# Patient Record
Sex: FEMALE | Race: BLACK OR AFRICAN AMERICAN | Employment: OTHER | URBAN - METROPOLITAN AREA
[De-identification: names, ages, dates, MRNs, and addresses within clinical notes are randomized per-mention and may not be internally consistent; named-entity substitution may affect disease eponyms.]

---

## 2017-04-25 ENCOUNTER — HOSPITAL ENCOUNTER (EMERGENCY)
Age: 57
Discharge: HOME | End: 2017-04-25
Attending: EMERGENCY MEDICINE
Payer: MEDICARE

## 2017-04-25 VITALS
RESPIRATION RATE: 18 BRPM | TEMPERATURE: 97.8 F | DIASTOLIC BLOOD PRESSURE: 96 MMHG | HEIGHT: 64 IN | OXYGEN SATURATION: 96 % | HEART RATE: 63 BPM | BODY MASS INDEX: 39.09 KG/M2 | SYSTOLIC BLOOD PRESSURE: 162 MMHG | WEIGHT: 229 LBS

## 2017-04-25 DIAGNOSIS — I10 ESSENTIAL HYPERTENSION: Primary | ICD-10-CM

## 2017-04-25 LAB
ANION GAP BLD CALC-SCNC: 19 MMOL/L (ref 5–15)
BUN BLD-MCNC: 13 MG/DL (ref 9–20)
CA-I BLD-MCNC: 1.13 MMOL/L (ref 1.12–1.32)
CHLORIDE BLD-SCNC: 104 MMOL/L (ref 98–107)
CO2 BLD-SCNC: 24 MMOL/L (ref 21–32)
CREAT BLD-MCNC: 0.6 MG/DL (ref 0.6–1.3)
GLUCOSE BLD-MCNC: 85 MG/DL (ref 65–100)
HCT VFR BLD CALC: 44 % (ref 35–47)
HGB BLD-MCNC: 15 GM/DL (ref 11.5–16)
POTASSIUM BLD-SCNC: 3.7 MMOL/L (ref 3.5–5.1)
SERVICE CMNT-IMP: ABNORMAL
SODIUM BLD-SCNC: 142 MMOL/L (ref 136–145)

## 2017-04-25 PROCEDURE — 74011250637 HC RX REV CODE- 250/637: Performed by: PHYSICIAN ASSISTANT

## 2017-04-25 PROCEDURE — 93005 ELECTROCARDIOGRAM TRACING: CPT

## 2017-04-25 PROCEDURE — 80047 BASIC METABLC PNL IONIZED CA: CPT

## 2017-04-25 PROCEDURE — 99284 EMERGENCY DEPT VISIT MOD MDM: CPT

## 2017-04-25 RX ORDER — LISINOPRIL 20 MG/1
20 TABLET ORAL DAILY
Qty: 20 TAB | Refills: 0 | Status: SHIPPED | OUTPATIENT
Start: 2017-04-25 | End: 2017-05-01 | Stop reason: SDUPTHER

## 2017-04-25 RX ORDER — METOPROLOL TARTRATE 25 MG/1
TABLET, FILM COATED ORAL
Qty: 10 TAB | Refills: 0 | Status: SHIPPED | OUTPATIENT
Start: 2017-04-25 | End: 2017-05-01 | Stop reason: SDUPTHER

## 2017-04-25 RX ORDER — AMLODIPINE BESYLATE 2.5 MG/1
2.5 TABLET ORAL DAILY
Qty: 20 TAB | Refills: 0 | Status: SHIPPED | OUTPATIENT
Start: 2017-04-25 | End: 2017-05-01 | Stop reason: SDUPTHER

## 2017-04-25 RX ORDER — METOPROLOL TARTRATE 25 MG/1
TABLET, FILM COATED ORAL
Qty: 10 TAB | Refills: 11 | Status: SHIPPED | OUTPATIENT
Start: 2017-04-25 | End: 2017-04-25

## 2017-04-25 RX ORDER — LISINOPRIL 20 MG/1
20 TABLET ORAL DAILY
Qty: 20 TAB | Refills: 11 | Status: SHIPPED | OUTPATIENT
Start: 2017-04-25 | End: 2017-04-25

## 2017-04-25 RX ORDER — LISINOPRIL 20 MG/1
20 TABLET ORAL
Status: COMPLETED | OUTPATIENT
Start: 2017-04-25 | End: 2017-04-25

## 2017-04-25 RX ORDER — AMLODIPINE BESYLATE 2.5 MG/1
2.5 TABLET ORAL DAILY
Qty: 20 TAB | Refills: 0 | Status: SHIPPED | OUTPATIENT
Start: 2017-04-25 | End: 2017-04-25

## 2017-04-25 RX ORDER — CLOPIDOGREL BISULFATE 75 MG/1
75 TABLET ORAL DAILY
Qty: 20 TAB | Refills: 0 | Status: SHIPPED | OUTPATIENT
Start: 2017-04-25 | End: 2017-05-01 | Stop reason: SDUPTHER

## 2017-04-25 RX ORDER — CLOPIDOGREL BISULFATE 75 MG/1
75 TABLET ORAL DAILY
Qty: 20 TAB | Refills: 11 | Status: SHIPPED | OUTPATIENT
Start: 2017-04-25 | End: 2017-04-25

## 2017-04-25 RX ORDER — AMLODIPINE BESYLATE 5 MG/1
2.5 TABLET ORAL
Status: COMPLETED | OUTPATIENT
Start: 2017-04-25 | End: 2017-04-25

## 2017-04-25 RX ORDER — METOPROLOL TARTRATE 25 MG/1
12.5 TABLET, FILM COATED ORAL
Status: COMPLETED | OUTPATIENT
Start: 2017-04-25 | End: 2017-04-25

## 2017-04-25 RX ADMIN — LISINOPRIL 20 MG: 20 TABLET ORAL at 11:48

## 2017-04-25 RX ADMIN — METOPROLOL TARTRATE 12.5 MG: 25 TABLET, FILM COATED ORAL at 11:48

## 2017-04-25 RX ADMIN — AMLODIPINE BESYLATE 2.5 MG: 5 TABLET ORAL at 11:48
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
## 2017-04-25 NOTE — DISCHARGE INSTRUCTIONS

## 2017-04-25 NOTE — ED NOTES
Patient verbalizes understanding of discharge instructions. Ambulatory and in no acute distress at discharge.    Visit Vitals    BP (!) 162/96 (BP 1 Location: Right arm, BP Patient Position: At rest)    Pulse 63    Temp 97.8 °F (36.6 °C)    Resp 18    Ht 5' 4\" (1.626 m)    Wt 103.9 kg (229 lb)    SpO2 96%    BMI 39.31 kg/m2

## 2017-04-27 ENCOUNTER — PATIENT OUTREACH (OUTPATIENT)
Dept: INTERNAL MEDICINE CLINIC | Age: 57
End: 2017-04-27

## 2017-04-28 NOTE — PROGRESS NOTES
Attempted to reach patient via telephone, left message to call Panel Manager. Patient listed on [facility] dated 4/25/2017 for Hypertension. Need to complete Initial Intake and Post Assessment. PAtient has f/u appointment for 5/1/2017. Will see patient at that time.

## 2017-04-30 LAB
ATRIAL RATE: 78 BPM
CALCULATED P AXIS, ECG09: 31 DEGREES
CALCULATED R AXIS, ECG10: 6 DEGREES
CALCULATED T AXIS, ECG11: 44 DEGREES
DIAGNOSIS, 93000: NORMAL
P-R INTERVAL, ECG05: 164 MS
Q-T INTERVAL, ECG07: 384 MS
QRS DURATION, ECG06: 84 MS
QTC CALCULATION (BEZET), ECG08: 437 MS
VENTRICULAR RATE, ECG03: 78 BPM

## 2017-05-01 ENCOUNTER — OFFICE VISIT (OUTPATIENT)
Dept: INTERNAL MEDICINE CLINIC | Age: 57
End: 2017-05-01

## 2017-05-01 ENCOUNTER — HOSPITAL ENCOUNTER (OUTPATIENT)
Dept: LAB | Age: 57
Discharge: HOME | End: 2017-05-01

## 2017-05-01 VITALS
HEART RATE: 70 BPM | RESPIRATION RATE: 20 BRPM | DIASTOLIC BLOOD PRESSURE: 111 MMHG | WEIGHT: 226.4 LBS | OXYGEN SATURATION: 100 % | TEMPERATURE: 97.8 F | HEIGHT: 64 IN | BODY MASS INDEX: 38.65 KG/M2 | SYSTOLIC BLOOD PRESSURE: 148 MMHG

## 2017-05-01 DIAGNOSIS — Z86.73 HISTORY OF CVA (CEREBROVASCULAR ACCIDENT): ICD-10-CM

## 2017-05-01 DIAGNOSIS — I10 ESSENTIAL HYPERTENSION: Primary | ICD-10-CM

## 2017-05-01 DIAGNOSIS — Z12.39 BREAST CANCER SCREENING: ICD-10-CM

## 2017-05-01 DIAGNOSIS — E66.01 MORBID OBESITY DUE TO EXCESS CALORIES (HCC): Chronic | ICD-10-CM

## 2017-05-01 DIAGNOSIS — I70.90 ASVD (ARTERIOSCLEROTIC VASCULAR DISEASE): ICD-10-CM

## 2017-05-01 LAB
GLUCOSE POC: 126 MG/DL
HBA1C MFR BLD HPLC: 5.7 %

## 2017-05-01 PROCEDURE — 99001 SPECIMEN HANDLING PT-LAB: CPT | Performed by: FAMILY MEDICINE

## 2017-05-01 RX ORDER — ATORVASTATIN CALCIUM 10 MG/1
10 TABLET, FILM COATED ORAL
Qty: 30 TAB | Refills: 11 | Status: SHIPPED | OUTPATIENT
Start: 2017-05-01 | End: 2018-11-26 | Stop reason: SDUPTHER

## 2017-05-01 RX ORDER — LISINOPRIL 20 MG/1
20 TABLET ORAL DAILY
Qty: 30 TAB | Refills: 11 | Status: SHIPPED | OUTPATIENT
Start: 2017-05-01 | End: 2018-11-26 | Stop reason: SDUPTHER

## 2017-05-01 RX ORDER — AMLODIPINE BESYLATE 2.5 MG/1
2.5 TABLET ORAL DAILY
Qty: 30 TAB | Refills: 11 | Status: SHIPPED | OUTPATIENT
Start: 2017-05-01 | End: 2018-11-26 | Stop reason: SDUPTHER

## 2017-05-01 RX ORDER — METOPROLOL TARTRATE 25 MG/1
TABLET, FILM COATED ORAL
Qty: 30 TAB | Refills: 11 | Status: SHIPPED | OUTPATIENT
Start: 2017-05-01 | End: 2018-11-26 | Stop reason: SDUPTHER

## 2017-05-01 RX ORDER — FLUOXETINE HYDROCHLORIDE 20 MG/1
CAPSULE ORAL
Qty: 30 CAP | Refills: 11 | Status: SHIPPED | OUTPATIENT
Start: 2017-05-01 | End: 2018-11-26

## 2017-05-01 RX ORDER — NORTRIPTYLINE HYDROCHLORIDE 10 MG/1
10 CAPSULE ORAL
Qty: 30 CAP | Refills: 11 | Status: SHIPPED | OUTPATIENT
Start: 2017-05-01 | End: 2018-11-26

## 2017-05-01 RX ORDER — GUAIFENESIN 100 MG/5ML
81 LIQUID (ML) ORAL DAILY
Qty: 30 TAB | Refills: 11 | Status: SHIPPED | OUTPATIENT
Start: 2017-05-01 | End: 2019-07-29 | Stop reason: SDUPTHER

## 2017-05-01 RX ORDER — CLOPIDOGREL BISULFATE 75 MG/1
75 TABLET ORAL DAILY
Qty: 30 TAB | Refills: 11 | Status: SHIPPED | OUTPATIENT
Start: 2017-05-01 | End: 2018-11-26 | Stop reason: SDUPTHER

## 2017-05-01 NOTE — MR AVS SNAPSHOT
Visit Information Date & Time Provider Department Dept. Phone Encounter #  
 5/1/2017  7:45 AM Zoë Corea, [address] [id number] Follow-up Instructions Return for HTN. Upcoming Health Maintenance Date Due Hepatitis C Screening 1960 COLONOSCOPY 9/4/1976 DTaP/Tdap/Td series (1 - Tdap) 9/4/1979 PAP AKA CERVICAL CYTOLOGY 9/4/1979 BREAST CANCER SCRN MAMMOGRAM 9/4/2008 INFLUENZA AGE 9 TO ADULT 8/1/2017 Allergies as of 5/1/2017  Review Complete On: 5/1/2017 By: Zoë Corea MD  
  
 Severity Noted Reaction Type Reactions Contrast Dye [Iodine]  03/02/2016    Hives Current Immunizations  Reviewed on 10/28/2014 Name Date Influenza Vaccine Intradermal PF 10/28/2014 Influenza Vaccine Split 10/27/2012  2:33 PM  
 Pneumococcal Vaccine (Unspecified Type) 10/27/2012  2:33 PM  
  
 Not reviewed this visit You Were Diagnosed With   
  
 Codes Comments Essential hypertension    -  Primary ICD-10-CM: I10 
ICD-9-CM: 401.9 History of CVA (cerebrovascular accident)     ICD-10-CM: Z86.73 
ICD-9-CM: V12.54   
 ASVD (arteriosclerotic vascular disease)     ICD-10-CM: I70.90 ICD-9-CM: 440.9 Breast cancer screening     ICD-10-CM: Z12.39 
ICD-9-CM: V76.10 Morbid obesity due to excess calories (HCC)     ICD-10-CM: E66.01 
ICD-9-CM: 278.01 Vitals BP Pulse Temp Resp Height(growth percentile) Weight(growth percentile) (!) 148/111 (BP 1 Location: Right arm, BP Patient Position: Sitting) 70 97.8 °F (36.6 °C) (Oral) 20 5' 4\" (1.626 m) 226 lb 6.4 oz (102.7 kg) SpO2 BMI OB Status Smoking Status 100% 38.86 kg/m2 Menopause Never Smoker Vitals History BMI and BSA Data Body Mass Index Body Surface Area  
 38.86 kg/m 2 2.15 m 2 Preferred Pharmacy Pharmacy Name Phone [facility] [address] [address] [phone] Your Updated Medication List  
  
   
This list is accurate as of: 5/1/17  9:21 AM.  Always use your most recent med list. amLODIPine 2.5 mg tablet Commonly known as:  Sue Cailin Take 1 Tab by mouth daily. aspirin 81 mg chewable tablet Take 1 Tab by mouth daily. atorvastatin 10 mg tablet Commonly known as:  LIPITOR Take 1 Tab by mouth nightly. clopidogrel 75 mg Tab Commonly known as:  PLAVIX Take 1 Tab by mouth daily. Indications: stroke prevention FLUoxetine 20 mg capsule Commonly known as:  PROzac TAKE ONE CAPSULE BY MOUTH ONCE DAILY  
  
 lisinopril 20 mg tablet Commonly known as:  Dulcie Teaganey Take 1 Tab by mouth daily. Indications: hypertension  
  
 metoprolol tartrate 25 mg tablet Commonly known as:  LOPRESSOR  
TAKE ONE-HALF TABLET BY MOUTH TWICE DAILY  
  
 nortriptyline 10 mg capsule Commonly known as:  PAMELOR Take 1 Cap by mouth nightly. Prescriptions Sent to Pharmacy Refills  
 aspirin 81 mg chewable tablet 11 Sig: Take 1 Tab by mouth daily. Class: Normal  
 Pharmacy: [address], [address] [place] [address] Ph #: [phone] Route: Oral  
 nortriptyline (PAMELOR) 10 mg capsule 11 Sig: Take 1 Cap by mouth nightly. Class: Normal  
 Pharmacy: [address], [address] [place] [address] Ph #: [phone] Route: Oral  
 atorvastatin (LIPITOR) 10 mg tablet 11 Sig: Take 1 Tab by mouth nightly. Class: Normal  
 Pharmacy: [address], [address] [place] [address] Ph #: [phone] Route: Oral  
 FLUoxetine (PROZAC) 20 mg capsule 11 Sig: TAKE ONE CAPSULE BY MOUTH ONCE DAILY Class: Normal  
 Pharmacy: [address], [address] [facility] Ph #: [phone]  
 metoprolol tartrate (LOPRESSOR) 25 mg tablet 11 Sig: TAKE ONE-HALF TABLET BY MOUTH TWICE DAILY  Class: Normal  
 Pharmacy: [address], [address] Ph #: [phone]  
 amLODIPine (NORVASC) 2.5 mg tablet 11 Sig: Take 1 Tab by mouth daily. Class: Normal  
 Pharmacy: [address] , [address] Ph #: [phone] Route: Oral  
 clopidogrel (PLAVIX) 75 mg tab 11 Sig: Take 1 Tab by mouth daily. Indications: stroke prevention Class: Normal  
 Pharmacy: [address], [address] Ph #: [phone] Route: Oral  
 lisinopril (PRINIVIL, ZESTRIL) 20 mg tablet 11 Sig: Take 1 Tab by mouth daily. Indications: hypertension Class: Normal  
 Pharmacy: [address], [address] Ph #: [phone] Route: Oral  
  
We Performed the Following AMB POC GLUCOSE BLOOD, BY GLUCOSE MONITORING DEVICE [37171 CPT(R)] AMB POC HEMOGLOBIN A1C [81203 CPT(R)] LIPID PANEL [22789 CPT(R)] METABOLIC PANEL, COMPREHENSIVE [69683 CPT(R)] Follow-up Instructions Return for HTN. To-Do List   
 05/01/2017 Imaging:  [facility] MAMMO BI SCREENING INCL CAD Introducing [facility]! [facility] introduces [employer] patient portal. Now you can access parts of your medical record, email your doctor's office, and request medication refills online. 1. In your internet browser, go to [email or web address] 2. Click on the First Time User? Click Here link in the Sign In box. You will see the New Member Sign Up page. 3. Enter your [employer] Access Code exactly as it appears below. You will not need to use this code after youve completed the sign-up process. If you do not sign up before the expiration date, you must request a new code. · [employer] Access Code: [email or web address] Expires: 7/30/2017  7:56 AM 
 
4.  Enter the last four digits of your Social Security Number (xxxx) and Date of Birth (mm/dd/yyyy) as indicated and click Submit. You will be taken to the next sign-up page. 5. Create a [employer] ID. This will be your [employer] login ID and cannot be changed, so think of one that is secure and easy to remember. 6. Create a [employer] password. You can change your password at any time. 7. Enter your Password Reset Question and Answer. This can be used at a later time if you forget your password. 8. Enter your e-mail address. You will receive e-mail notification when new information is available in [address] 9. Click Sign Up. You can now view and download portions of your medical record. 10. Click the Download Summary menu link to download a portable copy of your medical information. If you have questions, please visit the Frequently Asked Questions section of the [employer] website. Remember, [employer] is NOT to be used for urgent needs. For medical emergencies, dial 911. Now available from your iPhone and Android! Please provide this summary of care documentation to your next provider. Your primary care clinician is listed as Rupa Hernandez. If you have any questions after today's visit, please call [phone].

## 2017-05-01 NOTE — PROGRESS NOTES
1. Have you been to the ER, urgent care clinic since your last visit? Hospitalized since your last visit? Yes When: 4/25/17 [facility] ED /medication refill. 2. Have you seen or consulted any other health care providers outside of the [facility] since your last visit? Include any pap smears or colon screening.  No.

## 2017-05-01 NOTE — PROGRESS NOTES
Suzzanne Hammans is a 60 y.o. female and presents with Medication Refill  . Pt is on on her meds but didn't take meds this am. No CP, SOB, or edema. Review of Systems  Constitutional: negative for fevers, chills, anorexia and weight loss  Eyes:   negative for visual disturbance and irritation  ENT:   negative for tinnitus,sore throat,nasal congestion,ear pains. hoarseness  Respiratory:  negative for cough, hemoptysis, dyspnea,wheezing  CV:   negative for chest pain, palpitations, lower extremity edema  GI:   negative for nausea, vomiting, diarrhea, abdominal pain,melena  Endo:               negative for polyuria,polydipsia,polyphagia,heat intolerance  Genitourinary: negative for frequency, dysuria and hematuria  Integument:  negative for rash and pruritus  Hematologic:  negative for easy bruising and gum/nose bleeding  Musculoskel: negative for myalgias, arthralgias, back pain, muscle weakness, joint pain  Neurological:  negative for headaches, dizziness, vertigo, memory problems and gait   Behavl/Psych: negative for feelings of anxiety, depression, mood changes    Past Medical History:   Diagnosis Date    Anxiety     Bipolar 1 disorder (HCC)     Depression     GERD (gastroesophageal reflux disease)     Hypertension     Psychiatric disorder     bipolar    Stroke [facility])      Past Surgical History:   Procedure Laterality Date    HX ADENOIDECTOMY      sweat glands    HX TONSILLECTOMY      HX TUBAL LIGATION       Social History     Social History    Marital status:      Spouse name: N/A    Number of children: N/A    Years of education: N/A     Social History Main Topics    Smoking status: Never Smoker    Smokeless tobacco: None    Alcohol use No      Comment: \"once a month\"    Drug use: No      Comment: none x 1 month     Sexual activity: Not Currently     Other Topics Concern    None     Social History Narrative    ** Merged History Encounter **          Current Outpatient Prescriptions   Medication Sig Dispense Refill    aspirin 81 mg chewable tablet Take 1 Tab by mouth daily. 30 Tab 11    nortriptyline (PAMELOR) 10 mg capsule Take 1 Cap by mouth nightly. 30 Cap 11    atorvastatin (LIPITOR) 10 mg tablet Take 1 Tab by mouth nightly. 30 Tab 11    FLUoxetine (PROZAC) 20 mg capsule TAKE ONE CAPSULE BY MOUTH ONCE DAILY 30 Cap 11    metoprolol tartrate (LOPRESSOR) 25 mg tablet TAKE ONE-HALF TABLET BY MOUTH TWICE DAILY 30 Tab 11    amLODIPine (NORVASC) 2.5 mg tablet Take 1 Tab by mouth daily. 30 Tab 11    clopidogrel (PLAVIX) 75 mg tab Take 1 Tab by mouth daily. Indications: stroke prevention 30 Tab 11    lisinopril (PRINIVIL, ZESTRIL) 20 mg tablet Take 1 Tab by mouth daily. Indications: hypertension 30 Tab 11     Allergies   Allergen Reactions    Contrast Dye [Iodine] Hives       Objective:  Visit Vitals    BP (!) 148/111 (BP 1 Location: Right arm, BP Patient Position: Sitting)    Pulse 70    Temp 97.8 °F (36.6 °C) (Oral)    Resp 20    Ht 5' 4\" (1.626 m)    Wt 226 lb 6.4 oz (102.7 kg)    SpO2 100%    BMI 38.86 kg/m2     Physical Exam:   General appearance - alert, well appearing, and in no distress  Mental status - alert, oriented to person, place, and time  Chest - clear to auscultation, no wheezes, rales or rhonchi, symmetric air entry   Heart - normal rate, regular rhythm, normal S1, S2, no murmurs, rubs, clicks or gallops   Abdomen - soft, nontender, nondistended, no masses or organomegaly  Lymph- no adenopathy palpable  Ext-peripheral pulses normal, no pedal edema, no clubbing or cyanosis  Skin-Warm and dry. no hyperpigmentation, vitiligo, or suspicious lesions  Neuro -alert, oriented, normal speech, no focal findings or movement disorder noted      Assessment/Plan:    ICD-10-CM ICD-9-CM    1. Essential hypertension I10 401.9    2. History of CVA (cerebrovascular accident) Z86.73 V12.54    3. ASVD (arteriosclerotic vascular disease) I70.90 440.9    4.  Breast cancer screening Z12.39 V76.10 [facility] MAMMO BI SCREENING INCL CAD   5. Morbid obesity due to excess calories ([facility]) E00.68 694.36 METABOLIC PANEL, COMPREHENSIVE      LIPID PANEL      AMB POC HEMOGLOBIN A1C      AMB POC GLUCOSE BLOOD, BY GLUCOSE MONITORING DEVICE     Orders Placed This Encounter    [facility] MAMMO BI SCREENING INCL CAD     Standing Status:   Future     Standing Expiration Date:   6/1/2018     Order Specific Question:   Reason for Exam     Answer:   screening    METABOLIC PANEL, COMPREHENSIVE    LIPID PANEL    AMB POC HEMOGLOBIN A1C    AMB POC GLUCOSE BLOOD, BY GLUCOSE MONITORING DEVICE    aspirin 81 mg chewable tablet     Sig: Take 1 Tab by mouth daily. Dispense:  30 Tab     Refill:  11    nortriptyline (PAMELOR) 10 mg capsule     Sig: Take 1 Cap by mouth nightly. Dispense:  30 Cap     Refill:  11    atorvastatin (LIPITOR) 10 mg tablet     Sig: Take 1 Tab by mouth nightly. Dispense:  30 Tab     Refill:  11    FLUoxetine (PROZAC) 20 mg capsule     Sig: TAKE ONE CAPSULE BY MOUTH ONCE DAILY     Dispense:  30 Cap     Refill:  11    metoprolol tartrate (LOPRESSOR) 25 mg tablet     Sig: TAKE ONE-HALF TABLET BY MOUTH TWICE DAILY     Dispense:  30 Tab     Refill:  11    amLODIPine (NORVASC) 2.5 mg tablet     Sig: Take 1 Tab by mouth daily. Dispense:  30 Tab     Refill:  11    clopidogrel (PLAVIX) 75 mg tab     Sig: Take 1 Tab by mouth daily. Indications: stroke prevention     Dispense:  30 Tab     Refill:  11    lisinopril (PRINIVIL, ZESTRIL) 20 mg tablet     Sig: Take 1 Tab by mouth daily. Indications: hypertension     Dispense:  30 Tab     Refill:  11     HTN- ? Control. Check BP at home 1-3 times/week and bring readings to next appt. Hyperlipidemia- CMP  & lipid  H/o CVA- restarted plavix and ASA  Breast CA screening- mammogram ordered        Follow-up Disposition:  Return for HTN.

## 2017-05-02 LAB
ALBUMIN SERPL-MCNC: 4.5 G/DL (ref 3.5–5.5)
ALBUMIN/GLOB SERPL: 1.7 {RATIO} (ref 1.2–2.2)
ALP SERPL-CCNC: 117 IU/L (ref 39–117)
ALT SERPL-CCNC: 11 IU/L (ref 0–32)
AST SERPL-CCNC: 15 IU/L (ref 0–40)
BILIRUB SERPL-MCNC: 0.4 MG/DL (ref 0–1.2)
BUN SERPL-MCNC: 10 MG/DL (ref 6–24)
BUN/CREAT SERPL: 14 (ref 9–23)
CALCIUM SERPL-MCNC: 9.5 MG/DL (ref 8.7–10.2)
CHLORIDE SERPL-SCNC: 99 MMOL/L (ref 96–106)
CHOLEST SERPL-MCNC: 227 MG/DL (ref 100–199)
CO2 SERPL-SCNC: 25 MMOL/L (ref 18–29)
CREAT SERPL-MCNC: 0.72 MG/DL (ref 0.57–1)
GLOBULIN SER CALC-MCNC: 2.6 G/DL (ref 1.5–4.5)
GLUCOSE SERPL-MCNC: 98 MG/DL (ref 65–99)
HDLC SERPL-MCNC: 62 MG/DL
INTERPRETATION, 910389: NORMAL
LDLC SERPL CALC-MCNC: 137 MG/DL (ref 0–99)
POTASSIUM SERPL-SCNC: 4.2 MMOL/L (ref 3.5–5.2)
PROT SERPL-MCNC: 7.1 G/DL (ref 6–8.5)
SODIUM SERPL-SCNC: 140 MMOL/L (ref 134–144)
TRIGL SERPL-MCNC: 142 MG/DL (ref 0–149)
VLDLC SERPL CALC-MCNC: 28 MG/DL (ref 5–40)

## 2017-06-01 ENCOUNTER — OFFICE VISIT (OUTPATIENT)
Dept: INTERNAL MEDICINE CLINIC | Age: 57
End: 2017-06-01

## 2017-06-01 VITALS
SYSTOLIC BLOOD PRESSURE: 136 MMHG | TEMPERATURE: 97.4 F | HEIGHT: 64 IN | DIASTOLIC BLOOD PRESSURE: 91 MMHG | BODY MASS INDEX: 38.41 KG/M2 | HEART RATE: 71 BPM | RESPIRATION RATE: 18 BRPM | OXYGEN SATURATION: 98 % | WEIGHT: 225 LBS

## 2017-06-01 DIAGNOSIS — I10 ESSENTIAL HYPERTENSION: Primary | ICD-10-CM

## 2017-06-01 NOTE — PROGRESS NOTES
Ivone Cortes is a 60 y.o. female and presents with Hypertension and Follow-up  . Compliant w/ meds. No CP, SOB, or edema. Hasn't been checking BP at home but she is on all of her meds at this time. Review of Systems  Constitutional: negative for fevers, chills, anorexia and weight loss  Eyes:   negative for visual disturbance and irritation  ENT:   negative for tinnitus,sore throat,nasal congestion,ear pains. hoarseness  Respiratory:  negative for cough, hemoptysis, dyspnea,wheezing  CV:   negative for chest pain, palpitations, lower extremity edema  GI:   negative for nausea, vomiting, diarrhea, abdominal pain,melena  Endo:               negative for polyuria,polydipsia,polyphagia,heat intolerance  Genitourinary: negative for frequency, dysuria and hematuria  Integument:  negative for rash and pruritus  Hematologic:  negative for easy bruising and gum/nose bleeding  Musculoskel: negative for myalgias, arthralgias, back pain, muscle weakness, joint pain  Neurological:  negative for headaches, dizziness, vertigo, memory problems and gait   Behavl/Psych: negative for feelings of anxiety, depression, mood changes    Past Medical History:   Diagnosis Date    Anxiety     Bipolar 1 disorder (HCC)     Depression     GERD (gastroesophageal reflux disease)     Hypertension     Psychiatric disorder     bipolar    Stroke [facility])      Past Surgical History:   Procedure Laterality Date    HX ADENOIDECTOMY      sweat glands    HX TONSILLECTOMY      HX TUBAL LIGATION       Social History     Social History    Marital status:      Spouse name: N/A    Number of children: N/A    Years of education: N/A     Social History Main Topics    Smoking status: Never Smoker    Smokeless tobacco: None    Alcohol use No      Comment: \"once a month\"    Drug use: No      Comment: none x 1 month     Sexual activity: Not Currently     Other Topics Concern    None     Social History Narrative    ** Merged History Encounter **          Current Outpatient Prescriptions   Medication Sig Dispense Refill    aspirin 81 mg chewable tablet Take 1 Tab by mouth daily. 30 Tab 11    nortriptyline (PAMELOR) 10 mg capsule Take 1 Cap by mouth nightly. 30 Cap 11    atorvastatin (LIPITOR) 10 mg tablet Take 1 Tab by mouth nightly. 30 Tab 11    FLUoxetine (PROZAC) 20 mg capsule TAKE ONE CAPSULE BY MOUTH ONCE DAILY 30 Cap 11    metoprolol tartrate (LOPRESSOR) 25 mg tablet TAKE ONE-HALF TABLET BY MOUTH TWICE DAILY 30 Tab 11    amLODIPine (NORVASC) 2.5 mg tablet Take 1 Tab by mouth daily. 30 Tab 11    clopidogrel (PLAVIX) 75 mg tab Take 1 Tab by mouth daily. Indications: stroke prevention 30 Tab 11    lisinopril (PRINIVIL, ZESTRIL) 20 mg tablet Take 1 Tab by mouth daily. Indications: hypertension 30 Tab 11     Allergies   Allergen Reactions    Contrast Dye [Iodine] Hives       Objective:  Visit Vitals    BP (!) 136/91 (BP 1 Location: Left arm, BP Patient Position: Sitting)    Pulse 71    Temp 97.4 °F (36.3 °C) (Oral)    Resp 18    Ht 5' 4\" (1.626 m)    Wt 225 lb (102.1 kg)    SpO2 98%    BMI 38.62 kg/m2     Physical Exam:   General appearance - alert, well appearing, and in no distress  Mental status - alert, oriented to person, place, and time  Chest - clear to auscultation, no wheezes, rales or rhonchi, symmetric air entry   Heart - normal rate, regular rhythm, normal S1, S2, no murmurs, rubs, clicks or gallops   Abdomen - soft, nontender, nondistended, no masses or organomegaly  Lymph- no adenopathy palpable  Ext-peripheral pulses normal, no pedal edema, no clubbing or cyanosis  Skin-Warm and dry. no hyperpigmentation, vitiligo, or suspicious lesions  Neuro -alert, oriented, normal speech, no focal findings or movement disorder noted    Assessment/Plan:    ICD-10-CM ICD-9-CM    1. Essential hypertension I10 401.9      No orders of the defined types were placed in this encounter.     HTN- Well controlled  F/u in 6m    Follow-up Disposition:  Return in about 6 months (around 12/1/2017) for HTN.

## 2017-06-01 NOTE — MR AVS SNAPSHOT
Visit Information Date & Time Provider Department Dept. Phone Encounter #  
 6/1/2017  9:[address]  [address], [address]  [id number] Follow-up Instructions Return in about 6 months (around 12/1/2017) for HTN. Upcoming Health Maintenance Date Due Hepatitis C Screening 1960 COLONOSCOPY 9/4/1976 DTaP/Tdap/Td series (1 - Tdap) 9/4/1979 PAP AKA CERVICAL CYTOLOGY 9/4/1979 BREAST CANCER SCRN MAMMOGRAM 9/4/2008 INFLUENZA AGE 9 TO ADULT 8/1/2017 Allergies as of 6/1/2017  Review Complete On: 6/1/2017 By: Damien Ambrose LPN Severity Noted Reaction Type Reactions Contrast Dye [Iodine]  03/02/2016    Hives Current Immunizations  Reviewed on 10/28/2014 Name Date Influenza Vaccine Intradermal PF 10/28/2014 Influenza Vaccine Split 10/27/2012  2:33 PM  
 Pneumococcal Vaccine (Unspecified Type) 10/27/2012  2:33 PM  
  
 Not reviewed this visit Vitals BP Pulse Temp Resp Height(growth percentile) Weight(growth percentile) (!) 136/91 (BP 1 Location: Left arm, BP Patient Position: Sitting) 71 97.4 °F (36.3 °C) (Oral) 18 5' 4\" (1.626 m) 225 lb (102.1 kg) SpO2 BMI OB Status Smoking Status 98% 38.62 kg/m2 Menopause Never Smoker Vitals History BMI and BSA Data Body Mass Index Body Surface Area  
 38.62 kg/m 2 2.15 m 2 Preferred Pharmacy Pharmacy Name Phone [facility] [address] , [address] [place] [address] [phone] Your Updated Medication List  
  
   
This list is accurate as of: 6/1/17  9:18 AM.  Always use your most recent med list. amLODIPine 2.5 mg tablet Commonly known as:  Juan Luis Jarvis Take 1 Tab by mouth daily. aspirin 81 mg chewable tablet Take 1 Tab by mouth daily. atorvastatin 10 mg tablet Commonly known as:  LIPITOR Take 1 Tab by mouth nightly. clopidogrel 75 mg Tab Commonly known as:  PLAVIX Take 1 Tab by mouth daily. Indications: stroke prevention FLUoxetine 20 mg capsule Commonly known as:  PROzac TAKE ONE CAPSULE BY MOUTH ONCE DAILY  
  
 lisinopril 20 mg tablet Commonly known as:  [place] Take 1 Tab by mouth daily. Indications: hypertension  
  
 metoprolol tartrate 25 mg tablet Commonly known as:  LOPRESSOR  
TAKE ONE-HALF TABLET BY MOUTH TWICE DAILY  
  
 nortriptyline 10 mg capsule Commonly known as:  PAMELOR Take 1 Cap by mouth nightly. Follow-up Instructions Return in about 6 months (around 12/1/2017) for HTN. Introducing [facility]! Mis Salas introduces [email or web address] patient portal. Now you can access parts of your medical record, email your doctor's office, and request medication refills online. 1. In your internet browser, go to [email or web address] 2. Click on the First Time User? Click Here link in the Sign In box. You will see the New Member Sign Up page. 3. Enter your [email or web address] Access Code exactly as it appears below. You will not need to use this code after youve completed the sign-up process. If you do not sign up before the expiration date, you must request a new code. · [email or web address] Access Code: [email or web address] Expires: 7/30/2017  7:56 AM 
 
4. Enter the last four digits of your Social Security Number (xxxx) and Date of Birth (mm/dd/yyyy) as indicated and click Submit. You will be taken to the next sign-up page. 5. Create a [employer] ID. This will be your [email or web address] login ID and cannot be changed, so think of one that is secure and easy to remember. 6. Create a [email or web address] password. You can change your password at any time. 7. Enter your Password Reset Question and Answer. This can be used at a later time if you forget your password. 8. Enter your e-mail address. You will receive e-mail notification when new information is available in [address] 9. Click Sign Up. You can now view and download portions of your medical record. 10. Click the Download Summary menu link to download a portable copy of your medical information. If you have questions, please visit the Frequently Asked Questions section of the [employer] website. Remember, [employer] is NOT to be used for urgent needs. For medical emergencies, dial 911. Now available from your iPhone and Android! Please provide this summary of care documentation to your next provider. Your primary care clinician is listed as Erwin Ledesma. If you have any questions after today's visit, please call [phone].

## 2017-06-01 NOTE — PROGRESS NOTES
1. Have you been to the ER, urgent care clinic since your last visit? Hospitalized since your last visit? No.    2. Have you seen or consulted any other health care providers outside of the [facility] since your last visit? Include any pap smears or colon screening.  No.

## 2018-11-26 ENCOUNTER — HOSPITAL ENCOUNTER (OUTPATIENT)
Dept: LAB | Age: 58
Discharge: HOME | End: 2018-11-26
Payer: MEDICARE

## 2018-11-26 ENCOUNTER — OFFICE VISIT (OUTPATIENT)
Dept: INTERNAL MEDICINE CLINIC | Age: 58
End: 2018-11-26

## 2018-11-26 VITALS
SYSTOLIC BLOOD PRESSURE: 156 MMHG | BODY MASS INDEX: 36.04 KG/M2 | TEMPERATURE: 97.4 F | HEART RATE: 78 BPM | HEIGHT: 64 IN | OXYGEN SATURATION: 100 % | WEIGHT: 211.1 LBS | RESPIRATION RATE: 18 BRPM | DIASTOLIC BLOOD PRESSURE: 104 MMHG

## 2018-11-26 DIAGNOSIS — G89.29 CHRONIC PAIN OF RIGHT KNEE: ICD-10-CM

## 2018-11-26 DIAGNOSIS — Z01.419 WELL WOMAN EXAM WITH ROUTINE GYNECOLOGICAL EXAM: ICD-10-CM

## 2018-11-26 DIAGNOSIS — L73.2 HIDRADENITIS: ICD-10-CM

## 2018-11-26 DIAGNOSIS — I10 ESSENTIAL HYPERTENSION: ICD-10-CM

## 2018-11-26 DIAGNOSIS — E55.9 VITAMIN D DEFICIENCY: ICD-10-CM

## 2018-11-26 DIAGNOSIS — G47.30 SLEEP APNEA, UNSPECIFIED TYPE: ICD-10-CM

## 2018-11-26 DIAGNOSIS — M19.90 ARTHRITIS: ICD-10-CM

## 2018-11-26 DIAGNOSIS — Z11.59 SCREENING FOR VIRAL DISEASE: ICD-10-CM

## 2018-11-26 DIAGNOSIS — M25.561 CHRONIC PAIN OF RIGHT KNEE: ICD-10-CM

## 2018-11-26 DIAGNOSIS — Z12.11 ENCOUNTER FOR SCREENING COLONOSCOPY: ICD-10-CM

## 2018-11-26 DIAGNOSIS — Z01.419 WELL WOMAN EXAM WITH ROUTINE GYNECOLOGICAL EXAM: Primary | ICD-10-CM

## 2018-11-26 DIAGNOSIS — M25.521 RIGHT ELBOW PAIN: ICD-10-CM

## 2018-11-26 DIAGNOSIS — Z12.31 SCREENING MAMMOGRAM, ENCOUNTER FOR: ICD-10-CM

## 2018-11-26 DIAGNOSIS — E78.5 HYPERLIPIDEMIA, UNSPECIFIED HYPERLIPIDEMIA TYPE: ICD-10-CM

## 2018-11-26 DIAGNOSIS — Z86.73 HISTORY OF CVA (CEREBROVASCULAR ACCIDENT): ICD-10-CM

## 2018-11-26 DIAGNOSIS — K62.5 RECTAL BLEEDING: ICD-10-CM

## 2018-11-26 DIAGNOSIS — N76.0 ACUTE VAGINITIS: ICD-10-CM

## 2018-11-26 PROCEDURE — 87624 HPV HI-RISK TYP POOLED RSLT: CPT

## 2018-11-26 PROCEDURE — 88175 CYTOPATH C/V AUTO FLUID REDO: CPT

## 2018-11-26 RX ORDER — CHLORHEXIDINE GLUCONATE 4 G/100ML
1 SOLUTION TOPICAL
Qty: 236 ML | Refills: 0 | Status: SHIPPED | OUTPATIENT
Start: 2018-11-26 | End: 2019-07-29 | Stop reason: ALTCHOICE

## 2018-11-26 RX ORDER — METOPROLOL TARTRATE 25 MG/1
TABLET, FILM COATED ORAL
Qty: 30 TAB | Refills: 3 | Status: SHIPPED | OUTPATIENT
Start: 2018-11-26 | End: 2019-05-23 | Stop reason: SDUPTHER

## 2018-11-26 RX ORDER — LISINOPRIL 20 MG/1
20 TABLET ORAL DAILY
Qty: 30 TAB | Refills: 3 | Status: SHIPPED | OUTPATIENT
Start: 2018-11-26 | End: 2019-04-18 | Stop reason: SDUPTHER

## 2018-11-26 RX ORDER — ATORVASTATIN CALCIUM 10 MG/1
10 TABLET, FILM COATED ORAL
Qty: 30 TAB | Refills: 3 | Status: SHIPPED | OUTPATIENT
Start: 2018-11-26 | End: 2019-04-18 | Stop reason: SDUPTHER

## 2018-11-26 RX ORDER — CLOPIDOGREL BISULFATE 75 MG/1
75 TABLET ORAL DAILY
Qty: 30 TAB | Refills: 3 | Status: SHIPPED | OUTPATIENT
Start: 2018-11-26 | End: 2019-04-18 | Stop reason: SDUPTHER

## 2018-11-26 RX ORDER — DICLOFENAC SODIUM 10 MG/G
2 GEL TOPICAL
Qty: 2 G | Refills: 1 | Status: SHIPPED | OUTPATIENT
Start: 2018-11-26 | End: 2019-01-16

## 2018-11-26 RX ORDER — AMLODIPINE BESYLATE 2.5 MG/1
2.5 TABLET ORAL DAILY
Qty: 30 TAB | Refills: 3 | Status: SHIPPED | OUTPATIENT
Start: 2018-11-26 | End: 2019-04-18 | Stop reason: SDUPTHER

## 2018-11-26 NOTE — PROGRESS NOTES
Subjective:   59 y.o. female for Well Woman Check. No LMP recorded. Patient is not currently having periods (Reason: Menopause). She is new to this provider, has not been in this office x 1 year. Not currently followed by any specialists. She has not had any meds in 2-3 months    Hypertension ROS:  not taking medications regularly as instructed, no medication side effects noted, no TIAs, no chest pain on exertion, no dyspnea on exertion, no swelling of ankles. No previous problems w/ bp meds    Depression: again, no meds x 2-3 months, denies withdrawal s/e - denies depressive symptoms at this time    Sleep apnea: never got cpap, has poor sleep, +snores    Social History: single partner, contraception - none. Pertinent past medical hstory: hypertension. Blood in stool x 1 week. Denies constipation. No rectal pain. Never had colonoscopy    Hidradenitis: hx of surgery to bilateral axilla but abscesses have returned. Has not returned to ED for repeat I&D has been treating w/ hot water/compresses which has thus far resolved them. Knee pain: right side, hx of arthritis per patient. No swelling/warmth. Interested in steroid injection. No falls    Right elbow pain: right handed. No swelling, warmth. No inciting injury. No neuropathy.  She does a lot of cleaning around the house but no other repetitive motions that would cause obvious injury    Wt Readings from Last 3 Encounters:   11/26/18 211 lb 1.6 oz (95.8 kg)   06/01/17 225 lb (102.1 kg)   05/01/17 226 lb 6.4 oz (102.7 kg)         Patient Active Problem List   Diagnosis Code    Chest pain, unspecified R07.9    HTN (hypertension) I10    Esophageal reflux K21.9    History of CVA (cerebrovascular accident) Z80.78    Morbid obesity ([facility] E66.01    Cannabis abuse F12.10    ASVD (arteriosclerotic vascular disease) I70.90     Patient Active Problem List    Diagnosis Date Noted    Morbid obesity ([facility] 05/10/2014     Class: Chronic    Cannabis abuse 05/10/2014     Class: Chronic    ASVD (arteriosclerotic vascular disease) 05/10/2014     Class: Chronic    History of CVA (cerebrovascular accident) 01/09/2014     Class: Present on Admission    Chest pain, unspecified 10/24/2012    HTN (hypertension) 10/24/2012    Esophageal reflux 10/24/2012     Current Outpatient Medications   Medication Sig Dispense Refill    chlorhexidine (HIBICLENS) 4 % liquid Apply 118 mL to affected area daily as needed. 236 mL 0    clopidogrel (PLAVIX) 75 mg tab Take 1 Tab by mouth daily. 30 Tab 3    atorvastatin (LIPITOR) 10 mg tablet Take 1 Tab by mouth nightly. 30 Tab 3    amLODIPine (NORVASC) 2.5 mg tablet Take 1 Tab by mouth daily. 30 Tab 3    lisinopril (PRINIVIL, ZESTRIL) 20 mg tablet Take 1 Tab by mouth daily. 30 Tab 3    metoprolol tartrate (LOPRESSOR) 25 mg tablet TAKE ONE-HALF TABLET BY MOUTH TWICE DAILY 30 Tab 3    diclofenac (VOLTAREN) 1 % gel Apply 2 g to affected area four (4) times daily as needed. 2 g 1    aspirin 81 mg chewable tablet Take 1 Tab by mouth daily. 30 Tab 11     Allergies   Allergen Reactions    Contrast Dye [Iodine] Hives     Past Medical History:   Diagnosis Date    Anxiety     Bipolar 1 disorder (HCC)     Depression     GERD (gastroesophageal reflux disease)     Hypertension     Psychiatric disorder     bipolar    Stroke [facility])      Past Surgical History:   Procedure Laterality Date    HX ADENOIDECTOMY      sweat glands    HX GYN      HX TONSILLECTOMY       Family History   Problem Relation Age of Onset    Cancer Mother     Heart Disease Father      Social History     Tobacco Use    Smoking status: Never Smoker    Smokeless tobacco: Never Used   Substance Use Topics    Alcohol use: No     Comment: \"once a month\"        Review of Systems:   Constitutional:    Negative for fever and chills, negative diaphoresis. HEENT:              Negative for neck pain and stiffness.   Eyes:                  Negative for visual disturbance, itching, redness or discharge. Respiratory:        Negative for cough and shortness of breath. Cardiovascular:  Negative for chest pain and palpitations. Gastrointestinal: Negative for nausea, vomiting, abdominal pain, diarrhea and             constipation. +blrb in stool  Genitourinary:     Negative for dysuria and frequency. +vaginal itching. No discharge  Musculoskeletal: right knee and right elbow pain  Skin:                    +axillary abscess  Neurological:       Negative for dizzyness, seizure, loss of consciousness, weakness and numbness. Objective:     Visit Vitals  BP (!) 156/104 (BP 1 Location: Left arm, BP Patient Position: Sitting)   Pulse 78   Temp 97.4 °F (36.3 °C) (Oral)   Resp 18   Ht 5' 4\" (1.626 m)   Wt 211 lb 1.6 oz (95.8 kg)   SpO2 100%   BMI 36.24 kg/m²     Gen: Oriented to person, place and time and well-developed, well-nourished and in no distress. HEENT:    Head: normocephalic and atraumatic. Eyes:  EOM are normal. Pupils equal and round. Neck:  Normal range of motion. Neck supple. Cardiovascular: normal rate, regular rhythm and normal heart sounds. Pulmonary/Chest:  Effort normal and breath sounds normal.  No respiratory distress. No wheezes, no rales. Abdominal: soft, normal  bowel sounds. Musculoskeletal:  No edema, no tenderness. No calf tenderness or edema. Right elbow: no swelling, full ROM, no warmth. Neurological:  Alert, oriented to person, place and time. Skin: skin is warm and dry. Bilateral axilla: well healed scars. Right axilla does have evidence of small abscess, decreasing in size per patient, non-tender/non-draining        PELVIC EXAM: VULVA: normal appearing vulva with no masses, tenderness or lesions, CERVIX: normal appearing cervix without discharge or lesions, RECTAL: normal rectal, no masses    Assessment/Plan:   well woman  mammogram  pap smear      1.  Well woman exam with routine gynecological exam    - METABOLIC PANEL, COMPREHENSIVE  - CBC W/O DIFF  - LIPID PANEL  - HEPATITIS C AB  - HIV 1/2 AG/AB, 4TH GENERATION,W RFLX CONFIRM  - RPR  - VITAMIN D, 25 HYDROXY; Future  - [facility] MAMMO BI SCREENING INCL CAD; Future  - PAP IG, HPV AND RFX HPV 07/76,08([id number])    2. Essential hypertension    - amLODIPine (NORVASC) 2.5 mg tablet; Take 1 Tab by mouth daily. Dispense: 30 Tab; Refill: 3  - lisinopril (PRINIVIL, ZESTRIL) 20 mg tablet; Take 1 Tab by mouth daily. Dispense: 30 Tab; Refill: 3  - metoprolol tartrate (LOPRESSOR) 25 mg tablet; TAKE ONE-HALF TABLET BY MOUTH TWICE DAILY  Dispense: 30 Tab; Refill: 3    3. Encounter for screening colonoscopy      4. Rectal bleeding    - REFERRAL TO GASTROENTEROLOGY    5. Hyperlipidemia, unspecified hyperlipidemia type    - LIPID PANEL  - atorvastatin (LIPITOR) 10 mg tablet; Take 1 Tab by mouth nightly. Dispense: 30 Tab; Refill: 3    6. Right elbow pain  Reviewed exercises, f/u INI  - diclofenac (VOLTAREN) 1 % gel; Apply 2 g to affected area four (4) times daily as needed. Dispense: 2 g; Refill: 1    7. Arthritis    - diclofenac (VOLTAREN) 1 % gel; Apply 2 g to affected area four (4) times daily as needed. Dispense: 2 g; Refill: 1    8. Screening mammogram, encounter for    - [facility] MAMMO BI SCREENING INCL CAD; Future    9. Screening for viral disease    - HEPATITIS C AB  - HIV 1/2 AG/AB, 4TH GENERATION,W RFLX CONFIRM  - RPR    10. Acute vaginitis    - NUSWAB VAGINITIS PLUS    11. Vitamin D deficiency    - VITAMIN D, 25 HYDROXY; Future    12. Hidradenitis  Reviewed conditions in which to present to ED for I&D  - REFERRAL TO DERMATOLOGY  - chlorhexidine (HIBICLENS) 4 % liquid; Apply 118 mL to affected area daily as needed. Dispense: 236 mL; Refill: 0    13. Sleep apnea, unspecified type  Never got cpap, discussed risks of untreated  - REFERRAL TO SLEEP STUDIES    14. Chronic pain of right knee  interested in injections, recc tylenol, topicals, discussed weight loss  - REFERRAL TO ORTHOPEDICS    15. History of CVA (cerebrovascular accident)  asa  - clopidogrel (PLAVIX) 75 mg tab; Take 1 Tab by mouth daily. Dispense: 30 Tab; Refill: 3          Follow-up Disposition:  Return in about 4 weeks (around 12/24/2018) for bp/awv. Mike Betancourt

## 2018-11-26 NOTE — PATIENT INSTRUCTIONS
DASH Diet: Care Instructions  Your Care Instructions    The DASH diet is an eating plan that can help lower your blood pressure. DASH stands for Dietary Approaches to Stop Hypertension. Hypertension is high blood pressure. The DASH diet focuses on eating foods that are high in calcium, potassium, and magnesium. These nutrients can lower blood pressure. The foods that are highest in these nutrients are fruits, vegetables, low-fat dairy products, nuts, seeds, and legumes. But taking calcium, potassium, and magnesium supplements instead of eating foods that are high in those nutrients does not have the same effect. The DASH diet also includes whole grains, fish, and poultry. The DASH diet is one of several lifestyle changes your doctor may recommend to lower your high blood pressure. Your doctor may also want you to decrease the amount of sodium in your diet. Lowering sodium while following the DASH diet can lower blood pressure even further than just the DASH diet alone. Follow-up care is a key part of your treatment and safety. Be sure to make and go to all appointments, and call your doctor if you are having problems. It's also a good idea to know your test results and keep a list of the medicines you take. How can you care for yourself at home? Following the DASH diet  · Eat 4 to 5 servings of fruit each day. A serving is 1 medium-sized piece of fruit, ½ cup chopped or canned fruit, 1/4 cup dried fruit, or 4 ounces (½ cup) of fruit juice. Choose fruit more often than fruit juice. · Eat 4 to 5 servings of vegetables each day. A serving is 1 cup of lettuce or raw leafy vegetables, ½ cup of chopped or cooked vegetables, or 4 ounces (½ cup) of vegetable juice. Choose vegetables more often than vegetable juice. · Get 2 to 3 servings of low-fat and fat-free dairy each day. A serving is 8 ounces of milk, 1 cup of yogurt, or 1 ½ ounces of cheese. · Eat 6 to 8 servings of grains each day.  A serving is 1 slice of bread, 1 ounce of dry cereal, or ½ cup of cooked rice, pasta, or cooked cereal. Try to choose whole-grain products as much as possible. · Limit lean meat, poultry, and fish to 2 servings each day. A serving is 3 ounces, about the size of a deck of cards. · Eat 4 to 5 servings of nuts, seeds, and legumes (cooked dried beans, lentils, and split peas) each week. A serving is 1/3 cup of nuts, 2 tablespoons of seeds, or ½ cup of cooked beans or peas. · Limit fats and oils to 2 to 3 servings each day. A serving is 1 teaspoon of vegetable oil or 2 tablespoons of salad dressing. · Limit sweets and added sugars to 5 servings or less a week. A serving is 1 tablespoon jelly or jam, ½ cup sorbet, or 1 cup of lemonade. · Eat less than 2,300 milligrams (mg) of sodium a day. If you limit your sodium to 1,500 mg a day, you can lower your blood pressure even more. Tips for success  · Start small. Do not try to make dramatic changes to your diet all at once. You might feel that you are missing out on your favorite foods and then be more likely to not follow the plan. Make small changes, and stick with them. Once those changes become habit, add a few more changes. · Try some of the following:  ? Make it a goal to eat a fruit or vegetable at every meal and at snacks. This will make it easy to get the recommended amount of fruits and vegetables each day. ? Try yogurt topped with fruit and nuts for a snack or healthy dessert. ? Add lettuce, tomato, cucumber, and onion to sandwiches. ? Combine a ready-made pizza crust with low-fat mozzarella cheese and lots of vegetable toppings. Try using tomatoes, squash, spinach, broccoli, carrots, cauliflower, and onions. ? Have a variety of cut-up vegetables with a low-fat dip as an appetizer instead of chips and dip. ? Sprinkle sunflower seeds or chopped almonds over salads. Or try adding chopped walnuts or almonds to cooked vegetables.   ? Try some vegetarian meals using beans and peas. Add garbanzo or kidney beans to salads. Make burritos and tacos with mashed payne beans or black beans. Where can you learn more? Go to http://bertha-elizabeth.info/. Enter E932 in the search box to learn more about \"DASH Diet: Care Instructions. \"  Current as of: December 6, 2017  Content Version: 11.8  © 0783-7198 [employer]. Care instructions adapted under license by [employer] (which disclaims liability or warranty for this information). If you have questions about a medical condition or this instruction, always ask your healthcare professional. [address] any warranty or liability for your use of this information. Elbow Bursitis: Exercises  Your Care Instructions  Here are some examples of typical rehabilitation exercises for your condition. Start each exercise slowly. Ease off the exercise if you start to have pain. Your doctor or physical therapist will tell you when you can start these exercises and which ones will work best for you. How to do the exercises  Elbow flexion stretch    1. Lift the arm that bothers you, and bend the elbow. Your palm should face toward you. 2. With your other hand, gently push on the back of your affected forearm. Press your hand toward your shoulder until you feel a stretch in the back of your upper arm. 3. Hold for at least 15 to 30 seconds. 4. Repeat 2 to 4 times. Elbow extension stretch    1. Extend your affected arm in front of you with your palm facing away from you. 2. Bend back your wrist, pointing your hand up toward the ceiling. 3. With your other hand, gently bend your wrist farther until you feel a mild to moderate stretch in your forearm. 4. Hold for at least 15 to 30 seconds. 5. Repeat 2 to 4 times. 6. Repeat steps 1 through 5. But this time extend your affected arm in front of you with your palm facing up.  Then bend back your wrist, pointing your hand toward the floor.    Pronation and supination stretch    1. Keep your affected elbow at your side, bent at about 90 degrees. Grasp a pen, pencil, or stick, and wrap your hand around it. If you don't have something to hold on to, make a fist instead. 2. Slowly turn your forearm as far as you can back and forth in each direction. Your hand should face up and then down. 3. Hold each position for about 6 seconds. 4. Relax for up to 10 seconds between repetitions. 5. Repeat 8 to 12 times. Hand flips    1. While seated, place your affected forearm on your thigh. Your palm should face down. 2. Flip your hand over so the back of your hand rests on your thigh and your palm is up. Alternate between palm up and palm down while keeping your forearm on your thigh. 3. Repeat 8 to 12 times. Follow-up care is a key part of your treatment and safety. Be sure to make and go to all appointments, and call your doctor if you are having problems. It's also a good idea to know your test results and keep a list of the medicines you take. Where can you learn more? Go to http://bertha-elizabeth.info/. Enter X941 in the search box to learn more about \"Elbow Bursitis: Exercises. \"  Current as of: November 29, 2017  Content Version: 11.8  © 4607-0667 [employer]. Care instructions adapted under license by [employer] (which disclaims liability or warranty for this information). If you have questions about a medical condition or this instruction, always ask your healthcare professional. [place] [address] any warranty or liability for your use of this information.

## 2018-11-26 NOTE — PROGRESS NOTES
Chief Complaint   Patient presents with    Complete Physical    Skin Problem     1. Have you been to the ER, urgent care clinic since your last visit? Hospitalized since your last visit? No    2. Have you seen or consulted any other health care providers outside of the [address] [place] [address] since your last visit? Include any pap smears or colon screening.  No         Pt denies pain at this time        PHQ over the last two weeks 11/26/2018   Little interest or pleasure in doing things Not at all   Feeling down, depressed, irritable, or hopeless Not at all   Total Score PHQ 2 0   Trouble falling or staying asleep, or sleeping too much -   Feeling tired or having little energy -   Poor appetite, weight loss, or overeating -   Feeling bad about yourself - or that you are a failure or have let yourself or your family down -   Trouble concentrating on things such as school, work, reading, or watching TV -   Moving or speaking so slowly that other people could have noticed; or the opposite being so fidgety that others notice -   Thoughts of being better off dead, or hurting yourself in some way -   PHQ 9 Score -   How difficult have these problems made it for you to do your work, take care of your home and get along with others -

## 2018-11-27 ENCOUNTER — TELEPHONE (OUTPATIENT)
Dept: INTERNAL MEDICINE CLINIC | Age: 58
End: 2018-11-27

## 2018-11-27 DIAGNOSIS — R76.8 POSITIVE HEPATITIS C ANTIBODY TEST: Primary | ICD-10-CM

## 2018-11-27 PROBLEM — E55.9 VITAMIN D DEFICIENCY: Status: ACTIVE | Noted: 2018-11-27

## 2018-11-27 LAB
25(OH)D3+25(OH)D2 SERPL-MCNC: 12.6 NG/ML (ref 30–100)
ALBUMIN SERPL-MCNC: 4.4 G/DL (ref 3.6–4.8)
ALBUMIN/GLOB SERPL: 1.8 {RATIO} (ref 1.2–2.2)
ALP SERPL-CCNC: 115 IU/L (ref 39–117)
ALT SERPL-CCNC: 13 IU/L (ref 0–32)
AST SERPL-CCNC: 19 IU/L (ref 0–40)
BILIRUB SERPL-MCNC: 0.3 MG/DL (ref 0–1.2)
BUN SERPL-MCNC: 11 MG/DL (ref 8–27)
BUN/CREAT SERPL: 19 (ref 12–28)
CALCIUM SERPL-MCNC: 9.3 MG/DL (ref 8.7–10.3)
CHLORIDE SERPL-SCNC: 105 MMOL/L (ref 96–106)
CHOLEST SERPL-MCNC: 234 MG/DL (ref 100–199)
CO2 SERPL-SCNC: 25 MMOL/L (ref 20–29)
CREAT SERPL-MCNC: 0.57 MG/DL (ref 0.57–1)
ERYTHROCYTE [DISTWIDTH] IN BLOOD BY AUTOMATED COUNT: 14.2 % (ref 12.3–15.4)
GLOBULIN SER CALC-MCNC: 2.5 G/DL (ref 1.5–4.5)
GLUCOSE SERPL-MCNC: 71 MG/DL (ref 65–99)
HCT VFR BLD AUTO: 41.4 % (ref 34–46.6)
HCV AB S/CO SERPL IA: >11 S/CO RATIO (ref 0–0.9)
HDLC SERPL-MCNC: 71 MG/DL
HGB BLD-MCNC: 13.9 G/DL (ref 11.1–15.9)
HIV 1+2 AB+HIV1 P24 AG SERPL QL IA: NON REACTIVE
INTERPRETATION, 910389: NORMAL
LDLC SERPL CALC-MCNC: 138 MG/DL (ref 0–99)
MCH RBC QN AUTO: 31 PG (ref 26.6–33)
MCHC RBC AUTO-ENTMCNC: 33.6 G/DL (ref 31.5–35.7)
MCV RBC AUTO: 92 FL (ref 79–97)
PLATELET # BLD AUTO: 215 X10E3/UL (ref 150–379)
POTASSIUM SERPL-SCNC: 4.3 MMOL/L (ref 3.5–5.2)
PROT SERPL-MCNC: 6.9 G/DL (ref 6–8.5)
RBC # BLD AUTO: 4.49 X10E6/UL (ref 3.77–5.28)
RPR SER QL: NON REACTIVE
SODIUM SERPL-SCNC: 142 MMOL/L (ref 134–144)
TRIGL SERPL-MCNC: 127 MG/DL (ref 0–149)
VLDLC SERPL CALC-MCNC: 25 MG/DL (ref 5–40)
WBC # BLD AUTO: 7.6 X10E3/UL (ref 3.4–10.8)

## 2018-11-27 RX ORDER — ERGOCALCIFEROL 1.25 MG/1
50000 CAPSULE ORAL
Qty: 8 CAP | Refills: 0 | Status: SHIPPED | OUTPATIENT
Start: 2018-11-27 | End: 2018-12-31 | Stop reason: ALTCHOICE

## 2018-11-28 LAB
A VAGINAE DNA VAG QL NAA+PROBE: ABNORMAL SCORE
BVAB2 DNA VAG QL NAA+PROBE: ABNORMAL SCORE
C ALBICANS DNA VAG QL NAA+PROBE: NEGATIVE
C GLABRATA DNA VAG QL NAA+PROBE: NEGATIVE
C TRACH RRNA SPEC QL NAA+PROBE: NEGATIVE
MEGA1 DNA VAG QL NAA+PROBE: ABNORMAL SCORE
N GONORRHOEA RRNA SPEC QL NAA+PROBE: NEGATIVE
T VAGINALIS RRNA SPEC QL NAA+PROBE: NEGATIVE

## 2018-11-29 RX ORDER — METRONIDAZOLE 7.5 MG/G
1 GEL VAGINAL
Qty: 187.5 MG | Refills: 0 | Status: SHIPPED | OUTPATIENT
Start: 2018-11-29 | End: 2018-11-30

## 2018-11-30 RX ORDER — METRONIDAZOLE 500 MG/1
500 TABLET ORAL 2 TIMES DAILY
Qty: 14 TAB | Refills: 0 | Status: SHIPPED | OUTPATIENT
Start: 2018-11-30 | End: 2018-12-07

## 2018-11-30 NOTE — PROGRESS NOTES
11-30-18 called patient @ [phone] spoke to patient notified patient of Lab results per provider request and that medications was sent to her [address] [place], [place] [address]

## 2018-12-01 ENCOUNTER — TELEPHONE (OUTPATIENT)
Dept: SLEEP MEDICINE | Age: 58
End: 2018-12-01

## 2018-12-05 ENCOUNTER — HOSPITAL ENCOUNTER (OUTPATIENT)
Dept: MAMMOGRAPHY | Age: 58
Discharge: HOME | End: 2018-12-05
Attending: NURSE PRACTITIONER
Payer: MEDICARE

## 2018-12-05 DIAGNOSIS — Z12.31 SCREENING MAMMOGRAM, ENCOUNTER FOR: ICD-10-CM

## 2018-12-05 DIAGNOSIS — Z01.419 WELL WOMAN EXAM WITH ROUTINE GYNECOLOGICAL EXAM: ICD-10-CM

## 2018-12-05 PROCEDURE — 77067 SCR MAMMO BI INCL CAD: CPT

## 2018-12-06 ENCOUNTER — TELEPHONE (OUTPATIENT)
Dept: SLEEP MEDICINE | Age: 58
End: 2018-12-06

## 2018-12-31 RX ORDER — MULTIVITAMIN
2 TABLET ORAL DAILY
Qty: 60 TAB | Refills: 11 | Status: SHIPPED | OUTPATIENT
Start: 2018-12-31 | End: 2020-03-11 | Stop reason: SDUPTHER

## 2019-01-16 ENCOUNTER — OFFICE VISIT (OUTPATIENT)
Dept: INTERNAL MEDICINE CLINIC | Age: 59
End: 2019-01-16

## 2019-01-16 VITALS
BODY MASS INDEX: 34.85 KG/M2 | HEIGHT: 64 IN | SYSTOLIC BLOOD PRESSURE: 124 MMHG | OXYGEN SATURATION: 97 % | RESPIRATION RATE: 18 BRPM | WEIGHT: 204.1 LBS | DIASTOLIC BLOOD PRESSURE: 89 MMHG | TEMPERATURE: 96.2 F | HEART RATE: 84 BPM

## 2019-01-16 DIAGNOSIS — R63.4 WEIGHT LOSS: ICD-10-CM

## 2019-01-16 DIAGNOSIS — Z00.00 MEDICARE ANNUAL WELLNESS VISIT, SUBSEQUENT: ICD-10-CM

## 2019-01-16 DIAGNOSIS — Z23 ENCOUNTER FOR IMMUNIZATION: Primary | ICD-10-CM

## 2019-01-16 DIAGNOSIS — R07.89 CHEST WALL PAIN: ICD-10-CM

## 2019-01-16 NOTE — PROGRESS NOTES
Pt here for Chief Complaint Patient presents with [address] [place] [address] Annual Wellness Visit  Chest Pain 1. Have you been to the ER, urgent care clinic since your last visit? Hospitalized since your last visit? No 
 
2. Have you seen or consulted any other health care providers outside of the [address] [place] [address] since your last visit? Include any pap smears or colon screening. No  
 
 
 
 
Pt denies pain at this time PHQ over the last two weeks 1/16/2019 Little interest or pleasure in doing things Several days Feeling down, depressed, irritable, or hopeless Several days Total Score PHQ 2 2 Trouble falling or staying asleep, or sleeping too much - Feeling tired or having little energy - Poor appetite, weight loss, or overeating - Feeling bad about yourself - or that you are a failure or have let yourself or your family down - Trouble concentrating on things such as school, work, reading, or watching TV - Moving or speaking so slowly that other people could have noticed; or the opposite being so fidgety that others notice - Thoughts of being better off dead, or hurting yourself in some way -  
PHQ 9 Score - How difficult have these problems made it for you to do your work, take care of your home and get along with others -

## 2019-01-16 NOTE — PROGRESS NOTES
Subjective: (As above and below) Chief Complaint Patient presents with Mallory Ban Annual Wellness Visit  Chest Pain Trae Lara. Dennis Yee is a 59y.o. year old female who presents for AWV & following concerns: 
 
Chest wall  pain: pain is located just right of midline. Present x 2 days unchanged. No known inciting injury but maybe \"lifitng something\". Pain is worse with twisting her upper body. No associated shortness of breath (she does have baseline MARSH when exerts herself). She is quite sedentary. Leg aching: symptoms are very non-specific. She sites aching to calves, feet, occasional tingling feeling. No calf swelling, pain is NOT worse w/ walking. It is worse when she is sitting for periods of time of lying in certain positions. It has been present x 3-4 days. She has knee arthritis - gets cortisone injections. Denies low back pain Colonoscopy: scheduled for 2/13 Weight loss: she is concerned that she has lost weight, states that she has not been doing anything different w/ diet or activity Wt Readings from Last 3 Encounters:  
01/16/19 204 lb 1.6 oz (92.6 kg)  
11/26/18 211 lb 1.6 oz (95.8 kg) 06/01/17 225 lb (102.1 kg) Reviewed PmHx, RxHx, FmHx, SocHx, AllgHx and updated in chart. Family History Problem Relation Age of Onset  Cancer Mother  Heart Disease Father Past Medical History:  
Diagnosis Date  Anxiety  Bipolar 1 disorder ([facility] [address]  Depression  GERD (gastroesophageal reflux disease)  Hypertension  Psychiatric disorder   
 bipolar  [facility] Social History Socioeconomic History  Marital status:  Spouse name: Not on file  Number of children: Not on file  Years of education: Not on file  Highest education level: Not on file Tobacco Use  Smoking status: Never Smoker  Smokeless tobacco: Never Used Substance and Sexual Activity  Alcohol use: No  
  Comment: \"once a month\"  Drug use:  No  
 Comment: none x 1 month  Sexual activity: Not Currently Social History Narrative ** Merged History Encounter **  
    
  
 
 
Current Outpatient Medications Medication Sig  varicella-zoster recombinant, PF, (SHINGRIX, PF,) 50 mcg/0.5 mL susr injection 0.5 mL by IntraMUSCular route once for 1 dose.  calcium-cholecalciferol, D3, (CALCIUM WITH VITAMIN D) tablet Take 2 Tabs by mouth daily.  clopidogrel (PLAVIX) 75 mg tab Take 1 Tab by mouth daily.  atorvastatin (LIPITOR) 10 mg tablet Take 1 Tab by mouth nightly.  amLODIPine (NORVASC) 2.5 mg tablet Take 1 Tab by mouth daily.  lisinopril (PRINIVIL, ZESTRIL) 20 mg tablet Take 1 Tab by mouth daily.  metoprolol tartrate (LOPRESSOR) 25 mg tablet TAKE ONE-HALF TABLET BY MOUTH TWICE DAILY  aspirin 81 mg chewable tablet Take 1 Tab by mouth daily.  chlorhexidine (HIBICLENS) 4 % liquid Apply 118 mL to affected area daily as needed. No current facility-administered medications for this visit. Review of Systems:  
Constitutional:    Negative for fever and chills, negative diaphoresis. HEENT:              Negative for neck pain and stiffness. Eyes:                  Negative for visual disturbance, itching, redness or discharge. Respiratory:        Negative for cough and shortness of breath. +MARSH Cardiovascular:  Negative for palpitations. +chest wall pain Gastrointestinal: Negative for nausea, vomiting, abdominal pain, diarrhea or constipation. Genitourinary:     Negative for dysuria and frequency. Musculoskeletal: Negative for falls, tenderness and swelling. Skin:                    Negative for rash, masses or lesions. Neurological:       Negative for dizzyness, seizure, loss of consciousness, weakness and numbness. Objective:  
 
Vitals:  
 01/16/19 0849 BP: 124/89 Pulse: 84 Resp: 18 Temp: 96.2 °F (35.7 °C) TempSrc: Tympanic SpO2: 97% Weight: 204 lb 1.6 oz (92.6 kg) Height: 5' 4\" (1.626 m) Results for orders placed or performed in visit on 11/26/18 METABOLIC PANEL, COMPREHENSIVE Result Value Ref Range Glucose 71 65 - 99 mg/dL BUN 11 8 - 27 mg/dL Creatinine 0.57 0.57 - 1.00 mg/dL GFR est non- >59 mL/min/1.73 GFR est  >59 mL/min/1.73  
 BUN/Creatinine ratio 19 12 - 28 Sodium 142 134 - 144 mmol/L Potassium 4.3 3.5 - 5.2 mmol/L Chloride 105 96 - 106 mmol/L  
 CO2 25 20 - 29 mmol/L Calcium 9.3 8.7 - 10.3 mg/dL Protein, total 6.9 6.0 - 8.5 g/dL Albumin 4.4 3.6 - 4.8 g/dL GLOBULIN, TOTAL 2.5 1.5 - 4.5 g/dL A-G Ratio 1.8 1.2 - 2.2 Bilirubin, total 0.3 0.0 - 1.2 mg/dL Alk. phosphatase 115 39 - 117 IU/L  
 AST (SGOT) 19 0 - 40 IU/L  
 ALT (SGPT) 13 0 - 32 IU/L  
CBC W/O DIFF Result Value Ref Range WBC 7.6 3.4 - 10.8 x10E3/uL  
 RBC 4.49 3.77 - 5.28 x10E6/uL HGB 13.9 11.1 - 15.9 g/dL HCT 41.4 34.0 - 46.6 % MCV 92 79 - 97 fL  
 MCH 31.0 26.6 - 33.0 pg  
 MCHC 33.6 31.5 - 35.7 g/dL  
 RDW 14.2 12.3 - 15.4 % PLATELET 398 227 - 245 x10E3/uL LIPID PANEL Result Value Ref Range Cholesterol, total 234 (H) 100 - 199 mg/dL Triglyceride 127 0 - 149 mg/dL HDL Cholesterol 71 >39 mg/dL VLDL, calculated 25 5 - 40 mg/dL LDL, calculated 138 (H) 0 - 99 mg/dL HEPATITIS C AB Result Value Ref Range Hep C Virus Ab >11.0 (H) 0.0 - 0.9 s/co ratio HIV 1/2 AG/AB, 4TH GENERATION,W RFLX CONFIRM Result Value Ref Range HIV SCREEN 4TH GENERATION WRFX Non Reactive Non Reactive RPR Result Value Ref Range RPR Non Reactive Non Reactive NUSWAB VAGINITIS PLUS Result Value Ref Range Atopobium vaginae High - 2 (A) Score BVAB 2 High - 2 (A) Score Megasphaera 1 High - 2 (A) Score C. albicans, MICHELLE Negative Negative C. glabrata, MICHELLE Negative Negative T. vaginalis, IMCHELLE Negative Negative C. trachomatis, MICHELLE Negative Negative N. gonorrhoeae, MICHELLE Negative Negative VITAMIN D, 25 HYDROXY Result Value Ref Range VITAMIN D, 25-HYDROXY 12.6 (L) 30.0 - 100.0 ng/mL CVD REPORT Result Value Ref Range INTERPRETATION Note Britany Peaks Gen: Oriented to person, place and time and well-developed, well-nourished and in no distress. HEENT:   
Head: normocephalic and atraumatic. Eyes:  EOM are normal. Pupils equal and round. Neck:  Normal range of motion. Neck supple. Cardiovascular: normal rate, regular rhythm and normal heart sounds. Chest wall pain is reproducible with her rotating her upper body side to side but not w/ palpation Pulmonary/Chest:  Effort normal and breath sounds normal.  No respiratory distress. No wheezes, no rales. Abdominal: soft, normal  bowel sounds. Musculoskeletal:  No edema, no tenderness. No calf tenderness or edema. Pedal pulses +2, no pain to feet/ankles/legs on exam 
Neurological:  Alert, oriented to person, place and time. Skin: skin is warm and dry. Assessment/ Plan:  
Follow-up Disposition: 
Return in about 6 months (around 7/16/2019). Reprinted prior labs for hep c check Chest pain: watch and wait, seems to be msk. Advised ED if new/worsneing symptoms Leg pain: ?reviewed warning signs for DVT, watch and wait for symptom improvement as pain only 3-4 days 1. Medicare annual wellness visit, subsequent 2. Encounter for immunization 
 
- varicella-zoster recombinant, PF, (SHINGRIX, PF,) 50 mcg/0.5 mL susr injection; 0.5 mL by IntraMUSCular route once for 1 dose. Dispense: 0.5 mL; Refill: 1 3. Chest wall pain - AMB POC EKG ROUTINE W/ 12 LEADS, INTER & REP 4. Weight loss 
 
- TSH 3RD GENERATION I have discussed the diagnosis with the patient and the intended plan as seen in the above orders. The patient has received an after-visit summary and questions were answered concerning future plans. Pt conveyed understanding of plan. Medication Side Effects and Warnings were discussed with patient: yes Patient Labs were reviewed: yes Patient Past Records were reviewed:  yes Smiley Russ NP This is the Subsequent Medicare Annual Wellness Exam, performed 12 months or more after the Initial AWV or the last Subsequent AWV I have reviewed the patient's medical history in detail and updated the computerized patient record. History Past Medical History:  
Diagnosis Date  Anxiety  Bipolar 1 disorder ([facility] [address]  Depression  GERD (gastroesophageal reflux disease)  Hypertension  Psychiatric disorder   
 bipolar  Stroke ([facility] Past Surgical History:  
Procedure Laterality Date  HX ADENOIDECTOMY    
 sweat glands  HX GYN    
 HX TONSILLECTOMY Current Outpatient Medications Medication Sig Dispense Refill  varicella-zoster recombinant, PF, (SHINGRIX, PF,) 50 mcg/0.5 mL susr injection 0.5 mL by IntraMUSCular route once for 1 dose. 0.5 mL 1  calcium-cholecalciferol, D3, (CALCIUM WITH VITAMIN D) tablet Take 2 Tabs by mouth daily. 60 Tab 11  
 clopidogrel (PLAVIX) 75 mg tab Take 1 Tab by mouth daily. 30 Tab 3  
 atorvastatin (LIPITOR) 10 mg tablet Take 1 Tab by mouth nightly. 30 Tab 3  
 amLODIPine (NORVASC) 2.5 mg tablet Take 1 Tab by mouth daily. 30 Tab 3  
 lisinopril (PRINIVIL, ZESTRIL) 20 mg tablet Take 1 Tab by mouth daily. 30 Tab 3  
 metoprolol tartrate (LOPRESSOR) 25 mg tablet TAKE ONE-HALF TABLET BY MOUTH TWICE DAILY 30 Tab 3  
 aspirin 81 mg chewable tablet Take 1 Tab by mouth daily. 30 Tab 11  
 chlorhexidine (HIBICLENS) 4 % liquid Apply 118 mL to affected area daily as needed. 236 mL 0 Allergies Allergen Reactions  Contrast Dye [Iodine] Hives Family History Problem Relation Age of Onset  Cancer Mother  Heart Disease Father Social History Tobacco Use  Smoking status: Never Smoker  Smokeless tobacco: Never Used Substance Use Topics  Alcohol use: No  
  Comment: \"once a month\" Patient Active Problem List  
Diagnosis Code  Chest pain, unspecified R07.9  
 HTN (hypertension) I10  
 Esophageal reflux K21.9  History of CVA (cerebrovascular accident) Z80.78  
 Morbid obesity (HCC) E66.01  
 Cannabis abuse F12.10  ASVD (arteriosclerotic vascular disease) I70.90  Vitamin D deficiency E55.9  Positive hepatitis C antibody test R76.8 Depression Risk Factor Screening: PHQ over the last two weeks 1/16/2019 Little interest or pleasure in doing things Several days Feeling down, depressed, irritable, or hopeless Several days Total Score PHQ 2 2 Trouble falling or staying asleep, or sleeping too much - Feeling tired or having little energy - Poor appetite, weight loss, or overeating - Feeling bad about yourself - or that you are a failure or have let yourself or your family down - Trouble concentrating on things such as school, work, reading, or watching TV - Moving or speaking so slowly that other people could have noticed; or the opposite being so fidgety that others notice - Thoughts of being better off dead, or hurting yourself in some way -  
PHQ 9 Score - How difficult have these problems made it for you to do your work, take care of your home and get along with others - Alcohol Risk Factor Screening: You do not drink alcohol or very rarely. Functional Ability and Level of Safety:  
Hearing Loss Hearing is good. Activities of Daily Living The home contains: no safety equipment. Patient does total self care Fall Risk Fall Risk Assessment, last 12 mths 1/16/2019 Able to walk? Yes Fall in past 12 months? No  
 
 
Abuse Screen Patient is not abused Cognitive Screening Evaluation of Cognitive Function: 
Has your family/caregiver stated any concerns about your memory: no 
Normal 
 
Patient Care Team  
Patient Care Team: 
Radha Shah., NP as PCP - General (Nurse Practitioner) Leonel Soria RN as Nurse Navigator Sol Coto LPN as Ambulatory Care Navigator Assessment/Plan Education and counseling provided: 
Are appropriate based on today's review and evaluation Diagnoses and all orders for this visit: 1. Encounter for immunization 
-     varicella-zoster recombinant, PF, (SHINGRIX, PF,) 50 mcg/0.5 mL susr injection; 0.5 mL by IntraMUSCular route once for 1 dose. 2. Chest wall pain -     AMB POC EKG ROUTINE W/ 12 LEADS, INTER & REP 3. Medicare annual wellness visit, subsequent 4. Weight loss 
-     TSH 3RD GENERATION Health Maintenance Due Topic Date Due  
 DTaP/Tdap/Td series (1 - Tdap) 09/04/1979  Shingrix Vaccine Age 50> (1 of 2) 09/04/2008  FOBT Q 1 YEAR AGE 50-75  09/04/2008  MEDICARE YEARLY EXAM  10/18/2018

## 2019-01-16 NOTE — PATIENT INSTRUCTIONS
Medicare Wellness Visit, Female The best way to live healthy is to have a lifestyle where you eat a well-balanced diet, exercise regularly, limit alcohol use, and quit all forms of tobacco/nicotine, if applicable. Regular preventive services are another way to keep healthy. Preventive services (vaccines, screening tests, monitoring & exams) can help personalize your care plan, which helps you manage your own care. Screening tests can find health problems at the earliest stages, when they are easiest to treat. Medhat Patel follows the current, evidence-based guidelines published by the [facility] ([facility]) when recommending preventive services for our patients. Because we follow these guidelines, sometimes recommendations change over time as research supports it. (For example, mammograms used to be recommended annually. Even though Medicare will still pay for an annual mammogram, the newer guidelines recommend a mammogram every two years for women of average risk.) Of course, you and your doctor may decide to screen more often for some diseases, based on your risk and your health status. Preventive services for you include: - Medicare offers their members a free annual wellness visit, which is time for you and your primary care provider to discuss and plan for your preventive service needs. Take advantage of this benefit every year! 
-All adults over the age of 72 should receive the recommended pneumonia vaccines. Current USPSTF guidelines recommend a series of two vaccines for the best pneumonia protection.  
-All adults should have a flu vaccine yearly and a tetanus vaccine every 10 years. All adults age 61 and older should receive a shingles vaccine once in their lifetime.   
-A bone mass density test is recommended when a woman turns 65 to screen for osteoporosis. This test is only recommended one time, as a screening. Some providers will use this same test as a disease monitoring tool if you already have osteoporosis. -All adults age 38-68 who are overweight should have a diabetes screening test once every three years.  
-Other screening tests and preventive services for persons with diabetes include: an eye exam to screen for diabetic retinopathy, a kidney function test, a foot exam, and stricter control over your cholesterol.  
-Cardiovascular screening for adults with routine risk involves an electrocardiogram (ECG) at intervals determined by your doctor.  
-Colorectal cancer screenings should be done for adults age 54-65 with no increased risk factors for colorectal cancer. There are a number of acceptable methods of screening for this type of cancer. Each test has its own benefits and drawbacks. Discuss with your doctor what is most appropriate for you during your annual wellness visit. The different tests include: colonoscopy (considered the best screening method), a fecal occult blood test, a fecal DNA test, and sigmoidoscopy. -Breast cancer screenings are recommended every other year for women of normal risk, age 54-69. 
-Cervical cancer screenings for women over age 72 are only recommended with certain risk factors.  
-All adults born between [facility] should be screened once for Hepatitis C. Here is a list of your current Health Maintenance items (your personalized list of preventive services) with a due date: 
Health Maintenance Due Topic Date Due  
 DTaP/Tdap/Td  (1 - Tdap) 09/04/1979  Shingles Vaccine (1 of 2) 09/04/2008  Stool testing for trace blood  09/04/2008 [place] Annual Well Visit  10/18/2018 Musculoskeletal Chest Pain: Care Instructions Your Care Instructions Chest pain is not always a sign that something is wrong with your heart or that you have another serious problem.  The doctor thinks your chest pain is caused by strained muscles or ligaments, inflamed chest cartilage, or another problem in your chest, rather than by your heart. You may need more tests to find the cause of your chest pain. Follow-up care is a key part of your treatment and safety. Be sure to make and go to all appointments, and call your doctor if you are having problems. It's also a good idea to know your test results and keep a list of the medicines you take. How can you care for yourself at home? · Take pain medicines exactly as directed. ? If the doctor gave you a prescription medicine for pain, take it as prescribed. ? If you are not taking a prescription pain medicine, ask your doctor if you can take an over-the-counter medicine. · Rest and protect the sore area. · Stop, change, or take a break from any activity that may be causing your pain or soreness. · Put ice or a cold pack on the sore area for 10 to 20 minutes at a time. Try to do this every 1 to 2 hours for the next 3 days (when you are awake) or until the swelling goes down. Put a thin cloth between the ice and your skin. · After 2 or 3 days, apply a heating pad set on low or a warm cloth to the area that hurts. Some doctors suggest that you go back and forth between hot and cold. · Do not wrap or tape your ribs for support. This may cause you to take smaller breaths, which could increase your risk of lung problems. · Mentholated creams such as Bengay or Icy Hot may soothe sore muscles. Follow the instructions on the package. · Follow your doctor's instructions for exercising. · Gentle stretching and massage may help you get better faster. Stretch slowly to the point just before pain begins, and hold the stretch for at least 15 to 30 seconds. Do this 3 or 4 times a day. Stretch just after you have applied heat. · As your pain gets better, slowly return to your normal activities. Any increased pain may be a sign that you need to rest a while longer. When should you call for help? Call 911 anytime you think you may need emergency care. For example, call if: 
  · You have chest pain or pressure. This may occur with: ? Sweating. ? Shortness of breath. ? Nausea or vomiting. ? Pain that spreads from the chest to the neck, jaw, or one or both shoulders or arms. ? Dizziness or lightheadedness. ? A fast or uneven pulse. After calling 911, chew 1 adult-strength aspirin. Wait for an ambulance. Do not try to drive yourself.  
  · You have sudden chest pain and shortness of breath, or you cough up blood.  
 Call your doctor now or seek immediate medical care if: 
  · You have any trouble breathing.  
  · Your chest pain gets worse.  
  · Your chest pain occurs consistently with exercise and is relieved by rest.  
 Watch closely for changes in your health, and be sure to contact your doctor if: 
  · Your chest pain does not get better after 1 week. Where can you learn more? Go to http://berthaInvuityelizabeth.info/. Enter V293 in the search box to learn more about \"Musculoskeletal Chest Pain: Care Instructions. \" Current as of: November 20, 2017 Content Version: 11.8 © 1694-7776 [employer]. Care instructions adapted under license by [employer] (which disclaims liability or warranty for this information). If you have questions about a medical condition or this instruction, always ask your healthcare professional. [address] any warranty or liability for your use of this information. Recombinant Zoster (Shingles) Vaccine, RZV: What you need to know Why get vaccinated? Shingles (also called herpes zoster, or just zoster) is a painful skin rash, often with blisters. Shingles is caused by the varicella zoster virus, the same virus that causes chickenpox. After you have chickenpox, the virus stays in your body and can cause shingles later in life. You can't catch shingles from another person.  However, a person who has never had chickenpox (or chickenpox vaccine) could get chickenpox from someone with shingles. A shingles rash usually appears on one side of the face or body and heals within 2 to 4 weeks. Its main symptom is pain, which can be severe. Other symptoms can include fever, headache, chills, and upset stomach. Very rarely, a shingles infection can lead to pneumonia, hearing problems, blindness, brain inflammation (encephalitis), or death. For about 1 person in 5, severe pain can continue even long after the rash has cleared up. This long-lasting pain is called post-herpetic neuralgia (PHN). Shingles is far more common in people 48years of age and older than in younger people, and the risk increases with age. It is also more common in people whose immune system is weakened because of a disease such as cancer, or by drugs such as steroids or chemotherapy. At least 1 million people a year in the [facility] get shingles. Shingles vaccine (recombinant) Recombinant shingles vaccine was approved by FDA in 2017 for the prevention of shingles. In clinical trials, it was more than 90% effective in preventing shingles. It can also reduce the likelihood of PHN. Two doses, 2 to 6 months apart, are recommended for adults 48 and older. This vaccine is also recommended for people who have already gotten the live shingles vaccine (Zostavax). There is no live virus in this vaccine. Some people should not get this vaccine Tell your vaccine provider if you: 
· Have any severe, life-threatening allergies. A person who has ever had a life-threatening allergic reaction after a dose of recombinant shingles vaccine, or has a severe allergy to any component of this vaccine, may be advised not to be vaccinated. Ask your health care provider if you want information about vaccine components. · Are pregnant or breastfeeding. There is not much information about use of recombinant shingles vaccine in pregnant or nursing women.  Your healthcare provider might recommend delaying vaccination. · Are not feeling well. If you have a mild illness, such as a cold, you can probably get the vaccine today. If you are moderately or severely ill, you should probably wait until you recover. Your doctor can advise you. Risks of a vaccine reaction With any medicine, including vaccines, there is a chance of reactions. After recombinant shingles vaccination, a person might experience: 
· Pain, redness, soreness, or swelling at the site of the injection · Headache, muscle aches, fever, shivering, fatigue In clinical trials, most people got a sore arm with mild or moderate pain after vaccination, and some also had redness and swelling where they got the shot. Some people felt tired, had muscle pain, a headache, shivering, fever, stomach pain, or nausea. About 1 out of 6 people who got recombinant zoster vaccine experienced side effects that prevented them from doing regular activities. Symptoms went away on their own in about 2 to 3 days. Side effects were more common in younger people. You should still get the second dose of recombinant zoster vaccine even if you had one of these reactions after the first dose. Other things that could happen after this vaccine: · People sometimes faint after medical procedures, including vaccination. Sitting or lying down for about 15 minutes can help prevent fainting and injuries caused by a fall. Tell your provider if you feel dizzy or have vision changes or ringing in the ears. · Some people get shoulder pain that can be more severe and longer-lasting than routine soreness that can follow injections. This happens very rarely. · Any medication can cause a severe allergic reaction. Such reactions to a vaccine are estimated at about 1 in a million doses, and would happen within a few minutes to a few hours after the vaccination.  
As with any medicine, there is a very remote chance of a vaccine causing a serious injury or death. The safety of vaccines is always being monitored. For more information, visit: www.cdc.gov/vaccinesafety/ What if there is a serious problem? What should I look for? · Look for anything that concerns you, such as signs of a severe allergic reaction, very high fever, or unusual behavior. Signs of a severe allergic reaction can include hives, swelling of the face and throat, difficulty breathing, a fast heartbeat, dizziness, and weakness. These would usually start a few minutes to a few hours after the vaccination. What should I do? · If you think it is a severe allergic reaction or other emergency that can't wait, call 9-1-1 or get to the nearest hospital. Otherwise, call your health care provider. · Afterward, the reaction should be reported to the Vaccine Adverse Event Reporting System (VAERS). Your doctor should file this report, or you can do it yourself through the [employer] website at www.vaers. [facility].gov, or by calling 5-762.767.4158. [employer] does not give medical advice. Rosita Zaman How can I learn more? · Ask your health care provider. He or she can give you the vaccine package insert or suggest other sources of information. · Call your local or state health department. · Contact the Centers for Disease Control and Prevention (CDC): 
? Call 7-451.945.8605 (1-800-CDC-INFO) or 
? Visit CDC's website at www.cdc.gov/vaccines Vaccine Information Statement (Interim) Recombinant Zoster Vaccine 2/12/2018 Department of Health and [employer] Centers for Disease Control and Prevention Many Vaccine Information Statements are available in Thai and other languages. See www.immunize.org/vis. Hojas de Información Sobre Vacunas están disponibles en Español y en muchos otros idiomas. Visite Sugey.si Care instructions adapted under license by [employer] (which disclaims liability or warranty for this information).  If you have questions about a medical condition or this instruction, always ask your healthcare professional. [place] [address] any warranty or liability for your use of this information.

## 2019-01-18 LAB
25(OH)D3+25(OH)D2 SERPL-MCNC: 26.7 NG/ML (ref 30–100)
HCV GENOTYPE: NORMAL
HCV RNA SERPL NAA+PROBE-ACNC: NORMAL IU/ML
HCV RNA SERPL NAA+PROBE-LOG IU: NORMAL LOG10 IU/ML
TEST INFORMATION: NORMAL
TSH SERPL DL<=0.005 MIU/L-ACNC: 0.81 UIU/ML (ref 0.45–4.5)

## 2019-01-29 ENCOUNTER — TELEPHONE (OUTPATIENT)
Dept: INTERNAL MEDICINE CLINIC | Age: 59
End: 2019-01-29

## 2019-02-18 ENCOUNTER — APPOINTMENT (OUTPATIENT)
Dept: GENERAL RADIOLOGY | Age: 59
End: 2019-02-18
Attending: PHYSICIAN ASSISTANT
Payer: MEDICARE

## 2019-02-18 ENCOUNTER — HOSPITAL ENCOUNTER (EMERGENCY)
Age: 59
Discharge: HOME | End: 2019-02-18
Attending: EMERGENCY MEDICINE
Payer: MEDICARE

## 2019-02-18 VITALS
DIASTOLIC BLOOD PRESSURE: 96 MMHG | HEIGHT: 64 IN | OXYGEN SATURATION: 96 % | WEIGHT: 204.5 LBS | TEMPERATURE: 97.5 F | SYSTOLIC BLOOD PRESSURE: 156 MMHG | HEART RATE: 78 BPM | BODY MASS INDEX: 34.91 KG/M2 | RESPIRATION RATE: 18 BRPM

## 2019-02-18 DIAGNOSIS — M25.561 CHRONIC PAIN OF RIGHT KNEE: Primary | ICD-10-CM

## 2019-02-18 DIAGNOSIS — G89.29 CHRONIC PAIN OF RIGHT KNEE: Primary | ICD-10-CM

## 2019-02-18 PROCEDURE — 99282 EMERGENCY DEPT VISIT SF MDM: CPT

## 2019-02-18 PROCEDURE — 73562 X-RAY EXAM OF KNEE 3: CPT

## 2019-02-18 RX ORDER — MELOXICAM 7.5 MG/1
7.5 TABLET ORAL DAILY
Qty: 7 TAB | Refills: 0 | Status: SHIPPED | OUTPATIENT
Start: 2019-02-18 | End: 2019-04-18 | Stop reason: ALTCHOICE

## 2019-02-18 NOTE — DISCHARGE INSTRUCTIONS

## 2019-02-18 NOTE — ED TRIAGE NOTES
Patient (s)  given copy of dc instructions and 0 paper script(s) and 1 electronic scripts. Patient (s)  verbalized understanding of instructions and script (s). Patient given a current medication reconciliation form and verbalized understanding of their medications. Patient (s) verbalized understanding of the importance of discussing medications with  his or her physician or clinic they will be following up with. Patient alert and oriented and in no acute distress. Patient offered wheelchair from treatment area to hospital entrance, patient declined wheelchair and used her cane. Pt was discharged by the provider

## 2019-02-18 NOTE — ED PROVIDER NOTES
EMERGENCY DEPARTMENT HISTORY AND PHYSICAL EXAM 
 
Date: 2/18/2019 Patient Name: Sp Bass History of Presenting Illness Chief Complaint Patient presents with  Knee Pain History Provided By: Patient HPI: Sp Bass is a 59 y.o. female with a PMH of bipolar, depressoni, gerd, htn, stroke who presents with cc of right knee pain and swelling for 2 days. Pt states she has a history of arthritis that and has had similar flare ups in the past.  
She denies any trauma, falls, increase in warmth, weakness to her leg or any numbness. Pt has not self medicated All other ROS negative at this time Pt is in no acute distress and is speaking in full sentences' PCP: Reggie Milner., NP Current Outpatient Medications Medication Sig Dispense Refill  meloxicam (MOBIC) 7.5 mg tablet Take 1 Tab by mouth daily. 7 Tab 0  
 calcium-cholecalciferol, D3, (CALCIUM WITH VITAMIN D) tablet Take 2 Tabs by mouth daily. 60 Tab 11  
 chlorhexidine (HIBICLENS) 4 % liquid Apply 118 mL to affected area daily as needed. 236 mL 0  
 clopidogrel (PLAVIX) 75 mg tab Take 1 Tab by mouth daily. 30 Tab 3  
 atorvastatin (LIPITOR) 10 mg tablet Take 1 Tab by mouth nightly. 30 Tab 3  
 amLODIPine (NORVASC) 2.5 mg tablet Take 1 Tab by mouth daily. 30 Tab 3  
 lisinopril (PRINIVIL, ZESTRIL) 20 mg tablet Take 1 Tab by mouth daily. 30 Tab 3  
 metoprolol tartrate (LOPRESSOR) 25 mg tablet TAKE ONE-HALF TABLET BY MOUTH TWICE DAILY 30 Tab 3  
 aspirin 81 mg chewable tablet Take 1 Tab by mouth daily. 30 Tab 11 Past History Past Medical History: 
Past Medical History:  
Diagnosis Date  Anxiety  Bipolar 1 disorder ([facility] [address]  Depression  GERD (gastroesophageal reflux disease)  Hypertension  Psychiatric disorder   
 bipolar  Stroke ([facility] [address] Past Surgical History: 
Past Surgical History:  
Procedure Laterality Date  HX ADENOIDECTOMY    
 sweat glands  HX GYN    
  HX TONSILLECTOMY Family History: 
Family History Problem Relation Age of Onset  Cancer Mother  Heart Disease Father Social History: 
Social History Tobacco Use  Smoking status: Never Smoker  Smokeless tobacco: Never Used Substance Use Topics  Alcohol use: No  
  Comment: \"once a month\"  Drug use: No  
  Comment: none x 1 month Allergies: Allergies Allergen Reactions  Contrast Dye [Iodine] Hives Review of Systems Review of Systems Constitutional: Negative. Negative for chills and fever. HENT: Negative. Eyes: Negative. Respiratory: Negative. Negative for shortness of breath. Cardiovascular: Negative. Negative for chest pain. Gastrointestinal: Negative. Negative for abdominal pain, diarrhea, nausea and vomiting. Endocrine: Negative. Genitourinary: Negative. Musculoskeletal: Positive for arthralgias. Skin: Negative. Allergic/Immunologic: Negative. Neurological: Negative. Negative for headaches. Hematological: Negative. Psychiatric/Behavioral: Negative. All other systems reviewed and are negative. Physical Exam  
 
Vitals:  
 02/18/19 1317 BP: (!) 156/96 Pulse: 78 Resp: 18 Temp: 97.5 °F (36.4 °C) SpO2: 96% Weight: 92.8 kg (204 lb 8 oz) Height: 5' 4\" (1.626 m) Physical Exam  
Constitutional: She is oriented to person, place, and time. She appears well-developed and well-nourished. No distress. HENT:  
Head: Normocephalic and atraumatic. Right Ear: External ear normal.  
Left Ear: External ear normal.  
Nose: Nose normal.  
Mouth/Throat: Oropharynx is clear and moist.  
Eyes: Conjunctivae and EOM are normal. Pupils are equal, round, and reactive to light. Neck: Normal range of motion. Neck supple. No tracheal deviation present. Cardiovascular: Normal rate, regular rhythm, normal heart sounds and intact distal pulses. Pulmonary/Chest: Effort normal and breath sounds normal. No respiratory distress. She has no wheezes. Abdominal: Soft. Bowel sounds are normal. She exhibits no distension. There is no tenderness. There is no rebound, no CVA tenderness, no tenderness at McBurney's point and negative Cid's sign. Musculoskeletal: Normal range of motion. She exhibits no edema or deformity. Right hip: Normal.  
     Left hip: Normal.  
     Right knee: She exhibits normal range of motion, no swelling, no effusion, no ecchymosis, no deformity, no laceration, no erythema, normal alignment, no LCL laxity, normal patellar mobility and no MCL laxity. Tenderness (with ROM) found. No patellar tendon tenderness noted. Left knee: Normal.  
     Right ankle: Normal.  
     Left ankle: Normal.  
     Right lower leg: Normal. She exhibits no tenderness, no bony tenderness, no swelling, no edema, no deformity and no laceration. Left lower leg: Normal. She exhibits no tenderness, no bony tenderness, no swelling, no edema, no deformity and no laceration. Lymphadenopathy:  
  She has no cervical adenopathy. Neurological: She is alert and oriented to person, place, and time. She has normal reflexes. She displays normal reflexes. No cranial nerve deficit. She exhibits normal muscle tone. Coordination normal.  
Skin: Skin is warm and dry. She is not diaphoretic. No pallor. Psychiatric: She has a normal mood and affect. Her behavior is normal. Judgment and thought content normal.  
Nursing note and vitals reviewed. Diagnostic Study Results Labs - No results found for this or any previous visit (from the past 12 hour(s)). Radiologic Studies -  
XR KNEE RT 3 V Final Result IMPRESSION: Evidence of moderate degenerative change involving the right knee as  
described above. CT Results  (Last 48 hours) None CXR Results  (Last 48 hours) None Medical Decision Making I am the first provider for this patient. I reviewed the vital signs, available nursing notes, past medical history, past surgical history, family history and social history. Vital Signs-Reviewed the patient's vital signs. Records Reviewed: Nursing Notes, Old Medical Records, Previous Radiology Studies and Previous Laboratory Studies Disposition: 
Discharge DISCHARGE NOTE:  
Care plan outlined and precautions discussed. Patient has no new complaints, changes, or physical findings. Results of visit were reviewed with the patient. All medications were reviewed with the patient; will d/c home. All of pt's questions and concerns were addressed. Patient was instructed and agrees to follow up with pcp, as well as to return to the ED upon further deterioration. Patient is ready to go home. Follow-up Information Follow up With Specialties Details Why Contact Info Ruchi Yan., NP Nurse Practitioner Schedule an appointment as soon as possible for a visit in 3 days If symptoms worsen [facility] [address] [facility] [address] [id number] 
[phone] [facility][1]  Schedule an appointment as soon as possible for a visit in 3 days If symptoms worsen [address] [facility] [address] [place] [id number] [phone] Discharge Medication List as of 2/18/2019  2:14 PM  
  
START taking these medications Details  
meloxicam (MOBIC) 7.5 mg tablet Take 1 Tab by mouth daily. , Normal, Disp-7 Tab, R-0  
  
  
CONTINUE these medications which have NOT CHANGED Details  
calcium-cholecalciferol, D3, (CALCIUM WITH VITAMIN D) tablet Take 2 Tabs by mouth daily. , Normal, Disp-60 Tab, R-11  
  
chlorhexidine (HIBICLENS) 4 % liquid Apply 118 mL to affected area daily as needed., Normal, Disp-236 mL, R-0  
  
clopidogrel (PLAVIX) 75 mg tab Take 1 Tab by mouth daily. , Normal, Disp-30 Tab, R-3  
  
atorvastatin (LIPITOR) 10 mg tablet Take 1 Tab by mouth nightly., Normal, Disp-30 Tab, R-3  
  
amLODIPine (NORVASC) 2.5 mg tablet Take 1 Tab by mouth daily. , Normal, Disp-30 Tab, R-3  
  
lisinopril (PRINIVIL, ZESTRIL) 20 mg tablet Take 1 Tab by mouth daily. , Normal, Disp-30 Tab, R-3  
  
metoprolol tartrate (LOPRESSOR) 25 mg tablet TAKE ONE-HALF TABLET BY MOUTH TWICE DAILY, Normal, Disp-30 Tab, R-3  
  
aspirin 81 mg chewable tablet Take 1 Tab by mouth daily. , Normal, Disp-30 Tab, R-11 Provider Notes (Medical Decision Making): DDX arthritic pain, contusion, fracture, Worsening si/sxs discussed extensively Follow up with PCP or RTC if symptoms/signs worsen Side effects of medication discussed Education materials provided at discharge Pt verbalizes agreement with plan 
 
Procedures: 
Procedures Diagnosis Clinical Impression: 1. Chronic pain of right knee

## 2019-02-18 NOTE — ED NOTES
Emergency Department Nursing Plan of Care The Nursing Plan of Care is developed from the Nursing assessment and Emergency Department Attending provider initial evaluation. The plan of care may be reviewed in the ED Provider note. The Plan of Care was developed with the following considerations:  
Patient / Family readiness to learn indicated by:verbalized understanding Persons(s) to be included in education: patient Barriers to Learning/Limitations:No 
 
Signed Vipin Olmstead RN   
2/18/2019   2:21 PM

## 2019-02-18 NOTE — ED NOTES
Provider discharged the patient without a recheck of the bp. I told her and she still discharged the patient

## 2019-04-18 DIAGNOSIS — I10 ESSENTIAL HYPERTENSION: ICD-10-CM

## 2019-04-18 DIAGNOSIS — Z86.73 HISTORY OF CVA (CEREBROVASCULAR ACCIDENT): ICD-10-CM

## 2019-04-18 DIAGNOSIS — E78.5 HYPERLIPIDEMIA, UNSPECIFIED HYPERLIPIDEMIA TYPE: ICD-10-CM

## 2019-04-18 RX ORDER — AMLODIPINE BESYLATE 2.5 MG/1
TABLET ORAL
Qty: 90 TAB | Refills: 0 | Status: SHIPPED | OUTPATIENT
Start: 2019-04-18 | End: 2019-07-29 | Stop reason: SDUPTHER

## 2019-04-18 RX ORDER — CLOPIDOGREL BISULFATE 75 MG/1
TABLET ORAL
Qty: 90 TAB | Refills: 0 | Status: SHIPPED | OUTPATIENT
Start: 2019-04-18 | End: 2019-07-29 | Stop reason: SDUPTHER

## 2019-04-18 RX ORDER — ATORVASTATIN CALCIUM 10 MG/1
10 TABLET, FILM COATED ORAL
Qty: 90 TAB | Refills: 0 | Status: SHIPPED | OUTPATIENT
Start: 2019-04-18 | End: 2019-07-29 | Stop reason: SDUPTHER

## 2019-04-18 RX ORDER — LISINOPRIL 20 MG/1
TABLET ORAL
Qty: 90 TAB | Refills: 0 | Status: SHIPPED | OUTPATIENT
Start: 2019-04-18 | End: 2019-07-29 | Stop reason: SDUPTHER

## 2019-07-29 ENCOUNTER — OFFICE VISIT (OUTPATIENT)
Dept: INTERNAL MEDICINE CLINIC | Age: 59
End: 2019-07-29

## 2019-07-29 VITALS
BODY MASS INDEX: 36.55 KG/M2 | OXYGEN SATURATION: 98 % | SYSTOLIC BLOOD PRESSURE: 118 MMHG | DIASTOLIC BLOOD PRESSURE: 82 MMHG | RESPIRATION RATE: 18 BRPM | HEIGHT: 64 IN | HEART RATE: 76 BPM | TEMPERATURE: 98.1 F | WEIGHT: 214.1 LBS

## 2019-07-29 DIAGNOSIS — Z12.11 SCREENING FOR COLON CANCER: ICD-10-CM

## 2019-07-29 DIAGNOSIS — R05.8 DRY COUGH: ICD-10-CM

## 2019-07-29 DIAGNOSIS — I10 ESSENTIAL HYPERTENSION: Primary | ICD-10-CM

## 2019-07-29 DIAGNOSIS — E66.01 MORBID OBESITY DUE TO EXCESS CALORIES (HCC): ICD-10-CM

## 2019-07-29 DIAGNOSIS — E78.5 HYPERLIPIDEMIA, UNSPECIFIED HYPERLIPIDEMIA TYPE: ICD-10-CM

## 2019-07-29 DIAGNOSIS — E78.2 MIXED HYPERLIPIDEMIA: ICD-10-CM

## 2019-07-29 DIAGNOSIS — Z86.73 HISTORY OF CVA (CEREBROVASCULAR ACCIDENT): ICD-10-CM

## 2019-07-29 RX ORDER — LISINOPRIL 20 MG/1
TABLET ORAL
Qty: 90 TAB | Refills: 0 | Status: SHIPPED | OUTPATIENT
Start: 2019-07-29 | End: 2019-10-10 | Stop reason: SINTOL

## 2019-07-29 RX ORDER — GUAIFENESIN 100 MG/5ML
81 LIQUID (ML) ORAL DAILY
Qty: 90 TAB | Refills: 1 | Status: SHIPPED | OUTPATIENT
Start: 2019-07-29 | End: 2020-07-20 | Stop reason: SDUPTHER

## 2019-07-29 RX ORDER — ATORVASTATIN CALCIUM 10 MG/1
10 TABLET, FILM COATED ORAL
Qty: 90 TAB | Refills: 0 | Status: SHIPPED | OUTPATIENT
Start: 2019-07-29 | End: 2020-06-22

## 2019-07-29 RX ORDER — CETIRIZINE HCL 10 MG
10 TABLET ORAL
Qty: 90 TAB | Refills: 0 | Status: SHIPPED | OUTPATIENT
Start: 2019-07-29 | End: 2020-07-20 | Stop reason: SDUPTHER

## 2019-07-29 RX ORDER — CLOPIDOGREL BISULFATE 75 MG/1
TABLET ORAL
Qty: 90 TAB | Refills: 0 | Status: SHIPPED | OUTPATIENT
Start: 2019-07-29 | End: 2019-12-22

## 2019-07-29 RX ORDER — METOPROLOL TARTRATE 25 MG/1
12.5 TABLET, FILM COATED ORAL 2 TIMES DAILY
Qty: 180 TAB | Refills: 0 | Status: SHIPPED | OUTPATIENT
Start: 2019-07-29 | End: 2020-07-20 | Stop reason: SDUPTHER

## 2019-07-29 RX ORDER — AMLODIPINE BESYLATE 2.5 MG/1
TABLET ORAL
Qty: 90 TAB | Refills: 0 | Status: SHIPPED | OUTPATIENT
Start: 2019-07-29 | End: 2019-12-22

## 2019-07-29 NOTE — PROGRESS NOTES
Pt here for   Chief Complaint   Patient presents with    Follow-up    Hypertension    Cough     PT c/o dry cough     1. Have you been to the ER, urgent care clinic since your last visit? Hospitalized since your last visit? No    2. Have you seen or consulted any other health care providers outside of the [address] [place] [address] since your last visit? Include any pap smears or colon screening.  No       Pt denies pain at this time    3 most recent PHQ Screens 7/29/2019   Little interest or pleasure in doing things Not at all   Feeling down, depressed, irritable, or hopeless Not at all   Total Score PHQ 2 0   Trouble falling or staying asleep, or sleeping too much -   Feeling tired or having little energy -   Poor appetite, weight loss, or overeating -   Feeling bad about yourself - or that you are a failure or have let yourself or your family down -   Trouble concentrating on things such as school, work, reading, or watching TV -   Moving or speaking so slowly that other people could have noticed; or the opposite being so fidgety that others notice -   Thoughts of being better off dead, or hurting yourself in some way -   PHQ 9 Score -   How difficult have these problems made it for you to do your work, take care of your home and get along with others -

## 2019-07-29 NOTE — PATIENT INSTRUCTIONS
1 sometimes lisinopril can cause a dry cough - try holding this med for 1-2 weeks. .. to see if cough improves. Because this is a blood pressure med we don't want your pressure to start getting high - I would take 2 of your amlodipine to equal 5mg daily. .   Goal blood pressure is less than 140/90. .. It would be helpful for you to check it. ..

## 2019-07-29 NOTE — PROGRESS NOTES
Subjective: (As above and below)     Chief Complaint   Patient presents with    Follow-up    Hypertension    Cough     PT c/o dry cough     Adeel Lot. Mela Gomez is a 59y.o. year old female who presents for     Hypertension ROS:  taking medications as instructed, no medication side effects noted, no TIAs, no chest pain on exertion, no dyspnea on exertion, no swelling of ankles    Colonoscopy: canceled, unsure why. She has not r/s yet. Hyperlipidemia: taking statin w/o a/e    Dry cough: x 2 months. No fevers, travel, sick contacts. She has a hx of seasonal allergies but denies other allergy s/s. Not taking anything for allergies. Denies wheezing. Denies GERD. No chest pain          Reviewed PmHx, RxHx, FmHx, SocHx, AllgHx and updated in chart. Family History   Problem Relation Age of Onset    Cancer Mother     Heart Disease Father        Past Medical History:   Diagnosis Date    Anxiety     Bipolar 1 disorder ([facility] [address]     Depression     GERD (gastroesophageal reflux disease)     Hypertension     Psychiatric disorder     bipolar    Stroke [facility])       Social History     Socioeconomic History    Marital status:      Spouse name: Not on file    Number of children: Not on file    Years of education: Not on file    Highest education level: Not on file   Tobacco Use    Smoking status: Never Smoker    Smokeless tobacco: Never Used   Substance and Sexual Activity    Alcohol use: No     Comment: \"once a month\"    Drug use: No     Frequency: 2.0 times per week     Types: Marijuana     Comment: none x 1 month     Sexual activity: Not Currently   Social History Narrative    ** Merged History Encounter **               Current Outpatient Medications   Medication Sig    metoprolol tartrate (LOPRESSOR) 25 mg tablet TAKE 1/2 (ONE-HALF) TABLET BY MOUTH TWICE DAILY    atorvastatin (LIPITOR) 10 mg tablet Take 1 Tab by mouth nightly.  For cholesterol    amLODIPine (NORVASC) 2.5 mg tablet TAKE 1 TABLET BY MOUTH ONCE DAILY    clopidogrel (PLAVIX) 75 mg tab TAKE 1 TABLET BY MOUTH ONCE DAILY    lisinopril (PRINIVIL, ZESTRIL) 20 mg tablet TAKE 1 TABLET BY MOUTH ONCE DAILY    aspirin 81 mg chewable tablet Take 1 Tab by mouth daily.  calcium-cholecalciferol, D3, (CALCIUM WITH VITAMIN D) tablet Take 2 Tabs by mouth daily. No current facility-administered medications for this visit. Review of Systems:   Constitutional:    Negative for fever and chills, negative diaphoresis. HEENT:              Negative for neck pain and stiffness. Eyes:                  Negative for visual disturbance, itching, redness or discharge. Respiratory:        Negative for  shortness of breath. +cough  Cardiovascular:  Negative for chest pain and palpitations. Gastrointestinal: Negative for nausea, vomiting, abdominal pain, diarrhea or constipation. Genitourinary:     Negative for dysuria and frequency. Musculoskeletal: Negative for falls, tenderness and swelling. Skin:                    Negative for rash, masses or lesions. Neurological:       Negative for dizzyness, seizure, loss of consciousness, weakness and numbness.      Objective:     Vitals:    07/29/19 1126   BP: 118/82   Pulse: 76   Resp: 18   Temp: 98.1 °F (36.7 °C)   TempSrc: Oral   SpO2: 98%   Weight: 214 lb 1.6 oz (97.1 kg)   Height: 5' 4\" (1.626 m)       Results for orders placed or performed in visit on 11/26/18   VITAMIN D, 25 HYDROXY   Result Value Ref Range    VITAMIN D, 25-HYDROXY 26.7 (L) 30.0 - 100.0 ng/mL   HEPATITIS C QT BY PCR WITH REFLEX GENOTYPE   Result Value Ref Range    Hepatitis C Quantitation HCV Not Detected IU/mL    HCV log10 CANCELED log10 IU/mL    TEST INFORMATION Comment     HCV Genotype CANCELED    TSH 3RD GENERATION   Result Value Ref Range    TSH 0.810 0.450 - 4.500 uIU/mL         Physical Examination: General appearance - alert, well appearing, and in no distress and overweight  Ears - bilateral TM's and external ear canals normal  Nose - normal and patent, no erythema, discharge or polyps  Mouth - mucous membranes moist, pharynx normal without lesions  Chest - clear to auscultation, no wheezes, rales or rhonchi, symmetric air entry  Heart - normal rate, regular rhythm, normal S1, S2, no murmurs, rubs, clicks or gallops  Extremities - no pedal edema noted      Assessment/ Plan:     1. Essential hypertension    - METABOLIC PANEL, BASIC  - amLODIPine (NORVASC) 2.5 mg tablet; TAKE 1 TABLET BY MOUTH ONCE DAILY  Dispense: 90 Tab; Refill: 0  - metoprolol tartrate (LOPRESSOR) 25 mg tablet; Take 0.5 Tabs by mouth two (2) times a day. Dispense: 180 Tab; Refill: 0  - lisinopril (PRINIVIL, ZESTRIL) 20 mg tablet; TAKE 1 TABLET BY MOUTH ONCE DAILY  Dispense: 90 Tab; Refill: 0    2. Mixed hyperlipidemia    - LIPID PANEL    3. Screening for colon cancer    - REFERRAL TO GASTROENTEROLOGY    4. Hyperlipidemia, unspecified hyperlipidemia type    - atorvastatin (LIPITOR) 10 mg tablet; Take 1 Tab by mouth nightly. For cholesterol  Dispense: 90 Tab; Refill: 0    5. History of CVA (cerebrovascular accident)    - clopidogrel (PLAVIX) 75 mg tab; TAKE 1 TABLET BY MOUTH ONCE DAILY  Dispense: 90 Tab; Refill: 0    6. Dry cough  ? recc try allergy tab 1st. If not better advised trial off lisinopril x 1-2 weeks - if trial off lisinopril recc home BP checks and increase amlodipine to 5mg daily if >goal    7. Morbid obesity due to excess calories ([address]  I have reviewed/discussed the above normal BMI with the patient. I have recommended the following interventions: dietary management education, guidance, and counseling . .            I have discussed the diagnosis with the patient and the intended plan as seen in the above orders. The patient has received an after-visit summary and questions were answered concerning future plans. Pt conveyed understanding of plan.       Medication Side Effects and Warnings were discussed with patient: yes  Patient Labs were reviewed: yes  Patient Past Records were reviewed:  yes    Radha Augutse.  Trae Sloan NP

## 2019-08-09 ENCOUNTER — TELEPHONE (OUTPATIENT)
Dept: INTERNAL MEDICINE CLINIC | Age: 59
End: 2019-08-09

## 2019-10-09 ENCOUNTER — TELEPHONE (OUTPATIENT)
Dept: INTERNAL MEDICINE CLINIC | Age: 59
End: 2019-10-09

## 2019-10-09 NOTE — TELEPHONE ENCOUNTER
Called pt back  Understanding was that she was going to trial off lisinopril (which did help with cough) and monitor home BP's was not sure she would even need the lisinopril bc her pressure was very good and she is already on amlodipine (although very small dose) and a BB  She has NOT checked her pressure but has been having a headache.  Advised take 2 amlodpine to equal 5mg and come in tomorrow for BP check  Pt agrees w/ plan

## 2019-10-09 NOTE — TELEPHONE ENCOUNTER
Pt states you discontinued one of her blood pressure medications. She wants you to replace it with something else because she is now experiencing headaches.   Pt # [phone]

## 2019-10-10 ENCOUNTER — CLINICAL SUPPORT (OUTPATIENT)
Dept: INTERNAL MEDICINE CLINIC | Age: 59
End: 2019-10-10

## 2019-10-10 VITALS — HEART RATE: 69 BPM | SYSTOLIC BLOOD PRESSURE: 152 MMHG | DIASTOLIC BLOOD PRESSURE: 100 MMHG

## 2019-10-10 DIAGNOSIS — I10 ESSENTIAL HYPERTENSION: Primary | ICD-10-CM

## 2019-10-10 DIAGNOSIS — Z01.30 BLOOD PRESSURE CHECK: Primary | ICD-10-CM

## 2019-10-10 RX ORDER — IRBESARTAN 150 MG/1
150 TABLET ORAL
Qty: 60 TAB | Refills: 0 | Status: SHIPPED | OUTPATIENT
Start: 2019-10-10 | End: 2020-01-06 | Stop reason: SDUPTHER

## 2019-10-28 ENCOUNTER — CLINICAL SUPPORT (OUTPATIENT)
Dept: INTERNAL MEDICINE CLINIC | Age: 59
End: 2019-10-28

## 2019-10-28 DIAGNOSIS — Z23 ENCOUNTER FOR IMMUNIZATION: Primary | ICD-10-CM

## 2019-10-28 NOTE — PROGRESS NOTES
Tuberculin skin test applied to RIght ventral forearm. Explained how to read the test, measuring induration not just erythema; she will come into office if test appears positive. Pt understood Instructions Becca Mooney LPN

## 2019-10-31 LAB
MM INDURATION POC: 0 MM (ref 0–5)
PPD POC: NEGATIVE NEGATIVE

## 2019-11-18 ENCOUNTER — CLINICAL SUPPORT (OUTPATIENT)
Dept: INTERNAL MEDICINE CLINIC | Age: 59
End: 2019-11-18

## 2019-11-18 VITALS — SYSTOLIC BLOOD PRESSURE: 145 MMHG | DIASTOLIC BLOOD PRESSURE: 90 MMHG | HEART RATE: 79 BPM

## 2019-11-18 DIAGNOSIS — I10 ESSENTIAL HYPERTENSION: ICD-10-CM

## 2019-12-20 ENCOUNTER — OFFICE VISIT (OUTPATIENT)
Dept: INTERNAL MEDICINE CLINIC | Age: 59
End: 2019-12-20

## 2019-12-20 VITALS
BODY MASS INDEX: 37.24 KG/M2 | DIASTOLIC BLOOD PRESSURE: 97 MMHG | SYSTOLIC BLOOD PRESSURE: 150 MMHG | RESPIRATION RATE: 20 BRPM | WEIGHT: 218.1 LBS | OXYGEN SATURATION: 98 % | HEART RATE: 66 BPM | TEMPERATURE: 97.4 F | HEIGHT: 64 IN

## 2019-12-20 DIAGNOSIS — L02.91 ABSCESS: Primary | ICD-10-CM

## 2019-12-20 DIAGNOSIS — Z12.31 ENCOUNTER FOR SCREENING MAMMOGRAM FOR MALIGNANT NEOPLASM OF BREAST: ICD-10-CM

## 2019-12-20 DIAGNOSIS — R35.0 URINARY FREQUENCY: ICD-10-CM

## 2019-12-20 DIAGNOSIS — Z12.11 SCREENING FOR COLON CANCER: ICD-10-CM

## 2019-12-20 LAB
BILIRUB UR QL STRIP: NEGATIVE
GLUCOSE UR-MCNC: NEGATIVE MG/DL
KETONES P FAST UR STRIP-MCNC: NEGATIVE MG/DL
PH UR STRIP: 6 [PH] (ref 4.6–8)
PROT UR QL STRIP: NEGATIVE
SP GR UR STRIP: 1.03 (ref 1–1.03)
UA UROBILINOGEN AMB POC: NORMAL (ref 0.2–1)
URINALYSIS CLARITY POC: CLEAR
URINALYSIS COLOR POC: YELLOW
URINE BLOOD POC: NORMAL
URINE LEUKOCYTES POC: NEGATIVE
URINE NITRITES POC: NEGATIVE

## 2019-12-20 RX ORDER — DOXYCYCLINE 100 MG/1
100 TABLET ORAL 2 TIMES DAILY
Qty: 14 TAB | Refills: 0 | Status: SHIPPED | OUTPATIENT
Start: 2019-12-20 | End: 2019-12-27

## 2019-12-20 NOTE — PROGRESS NOTES
Subjective: (As above and below)     Chief Complaint   Patient presents with    Cyst     Chest    Urinary Pain    Flank Pain     Left side     Meliton Rodriguez Monday is a 62y.o. year old female who presents for     Abscess: to her chest - in between breasts - present for a few days. Non-draining. Mildly tender. Hx of abscesses to different locations    Urinary frequency: x 1 week w/ mild LLQ abdominal pain. No dysuria, slight vaginal odor. No other GI s/s    Mammo:due  Colonoscopy:due - average risk. HTN: didn't take meds today. .        Reviewed PmHx, RxHx, FmHx, SocHx, AllgHx and updated in chart. Family History   Problem Relation Age of Onset    Cancer Mother     Heart Disease Father        Past Medical History:   Diagnosis Date    Anxiety     [facility] ([facility] [address]     Depression     GERD (gastroesophageal reflux disease)     Hypertension     Psychiatric disorder     bipolar    Stroke [facility])       Social History     Socioeconomic History    Marital status:      Spouse name: Not on file    Number of children: Not on file    Years of education: Not on file    Highest education level: Not on file   Tobacco Use    Smoking status: Never Smoker    Smokeless tobacco: Never Used   Substance and Sexual Activity    Alcohol use: No     Comment: \"once a month\"    Drug use: No     Frequency: 2.0 times per week     Types: Marijuana     Comment: none x 1 month     Sexual activity: Not Currently   Social History Narrative    ** Merged History Encounter **               Current Outpatient Medications   Medication Sig    irbesartan (AVAPRO) 150 mg tablet Take 1 Tab by mouth nightly.  amLODIPine (NORVASC) 2.5 mg tablet TAKE 1 TABLET BY MOUTH ONCE DAILY    atorvastatin (LIPITOR) 10 mg tablet Take 1 Tab by mouth nightly. For cholesterol    aspirin 81 mg chewable tablet Take 1 Tab by mouth daily.     clopidogrel (PLAVIX) 75 mg tab TAKE 1 TABLET BY MOUTH ONCE DAILY    metoprolol tartrate (LOPRESSOR) 25 mg tablet Take 0.5 Tabs by mouth two (2) times a day.  cetirizine (ZYRTEC) 10 mg tablet Take 1 Tab by mouth daily as needed for Allergies.  calcium-cholecalciferol, D3, (CALCIUM WITH VITAMIN D) tablet Take 2 Tabs by mouth daily. No current facility-administered medications for this visit. Review of Systems:   Constitutional:    Negative for fever and chills, negative diaphoresis. HEENT:              Negative for neck pain and stiffness. Eyes:                  Negative for visual disturbance, itching, redness or discharge. Respiratory:        Negative for cough and shortness of breath. Cardiovascular:  Negative for chest pain and palpitations. Gastrointestinal: Negative for nausea, vomiting, abdominal pain, diarrhea or constipation. Genitourinary:     Negative for dysuria and +frequency. Musculoskeletal: Negative for falls, tenderness and swelling. Skin:                    Negative for rash, masses or lesions. Neurological:       Negative for dizzyness, seizure, loss of consciousness, weakness and numbness.      Objective:     Vitals:    12/20/19 0931   BP: (!) 150/97   Pulse: 66   Resp: 20   Temp: 97.4 °F (36.3 °C)   TempSrc: Oral   SpO2: 98%   Weight: 218 lb 1.6 oz (98.9 kg)   Height: 5' 4\" (1.626 m)       Results for orders placed or performed in visit on 12/20/19   AMB POC URINALYSIS DIP STICK AUTO W/O MICRO   Result Value Ref Range    Color (UA POC) Yellow     Clarity (UA POC) Clear     Glucose (UA POC) Negative Negative    Bilirubin (UA POC) Negative Negative    Ketones (UA POC) Negative Negative    Specific gravity (UA POC) 1.030 1.001 - 1.035    Blood (UA POC) Trace Negative    pH (UA POC) 6.0 4.6 - 8.0    Protein (UA POC) Negative Negative    Urobilinogen (UA POC) 0.2 mg/dL 0.2 - 1    Nitrites (UA POC) Negative Negative    Leukocyte esterase (UA POC) Negative Negative         Physical Examination: General appearance - alert, well appearing, and in no distress  Chest - clear to auscultation, no wheezes, rales or rhonchi, symmetric air entry  Heart - normal rate, regular rhythm, normal S1, S2, no murmurs, rubs, clicks or gallops  Abdomen - soft, nontender, nondistended, no masses or organomegaly  Skin - abscess to middle of chest wall, non-drainage, slightly erythematous and mildly tender to touch. Assessment/ Plan:       1. Abscess  Warm compresses - need ED if not draining for I&D  - doxycycline (ADOXA) 100 mg tablet; Take 1 Tab by mouth two (2) times a day for 7 days. Dispense: 14 Tab; Refill: 0    2. Screening for colon cancer  Understands that if + needs colonosocpy  - COLOGUARD TEST (FECAL DNA COLORECTAL CANCER SCREENING)    3. Urinary frequency  No s/s of UTI. Offered nuswab for vaginal odor, pt declines bc she is in a rush to make another appt. Will f/u [facility]  - AMB POC URINALYSIS DIP STICK AUTO W/O MICRO    4. Encounter for screening mammogram for malignant neoplasm of breast     - [facility] MAMMO BI SCREENING INCL CAD; Future      I have discussed the diagnosis with the patient and the intended plan as seen in the above orders. The patient has received an after-visit summary and questions were answered concerning future plans. Pt conveyed understanding of plan. Medication Side Effects and Warnings were discussed with patient: yes  Patient Labs were reviewed: yes  Patient Past Records were reviewed:  yes    Spencer Oliveira.  Dev Craft NP

## 2019-12-20 NOTE — PROGRESS NOTES
Pt here for   Chief Complaint   Patient presents with    Cyst     Chest    Urinary Pain    Flank Pain     Left side     1. Have you been to the ER, urgent care clinic since your last visit? Hospitalized since your last visit? No    2. Have you seen or consulted any other health care providers outside of the [address] [place] [address] since your last visit? Include any pap smears or colon screening.  No       Pt c/o pain 4 of 10, Pt denies taking anything for pain today    3 most recent PHQ Screens 12/20/2019   Little interest or pleasure in doing things Not at all   Feeling down, depressed, irritable, or hopeless Not at all   Total Score PHQ 2 0   Trouble falling or staying asleep, or sleeping too much -   Feeling tired or having little energy -   Poor appetite, weight loss, or overeating -   Feeling bad about yourself - or that you are a failure or have let yourself or your family down -   Trouble concentrating on things such as school, work, reading, or watching TV -   Moving or speaking so slowly that other people could have noticed; or the opposite being so fidgety that others notice -   Thoughts of being better off dead, or hurting yourself in some way -   PHQ 9 Score -   How difficult have these problems made it for you to do your work, take care of your home and get along with others -

## 2019-12-20 NOTE — PATIENT INSTRUCTIONS
Warm compresses to check to hopefully promote it to drain, antibiotics. If NOT better - getting bigger/not drainage - emergency room for drainage             Skin Abscess: Care Instructions  Your Care Instructions    A skin abscess is a bacterial infection that forms a pocket of pus. A boil is a kind of skin abscess. The doctor may have cut an opening in the abscess so that the pus can drain out. You may have gauze in the cut so that the abscess will stay open and keep draining. You may need antibiotics. You will need to follow up with your doctor to make sure the infection has gone away. The doctor has checked you carefully, but problems can develop later. If you notice any problems or new symptoms, get medical treatment right away. Follow-up care is a key part of your treatment and safety. Be sure to make and go to all appointments, and call your doctor if you are having problems. It's also a good idea to know your test results and keep a list of the medicines you take. How can you care for yourself at home? · Apply warm and dry compresses, a heating pad set on low, or a hot water bottle 3 or 4 times a day for pain. Keep a cloth between the heat source and your skin. · If your doctor prescribed antibiotics, take them as directed. Do not stop taking them just because you feel better. You need to take the full course of antibiotics. · Take pain medicines exactly as directed. ? If the doctor gave you a prescription medicine for pain, take it as prescribed. ? If you are not taking a prescription pain medicine, ask your doctor if you can take an over-the-counter medicine. · Keep your bandage clean and dry. Change the bandage whenever it gets wet or dirty, or at least one time a day. · If the abscess was packed with gauze:  ? Keep follow-up appointments to have the gauze changed or removed. If the doctor instructed you to remove the gauze, follow the instructions you were given for how to remove it. ?  After the gauze is removed, soak the area in warm water for 15 to 20 minutes 2 times a day, until the wound closes. When should you call for help? Call your doctor now or seek immediate medical care if:    · You have signs of worsening infection, such as:  ? Increased pain, swelling, warmth, or redness. ? Red streaks leading from the infected skin. ? Pus draining from the wound. ? A fever.    Watch closely for changes in your health, and be sure to contact your doctor if:    · You do not get better as expected. Where can you learn more? Go to http://bertha-elizabeth.info/. Enter D084 in the search box to learn more about \"Skin Abscess: Care Instructions. \"  Current as of: April 1, 2019  Content Version: 12.2  © 9767-5486 [employer]. Care instructions adapted under license by [employer] (which disclaims liability or warranty for this information). If you have questions about a medical condition or this instruction, always ask your healthcare professional. [place] [address] any warranty or liability for your use of this information.

## 2019-12-21 DIAGNOSIS — I10 ESSENTIAL HYPERTENSION: ICD-10-CM

## 2019-12-21 DIAGNOSIS — Z86.73 HISTORY OF CVA (CEREBROVASCULAR ACCIDENT): ICD-10-CM

## 2019-12-22 RX ORDER — CLOPIDOGREL BISULFATE 75 MG/1
TABLET ORAL
Qty: 90 TAB | Refills: 0 | Status: SHIPPED | OUTPATIENT
Start: 2019-12-22 | End: 2020-04-17

## 2019-12-22 RX ORDER — AMLODIPINE BESYLATE 2.5 MG/1
TABLET ORAL
Qty: 90 TAB | Refills: 0 | Status: SHIPPED | OUTPATIENT
Start: 2019-12-22 | End: 2020-01-28 | Stop reason: SDUPTHER

## 2019-12-27 ENCOUNTER — TELEPHONE (OUTPATIENT)
Dept: INTERNAL MEDICINE CLINIC | Age: 59
End: 2019-12-27

## 2019-12-27 RX ORDER — DOXYCYCLINE 100 MG/1
100 CAPSULE ORAL 2 TIMES DAILY
Qty: 14 CAP | Refills: 0 | Status: SHIPPED | OUTPATIENT
Start: 2019-12-27 | End: 2020-01-03

## 2019-12-27 NOTE — TELEPHONE ENCOUNTER
Pt states you prescirbed an antibiotic that she cannot afford ( it costs $54.00). She wants to know if a cheaper rx can be sent to [facility] on file please.   Pt # [phone]

## 2020-01-06 DIAGNOSIS — I10 ESSENTIAL HYPERTENSION: ICD-10-CM

## 2020-01-06 RX ORDER — IRBESARTAN 150 MG/1
150 TABLET ORAL
Qty: 90 TAB | Refills: 1 | Status: SHIPPED | OUTPATIENT
Start: 2020-01-06 | End: 2020-07-20 | Stop reason: ALTCHOICE

## 2020-01-28 ENCOUNTER — OFFICE VISIT (OUTPATIENT)
Dept: INTERNAL MEDICINE CLINIC | Age: 60
End: 2020-01-28

## 2020-01-28 VITALS
BODY MASS INDEX: 36.72 KG/M2 | SYSTOLIC BLOOD PRESSURE: 146 MMHG | HEART RATE: 93 BPM | HEIGHT: 64 IN | WEIGHT: 215.1 LBS | OXYGEN SATURATION: 98 % | DIASTOLIC BLOOD PRESSURE: 95 MMHG | RESPIRATION RATE: 18 BRPM | TEMPERATURE: 96.1 F

## 2020-01-28 DIAGNOSIS — R42 DIZZINESS: ICD-10-CM

## 2020-01-28 DIAGNOSIS — R55 SYNCOPE, UNSPECIFIED SYNCOPE TYPE: Primary | ICD-10-CM

## 2020-01-28 DIAGNOSIS — I10 ESSENTIAL HYPERTENSION: ICD-10-CM

## 2020-01-28 LAB — HGB BLD-MCNC: 14.7 G/DL

## 2020-01-28 RX ORDER — AMLODIPINE BESYLATE 5 MG/1
5 TABLET ORAL DAILY
Qty: 90 TAB | Refills: 1 | Status: SHIPPED | OUTPATIENT
Start: 2020-01-28 | End: 2020-07-14 | Stop reason: SDUPTHER

## 2020-01-28 RX ORDER — LISINOPRIL 20 MG/1
TABLET ORAL
COMMUNITY
Start: 2020-01-04 | End: 2020-07-20 | Stop reason: ALTCHOICE

## 2020-01-28 NOTE — PROGRESS NOTES
Pt here for   Chief Complaint   Patient presents with    Loss of Consciousness     Pt states passed out friday at barbaque at cousin house, Pt states she had been moving       1. Have you been to the ER, urgent care clinic since your last visit? Hospitalized since your last visit? No    2. Have you seen or consulted any other health care providers outside of the [address] [place] [address] since your last visit? Include any pap smears or colon screening.  No       Pt denies pain at this time    3 most recent PHQ Screens 1/28/2020   Little interest or pleasure in doing things Not at all   Feeling down, depressed, irritable, or hopeless Not at all   Total Score PHQ 2 0   Trouble falling or staying asleep, or sleeping too much -   Feeling tired or having little energy -   Poor appetite, weight loss, or overeating -   Feeling bad about yourself - or that you are a failure or have let yourself or your family down -   Trouble concentrating on things such as school, work, reading, or watching TV -   Moving or speaking so slowly that other people could have noticed; or the opposite being so fidgety that others notice -   Thoughts of being better off dead, or hurting yourself in some way -   PHQ 9 Score -   How difficult have these problems made it for you to do your work, take care of your home and get along with others -
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
## 2020-01-29 LAB
BUN SERPL-MCNC: 14 MG/DL (ref 8–27)
BUN/CREAT SERPL: 18 (ref 12–28)
CALCIUM SERPL-MCNC: 9.9 MG/DL (ref 8.7–10.3)
CHLORIDE SERPL-SCNC: 107 MMOL/L (ref 96–106)
CHOLEST SERPL-MCNC: 181 MG/DL (ref 100–199)
CO2 SERPL-SCNC: 21 MMOL/L (ref 20–29)
CREAT SERPL-MCNC: 0.79 MG/DL (ref 0.57–1)
GLUCOSE SERPL-MCNC: 102 MG/DL (ref 65–99)
HDLC SERPL-MCNC: 67 MG/DL
INTERPRETATION, 910389: NORMAL
LDLC SERPL CALC-MCNC: 81 MG/DL (ref 0–99)
POTASSIUM SERPL-SCNC: 4.6 MMOL/L (ref 3.5–5.2)
SODIUM SERPL-SCNC: 146 MMOL/L (ref 134–144)
TRIGL SERPL-MCNC: 166 MG/DL (ref 0–149)
TSH SERPL DL<=0.005 MIU/L-ACNC: 0.77 UIU/ML (ref 0.45–4.5)
VLDLC SERPL CALC-MCNC: 33 MG/DL (ref 5–40)

## 2020-01-31 ENCOUNTER — HOSPITAL ENCOUNTER (OUTPATIENT)
Dept: MAMMOGRAPHY | Age: 60
Discharge: HOME | End: 2020-01-31
Attending: NURSE PRACTITIONER
Payer: MEDICARE

## 2020-01-31 DIAGNOSIS — Z12.31 ENCOUNTER FOR SCREENING MAMMOGRAM FOR MALIGNANT NEOPLASM OF BREAST: ICD-10-CM

## 2020-01-31 PROCEDURE — 77067 SCR MAMMO BI INCL CAD: CPT

## 2020-02-28 ENCOUNTER — OFFICE VISIT (OUTPATIENT)
Dept: NEUROLOGY | Age: 60
End: 2020-02-28

## 2020-02-28 VITALS
RESPIRATION RATE: 16 BRPM | HEIGHT: 64 IN | WEIGHT: 210.6 LBS | HEART RATE: 63 BPM | TEMPERATURE: 97.7 F | DIASTOLIC BLOOD PRESSURE: 85 MMHG | BODY MASS INDEX: 35.96 KG/M2 | OXYGEN SATURATION: 96 % | SYSTOLIC BLOOD PRESSURE: 136 MMHG

## 2020-02-28 DIAGNOSIS — F48.2 PBA (PSEUDOBULBAR AFFECT): ICD-10-CM

## 2020-02-28 DIAGNOSIS — M35.9 AUTOIMMUNE DISEASE (HCC): ICD-10-CM

## 2020-02-28 DIAGNOSIS — I63.9 CEREBROVASCULAR ACCIDENT (CVA), UNSPECIFIED MECHANISM (HCC): Primary | ICD-10-CM

## 2020-02-28 DIAGNOSIS — G47.9 SLEEP DISORDER: ICD-10-CM

## 2020-02-28 DIAGNOSIS — R55 SYNCOPE, UNSPECIFIED SYNCOPE TYPE: ICD-10-CM

## 2020-02-28 DIAGNOSIS — R42 DIZZINESS: ICD-10-CM

## 2020-03-02 NOTE — PROGRESS NOTES
Neurology Consult Note      HISTORY PROVIDED BY: patient    Chief Complaint:   Chief Complaint   Patient presents with    TIA    New Patient      Subjective:    Giuseppe Juares is a 62 y.o. right handed female who presents in consultation for TIA  This is a 40-year-old right-handed black female with history of generalized anxiety disorder, bipolar disorder, major depressive disorder, headaches, hypertension, gastroesophageal reflux disease, memory difficulty, degenerative joint disease, sleep disorder, CVA, who was referred to the clinic to evaluate for transient ischemic attack versus acute stroke. Patient says about a month ago, while at her cousin's house for [employer] she felt dizzy, and could not remember anything, she was told that she was out for about 20 seconds. There was no shaking, falling or hitting her head. Subsequently patient was somewhat confused. It should be noted that this patient has had previous CVA. Patient did not go to the emergency room at that time, according to her, it was very brief, no ambulance was called. Patient then had follow-up with her primary care physician who referred her for further evaluation and management. She says sometimes she continues to be confused. Patient is already Plavix and aspirin. Because of the new event, I will obtain MRI with and without gadolinium on this patient. She says sometimes she laughs with no reason and cries with no reason  She says when she wakes up in the morning she is always very tired she has been told that she snores a lot when she sleeps.   Review of Systems - General ROS: positive for  - fatigue, night sweats and sleep disturbance  Psychological ROS: positive for - anxiety, concentration difficulties, depression, memory difficulties, mood swings and sleep disturbances  Ophthalmic ROS: positive for - decreased vision and double vision  ENT ROS: positive for - headaches, tinnitus, vertigo and visual changes  Allergy and Immunology ROS: negative  Hematological and Lymphatic ROS: negative  Endocrine ROS: negative  Respiratory ROS: no cough, shortness of breath, or wheezing  Cardiovascular ROS: no chest pain or dyspnea on exertion  Gastrointestinal ROS: no abdominal pain, change in bowel habits, or black or bloody stools  Genito-Urinary ROS: no dysuria, trouble voiding, or hematuria  Musculoskeletal ROS: positive for - joint pain, joint stiffness, muscle pain and muscular weakness  Neurological ROS: positive for - confusion, dizziness, headaches, impaired coordination/balance, memory loss, numbness/tingling, visual changes and weakness  Dermatological ROS: negative    Past Medical History:   Diagnosis Date    Anxiety     Bipolar 1 disorder (HCC)     Depression     Fatigue     Frequent headaches     GERD (gastroesophageal reflux disease)     Hearing loss     Hypertension     Joint pain     Memory disorder     Muscle pain     Psychiatric disorder     bipolar    Snoring     Stomach pain     Stroke [facility])       Past Surgical History:   Procedure Laterality Date    HX ADENOIDECTOMY      sweat glands    HX GYN      HX TONSILLECTOMY        Social History     Socioeconomic History    Marital status:      Spouse name: Not on file    Number of children: Not on file    Years of education: Not on file    Highest education level: Not on file   Occupational History    Not on file   Social Needs    Financial resource strain: Not on file    Food insecurity:     Worry: Not on file     Inability: Not on file    Transportation needs:     Medical: Not on file     Non-medical: Not on file   Tobacco Use    Smoking status: Never Smoker    Smokeless tobacco: Never Used   Substance and Sexual Activity    Alcohol use: No     Comment: \"once a month\"    Drug use: No     Frequency: 2.0 times per week     Types: Marijuana     Comment: none x 1 month     Sexual activity: Not Currently   Lifestyle    Physical activity:     Days per week: Not on file     Minutes per session: Not on file    Stress: Not on file   Relationships    Social connections:     Talks on phone: Not on file     Gets together: Not on file     Attends Yazidism service: Not on file     Active member of club or organization: Not on file     Attends meetings of clubs or organizations: Not on file     Relationship status: Not on file    Intimate partner violence:     Fear of current or ex partner: Not on file     Emotionally abused: Not on file     Physically abused: Not on file     Forced sexual activity: Not on file   Other Topics Concern    Not on file   Social History Narrative    ** Merged History Encounter **          Family History   Problem Relation Age of Onset    Cancer Mother     Heart Disease Father          Objective:   ROS  As per HPI  Allergies   Allergen Reactions    Ace Inhibitors Cough    Contrast Dye [Iodine] Hives        Meds:  Outpatient Medications Prior to Visit   Medication Sig Dispense Refill    lisinopril (PRINIVIL, ZESTRIL) 20 mg tablet TAKE 1 TABLET BY MOUTH ONCE DAILY      amLODIPine (NORVASC) 5 mg tablet Take 1 Tab by mouth daily. 90 Tab 1    irbesartan (AVAPRO) 150 mg tablet Take 1 Tab by mouth nightly. 90 Tab 1    clopidogrel (PLAVIX) 75 mg tab TAKE 1 TABLET BY MOUTH ONCE DAILY 90 Tab 0    atorvastatin (LIPITOR) 10 mg tablet Take 1 Tab by mouth nightly. For cholesterol 90 Tab 0    aspirin 81 mg chewable tablet Take 1 Tab by mouth daily. 90 Tab 1    metoprolol tartrate (LOPRESSOR) 25 mg tablet Take 0.5 Tabs by mouth two (2) times a day. 180 Tab 0    cetirizine (ZYRTEC) 10 mg tablet Take 1 Tab by mouth daily as needed for Allergies. 90 Tab 0    calcium-cholecalciferol, D3, (CALCIUM WITH VITAMIN D) tablet Take 2 Tabs by mouth daily. 60 Tab 11     No facility-administered medications prior to visit.         Imaging:  MRI Results (most recent):  Results from Hospital Encounter encounter on 01/09/14   MRI BRAIN W WO CONT Narrative **Final Report**       ICD Codes / Adm. Diagnosis: 401.9  784.0 / Unspecified essential hyperten    Headache  Examination:  MR BRAIN W AND WO CON  - [id number] - Jan 9 2014  3:46PM  Accession No:  [id number]  Reason:  TIA      REPORT:  EXAM:  MR BRAIN W AND WO CON    INDICATION:   Hypertension and headache    COMPARISON: CT head earlier today at 0639 hrs. TECHNIQUE: Sagittal T1; coronal post contrast T1; axial T1, T2, FLAIR,   gradient-echo, diffusion weighted MRI of the brain before and following   uneventful intravenous administration of gadolinium 20 mL Mag[place]vist    FINDINGS: Punctate focus of restricted diffusion in the medial left temporal   lobe near the hippocampus. Subtle hyperintense T2 signal in this area as   well. Extensive hyperintense T2/FLAIR signal in the cerebral white matter. No   other restricted diffusion. No hydrocephalus. No mass effect or midline shift. No extra-axial fluid   collection. No pathologic enhancement. The major intracranial vascular flow-voids are   patent. The midline structures, including the cervicomedullary junction, are   within normal limits. IMPRESSION:    1. Punctate acute versus subacute infarct in the anterior, medial left   temporal lobe appears to involve the hippocampus. 2. Extensive chronic microvascular ischemic disease for age. Chronic   demyelination is considered less likely. 3. No pathologic enhancement. Signing/Reading Doctor: Giselle Phillips ([id number])    Approved: Giselle Phillips ([id number])  Jan 9 2014  4:46PM                                     CT Results (most recent):  Results from Hospital Encounter encounter on 03/02/16   CT HEAD WO CONT    Narrative **Final Report**       ICD Codes / Adm. Diagnosis: 326577   / Extremity Weakness  stroke symptoms  Examination:  CT HEAD WO CON  - [id number] - Mar  2 2016  6:52PM  Accession No:  [id number]  Reason:  SOB      REPORT:  EXAM:  CT HEAD WITHOUT CONTRAST    INDICATION: Extremity weakness and stroke symptoms. COMPARISON: 5/9/2014. CONTRAST: None. TECHNIQUE: Unenhanced CT of the head was performed using 5 mm images. Brain   and bone windows were generated. Sagittal and coronal reformations were   generated. FINDINGS: There is streak artifact on the images from a large number of   alejandra pins. The ventricles and sulci are normal in size, shape and   configuration and midline. There is no significant white matter disease. There is no intracranial hemorrhage. There is no extra-axial collection,   mass, mass effect or midline shift. The basilar cisterns are open. No   acute infarct is identified. The bone windows demonstrate no abnormalities. The visualized portions of the paranasal sinuses and mastoid air cells are   clear. IMPRESSION: No acute intracranial abnormality. Signing/Reading Doctor: Shannon Aguilar ([id number])    Approved: Shannon Aguilar ([id number])  Mar  2 2016  7:21PM                                    Reviewed records in [facility] and media tab today    Lab Review   Results for orders placed or performed in visit on 01/28/20   TSH 3RD GENERATION   Result Value Ref Range    TSH 0.768 0.450 - 4.500 uIU/mL   AMB POC HEMOGLOBIN (HGB)   Result Value Ref Range    Hemoglobin (POC) 14.7         Exam:  Visit Vitals  /85   Pulse 63   Temp 97.7 °F (36.5 °C) (Temporal)   Resp 16   Ht 5' 4\" (1.626 m)   Wt 210 lb 9.6 oz (95.5 kg)   SpO2 96%   BMI 36.15 kg/m²     General:  Alert, cooperative, no distress. Head:  Normocephalic, without obvious abnormality, atraumatic. Respiratory:  Heart:   Non labored breathing  Regular rate and rhythm, no murmurs   Neck:   2+ carotids, no bruits   Extremities: Warm, no cyanosis or edema. Pulses: 2+ radial pulses. Neurologic:  MS: Alert and oriented x 4, speech intact. Language intact, able to name, repeat, and follow all commands. Attention and fund of knowledge appropriate.   Recent and remote memory intact. Cranial Nerves:  II: visual fields Full to confrontation   II: pupils Equal, round, reactive to light   II: optic disc No papilledema   III,VII: ptosis none   III,IV,VI: extraocular muscles  EOMI, no nystagmus or diplopia   V: facial light touch sensation  normal   VII: facial muscle function   symmetric   VIII: hearing intact   IX: soft palate elevation  normal   XI: trapezius strength  5/5   XI: sternocleidomastoid strength 5/5   XII: tongue  Midline     Motor: normal bulk and tone, no tremor              Strength: 5/5 throughout, no PD  Sensory: Dysesthesia to LT, PP, temperature and vibration  Coordination: FTN and HTS intact, RODNEY intact,Romberg negative  Gait: normal gait, unable to heel, toe, and tandem walk  Reflexes: 2+ symmetric, toes downgoing           Assessment/Plan       ICD-10-CM ICD-9-CM    1. Cerebrovascular accident (CVA), unspecified mechanism ([facility] I63.9 434.91    2. Syncope, unspecified syncope type R55 780.2    3. Dizziness R42 780.4 ALDOLASE      RHEUMATOID FACTOR, QL      VITAMIN B12      SUZANNE, DIRECT, W/REFLEX      RPR      ANGIOTENSIN CONVERTING ENZYME      CK      PROTEIN ELECTROPHORESIS   4. Sleep disorder G47.9 780.50 REFERRAL TO SLEEP STUDIES      REFERRAL TO SLEEP STUDIES   5. PBA (pseudobulbar affect) F48.2 310.81    6. Autoimmune disease ([facility] M35.9 279.49 ALDOLASE      RHEUMATOID FACTOR, QL      VITAMIN B12      SUZANNE, DIRECT, W/REFLEX      RPR      ANGIOTENSIN CONVERTING ENZYME      CK      PROTEIN ELECTROPHORESIS   Plan:  Continue Plavix 75 mg p.o. daily  Aspirin 81 mg p.o. daily  MRI of the brain with and without gadolinium  EEG  Referred to sleep study  Blood work autoimmune work-up, CK, ESR, RPR, vitamin B12, aldolase    Follow-up and Dispositions    · Return in about 2 months (around 4/28/2020). Thank you very much for this consultation.        .    SignedCharline Angelucci, MD  3/2/2020

## 2020-03-03 LAB
ACE SERPL-CCNC: 5 U/L (ref 14–82)
ALBUMIN SERPL ELPH-MCNC: 4.1 G/DL (ref 2.9–4.4)
ALBUMIN/GLOB SERPL: 1.4 {RATIO} (ref 0.7–1.7)
ALDOLASE SERPL-CCNC: 4.5 U/L (ref 3.3–10.3)
ALPHA1 GLOB SERPL ELPH-MCNC: 0.2 G/DL (ref 0–0.4)
ALPHA2 GLOB SERPL ELPH-MCNC: 0.7 G/DL (ref 0.4–1)
ANA SER QL: NEGATIVE
B-GLOBULIN SERPL ELPH-MCNC: 1.2 G/DL (ref 0.7–1.3)
CK SERPL-CCNC: 129 U/L (ref 24–173)
GAMMA GLOB SERPL ELPH-MCNC: 1 G/DL (ref 0.4–1.8)
GLOBULIN SER CALC-MCNC: 3 G/DL (ref 2.2–3.9)
M PROTEIN SERPL ELPH-MCNC: NORMAL G/DL
PLEASE NOTE, 011150: NORMAL
PROT SERPL-MCNC: 7.1 G/DL (ref 6–8.5)
RHEUMATOID FACT SERPL-ACNC: <10 IU/ML (ref 0–13.9)
RPR SER QL: NON REACTIVE
VIT B12 SERPL-MCNC: 761 PG/ML (ref 232–1245)

## 2020-03-11 RX ORDER — MULTIVITAMIN
2 TABLET ORAL DAILY
Qty: 60 TAB | Refills: 11 | Status: SHIPPED | OUTPATIENT
Start: 2020-03-11 | End: 2020-07-20 | Stop reason: SDUPTHER

## 2020-03-11 NOTE — TELEPHONE ENCOUNTER
03/11/2020 Spoke w/ pt about medication refill for Lisinopril and why it was denied. Pt understood. Pt requesting Vitamin D med be refilled.

## 2020-04-16 DIAGNOSIS — Z86.73 HISTORY OF CVA (CEREBROVASCULAR ACCIDENT): ICD-10-CM

## 2020-04-17 RX ORDER — CLOPIDOGREL BISULFATE 75 MG/1
TABLET ORAL
Qty: 90 TAB | Refills: 0 | Status: SHIPPED | OUTPATIENT
Start: 2020-04-17 | End: 2020-06-22

## 2020-06-19 DIAGNOSIS — Z86.73 HISTORY OF CVA (CEREBROVASCULAR ACCIDENT): ICD-10-CM

## 2020-06-19 DIAGNOSIS — E78.5 HYPERLIPIDEMIA, UNSPECIFIED HYPERLIPIDEMIA TYPE: ICD-10-CM

## 2020-06-22 RX ORDER — CLOPIDOGREL BISULFATE 75 MG/1
TABLET ORAL
Qty: 90 TAB | Refills: 0 | Status: SHIPPED | OUTPATIENT
Start: 2020-06-22 | End: 2020-07-20 | Stop reason: SDUPTHER

## 2020-06-22 RX ORDER — ATORVASTATIN CALCIUM 10 MG/1
TABLET, FILM COATED ORAL
Qty: 90 TAB | Refills: 0 | Status: SHIPPED | OUTPATIENT
Start: 2020-06-22 | End: 2020-07-14 | Stop reason: SDUPTHER

## 2020-07-14 ENCOUNTER — HOSPITAL ENCOUNTER (EMERGENCY)
Age: 60
Discharge: HOME | End: 2020-07-14
Attending: EMERGENCY MEDICINE
Payer: MEDICARE

## 2020-07-14 ENCOUNTER — APPOINTMENT (OUTPATIENT)
Dept: GENERAL RADIOLOGY | Age: 60
End: 2020-07-14
Attending: EMERGENCY MEDICINE
Payer: MEDICARE

## 2020-07-14 VITALS
DIASTOLIC BLOOD PRESSURE: 85 MMHG | RESPIRATION RATE: 25 BRPM | HEART RATE: 71 BPM | OXYGEN SATURATION: 98 % | TEMPERATURE: 98.5 F | SYSTOLIC BLOOD PRESSURE: 146 MMHG | BODY MASS INDEX: 39.27 KG/M2 | WEIGHT: 230 LBS | HEIGHT: 64 IN

## 2020-07-14 DIAGNOSIS — I10 ESSENTIAL HYPERTENSION: ICD-10-CM

## 2020-07-14 DIAGNOSIS — S93.402A SPRAIN OF LEFT ANKLE, UNSPECIFIED LIGAMENT, INITIAL ENCOUNTER: ICD-10-CM

## 2020-07-14 DIAGNOSIS — R42 DIZZINESS: Primary | ICD-10-CM

## 2020-07-14 LAB
ALBUMIN SERPL-MCNC: 3.7 G/DL (ref 3.5–5)
ALBUMIN/GLOB SERPL: 1.1 {RATIO} (ref 1.1–2.2)
ALP SERPL-CCNC: 111 U/L (ref 45–117)
ALT SERPL-CCNC: 19 U/L (ref 12–78)
ANION GAP SERPL CALC-SCNC: 10 MMOL/L (ref 5–15)
AST SERPL-CCNC: 18 U/L (ref 15–37)
ATRIAL RATE: 66 BPM
BASOPHILS # BLD: 0 K/UL (ref 0–0.1)
BASOPHILS NFR BLD: 0 % (ref 0–1)
BILIRUB SERPL-MCNC: 0.5 MG/DL (ref 0.2–1)
BUN SERPL-MCNC: 10 MG/DL (ref 6–20)
BUN/CREAT SERPL: 12 (ref 12–20)
CALCIUM SERPL-MCNC: 8.6 MG/DL (ref 8.5–10.1)
CALCULATED P AXIS, ECG09: 44 DEGREES
CALCULATED R AXIS, ECG10: 26 DEGREES
CALCULATED T AXIS, ECG11: 53 DEGREES
CHLORIDE SERPL-SCNC: 108 MMOL/L (ref 97–108)
CO2 SERPL-SCNC: 26 MMOL/L (ref 21–32)
CREAT SERPL-MCNC: 0.84 MG/DL (ref 0.55–1.02)
DIAGNOSIS, 93000: NORMAL
DIFFERENTIAL METHOD BLD: NORMAL
EOSINOPHIL # BLD: 0 K/UL (ref 0–0.4)
EOSINOPHIL NFR BLD: 1 % (ref 0–7)
ERYTHROCYTE [DISTWIDTH] IN BLOOD BY AUTOMATED COUNT: 13.9 % (ref 11.5–14.5)
GLOBULIN SER CALC-MCNC: 3.3 G/DL (ref 2–4)
GLUCOSE SERPL-MCNC: 112 MG/DL (ref 65–100)
HCT VFR BLD AUTO: 42.9 % (ref 35–47)
HGB BLD-MCNC: 13.9 G/DL (ref 11.5–16)
IMM GRANULOCYTES # BLD AUTO: 0 K/UL (ref 0–0.04)
IMM GRANULOCYTES NFR BLD AUTO: 0 % (ref 0–0.5)
LYMPHOCYTES # BLD: 1.4 K/UL (ref 0.8–3.5)
LYMPHOCYTES NFR BLD: 28 % (ref 12–49)
MCH RBC QN AUTO: 31.5 PG (ref 26–34)
MCHC RBC AUTO-ENTMCNC: 32.4 G/DL (ref 30–36.5)
MCV RBC AUTO: 97.3 FL (ref 80–99)
MONOCYTES # BLD: 0.4 K/UL (ref 0–1)
MONOCYTES NFR BLD: 8 % (ref 5–13)
NEUTS SEG # BLD: 3.3 K/UL (ref 1.8–8)
NEUTS SEG NFR BLD: 63 % (ref 32–75)
NRBC # BLD: 0 K/UL (ref 0–0.01)
NRBC BLD-RTO: 0 PER 100 WBC
P-R INTERVAL, ECG05: 202 MS
PLATELET # BLD AUTO: 198 K/UL (ref 150–400)
PMV BLD AUTO: 11.3 FL (ref 8.9–12.9)
POTASSIUM SERPL-SCNC: 3.8 MMOL/L (ref 3.5–5.1)
PROT SERPL-MCNC: 7 G/DL (ref 6.4–8.2)
Q-T INTERVAL, ECG07: 406 MS
QRS DURATION, ECG06: 94 MS
QTC CALCULATION (BEZET), ECG08: 425 MS
RBC # BLD AUTO: 4.41 M/UL (ref 3.8–5.2)
SODIUM SERPL-SCNC: 144 MMOL/L (ref 136–145)
TROPONIN I SERPL-MCNC: <0.05 NG/ML
VENTRICULAR RATE, ECG03: 66 BPM
WBC # BLD AUTO: 5.2 K/UL (ref 3.6–11)

## 2020-07-14 PROCEDURE — 99285 EMERGENCY DEPT VISIT HI MDM: CPT

## 2020-07-14 PROCEDURE — 73610 X-RAY EXAM OF ANKLE: CPT

## 2020-07-14 PROCEDURE — 74011250637 HC RX REV CODE- 250/637: Performed by: EMERGENCY MEDICINE

## 2020-07-14 PROCEDURE — 36415 COLL VENOUS BLD VENIPUNCTURE: CPT

## 2020-07-14 PROCEDURE — 93005 ELECTROCARDIOGRAM TRACING: CPT

## 2020-07-14 PROCEDURE — 85025 COMPLETE CBC W/AUTO DIFF WBC: CPT

## 2020-07-14 PROCEDURE — 84484 ASSAY OF TROPONIN QUANT: CPT

## 2020-07-14 PROCEDURE — 80053 COMPREHEN METABOLIC PANEL: CPT

## 2020-07-14 RX ORDER — ACETAMINOPHEN 500 MG
1000 TABLET ORAL ONCE
Status: COMPLETED | OUTPATIENT
Start: 2020-07-14 | End: 2020-07-14

## 2020-07-14 RX ADMIN — ACETAMINOPHEN 1000 MG: 500 TABLET, FILM COATED ORAL at 09:16

## 2020-07-14 NOTE — ED NOTES
Negative orthostatics. Pt provided w/ warm blanket and updated on plan of care. Awaiting lab results. Pt remains on cardiac monitor x 2. Call bell within reach; will continue to monitor.

## 2020-07-14 NOTE — ED NOTES
Pt provided w/ ginger ale. Now reporting H/A, requesting something for pain. Lights dimmed for comfort. Dr. Stefani Aquino notified, waiting additional orders.  Pts BP continues to decrease at rest.

## 2020-07-14 NOTE — ED NOTES
Patient (s)  given copy of dc instructions and 0 script(s). Patient (s) verbalized understanding of instructions and script (s). Patient given a current medication reconciliation form and verbalized understanding of their medications. Patient (s) verbalized understanding of the importance of discussing medications with  his or her physician or clinic they will be following up with. Patient alert and oriented and in no acute distress. Patient discharged home ambulatory.

## 2020-07-14 NOTE — ED PROVIDER NOTES
EMERGENCY DEPARTMENT HISTORY AND PHYSICAL EXAM      Date: 7/14/2020  Patient Name: Patt Mahoney    History of Presenting Illness     Chief Complaint   Patient presents with    Dizziness    Ankle Pain     History Provided By: Patient    HPI: Patt Mahoney, 62 y.o. female with past medical history significant for anxiety, bipolar, depression, hypertension, and CVA who presents via private vehicle to the ED with cc of dizziness for the past month as well as left medial ankle pain for the past 5 days. Patient describes her dizziness as a lightheadedness and she believes it is due to running out of her blood pressure medications. She ran out of them approximately 1 week ago and cannot see her primary care doctor until next Monday. She denies any falls, but does state that she stepped awkwardly on her left ankle and believes she sprained her ankle approximately 1 week ago. She denies take anything for the pain. She denies any chest pain, shortness of breath, nausea, vomiting, or diarrhea. PMHx: Anxiety, bipolar, depression, GERD, hypertension, CVA  Social Hx: Occasional alcohol use, denies tobacco use, occasional marijuana use    PCP: Flora Beaver., NP    There are no other complaints, changes, or physical findings at this time. No current facility-administered medications on file prior to encounter. Current Outpatient Medications on File Prior to Encounter   Medication Sig Dispense Refill    clopidogreL (PLAVIX) 75 mg tab Take 1 tablet by mouth once daily 90 Tab 0    atorvastatin (LIPITOR) 10 mg tablet TAKE 1 TABLET BY MOUTH NIGHTLY FOR CHOLESTEROL 90 Tab 0    calcium-cholecalciferol, D3, (Calcium with Vitamin D) tablet Take 2 Tabs by mouth daily. 60 Tab 11    lisinopril (PRINIVIL, ZESTRIL) 20 mg tablet TAKE 1 TABLET BY MOUTH ONCE DAILY      amLODIPine (NORVASC) 5 mg tablet Take 1 Tab by mouth daily. 90 Tab 1    irbesartan (AVAPRO) 150 mg tablet Take 1 Tab by mouth nightly.  90 Tab 1    aspirin 81 mg chewable tablet Take 1 Tab by mouth daily. 90 Tab 1    metoprolol tartrate (LOPRESSOR) 25 mg tablet Take 0.5 Tabs by mouth two (2) times a day. 180 Tab 0    cetirizine (ZYRTEC) 10 mg tablet Take 1 Tab by mouth daily as needed for Allergies. 80 Tab 0     Past History     Past Medical History:  Past Medical History:   Diagnosis Date    Anxiety     Bipolar 1 disorder (HCC)     Depression     Fatigue     Frequent headaches     GERD (gastroesophageal reflux disease)     Hearing loss     Hypertension     Joint pain     Memory disorder     Muscle pain     Psychiatric disorder     bipolar    Snoring     Stomach pain     Stroke [facility])      Past Surgical History:  Past Surgical History:   Procedure Laterality Date    HX ADENOIDECTOMY      sweat glands    HX GYN      HX TONSILLECTOMY       Family History:  Family History   Problem Relation Age of Onset    Cancer Mother     Heart Disease Father      Social History:  Social History     Tobacco Use    Smoking status: Former Smoker    Smokeless tobacco: Never Used   Substance Use Topics    Alcohol use: No     Comment: \"once a month\"    Drug use: Yes     Types: Marijuana     Comment:  x 2 months sgo     Allergies: Allergies   Allergen Reactions    Ace Inhibitors Cough    Contrast Dye [Iodine] Hives     Review of Systems   Review of Systems   Constitutional: Negative for chills and fever. HENT: Negative for congestion, rhinorrhea, sneezing and sore throat. Eyes: Negative for redness and visual disturbance. Respiratory: Negative for shortness of breath. Cardiovascular: Negative for leg swelling. Gastrointestinal: Negative for abdominal pain, nausea and vomiting. Genitourinary: Negative for difficulty urinating and frequency. Musculoskeletal: Positive for arthralgias. Negative for back pain, myalgias and neck stiffness. Skin: Negative for rash. Neurological: Positive for dizziness and light-headedness.  Negative for syncope, weakness and headaches. Hematological: Negative for adenopathy. All other systems reviewed and are negative. Physical Exam   Physical Exam  Vitals signs and nursing note reviewed. Constitutional:       Appearance: Normal appearance. She is well-developed. HENT:      Head: Normocephalic and atraumatic. Eyes:      Conjunctiva/sclera: Conjunctivae normal.   Neck:      Musculoskeletal: Full passive range of motion without pain, normal range of motion and neck supple. Cardiovascular:      Rate and Rhythm: Normal rate and regular rhythm. Pulses: Normal pulses. Heart sounds: Normal heart sounds, S1 normal and S2 normal. No murmur. Pulmonary:      Effort: Pulmonary effort is normal. No respiratory distress. Breath sounds: Normal breath sounds. No wheezing. Abdominal:      General: Bowel sounds are normal. There is no distension. Palpations: Abdomen is soft. Tenderness: There is no abdominal tenderness. There is no rebound. Musculoskeletal: Normal range of motion. Feet:    Skin:     General: Skin is warm and dry. Findings: No rash. Neurological:      Mental Status: She is alert and oriented to person, place, and time. Psychiatric:         Speech: Speech normal.         Behavior: Behavior normal.         Thought Content:  Thought content normal.         Judgment: Judgment normal.       Diagnostic Study Results   Labs -     Recent Results (from the past 12 hour(s))   EKG, 12 LEAD, INITIAL    Collection Time: 07/14/20  8:16 AM   Result Value Ref Range    Ventricular Rate 66 BPM    Atrial Rate 66 BPM    P-R Interval 202 ms    QRS Duration 94 ms    Q-T Interval 406 ms    QTC Calculation (Bezet) 425 ms    Calculated P Axis 44 degrees    Calculated R Axis 26 degrees    Calculated T Axis 53 degrees    Diagnosis       Normal sinus rhythm  Normal ECG  When compared with ECG of 25-APR-2017 11:07,  Nonspecific T wave abnormality no longer evident in Inferior leads TROPONIN I    Collection Time: 07/14/20  8:28 AM   Result Value Ref Range    Troponin-I, Qt. <0.05 <0.05 ng/mL   CBC WITH AUTOMATED DIFF    Collection Time: 07/14/20  8:28 AM   Result Value Ref Range    WBC 5.2 3.6 - 11.0 K/uL    RBC 4.41 3.80 - 5.20 M/uL    HGB 13.9 11.5 - 16.0 g/dL    HCT 42.9 35.0 - 47.0 %    MCV 97.3 80.0 - 99.0 FL    MCH 31.5 26.0 - 34.0 PG    MCHC 32.4 30.0 - 36.5 g/dL    RDW 13.9 11.5 - 14.5 %    PLATELET 642 626 - 279 K/uL    MPV 11.3 8.9 - 12.9 FL    NRBC 0.0 0  WBC    ABSOLUTE NRBC 0.00 0.00 - 0.01 K/uL    NEUTROPHILS 63 32 - 75 %    LYMPHOCYTES 28 12 - 49 %    MONOCYTES 8 5 - 13 %    EOSINOPHILS 1 0 - 7 %    BASOPHILS 0 0 - 1 %    IMMATURE GRANULOCYTES 0 0.0 - 0.5 %    ABS. NEUTROPHILS 3.3 1.8 - 8.0 K/UL    ABS. LYMPHOCYTES 1.4 0.8 - 3.5 K/UL    ABS. MONOCYTES 0.4 0.0 - 1.0 K/UL    ABS. EOSINOPHILS 0.0 0.0 - 0.4 K/UL    ABS. BASOPHILS 0.0 0.0 - 0.1 K/UL    ABS. IMM. GRANS. 0.0 0.00 - 0.04 K/UL    DF AUTOMATED     METABOLIC PANEL, COMPREHENSIVE    Collection Time: 07/14/20  8:28 AM   Result Value Ref Range    Sodium 144 136 - 145 mmol/L    Potassium 3.8 3.5 - 5.1 mmol/L    Chloride 108 97 - 108 mmol/L    CO2 26 21 - 32 mmol/L    Anion gap 10 5 - 15 mmol/L    Glucose 112 (H) 65 - 100 mg/dL    BUN 10 6 - 20 MG/DL    Creatinine 0.84 0.55 - 1.02 MG/DL    BUN/Creatinine ratio 12 12 - 20      GFR est AA >60 >60 ml/min/1.73m2    GFR est non-AA >60 >60 ml/min/1.73m2    Calcium 8.6 8.5 - 10.1 MG/DL    Bilirubin, total 0.5 0.2 - 1.0 MG/DL    ALT (SGPT) 19 12 - 78 U/L    AST (SGOT) 18 15 - 37 U/L    Alk. phosphatase 111 45 - 117 U/L    Protein, total 7.0 6.4 - 8.2 g/dL    Albumin 3.7 3.5 - 5.0 g/dL    Globulin 3.3 2.0 - 4.0 g/dL    A-G Ratio 1.1 1.1 - 2.2         Radiologic Studies -   XR ANKLE LT MIN 3 V   Final Result   IMPRESSION: No acute abnormality. Xr Ankle Lt Min 3 V    Result Date: 7/14/2020  IMPRESSION: No acute abnormality.     Medical Decision Making   I am the first provider for this patient. I reviewed the vital signs, available nursing notes, past medical history, past surgical history, family history and social history. Vital Signs-Reviewed the patient's vital signs. Patient Vitals for the past 24 hrs:   Temp Pulse Resp BP SpO2   07/14/20 0930  71 25 146/85 98 %   07/14/20 0851  67  141/90    07/14/20 0830     99 %   07/14/20 0807 98.5 °F (36.9 °C) 77 16 (!) 145/95 99 %     Pulse Oximetry Analysis - 98% on RA    Cardiac Monitor:   Rate: 77 bpm  Rhythm: Normal Sinus Rhythm     ED EKG interpretation: 08:16  Rhythm: normal sinus rhythm; and regular . Rate (approx.): 66; Axis: normal; P wave: normal; QRS interval: normal ; ST/T wave: normal; Other findings: normal. This EKG was interpreted by Cindy Srivastava MD,ED Provider. Records Reviewed: Nursing Notes and Old Medical Records    Provider Notes (Medical Decision Making):   78-year-old female presents with dizziness for the past month as well as left ankle pain for the past week. Differential includes anemia, dehydration, arrhythmia, anxiety, musculoskeletal strain, osteoarthritis, and fracture. ED Course:   Initial assessment performed. The patients presenting problems have been discussed, and they are in agreement with the care plan formulated and outlined with them. I have encouraged them to ask questions as they arise throughout their visit. Progress Note  10:03 AM  I have reviewed patient's labs and work-up which are negative. She is not orthostatic. Will refill her medications that she is out of and have her follow-up with her primary care doctor. 10:06 AM  I discussed the plan of care with Ms. Gianna Pitts. She does not know which medication she needs refills of but she does have the empty bottles at home. She will go home and call the primary care doctor today to see if she can get a short supply until her follow-up appointment.     Progress Note:   Updated pt on all returned results and findings. Discussed the importance of proper follow up as referred below along with return precautions. Pt in agreement with the care plan and expresses agreement with and understanding of all items discussed. Disposition:  Discharge Note:  The pt is ready for discharge. The pt's signs, symptoms, diagnosis, and discharge instructions have been discussed and pt has conveyed their understanding. The pt is to follow up as recommended or return to ER should their symptoms worsen. Plan has been discussed and pt is in agreement. PLAN:  1. Current Discharge Medication List        2. Follow-up Information     Follow up With Specialties Details Why Contact Dora Bolivar., NP Nurse Practitioner Call today  M[address]  [address] [phone]      [facility] [place] EMERGENCY DEPT Emergency Medicine  As needed, If symptoms worsen Akash August  [phone]        Return to ED if worse     Diagnosis     Clinical Impression:   1. Dizziness    2. Essential hypertension    3. Sprain of left ankle, unspecified ligament, initial encounter            Please note that this dictation was completed with Dragon, computer voice recognition software. Quite often unanticipated grammatical, syntax, homophones, and other interpretive errors are inadvertently transcribed by the computer software. Please disregard these errors. Additionally, please excuse any errors that have escaped final proofreading.

## 2020-07-14 NOTE — ED NOTES
Pt reports improvement in H/A post medicating. Pt reports pain 2/10, resting comfortably upright in the bed in a position of comfort. Appears in no acute distress. VS updated; pt remains of cardiac monitor x 2. Call bell within reach, will continue to monitor.

## 2020-07-14 NOTE — DISCHARGE INSTRUCTIONS
Patient Education        Ankle Sprain: Care Instructions  Your Care Instructions     An ankle sprain can happen when you twist your ankle. The ligaments that support the ankle can get stretched and torn. Often the ankle is swollen and painful. Ankle sprains may take from several weeks to several months to heal. Usually, the more pain and swelling you have, the more severe your ankle sprain is and the longer it will take to heal. You can heal faster and regain strength in your ankle with good home treatment. It is very important to give your ankle time to heal completely, so that you do not easily hurt your ankle again. Follow-up care is a key part of your treatment and safety. Be sure to make and go to all appointments, and call your doctor if you are having problems. It's also a good idea to know your test results and keep a list of the medicines you take. How can you care for yourself at home? · Prop up your foot on pillows as much as possible for the next 3 days. Try to keep your ankle above the level of your heart. This will help reduce the swelling. · Follow your doctor's directions for wearing a splint or elastic bandage. Wrapping the ankle may help reduce or prevent swelling. · Your doctor may give you a splint, a brace, an air stirrup, or another form of ankle support to protect your ankle until it is healed. Wear it as directed while your ankle is healing. Do not remove it unless your doctor tells you to. After your ankle has healed, ask your doctor whether you should wear the brace when you exercise. · Put ice or cold packs on your injured ankle for 10 to 20 minutes at a time. Try to do this every 1 to 2 hours for the next 3 days (when you are awake) or until the swelling goes down. Put a thin cloth between the ice and your skin. · You may need to use crutches until you can walk without pain. If you do use crutches, try to bear some weight on your injured ankle if you can do so without pain.  This helps the ankle heal.  · Take pain medicines exactly as directed. ? If the doctor gave you a prescription medicine for pain, take it as prescribed. ? If you are not taking a prescription pain medicine, ask your doctor if you can take an over-the-counter medicine. · If you have been given ankle exercises to do at home, do them exactly as instructed. These can promote healing and help prevent lasting weakness. When should you call for help? Call your doctor now or seek immediate medical care if:  · Your pain is getting worse. · Your swelling is getting worse. · Your splint feels too tight or you are unable to loosen it. Watch closely for changes in your health, and be sure to contact your doctor if:  · You are not getting better after 1 week. Where can you learn more? Go to http://www.gray.com/  Enter I096 in the search box to learn more about \"Ankle Sprain: Care Instructions. \"  Current as of: March 2, 2020               Content Version: 12.5  © 2006-2020 [employer]. Care instructions adapted under license by [employer] (which disclaims liability or warranty for this information). If you have questions about a medical condition or this instruction, always ask your healthcare professional. [place] [address] any warranty or liability for your use of this information. Patient Education        Dizziness: Care Instructions  Your Care Instructions  Dizziness is the feeling of unsteadiness or fuzziness in your head. It is different than having vertigo, which is a feeling that the room is spinning or that you are moving or falling. It is also different from lightheadedness, which is the feeling that you are about to faint. It can be hard to know what causes dizziness. Some people feel dizzy when they have migraine headaches. Sometimes bouts of flu can make you feel dizzy.  Some medical conditions, such as heart problems or high blood pressure, can make you feel dizzy. Many medicines can cause dizziness, including medicines for high blood pressure, pain, or anxiety. If a medicine causes your symptoms, your doctor may recommend that you stop or change the medicine. If it is a problem with your heart, you may need medicine to help your heart work better. If there is no clear reason for your symptoms, your doctor may suggest watching and waiting for a while to see if the dizziness goes away on its own. Follow-up care is a key part of your treatment and safety. Be sure to make and go to all appointments, and call your doctor if you are having problems. It's also a good idea to know your test results and keep a list of the medicines you take. How can you care for yourself at home? · If your doctor recommends or prescribes medicine, take it exactly as directed. Call your doctor if you think you are having a problem with your medicine. · Do not drive while you feel dizzy. · Try to prevent falls. Steps you can take include:  ? Using nonskid mats, adding grab bars near the tub, and using night-lights. ? Clearing your home so that walkways are free of anything you might trip on.  ? Letting family and friends know that you have been feeling dizzy. This will help them know how to help you. When should you call for help? [id number] anytime you think you may need emergency care. For example, call if:  · You passed out (lost consciousness). · You have dizziness along with symptoms of a heart attack. These may include:  ? Chest pain or pressure, or a strange feeling in the chest.  ? Sweating. ? Shortness of breath. ? Nausea or vomiting. ? Pain, pressure, or a strange feeling in the back, neck, jaw, or upper belly or in one or both shoulders or arms. ? Lightheadedness or sudden weakness. ? A fast or irregular heartbeat. · You have symptoms of a stroke.  These may include:  ? Sudden numbness, tingling, weakness, or loss of movement in your face, arm, or leg, especially on only one side of your body. ? Sudden vision changes. ? Sudden trouble speaking. ? Sudden confusion or trouble understanding simple statements. ? Sudden problems with walking or balance. ? A sudden, severe headache that is different from past headaches. Call your doctor now or seek immediate medical care if:  · You feel dizzy and have a fever, headache, or ringing in your ears. · You have new or increased nausea and vomiting. · Your dizziness does not go away or comes back. Watch closely for changes in your health, and be sure to contact your doctor if:  · You do not get better as expected. Where can you learn more? Go to http://bertha-elizabeth.info/  Enter Q823 in the search box to learn more about \"Dizziness: Care Instructions. \"  Current as of: June 26, 2019               Content Version: 12.5  © 6516-2029 [employer] Care instructions adapted under license by [employer] (which disclaims liability or warranty for this information). If you have questions about a medical condition or this instruction, always ask your healthcare professional. [place] [address] any warranty or liability for your use of this information. Patient Education        Lightheadedness or Faintness: Care Instructions  Your Care Instructions  Lightheadedness is a feeling that you are about to faint or \"pass out. \" You do not feel as if you or your surroundings are moving. It is different from vertigo, which is the feeling that you or things around you are spinning or tilting. Lightheadedness usually goes away or gets better when you lie down. If lightheadedness gets worse, it can lead to a fainting spell. It is common to feel lightheaded from time to time. Lightheadedness usually is not caused by a serious problem.  It often is caused by a short-lasting drop in blood pressure and blood flow to your head that occurs when you get up too quickly from a seated or lying position. Follow-up care is a key part of your treatment and safety. Be sure to make and go to all appointments, and call your doctor if you are having problems. It's also a good idea to know your test results and keep a list of the medicines you take. How can you care for yourself at home? · Lie down for 1 or 2 minutes when you feel lightheaded. After lying down, sit up slowly and remain sitting for 1 to 2 minutes before slowly standing up. · Avoid movements, positions, or activities that have made you lightheaded in the past.  · Get plenty of rest, especially if you have a cold or flu, which can cause lightheadedness. · Make sure you drink plenty of fluids, especially if you have a fever or have been sweating. · Do not drive or put yourself and others in danger while you feel lightheaded. When should you call for help? [id number] anytime you think you may need emergency care. For example, call if:  · You have symptoms of a stroke. These may include:  ? Sudden numbness, tingling, weakness, or loss of movement in your face, arm, or leg, especially on only one side of your body. ? Sudden vision changes. ? Sudden trouble speaking. ? Sudden confusion or trouble understanding simple statements. ? Sudden problems with walking or balance. ? A sudden, severe headache that is different from past headaches. · You have symptoms of a heart attack. These may include:  ? Chest pain or pressure, or a strange feeling in the chest.  ? Sweating. ? Shortness of breath. ? Nausea or vomiting. ? Pain, pressure, or a strange feeling in the back, neck, jaw, or upper belly or in one or both shoulders or arms. ? Lightheadedness or sudden weakness. ? A fast or irregular heartbeat. After you call 911, the  may tell you to chew 1 adult-strength or 2 to 4 low-dose aspirin. Wait for an ambulance. Do not try to drive yourself.   Watch closely for changes in your health, and be sure to contact your doctor if:  · Your lightheadedness gets worse or does not get better with home care. Where can you learn more? Go to http://www.gray.com/  Enter [id number] in the search box to learn more about \"Lightheadedness or Faintness: Care Instructions. \"  Current as of: June 26, 2019               Content Version: 12.5  © 9103-0620 [employer]. Care instructions adapted under license by [employer] (which disclaims liability or warranty for this information). If you have questions about a medical condition or this instruction, always ask your healthcare professional. [place] [address] any warranty or liability for your use of this information.

## 2020-07-14 NOTE — ED TRIAGE NOTES
Pt c/o dizziness that began approximately one month ago, increasingly worse this morning. Pt reports balancing issues recently. Pt reports being lightheaded at both the night and morning. Pt states \"I ran out of my BP medication about a week ago and I can't see my provider\". Pt denies any SOB or C/P at this time. Also reports L ankle pain x 1 week ago when pt felt dizzy and caught herself landing on ankle. Pt appears anxious and tearful during triage. Emergency Department Nursing Plan of Care       The Nursing Plan of Care is developed from the Nursing assessment and Emergency Department Attending provider initial evaluation. The plan of care may be reviewed in the ED Provider note.     The Plan of Care was developed with the following considerations:   Patient / Family readiness to learn indicated by:verbalized understanding  Persons(s) to be included in education: patient  Barriers to Learning/Limitations:No    Signed     Eliel Vidales    7/14/2020   8:42 AM

## 2020-07-20 ENCOUNTER — OFFICE VISIT (OUTPATIENT)
Dept: INTERNAL MEDICINE CLINIC | Age: 60
End: 2020-07-20

## 2020-07-20 VITALS
WEIGHT: 202 LBS | TEMPERATURE: 98.6 F | OXYGEN SATURATION: 98 % | SYSTOLIC BLOOD PRESSURE: 121 MMHG | RESPIRATION RATE: 18 BRPM | BODY MASS INDEX: 34.49 KG/M2 | DIASTOLIC BLOOD PRESSURE: 86 MMHG | HEIGHT: 64 IN | HEART RATE: 69 BPM

## 2020-07-20 DIAGNOSIS — I10 ESSENTIAL HYPERTENSION: ICD-10-CM

## 2020-07-20 DIAGNOSIS — T14.8XXA ABRASION: ICD-10-CM

## 2020-07-20 DIAGNOSIS — N39.41 URGE INCONTINENCE: ICD-10-CM

## 2020-07-20 DIAGNOSIS — E78.5 HYPERLIPIDEMIA, UNSPECIFIED HYPERLIPIDEMIA TYPE: ICD-10-CM

## 2020-07-20 DIAGNOSIS — Z86.73 HISTORY OF CVA (CEREBROVASCULAR ACCIDENT): ICD-10-CM

## 2020-07-20 DIAGNOSIS — Z23 ENCOUNTER FOR IMMUNIZATION: ICD-10-CM

## 2020-07-20 DIAGNOSIS — Z00.00 MEDICARE ANNUAL WELLNESS VISIT, SUBSEQUENT: Primary | ICD-10-CM

## 2020-07-20 RX ORDER — GUAIFENESIN 100 MG/5ML
81 LIQUID (ML) ORAL DAILY
Qty: 90 TAB | Refills: 1 | Status: SHIPPED | OUTPATIENT
Start: 2020-07-20 | End: 2020-12-03 | Stop reason: SDUPTHER

## 2020-07-20 RX ORDER — CLOPIDOGREL BISULFATE 75 MG/1
75 TABLET ORAL DAILY
Qty: 90 TAB | Refills: 0 | Status: SHIPPED | OUTPATIENT
Start: 2020-07-20 | End: 2020-12-03 | Stop reason: SDUPTHER

## 2020-07-20 RX ORDER — CETIRIZINE HCL 10 MG
10 TABLET ORAL
Qty: 90 TAB | Refills: 0 | Status: SHIPPED | OUTPATIENT
Start: 2020-07-20 | End: 2020-10-27

## 2020-07-20 RX ORDER — METOPROLOL TARTRATE 25 MG/1
12.5 TABLET, FILM COATED ORAL 2 TIMES DAILY
Qty: 180 TAB | Refills: 0 | Status: SHIPPED | OUTPATIENT
Start: 2020-07-20 | End: 2020-10-27

## 2020-07-20 RX ORDER — AMLODIPINE BESYLATE 5 MG/1
5 TABLET ORAL DAILY
Qty: 90 TAB | Refills: 0 | Status: CANCELLED | OUTPATIENT
Start: 2020-07-20

## 2020-07-20 RX ORDER — IRBESARTAN 150 MG/1
150 TABLET ORAL
Qty: 90 TAB | Refills: 1 | Status: CANCELLED | OUTPATIENT
Start: 2020-07-20

## 2020-07-20 RX ORDER — MULTIVITAMIN
2 TABLET ORAL DAILY
Qty: 60 TAB | Refills: 11 | Status: SHIPPED | OUTPATIENT
Start: 2020-07-20 | End: 2020-12-03 | Stop reason: SDUPTHER

## 2020-07-20 RX ORDER — ATORVASTATIN CALCIUM 10 MG/1
TABLET, FILM COATED ORAL
Qty: 90 TAB | Refills: 0 | Status: CANCELLED | OUTPATIENT
Start: 2020-07-20

## 2020-07-20 NOTE — PROGRESS NOTES
Subjective: (As above and below)     Chief Complaint   Patient presents with    Hypertension     follow up     Urinary Retention     Pt states she is unable to hold bladder often urinates on self     Clark KEENAN Soni Chaudhari is a 62y.o. year old female who presents for AWV and fu on chronic conditions    Hx of CVA: on aspirin, plavix, statin    Hypertension ROS:  taking medications as instructed, no medication side effects noted, no TIAs, no chest pain on exertion, no dyspnea on exertion, no swelling of ankles    Knee DJD: was scheduled for R total arthroplasty but canceled d/t pandemic, will work on getting this r/s    Ankle injury: twisted ankle, got a scrape on her leg, went to ED - xray normal    Urinary incontinence; leaks small amt of urine, she admits to holding her urine a lot. No dysuria. No constipation      Reviewed PmHx, RxHx, FmHx, SocHx, AllgHx and updated in chart.   Family History   Problem Relation Age of Onset    Cancer Mother     Heart Disease Father        Past Medical History:   Diagnosis Date    Anxiety     Bipolar 1 disorder ([facility] [address]     Depression     Fatigue     Frequent headaches     GERD (gastroesophageal reflux disease)     Hearing loss     Hypertension     Joint pain     Memory disorder     Muscle pain     Psychiatric disorder     bipolar    Snoring     Stomach pain     Stroke [facility])       Social History     Socioeconomic History    Marital status:      Spouse name: Not on file    Number of children: Not on file    Years of education: Not on file    Highest education level: Not on file   Tobacco Use    Smoking status: Former Smoker    Smokeless tobacco: Never Used   Substance and Sexual Activity    Alcohol use: No     Comment: \"once a month\"    Drug use: Yes     Types: Marijuana     Comment:  x 2 months sgo    Sexual activity: Not Currently   Social History Narrative    ** Merged History Encounter **               Current Outpatient Medications   Medication Sig  calcium-cholecalciferol, D3, (Calcium with Vitamin D) tablet Take 2 Tabs by mouth daily.  cetirizine (ZYRTEC) 10 mg tablet Take 1 Tab by mouth daily as needed for Allergies.  clopidogreL (PLAVIX) 75 mg tab Take 1 Tab by mouth daily.  metoprolol tartrate (LOPRESSOR) 25 mg tablet Take 0.5 Tabs by mouth two (2) times a day.  aspirin 81 mg chewable tablet Take 1 Tab by mouth daily.  amLODIPine (NORVASC) 5 mg tablet Take 1 Tab by mouth daily.  atorvastatin (LIPITOR) 10 mg tablet TAKE 1 TABLET BY MOUTH NIGHTLY FOR CHOLESTEROL     No current facility-administered medications for this visit. Review of Systems:   Constitutional:    Negative for fever and chills, negative diaphoresis. HEENT:              Negative for neck pain and stiffness. Eyes:                  Negative for visual disturbance, itching, redness or discharge. Respiratory:        Negative for cough and shortness of breath. Cardiovascular:  Negative for chest pain and palpitations. Gastrointestinal: Negative for nausea, vomiting, abdominal pain, diarrhea or constipation. Genitourinary:     Negative for dysuria and frequency. Musculoskeletal: L ankle pain  Skin:                    Negative for rash, masses or lesions. Neurological:       Negative for dizzyness, seizure, loss of consciousness, weakness and numbness.      Objective:     Vitals:    07/20/20 1515   BP: 121/86   Pulse: 69   Resp: 18   Temp: 98.6 °F (37 °C)   TempSrc: Oral   SpO2: 98%   Weight: 202 lb (91.6 kg)   Height: 5' 4\" (1.626 m)           Physical Examination: General appearance - alert, well appearing, and in no distress and overweight  Ears - bilateral TM's and external ear canals normal  Chest - clear to auscultation, no wheezes, rales or rhonchi, symmetric air entry  Heart - normal rate, regular rhythm, normal S1, S2, no murmurs, rubs, clicks or gallops  Extremities - peripheral pulses normal, no pedal edema, no clubbing or cyanosis  Skin - normal coloration and turgor, no rashes, no suspicious skin lesions noted      Assessment/ Plan:     Follow-up and Dispositions    · Return in about 4 months (around 11/20/2020) for bp. 1. Medicare annual wellness visit, subsequent    - REFERRAL TO ENT-OTOLARYNGOLOGY    2. Essential hypertension    - metoprolol tartrate (LOPRESSOR) 25 mg tablet; Take 0.5 Tabs by mouth two (2) times a day. Dispense: 180 Tab; Refill: 0    3. Hyperlipidemia, unspecified hyperlipidemia type      4. History of CVA (cerebrovascular accident)    - clopidogreL (PLAVIX) 75 mg tab; Take 1 Tab by mouth daily. Dispense: 90 Tab; Refill: 0    5. Abrasion  - TETANUS, DIPHTHERIA TOXOIDS AND ACELLULAR PERTUSSIS VACCINE (TDAP), IN INDIVIDS. >=7, IM      6. Encounter for immunization    - TETANUS, DIPHTHERIA TOXOIDS AND ACELLULAR PERTUSSIS VACCINE (TDAP), IN INDIVIDS. >=7, IM    7. Urge incontinence  Discussed kegel exercises, NOT holding urine when she has the urge to go  - AMB POC URINALYSIS DIP STICK AUTO W/O MICRO      I have discussed the diagnosis with the patient and the intended plan as seen in the above orders. The patient has received an after-visit summary and questions were answered concerning future plans. Pt conveyed understanding of plan. Medication Side Effects and Warnings were discussed with patient: yes  Patient Labs were reviewed: yes  Patient Past Records were reviewed:  yes    Carlos Valentino. Ja Collazo NP     This is the Subsequent Medicare Annual Wellness Exam, performed 12 months or more after the Initial AWV or the last Subsequent AWV    I have reviewed the patient's medical history in detail and updated the computerized patient record.      History     Patient Active Problem List   Diagnosis Code    Chest pain, unspecified R07.9    HTN (hypertension) I10    Esophageal reflux K21.9    History of CVA (cerebrovascular accident) Z80.78    Morbid obesity ([address] E66.01    Cannabis abuse F12.10    ASVD (arteriosclerotic vascular disease) I70.90    Vitamin D deficiency E55.9    Positive hepatitis C antibody test R76.8     Past Medical History:   Diagnosis Date    Anxiety     Bipolar 1 disorder (HCC)     Depression     Fatigue     Frequent headaches     GERD (gastroesophageal reflux disease)     Hearing loss     Hypertension     Joint pain     Memory disorder     Muscle pain     Psychiatric disorder     bipolar    Snoring     Stomach pain     Stroke [facility])       Past Surgical History:   Procedure Laterality Date    HX ADENOIDECTOMY      sweat glands    HX GYN      HX TONSILLECTOMY       Current Outpatient Medications   Medication Sig Dispense Refill    calcium-cholecalciferol, D3, (Calcium with Vitamin D) tablet Take 2 Tabs by mouth daily. 60 Tab 11    cetirizine (ZYRTEC) 10 mg tablet Take 1 Tab by mouth daily as needed for Allergies. 90 Tab 0    clopidogreL (PLAVIX) 75 mg tab Take 1 Tab by mouth daily. 90 Tab 0    metoprolol tartrate (LOPRESSOR) 25 mg tablet Take 0.5 Tabs by mouth two (2) times a day. 180 Tab 0    aspirin 81 mg chewable tablet Take 1 Tab by mouth daily. 90 Tab 1    amLODIPine (NORVASC) 5 mg tablet Take 1 Tab by mouth daily.  90 Tab 0    atorvastatin (LIPITOR) 10 mg tablet TAKE 1 TABLET BY MOUTH NIGHTLY FOR CHOLESTEROL 90 Tab 0     Allergies   Allergen Reactions    Ace Inhibitors Cough    Contrast Dye [Iodine] Hives       Family History   Problem Relation Age of Onset    Cancer Mother     Heart Disease Father      Social History     Tobacco Use    Smoking status: Former Smoker    Smokeless tobacco: Never Used   Substance Use Topics    Alcohol use: No     Comment: \"once a month\"       Depression Risk Factor Screening:     3 most recent PHQ Screens 2/28/2020   PHQ Not Done Active Diagnosis of Depression or Bipolar Disorder   Little interest or pleasure in doing things -   Feeling down, depressed, irritable, or hopeless -   Total Score PHQ 2 -   Trouble falling or staying asleep, or sleeping too much -   Feeling tired or having little energy -   Poor appetite, weight loss, or overeating -   Feeling bad about yourself - or that you are a failure or have let yourself or your family down -   Trouble concentrating on things such as school, work, reading, or watching TV -   Moving or speaking so slowly that other people could have noticed; or the opposite being so fidgety that others notice -   Thoughts of being better off dead, or hurting yourself in some way -   PHQ 9 Score -   How difficult have these problems made it for you to do your work, take care of your home and get along with others -       Alcohol Risk Factor Screening:   Do you average 1 drink per night or more than 7 drinks a week:  No    On any one occasion in the past three months have you have had more than 3 drinks containing alcohol:  No      Functional Ability and Level of Safety:   Hearing: she notes some increased difficulty hearning     Activities of Daily Living: The home contains: no safety equipment. Patient does total self care     Ambulation: with no difficulty     Fall Risk:  Fall Risk Assessment, last 12 mths 1/16/2019   Able to walk? Yes   Fall in past 12 months? No     Abuse Screen:  Patient is not abused       Cognitive Screening   Has your family/caregiver stated any concerns about your memory: no     Cognitive Screening: Normal - based on H&P    Patient Care Team   Patient Care Team:  Marian Hussein NP as PCP - General (Nurse Practitioner)  Marian Hussein NP as PCP - Jordyn Jc Provider  Krista Young RN as Nurse Navigator  Ann Marie Pearce LPN as Ambulatory Care Manager    Assessment/Plan   Education and counseling provided:  Are appropriate based on today's review and evaluation    Diagnoses and all orders for this visit:    1.  Medicare annual wellness visit, subsequent  -     REFERRAL TO ENT-OTOLARYNGOLOGY    2. Essential hypertension  -     metoprolol tartrate (LOPRESSOR) 25 mg tablet; Take 0.5 Tabs by mouth two (2) times a day. 3. Hyperlipidemia, unspecified hyperlipidemia type    4. History of CVA (cerebrovascular accident)  -     clopidogreL (PLAVIX) 75 mg tab; Take 1 Tab by mouth daily. 5. Abrasion    6. Encounter for immunization  -     TETANUS, DIPHTHERIA TOXOIDS AND ACELLULAR PERTUSSIS VACCINE (TDAP), IN INDIVIDS. >=7, IM    7. Urge incontinence  -     AMB POC URINALYSIS DIP STICK AUTO W/O MICRO    Other orders  -     calcium-cholecalciferol, D3, (Calcium with Vitamin D) tablet; Take 2 Tabs by mouth daily. -     cetirizine (ZYRTEC) 10 mg tablet; Take 1 Tab by mouth daily as needed for Allergies. -     aspirin 81 mg chewable tablet; Take 1 Tab by mouth daily.         Health Maintenance Due   Topic Date Due    Shingrix Vaccine Age 49> (2 of 2) 11/18/2019    Medicare Yearly Exam  01/17/2020

## 2020-07-20 NOTE — PROGRESS NOTES
Chief Complaint   Patient presents with    Hypertension     follow up     Urinary Retention     Pt states she is unable to hold bladder often urinates on self       1. Have you been to the ER, urgent care clinic since your last visit? Hospitalized since your last visit? Yes When: 07/14/2020 Where: [facility]  Reason for visit: ankle sprain    2. Have you seen or consulted any other health care providers outside of the [address] [place] [address] since your last visit? Include any pap smears or colon screening. No      Tdap administered to LD 7/20/2020 by Александр Neal with pt's consent per provider. Patient tolerated procedure well. No reactions noted.

## 2020-07-21 LAB
BILIRUB UR QL STRIP: NORMAL
GLUCOSE UR-MCNC: NEGATIVE MG/DL
KETONES P FAST UR STRIP-MCNC: NORMAL MG/DL
PH UR STRIP: 5.5 [PH] (ref 4.6–8)
PROT UR QL STRIP: NORMAL
SP GR UR STRIP: 1.03 (ref 1–1.03)
UA UROBILINOGEN AMB POC: NORMAL (ref 0.2–1)
URINALYSIS CLARITY POC: NORMAL
URINALYSIS COLOR POC: NORMAL
URINE BLOOD POC: NEGATIVE
URINE LEUKOCYTES POC: NEGATIVE
URINE NITRITES POC: NEGATIVE

## 2020-10-26 DIAGNOSIS — I10 ESSENTIAL HYPERTENSION: ICD-10-CM

## 2020-10-27 RX ORDER — METOPROLOL TARTRATE 25 MG/1
TABLET, FILM COATED ORAL
Qty: 180 TAB | Refills: 0 | Status: SHIPPED | OUTPATIENT
Start: 2020-10-27 | End: 2020-12-03 | Stop reason: SDUPTHER

## 2020-10-27 RX ORDER — CETIRIZINE HYDROCHLORIDE 10 MG/1
TABLET, FILM COATED ORAL
Qty: 90 TAB | Refills: 0 | Status: SHIPPED | OUTPATIENT
Start: 2020-10-27 | End: 2020-12-03 | Stop reason: SDUPTHER

## 2020-12-03 ENCOUNTER — OFFICE VISIT (OUTPATIENT)
Dept: INTERNAL MEDICINE CLINIC | Age: 60
End: 2020-12-03
Payer: MEDICARE

## 2020-12-03 VITALS
WEIGHT: 195 LBS | SYSTOLIC BLOOD PRESSURE: 133 MMHG | RESPIRATION RATE: 18 BRPM | HEART RATE: 96 BPM | OXYGEN SATURATION: 98 % | TEMPERATURE: 98.6 F | DIASTOLIC BLOOD PRESSURE: 81 MMHG | HEIGHT: 64 IN | BODY MASS INDEX: 33.29 KG/M2

## 2020-12-03 DIAGNOSIS — Z23 NEEDS FLU SHOT: ICD-10-CM

## 2020-12-03 DIAGNOSIS — E78.5 HYPERLIPIDEMIA, UNSPECIFIED HYPERLIPIDEMIA TYPE: ICD-10-CM

## 2020-12-03 DIAGNOSIS — I10 ESSENTIAL HYPERTENSION: Primary | ICD-10-CM

## 2020-12-03 DIAGNOSIS — Z86.73 HISTORY OF CVA (CEREBROVASCULAR ACCIDENT): ICD-10-CM

## 2020-12-03 PROCEDURE — 90686 IIV4 VACC NO PRSV 0.5 ML IM: CPT | Performed by: NURSE PRACTITIONER

## 2020-12-03 PROCEDURE — G8752 SYS BP LESS 140: HCPCS | Performed by: NURSE PRACTITIONER

## 2020-12-03 PROCEDURE — 3017F COLORECTAL CA SCREEN DOC REV: CPT | Performed by: NURSE PRACTITIONER

## 2020-12-03 PROCEDURE — G8432 DEP SCR NOT DOC, RNG: HCPCS | Performed by: NURSE PRACTITIONER

## 2020-12-03 PROCEDURE — G9899 SCRN MAM PERF RSLTS DOC: HCPCS | Performed by: NURSE PRACTITIONER

## 2020-12-03 PROCEDURE — G8417 CALC BMI ABV UP PARAM F/U: HCPCS | Performed by: NURSE PRACTITIONER

## 2020-12-03 PROCEDURE — G0008 ADMIN INFLUENZA VIRUS VAC: HCPCS | Performed by: NURSE PRACTITIONER

## 2020-12-03 PROCEDURE — 99214 OFFICE O/P EST MOD 30 MIN: CPT | Performed by: NURSE PRACTITIONER

## 2020-12-03 PROCEDURE — G8427 DOCREV CUR MEDS BY ELIG CLIN: HCPCS | Performed by: NURSE PRACTITIONER

## 2020-12-03 PROCEDURE — G8754 DIAS BP LESS 90: HCPCS | Performed by: NURSE PRACTITIONER

## 2020-12-03 RX ORDER — METOPROLOL TARTRATE 25 MG/1
TABLET, FILM COATED ORAL
Qty: 180 TAB | Refills: 0 | Status: SHIPPED | OUTPATIENT
Start: 2020-12-03 | End: 2021-08-26 | Stop reason: SDUPTHER

## 2020-12-03 RX ORDER — CLOPIDOGREL BISULFATE 75 MG/1
75 TABLET ORAL DAILY
Qty: 90 TAB | Refills: 0 | Status: SHIPPED | OUTPATIENT
Start: 2020-12-03 | End: 2021-05-25

## 2020-12-03 RX ORDER — AMLODIPINE BESYLATE 5 MG/1
5 TABLET ORAL DAILY
Qty: 90 TAB | Refills: 0 | Status: SHIPPED | OUTPATIENT
Start: 2020-12-03 | End: 2021-05-25

## 2020-12-03 RX ORDER — ATORVASTATIN CALCIUM 10 MG/1
TABLET, FILM COATED ORAL
Qty: 90 TAB | Refills: 0 | Status: SHIPPED | OUTPATIENT
Start: 2020-12-03 | End: 2021-08-26 | Stop reason: SDUPTHER

## 2020-12-03 RX ORDER — GUAIFENESIN 100 MG/5ML
81 LIQUID (ML) ORAL DAILY
Qty: 90 TAB | Refills: 1 | Status: SHIPPED | OUTPATIENT
Start: 2020-12-03 | End: 2021-08-26 | Stop reason: SDUPTHER

## 2020-12-03 RX ORDER — MULTIVITAMIN
2 TABLET ORAL DAILY
Qty: 60 TAB | Refills: 11 | Status: SHIPPED | OUTPATIENT
Start: 2020-12-03 | End: 2021-02-26 | Stop reason: SDUPTHER

## 2020-12-03 RX ORDER — CETIRIZINE HCL 10 MG
TABLET ORAL
Qty: 90 TAB | Refills: 0 | OUTPATIENT
Start: 2020-12-03 | End: 2021-01-28

## 2020-12-03 NOTE — PATIENT INSTRUCTIONS
Vaccine Information Statement    Influenza (Flu) Vaccine (Inactivated or Recombinant): What You Need to Know    Many Vaccine Information Statements are available in Serbian and other languages. See www.immunize.org/vis  Hojas de información sobre vacunas están disponibles en español y en muchos otros idiomas. Visite www.immunize.org/vis    1. Why get vaccinated? Influenza vaccine can prevent influenza (flu). Flu is a contagious disease that spreads around the [facility] every year, usually between October and May. Anyone can get the flu, but it is more dangerous for some people. Infants and young children, people 72years of age and older, pregnant women, and people with certain health conditions or a weakened immune system are at greatest risk of flu complications. Pneumonia, bronchitis, sinus infections and ear infections are examples of flu-related complications. If you have a medical condition, such as heart disease, cancer or diabetes, flu can make it worse. Flu can cause fever and chills, sore throat, muscle aches, fatigue, cough, headache, and runny or stuffy nose. Some people may have vomiting and diarrhea, though this is more common in children than adults. Each year thousands of people in the [facility] die from flu, and many more are hospitalized. Flu vaccine prevents millions of illnesses and flu-related visits to the doctor each year. 2. Influenza vaccines     CDC recommends everyone 10months of age and older get vaccinated every flu season. Children 6 months through 6years of age may need 2 doses during a single flu season. Everyone else needs only 1 dose each flu season. It takes about 2 weeks for protection to develop after vaccination. There are many flu viruses, and they are always changing. Each year a new flu vaccine is made to protect against three or four viruses that are likely to cause disease in the upcoming flu season.  Even when the vaccine doesnt exactly match these viruses, it may still provide some protection. Influenza vaccine does not cause flu. Influenza vaccine may be given at the same time as other vaccines. 3. Talk with your health care provider    Tell your vaccine provider if the person getting the vaccine:   Has had an allergic reaction after a previous dose of influenza vaccine, or has any severe, life-threatening allergies.  Has ever had Guillain-Barré Syndrome (also called GBS). In some cases, your health care provider may decide to postpone influenza vaccination to a future visit. People with minor illnesses, such as a cold, may be vaccinated. People who are moderately or severely ill should usually wait until they recover before getting influenza vaccine. Your health care provider can give you more information. 4. Risks of a reaction     Soreness, redness, and swelling where shot is given, fever, muscle aches, and headache can happen after influenza vaccine.  There may be a very small increased risk of Guillain-Barré Syndrome (GBS) after inactivated influenza vaccine (the flu shot). Parisa Long children who get the flu shot along with pneumococcal vaccine (PCV13), and/or DTaP vaccine at the same time might be slightly more likely to have a seizure caused by fever. Tell your health care provider if a child who is getting flu vaccine has ever had a seizure. People sometimes faint after medical procedures, including vaccination. Tell your provider if you feel dizzy or have vision changes or ringing in the ears. As with any medicine, there is a very remote chance of a vaccine causing a severe allergic reaction, other serious injury, or death. 5. What if there is a serious problem? An allergic reaction could occur after the vaccinated person leaves the clinic.  If you see signs of a severe allergic reaction (hives, swelling of the face and throat, difficulty breathing, a fast heartbeat, dizziness, or weakness), call 9-1-1 and get the person to the nearest hospital.    For other signs that concern you, call your health care provider. Adverse reactions should be reported to the Vaccine Adverse Event Reporting System (VAERS). Your health care provider will usually file this report, or you can do it yourself. Visit the VAERS website at www.vaers. [facility].gov or call 9-203.213.1652. VAERS is only for reporting reactions, and VAERS staff do not give medical advice. 6. The National Vaccine Injury Compensation Program    The [facility] Vaccine Injury Compensation Program (VICP) is a federal program that was created to compensate people who may have been injured by certain vaccines. Visit the VICP website at www.[facility].gov/vaccinecompensation or call 7-462.490.1766 to learn about the program and about filing a claim. There is a time limit to file a claim for compensation. 7. How can I learn more?  Ask your health care provider.  Call your local or state health department.  Contact the Centers for Disease Control and Prevention (CDC):  - Call 3-377.490.3985 (1-800-CDC-INFO) or  - Visit CDCs influenza website at www.cdc.gov/flu    Vaccine Information Statement (Interim)  Inactivated Influenza Vaccine   8/15/2019  [id number]   Department of Health and Human Services  Centers for Disease Control and Prevention    Office Use Only

## 2020-12-03 NOTE — PROGRESS NOTES
Subjective: (As above and below)     Chief Complaint   Patient presents with    Hypertension     follow up     Spasms     pt states she has started getting \"jumps\" that happen randomly     [address] Eitan Mitchell is a 56y.o. year old female who presents for     Hypertension ROS:  taking medications as instructed, no medication side effects noted, no TIAs, no chest pain on exertion, no dyspnea on exertion, no swelling of ankles    Muscle spasm? She states that once in a while her body will \"jump\" or \"twitch\" not isolated muscles, no pain, no neurologic changes,   She reports staying hydrated  No residual effects      Reviewed PmHx, RxHx, FmHx, SocHx, AllgHx and updated in chart. Family History   Problem Relation Age of Onset    Cancer Mother     Heart Disease Father        Past Medical History:   Diagnosis Date    Anxiety     Bipolar 1 disorder ([facility] [address]     Depression     Fatigue     Frequent headaches     GERD (gastroesophageal reflux disease)     Hearing loss     Hypertension     Joint pain     Memory disorder     Muscle pain     Psychiatric disorder     bipolar    Snoring     Stomach pain     Stroke [facility])       Social History     Socioeconomic History    Marital status:      Spouse name: Not on file    Number of children: Not on file    Years of education: Not on file    Highest education level: Not on file   Tobacco Use    Smoking status: Former Smoker    Smokeless tobacco: Never Used   Substance and Sexual Activity    Alcohol use: No     Comment: \"once a month\"    Drug use: Yes     Types: Marijuana     Comment:  x 2 months sgo    Sexual activity: Not Currently   Social History Narrative    ** Merged History Encounter **               Current Outpatient Medications   Medication Sig    cetirizine (Allergy Relief, cetirizine,) 10 mg tablet TAKE 1 TABLET BY MOUTH ONCE DAILY AS NEEDED FOR ALLERGIES    amLODIPine (NORVASC) 5 mg tablet Take 1 Tab by mouth daily.     aspirin 81 mg chewable tablet Take 1 Tab by mouth daily.  atorvastatin (LIPITOR) 10 mg tablet TAKE 1 TABLET BY MOUTH NIGHTLY FOR CHOLESTEROL    calcium-cholecalciferol, D3, (Calcium with Vitamin D) tablet Take 2 Tabs by mouth daily.  clopidogreL (PLAVIX) 75 mg tab Take 1 Tab by mouth daily.  metoprolol tartrate (LOPRESSOR) 25 mg tablet Take 1/2 (one-half) tablet by mouth twice daily     No current facility-administered medications for this visit. Review of Systems:   Constitutional:    Negative for fever and chills, negative diaphoresis. HEENT:              Negative for neck pain and stiffness. Eyes:                  Negative for visual disturbance, itching, redness or discharge. Respiratory:        Negative for cough and shortness of breath. Cardiovascular:  Negative for chest pain and palpitations. Gastrointestinal: Negative for nausea, vomiting, abdominal pain, diarrhea or constipation. Genitourinary:     Negative for dysuria and frequency. Musculoskeletal: Negative for falls, tenderness and swelling. Skin:                    Negative for rash, masses or lesions. Neurological:       Negative for dizzyness, seizure, loss of consciousness, weakness and numbness.      Objective:     Vitals:    12/03/20 1519   BP: 133/81   Pulse: 96   Resp: 18   Temp: 98.6 °F (37 °C)   TempSrc: Temporal   SpO2: 98%   Weight: 195 lb (88.5 kg)   Height: 5' 4\" (1.626 m)       Results for orders placed or performed in visit on 07/20/20   AMB POC URINALYSIS DIP STICK AUTO W/O MICRO   Result Value Ref Range    Color (UA POC) Dark Yellow     Clarity (UA POC) Cloudy     Glucose (UA POC) Negative Negative    Bilirubin (UA POC) 1+ Negative    Ketones (UA POC) Trace Negative    Specific gravity (UA POC) 1.030 1.001 - 1.035    Blood (UA POC) Negative Negative    pH (UA POC) 5.5 4.6 - 8.0    Protein (UA POC) 1+ Negative    Urobilinogen (UA POC) 0.2 mg/dL 0.2 - 1    Nitrites (UA POC) Negative Negative    Leukocyte esterase (UA POC) Negative Negative         Gen: Oriented to person, place and time and well-developed, well-nourished and in no distress. HEENT:    Head: normocephalic and atraumatic. Eyes:  EOM are normal. Pupils equal and round. Neck:  Normal range of motion. Neck supple. Cardiovascular: normal rate, regular rhythm and normal heart sounds. Pulmonary/Chest:  Effort normal and breath sounds normal.  No respiratory distress. No wheezes, no rales. Musculoskeletal:  No edema, no tenderness. No calf tenderness or edema. Neurological:  Alert, oriented to person, place and time. Skin: skin is warm and dry. Assessment/ Plan:      Msk: seems benign, not bothersome, check electrolytes, fu INI    1. Essential hypertension    - amLODIPine (NORVASC) 5 mg tablet; Take 1 Tab by mouth daily. Dispense: 90 Tab; Refill: 0  - metoprolol tartrate (LOPRESSOR) 25 mg tablet; Take 1/2 (one-half) tablet by mouth twice daily  Dispense: 180 Tab; Refill: 0    2. Hyperlipidemia, unspecified hyperlipidemia type    - atorvastatin (LIPITOR) 10 mg tablet; TAKE 1 TABLET BY MOUTH NIGHTLY FOR CHOLESTEROL  Dispense: 90 Tab; Refill: 0  - METABOLIC PANEL, COMPREHENSIVE  - LIPID PANEL    3. History of CVA (cerebrovascular accident)    - clopidogreL (PLAVIX) 75 mg tab; Take 1 Tab by mouth daily. Dispense: 90 Tab; Refill: 0    4. Needs flu shot    - INFLUENZA VIRUS VAC QUAD,SPLIT,PRESV FREE SYRINGE IM        I have discussed the diagnosis with the patient and the intended plan as seen in the above orders. The patient has received an after-visit summary and questions were answered concerning future plans. Pt conveyed understanding of plan. Medication Side Effects and Warnings were discussed with patient: yes  Patient Labs were reviewed: yes  Patient Past Records were reviewed:  yes    Vanessa Damian.  Nilda Romero NP

## 2020-12-03 NOTE — PROGRESS NOTES
Chief Complaint   Patient presents with    Hypertension     follow up     Spasms     pt states she has started getting \"jumps\" that happen randomly       1. Have you been to the ER, urgent care clinic since your last visit? Hospitalized since your last visit? No    2. Have you seen or consulted any other health care providers outside of the [address] [place] [address] since your last visit? Include any pap smears or colon screening. Michelle      Antonella Arnold is a 56 y.o. female who presents for routine immunizations. She denies any symptoms , reactions or allergies that would exclude them from being immunized today. Risks and adverse reactions were discussed and the VIS was given to them. All questions were addressed. She was observed for 10 min post injection. There were no reactions observed.     Timothy Andrea

## 2020-12-29 LAB
ALBUMIN SERPL-MCNC: 4.4 G/DL (ref 3.8–4.8)
ALBUMIN/GLOB SERPL: 2 {RATIO} (ref 1.2–2.2)
ALP SERPL-CCNC: 133 IU/L (ref 39–117)
ALT SERPL-CCNC: 12 IU/L (ref 0–32)
AST SERPL-CCNC: 12 IU/L (ref 0–40)
BILIRUB SERPL-MCNC: 0.3 MG/DL (ref 0–1.2)
BUN SERPL-MCNC: 12 MG/DL (ref 8–27)
BUN/CREAT SERPL: 19 (ref 12–28)
CALCIUM SERPL-MCNC: 9.8 MG/DL (ref 8.7–10.3)
CHLORIDE SERPL-SCNC: 103 MMOL/L (ref 96–106)
CHOLEST SERPL-MCNC: 193 MG/DL (ref 100–199)
CO2 SERPL-SCNC: 26 MMOL/L (ref 20–29)
CREAT SERPL-MCNC: 0.63 MG/DL (ref 0.57–1)
GLOBULIN SER CALC-MCNC: 2.2 G/DL (ref 1.5–4.5)
GLUCOSE SERPL-MCNC: 81 MG/DL (ref 65–99)
HDLC SERPL-MCNC: 73 MG/DL
INTERPRETATION, 910389: NORMAL
LDLC SERPL CALC-MCNC: 96 MG/DL (ref 0–99)
POTASSIUM SERPL-SCNC: 4.3 MMOL/L (ref 3.5–5.2)
PROT SERPL-MCNC: 6.6 G/DL (ref 6–8.5)
SODIUM SERPL-SCNC: 141 MMOL/L (ref 134–144)
TRIGL SERPL-MCNC: 139 MG/DL (ref 0–149)
VLDLC SERPL CALC-MCNC: 24 MG/DL (ref 5–40)

## 2021-01-28 ENCOUNTER — HOSPITAL ENCOUNTER (EMERGENCY)
Age: 61
Discharge: HOME | End: 2021-01-28
Attending: EMERGENCY MEDICINE
Payer: MEDICARE

## 2021-01-28 VITALS
HEART RATE: 67 BPM | WEIGHT: 195 LBS | RESPIRATION RATE: 18 BRPM | BODY MASS INDEX: 33.29 KG/M2 | TEMPERATURE: 98 F | HEIGHT: 64 IN | SYSTOLIC BLOOD PRESSURE: 149 MMHG | DIASTOLIC BLOOD PRESSURE: 88 MMHG | OXYGEN SATURATION: 100 %

## 2021-01-28 DIAGNOSIS — J01.10 ACUTE NON-RECURRENT FRONTAL SINUSITIS: ICD-10-CM

## 2021-01-28 DIAGNOSIS — Z20.822 ENCOUNTER FOR LABORATORY TESTING FOR COVID-19 VIRUS: ICD-10-CM

## 2021-01-28 DIAGNOSIS — H66.001 ACUTE SUPPURATIVE OTITIS MEDIA OF RIGHT EAR WITHOUT SPONTANEOUS RUPTURE OF TYMPANIC MEMBRANE, RECURRENCE NOT SPECIFIED: Primary | ICD-10-CM

## 2021-01-28 LAB — SARS-COV-2, COV2: NORMAL

## 2021-01-28 PROCEDURE — U0003 INFECTIOUS AGENT DETECTION BY NUCLEIC ACID (DNA OR RNA); SEVERE ACUTE RESPIRATORY SYNDROME CORONAVIRUS 2 (SARS-COV-2) (CORONAVIRUS DISEASE [COVID-19]), AMPLIFIED PROBE TECHNIQUE, MAKING USE OF HIGH THROUGHPUT TECHNOLOGIES AS DESCRIBED BY CMS-2020-01-R: HCPCS

## 2021-01-28 PROCEDURE — 99283 EMERGENCY DEPT VISIT LOW MDM: CPT

## 2021-01-28 PROCEDURE — U0005 INFEC AGEN DETEC AMPLI PROBE: HCPCS

## 2021-01-28 RX ORDER — GUAIFENESIN 600 MG/1
600 TABLET, EXTENDED RELEASE ORAL 2 TIMES DAILY
Qty: 14 TAB | Refills: 0 | Status: SHIPPED | OUTPATIENT
Start: 2021-01-28 | End: 2021-08-26

## 2021-01-28 RX ORDER — AMOXICILLIN AND CLAVULANATE POTASSIUM 875; 125 MG/1; MG/1
1 TABLET, FILM COATED ORAL 2 TIMES DAILY
Qty: 14 TAB | Refills: 0 | Status: SHIPPED | OUTPATIENT
Start: 2021-01-28 | End: 2021-08-26

## 2021-01-28 RX ORDER — CETIRIZINE HCL 10 MG
10 TABLET ORAL DAILY
Qty: 30 TAB | Refills: 0 | Status: SHIPPED | OUTPATIENT
Start: 2021-01-28 | End: 2021-08-26 | Stop reason: SDUPTHER

## 2021-01-28 NOTE — ED NOTES
Emergency Department Nursing Plan of Care       The Nursing Plan of Care is developed from the Nursing assessment and Emergency Department Attending provider initial evaluation. The plan of care may be reviewed in the ED Provider note.     The Plan of Care was developed with the following considerations:   Patient / Family readiness to learn indicated by:verbalized understanding  Persons(s) to be included in education: patient  Barriers to Learning/Limitations:No    Signed     Soraya Faith RN    1/28/2021   11:47 AM

## 2021-01-28 NOTE — ED PROVIDER NOTES
EMERGENCY DEPARTMENT HISTORY AND PHYSICAL EXAM    Date: 1/28/2021  Patient Name: Danelle Austin    History of Presenting Illness     Chief Complaint   Patient presents with    Ear Pain    Headache         History Provided By: Patient    HPI: Danelle Austin is a 56 y.o. female with a PMH of GERD, bipolar, anxiety, depression, CVA, hypertension who presents with ear pain and headache. Onset 3 days ago. Patient states her body aches began prior to her ear pain and headache. Denies ear drainage or ringing in her ears but at times she feels a popping sensation. Patient reports right ear worse than left ear. Also reports intermittent abdominal pain but no nausea vomiting or diarrhea. Patient states she has been taking Tylenol for symptoms with no relief. Patient states she has been around others with cold symptoms and would like to be tested for Covid. PCP: Senia York, NP    Current Outpatient Medications   Medication Sig Dispense Refill    amoxicillin-clavulanate (Augmentin) 875-125 mg per tablet Take 1 Tab by mouth two (2) times a day. 14 Tab 0    guaiFENesin ER (MUCINEX) 600 mg ER tablet Take 1 Tab by mouth two (2) times a day. 14 Tab 0    cetirizine (ZYRTEC) 10 mg tablet Take 1 Tab by mouth daily. 30 Tab 0    amLODIPine (NORVASC) 5 mg tablet Take 1 Tab by mouth daily. 90 Tab 0    aspirin 81 mg chewable tablet Take 1 Tab by mouth daily. 90 Tab 1    atorvastatin (LIPITOR) 10 mg tablet TAKE 1 TABLET BY MOUTH NIGHTLY FOR CHOLESTEROL 90 Tab 0    calcium-cholecalciferol, D3, (Calcium with Vitamin D) tablet Take 2 Tabs by mouth daily. 60 Tab 11    clopidogreL (PLAVIX) 75 mg tab Take 1 Tab by mouth daily.  90 Tab 0    metoprolol tartrate (LOPRESSOR) 25 mg tablet Take 1/2 (one-half) tablet by mouth twice daily 180 Tab 0       Past History     Past Medical History:  Past Medical History:   Diagnosis Date    Anxiety     Bipolar 1 disorder (HCC)     Depression     Fatigue     Frequent headaches     GERD (gastroesophageal reflux disease)     Hearing loss     Hypertension     Joint pain     Memory disorder     Muscle pain     Psychiatric disorder     bipolar    Snoring     Stomach pain     Stroke [facility])        Past Surgical History:  Past Surgical History:   Procedure Laterality Date    HX ADENOIDECTOMY      sweat glands    HX GYN      tubal ligation    HX TONSILLECTOMY         Family History:  Family History   Problem Relation Age of Onset    Cancer Mother     Heart Disease Father        Social History:  Social History     Tobacco Use    Smoking status: Current Some Day Smoker    Smokeless tobacco: Never Used   Substance Use Topics    Alcohol use: Not Currently     Comment: \"once a month\"    Drug use: Yes     Types: Marijuana       Allergies: Allergies   Allergen Reactions    Ace Inhibitors Cough    Contrast Dye [Iodine] Hives         Review of Systems   Review of Systems   Constitutional: Negative for chills, fatigue and fever. HENT: Positive for congestion, ear pain, sinus pain and sneezing. Negative for ear discharge, postnasal drip, rhinorrhea and sore throat. Eyes: Negative for pain. Respiratory: Positive for cough. Negative for shortness of breath and wheezing. Cardiovascular: Negative for chest pain. Gastrointestinal: Negative for abdominal pain, nausea and vomiting. Musculoskeletal: Positive for arthralgias. Negative for back pain. Skin: Negative for rash. Neurological: Positive for headaches. All other systems reviewed and are negative. Physical Exam     Vitals:    01/28/21 1126 01/28/21 1200   BP: (!) 161/93 (!) 149/88   Pulse: 67    Resp: 18    Temp: 98 °F (36.7 °C)    SpO2: 100% 100%   Weight: 88.5 kg (195 lb)    Height: 5' 4\" (1.626 m)      Physical Exam  Vitals signs and nursing note reviewed. Constitutional:       General: She is not in acute distress. Appearance: She is well-developed. She is not ill-appearing.    HENT:      Head: Normocephalic and atraumatic. Right Ear: External ear normal. A middle ear effusion is present. Tympanic membrane is erythematous and bulging. Left Ear: Tympanic membrane and ear canal normal.      Nose: Congestion present. Right Sinus: Maxillary sinus tenderness and frontal sinus tenderness present. Left Sinus: No maxillary sinus tenderness or frontal sinus tenderness. Mouth/Throat:      Mouth: Mucous membranes are moist.      Pharynx: Oropharynx is clear. No oropharyngeal exudate or posterior oropharyngeal erythema. Eyes:      Extraocular Movements: Extraocular movements intact. Conjunctiva/sclera: Conjunctivae normal.      Pupils: Pupils are equal, round, and reactive to light. Neck:      Musculoskeletal: Normal range of motion and neck supple. Cardiovascular:      Rate and Rhythm: Normal rate and regular rhythm. Pulses: Normal pulses. Heart sounds: Normal heart sounds. Pulmonary:      Effort: Pulmonary effort is normal.      Breath sounds: Normal breath sounds. Abdominal:      General: Abdomen is flat. Bowel sounds are normal.      Palpations: Abdomen is soft. Tenderness: There is no abdominal tenderness. There is no guarding or rebound. Musculoskeletal: Normal range of motion. Lymphadenopathy:      Cervical: No cervical adenopathy. Skin:     General: Skin is warm and dry. Neurological:      Mental Status: She is alert and oriented to person, place, and time. Deep Tendon Reflexes: Reflexes are normal and symmetric.            Diagnostic Study Results     Labs -     Recent Results (from the past 12 hour(s))   SARS-COV-2    Collection Time: 01/28/21 12:46 PM   Result Value Ref Range    SARS-CoV-2 Please find results under separate order         Radiologic Studies -   No orders to display     CT Results  (Last 48 hours)    None        CXR Results  (Last 48 hours)    None            Medical Decision Making   I am the first provider for this patient. I reviewed the vital signs, available nursing notes, past medical history, past surgical history, family history and social history. Vital Signs-Reviewed the patient's vital signs. Records Reviewed: Nursing Notes and Old Medical Records    58-year-old female with complaints of ear pain, headache and body aches exhibiting right nare congestion in addition to bulging TM that is erythematous. I suspect patient has a sinus infection causing an ear infection. Due to her symptoms will obtain Covid testing per her request.  Discussed quarantine precautions and advise she cannot return to work until results have returned. Will DC with guaifenesin, Augmentin in addition to Zyrtec. Patient advised she can take Tylenol as needed for her pain. Advised to avoid over-the-counter cold medication due to history of hypertension. Disposition:  Discharge     DISCHARGE NOTE:         Care plan outlined and precautions discussed. Patient has no new complaints, changes, or physical findings. All of pt's questions and concerns were addressed. Patient was instructed and agrees to follow up with PCP, as well as to return to the ED upon further deterioration. Patient is ready to go home.     Follow-up Information     Follow up With Specialties Details Why Contact Info    Eboni Hill., NP Nurse Practitioner On 2/10/2021 Your appointment time is 0815, Bring a MASK, Please arrive 15 mins early, Bring  INS card, picture ID,and discharge papers, Please keep this appointment Miles Perezisti   Nataliia Cade David  [phone]            Discharge Medication List as of 1/28/2021 12:26 PM      START taking these medications    Details   amoxicillin-clavulanate (Augmentin) 875-125 mg per tablet Take 1 Tab by mouth two (2) times a day., Normal, Disp-14 Tab, R-0      guaiFENesin ER (MUCINEX) 600 mg ER tablet Take 1 Tab by mouth two (2) times a day., Normal, Disp-14 Tab, R-0         CONTINUE these medications which have CHANGED    Details   cetirizine (ZYRTEC) 10 mg tablet Take 1 Tab by mouth daily. , Normal, Disp-30 Tab, R-0         CONTINUE these medications which have NOT CHANGED    Details   amLODIPine (NORVASC) 5 mg tablet Take 1 Tab by mouth daily. , Normal, Disp-90 Tab,R-0      aspirin 81 mg chewable tablet Take 1 Tab by mouth daily. , Normal, Disp-90 Tab,R-1      atorvastatin (LIPITOR) 10 mg tablet TAKE 1 TABLET BY MOUTH NIGHTLY FOR CHOLESTEROL, Normal, Disp-90 Tab,R-0      calcium-cholecalciferol, D3, (Calcium with Vitamin D) tablet Take 2 Tabs by mouth daily. , Normal, Disp-60 Tab,R-11      clopidogreL (PLAVIX) 75 mg tab Take 1 Tab by mouth daily. , Normal, Disp-90 Tab,R-0      metoprolol tartrate (LOPRESSOR) 25 mg tablet Take 1/2 (one-half) tablet by mouth twice daily, Normal, Disp-180 Tab,R-0             Provider Notes (Medical Decision Making):   DDX: Otalgia, Otitis Externa, AOM, Foreign Body in Ear, URI, COVID-19, allergic rhinitis, viral versus bacterial sinusitis    Procedures:  Procedures    Please note that this dictation was completed with Dragon, computer voice recognition software. Quite often unanticipated grammatical, syntax, homophones, and other interpretive errors are inadvertently transcribed by the computer software. Please disregard these errors. Additionally, please excuse any errors that have escaped final proofreading. Diagnosis     Clinical Impression:   1. Acute suppurative otitis media of right ear without spontaneous rupture of tympanic membrane, recurrence not specified    2. Acute non-recurrent frontal sinusitis    3.  Encounter for laboratory testing for COVID-19 virus

## 2021-01-28 NOTE — Clinical Note
[address] EMERGENCY DEPT 
[address] [place] [id number] 
[phone] Work/School Note Date: 1/28/2021 To Whom It May concern: 
 
Leidy Schmitt was evaulated by the following provider(s): 
Attending Provider: Margarita Arias MD 
Nurse Practitioner: Kristofer Christopher NP.   [place] virus is suspected. Per the CDC guidelines we recommend home isolation until the following conditions are all met: 1. At least 10 days have passed since symptoms first appeared and 2. At least 24 hours have passed since last fever without the use of fever-reducing medications and 
3. Symptoms (e.g., cough, shortness of breath) have improved Sincerely, Hermilo Lewis NP

## 2021-01-28 NOTE — ED TRIAGE NOTES
C\o right sided ear  Pain, headache x3 days and body wide aches. Pt reports tingling feeling in her finger tips and in her toes. Pt reports no relief with tylenol.

## 2021-01-28 NOTE — Clinical Note
[facility] EMERGENCY DEPT 
[address]  [id number] 
[phone] Work/School Note Date: 1/28/2021 To Whom It May concern: 
 
Roseann Edward was evaulated by the following provider(s): 
Attending Provider: Isabelle Blackburn MD 
Nurse Practitioner: Mireya Kelley NP.   [address] virus is suspected. Per the CDC guidelines we recommend home isolation until the following conditions are all met: 1. At least 10 days have passed since symptoms first appeared and 2. At least 24 hours have passed since last fever without the use of fever-reducing medications and 
3. Symptoms (e.g., cough, shortness of breath) have improved Sincerely, Sharan Damon NP

## 2021-01-29 ENCOUNTER — PATIENT OUTREACH (OUTPATIENT)
Dept: FAMILY MEDICINE CLINIC | Age: 61
End: 2021-01-29

## 2021-01-29 LAB
SARS-COV-2, XPLCVT: NOT DETECTED
SOURCE, COVRS: NORMAL

## 2021-02-08 ENCOUNTER — PATIENT OUTREACH (OUTPATIENT)
Dept: FAMILY MEDICINE CLINIC | Age: 61
End: 2021-02-08

## 2021-02-16 ENCOUNTER — OFFICE VISIT (OUTPATIENT)
Dept: INTERNAL MEDICINE CLINIC | Age: 61
End: 2021-02-16
Payer: MEDICARE

## 2021-02-16 VITALS
RESPIRATION RATE: 18 BRPM | HEART RATE: 66 BPM | HEIGHT: 64 IN | DIASTOLIC BLOOD PRESSURE: 82 MMHG | WEIGHT: 195 LBS | SYSTOLIC BLOOD PRESSURE: 132 MMHG | TEMPERATURE: 98.5 F | OXYGEN SATURATION: 98 % | BODY MASS INDEX: 33.29 KG/M2

## 2021-02-16 DIAGNOSIS — M54.41 CHRONIC MIDLINE LOW BACK PAIN WITH BILATERAL SCIATICA: ICD-10-CM

## 2021-02-16 DIAGNOSIS — G89.29 CHRONIC MIDLINE LOW BACK PAIN WITH BILATERAL SCIATICA: ICD-10-CM

## 2021-02-16 DIAGNOSIS — M54.42 CHRONIC MIDLINE LOW BACK PAIN WITH BILATERAL SCIATICA: ICD-10-CM

## 2021-02-16 DIAGNOSIS — G62.9 NEUROPATHY: Primary | ICD-10-CM

## 2021-02-16 DIAGNOSIS — G62.9 NEUROPATHY: ICD-10-CM

## 2021-02-16 DIAGNOSIS — R63.4 UNINTENTIONAL WEIGHT LOSS: ICD-10-CM

## 2021-02-16 DIAGNOSIS — E55.9 VITAMIN D DEFICIENCY: ICD-10-CM

## 2021-02-16 DIAGNOSIS — M54.2 NECK PAIN: ICD-10-CM

## 2021-02-16 PROCEDURE — G8427 DOCREV CUR MEDS BY ELIG CLIN: HCPCS | Performed by: NURSE PRACTITIONER

## 2021-02-16 PROCEDURE — G9899 SCRN MAM PERF RSLTS DOC: HCPCS | Performed by: NURSE PRACTITIONER

## 2021-02-16 PROCEDURE — G8752 SYS BP LESS 140: HCPCS | Performed by: NURSE PRACTITIONER

## 2021-02-16 PROCEDURE — G8754 DIAS BP LESS 90: HCPCS | Performed by: NURSE PRACTITIONER

## 2021-02-16 PROCEDURE — 3017F COLORECTAL CA SCREEN DOC REV: CPT | Performed by: NURSE PRACTITIONER

## 2021-02-16 PROCEDURE — G8417 CALC BMI ABV UP PARAM F/U: HCPCS | Performed by: NURSE PRACTITIONER

## 2021-02-16 PROCEDURE — 99214 OFFICE O/P EST MOD 30 MIN: CPT | Performed by: NURSE PRACTITIONER

## 2021-02-16 PROCEDURE — G8432 DEP SCR NOT DOC, RNG: HCPCS | Performed by: NURSE PRACTITIONER

## 2021-02-16 RX ORDER — GABAPENTIN 100 MG/1
100 CAPSULE ORAL 3 TIMES DAILY
Qty: 90 CAP | Refills: 0 | Status: SHIPPED | OUTPATIENT
Start: 2021-02-16 | End: 2022-01-31 | Stop reason: SDUPTHER

## 2021-02-16 NOTE — PROGRESS NOTES
Chief Complaint   Patient presents with   [facility] ED Follow-up     [address]       1. Have you been to the ER, urgent care clinic since your last visit? Hospitalized since your last visit? Yes When: 01/28/2021 Where: [facility] Reason for visit: ear pain, headache    2. Have you seen or consulted any other health care providers outside of the [address] [place] [address] since your last visit? Include any pap smears or colon screening.  No

## 2021-02-16 NOTE — PROGRESS NOTES
Subjective: (As above and below)     Chief Complaint   Patient presents with   [facility]     [facility] Veronica Combs is a 56y.o. year old female who presents for     ED fu: presented on 1/28/21 for  R ear infxn, she feels better    Hypertension ROS:  taking medications as instructed, no medication side effects noted, no TIAs, no chest pain on exertion, no dyspnea on exertion, no swelling of ankles      Neuropathy: sh ehas been having burning/pins and needles feeling to feet and hands at times  She does endorse some neck and low back pain  Denies saddle numbness, leg weakness, falls or bowel/bladder dysfunction. Denies upper ext weakness  She has known DJD      Weight loss: she is concerned that she is losing weight  She was 202 lbs in 07/2020  She eats but does feel a decreased appetite, she endorses acid reflux type symptoms, some epigastric burning or sensation that food gets stuck at times. No hx of EGD  She is utd on mammo, colonoscopy pap      Wt Readings from Last 3 Encounters:   02/16/21 195 lb (88.5 kg)   01/28/21 195 lb (88.5 kg)   12/03/20 195 lb (88.5 kg)         Acid reflux testing    Right knee: knees replacement R        Reviewed PmHx, RxHx, FmHx, SocHx, AllgHx and updated in chart.   Family History   Problem Relation Age of Onset    Cancer Mother     Heart Disease Father        Past Medical History:   Diagnosis Date    Anxiety     Bipolar 1 disorder ([address]     Depression     Fatigue     Frequent headaches     GERD (gastroesophageal reflux disease)     Hearing loss     Hypertension     Joint pain     Memory disorder     Muscle pain     Psychiatric disorder     bipolar    Snoring     Stomach pain     Stroke [facility])       Social History     Socioeconomic History    Marital status:      Spouse name: Not on file    Number of children: Not on file    Years of education: Not on file    Highest education level: Not on file   Tobacco Use    Smoking status: Current Some Day Smoker    Smokeless tobacco: Never Used   Substance and Sexual Activity    Alcohol use: Not Currently     Comment: \"once a month\"    Drug use: Yes     Types: Marijuana    Sexual activity: Not Currently   Social History Narrative    ** Merged History Encounter **               Current Outpatient Medications   Medication Sig    amoxicillin-clavulanate (Augmentin) 875-125 mg per tablet Take 1 Tab by mouth two (2) times a day.  cetirizine (ZYRTEC) 10 mg tablet Take 1 Tab by mouth daily.  amLODIPine (NORVASC) 5 mg tablet Take 1 Tab by mouth daily.  aspirin 81 mg chewable tablet Take 1 Tab by mouth daily.  atorvastatin (LIPITOR) 10 mg tablet TAKE 1 TABLET BY MOUTH NIGHTLY FOR CHOLESTEROL    calcium-cholecalciferol, D3, (Calcium with Vitamin D) tablet Take 2 Tabs by mouth daily.  clopidogreL (PLAVIX) 75 mg tab Take 1 Tab by mouth daily.  metoprolol tartrate (LOPRESSOR) 25 mg tablet Take 1/2 (one-half) tablet by mouth twice daily    guaiFENesin ER (MUCINEX) 600 mg ER tablet Take 1 Tab by mouth two (2) times a day. No current facility-administered medications for this visit. Review of Systems:   Constitutional:    Negative for fever and chills, negative diaphoresis. HEENT:              Negative for neck pain and stiffness. Eyes:                  Negative for visual disturbance, itching, redness or discharge. Respiratory:        Negative for cough and shortness of breath. Cardiovascular:  Negative for chest pain and palpitations. Gastrointestinal: Negative for nausea, vomiting, abdominal pain, diarrhea or constipation. Genitourinary:     Negative for dysuria and frequency. Musculoskeletal: occ neck and low back pain, chronic R knee pain  Skin:                    Negative for rash, masses or lesions. Neurological:       Negative for dizzyness, seizure, loss of consciousness, weakness and + numbness.      Objective:     Vitals:    02/16/21 1502   BP: 132/82   Pulse: 66   Resp: 18 Temp: 98.5 °F (36.9 °C)   TempSrc: Temporal   SpO2: 98%   Weight: 195 lb (88.5 kg)   Height: 5' 4\" (1.626 m)       Results for orders placed or performed during the hospital encounter of 01/28/21   SARS-COV-2   Result Value Ref Range    SARS-CoV-2 Please find results under separate order     SARS-COV-2   Result Value Ref Range    Specimen source Nasopharyngeal      SARS-CoV-2 NOT DETECTED NOTDET           Physical Examination: General appearance - alert, well appearing, and in no distress  Mental status - alert, oriented to person, place, and time, normal mood, behavior, speech, dress, motor activity, and thought processes  Ears - bilateral TM's and external ear canals normal  Chest - clear to auscultation, no wheezes, rales or rhonchi, symmetric air entry  Heart - normal rate, regular rhythm, normal S1, S2, no murmurs, rubs, clicks or gallops  Extremities - no pedal edema noted    Neuro: negative phalen/tinel  Negative SLR      Assessment/ Plan:      Encouraged smoking cessation    1. Neuropathy  Intermittent, no ext weakness, try exercises for arthritis, gabapentin, check labs, neuro INI  - VITAMIN D, 25 HYDROXY; Future    2. Neck pain    - gabapentin (NEURONTIN) 100 mg capsule; Take 1 Cap by mouth three (3) times daily. Max Daily Amount: 300 mg. Nerve pain  Dispense: 90 Cap; Refill: 0    3. Chronic midline low back pain with bilateral sciatica  New med, discussed exercises  Fu INI  - gabapentin (NEURONTIN) 100 mg capsule; Take 1 Cap by mouth three (3) times daily. Max Daily Amount: 300 mg. Nerve pain  Dispense: 90 Cap; Refill: 0    4. Unintentional weight loss  Labs, consider GI referral ?hiatal hernia  - CBC W/O DIFF  - TSH 3RD GENERATION  - VITAMIN D, 25 HYDROXY; Future    5. Vitamin D deficiency    - VITAMIN D, 25 HYDROXY; Future        I have discussed the diagnosis with the patient and the intended plan as seen in the above orders.   The patient has received an after-visit summary and questions were answered concerning future plans. Pt conveyed understanding of plan. Medication Side Effects and Warnings were discussed with patient: yes  Patient Labs were reviewed: yes  Patient Past Records were reviewed:  yes    Jud Lou.  Daniela Gonsales NP

## 2021-02-17 LAB
25(OH)D3+25(OH)D2 SERPL-MCNC: 21.3 NG/ML (ref 30–100)
ERYTHROCYTE [DISTWIDTH] IN BLOOD BY AUTOMATED COUNT: 13 % (ref 11.7–15.4)
HCT VFR BLD AUTO: 45.2 % (ref 34–46.6)
HGB BLD-MCNC: 15.1 G/DL (ref 11.1–15.9)
MCH RBC QN AUTO: 31.6 PG (ref 26.6–33)
MCHC RBC AUTO-ENTMCNC: 33.4 G/DL (ref 31.5–35.7)
MCV RBC AUTO: 95 FL (ref 79–97)
PLATELET # BLD AUTO: 206 X10E3/UL (ref 150–450)
RBC # BLD AUTO: 4.78 X10E6/UL (ref 3.77–5.28)
TSH SERPL DL<=0.005 MIU/L-ACNC: 1.2 UIU/ML (ref 0.45–4.5)
WBC # BLD AUTO: 7.2 X10E3/UL (ref 3.4–10.8)

## 2021-02-24 ENCOUNTER — PATIENT OUTREACH (OUTPATIENT)
Dept: FAMILY MEDICINE CLINIC | Age: 61
End: 2021-02-24

## 2021-02-24 NOTE — PROGRESS NOTES
Patient resolved from Transition of Care episode on 2/24/21. ACM/CTN was unsuccessful at contacting this patient today. Patient/family was provided the following resources and education related to COVID-19 during the initial call:                         Signs, symptoms and red flags related to COVID-19            CDC exposure and quarantine guidelines            [facility] exposure contact - [phone]            Contact for their local Department of Health                 Patient has not had any additional ED or hospital visits. No further outreach scheduled with this CTN/ACM. Episode of Care resolved. Patient has this CTN/ACM contact information if future needs arise.

## 2021-02-26 RX ORDER — MULTIVITAMIN
2 TABLET ORAL DAILY
Qty: 60 TAB | Refills: 11 | Status: SHIPPED | OUTPATIENT
Start: 2021-02-26 | End: 2021-08-26 | Stop reason: SDUPTHER

## 2021-08-26 ENCOUNTER — OFFICE VISIT (OUTPATIENT)
Dept: INTERNAL MEDICINE CLINIC | Age: 61
End: 2021-08-26
Payer: MEDICARE

## 2021-08-26 ENCOUNTER — HOSPITAL ENCOUNTER (OUTPATIENT)
Dept: GENERAL RADIOLOGY | Age: 61
Discharge: HOME | End: 2021-08-26
Attending: NURSE PRACTITIONER
Payer: MEDICARE

## 2021-08-26 VITALS
OXYGEN SATURATION: 98 % | BODY MASS INDEX: 33.63 KG/M2 | HEIGHT: 64 IN | HEART RATE: 79 BPM | WEIGHT: 197 LBS | DIASTOLIC BLOOD PRESSURE: 96 MMHG | TEMPERATURE: 98.1 F | RESPIRATION RATE: 18 BRPM | SYSTOLIC BLOOD PRESSURE: 156 MMHG

## 2021-08-26 DIAGNOSIS — Z12.4 CERVICAL CANCER SCREENING: ICD-10-CM

## 2021-08-26 DIAGNOSIS — E78.5 HYPERLIPIDEMIA, UNSPECIFIED HYPERLIPIDEMIA TYPE: ICD-10-CM

## 2021-08-26 DIAGNOSIS — Z12.31 SCREENING MAMMOGRAM, ENCOUNTER FOR: ICD-10-CM

## 2021-08-26 DIAGNOSIS — S69.92XA INJURY OF LEFT THUMB, INITIAL ENCOUNTER: ICD-10-CM

## 2021-08-26 DIAGNOSIS — F51.01 PRIMARY INSOMNIA: ICD-10-CM

## 2021-08-26 DIAGNOSIS — E78.2 MIXED HYPERLIPIDEMIA: ICD-10-CM

## 2021-08-26 DIAGNOSIS — R73.09 ELEVATED GLUCOSE: ICD-10-CM

## 2021-08-26 DIAGNOSIS — G62.9 NEUROPATHY: ICD-10-CM

## 2021-08-26 DIAGNOSIS — N93.9 VAGINAL BLEEDING: ICD-10-CM
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
## 2021-08-26 DIAGNOSIS — Z86.73 HISTORY OF CVA (CEREBROVASCULAR ACCIDENT): ICD-10-CM

## 2021-08-26 DIAGNOSIS — N95.0 POST-MENOPAUSAL BLEEDING: ICD-10-CM

## 2021-08-26 DIAGNOSIS — Z00.00 MEDICARE ANNUAL WELLNESS VISIT, SUBSEQUENT: Primary | ICD-10-CM

## 2021-08-26 DIAGNOSIS — M79.10 MYALGIA: ICD-10-CM

## 2021-08-26 DIAGNOSIS — I10 ESSENTIAL HYPERTENSION: ICD-10-CM

## 2021-08-26 LAB
BILIRUB UR QL STRIP: NEGATIVE
GLUCOSE UR-MCNC: NEGATIVE MG/DL
KETONES P FAST UR STRIP-MCNC: NORMAL MG/DL
PH UR STRIP: 5.5 [PH] (ref 4.6–8)
PROT UR QL STRIP: NEGATIVE
SP GR UR STRIP: 1.03 (ref 1–1.03)
UA UROBILINOGEN AMB POC: NORMAL (ref 0.2–1)
URINALYSIS CLARITY POC: CLEAR
URINALYSIS COLOR POC: YELLOW
URINE BLOOD POC: NORMAL
URINE LEUKOCYTES POC: NEGATIVE
URINE NITRITES POC: NEGATIVE

## 2021-08-26 PROCEDURE — 81003 URINALYSIS AUTO W/O SCOPE: CPT | Performed by: NURSE PRACTITIONER

## 2021-08-26 PROCEDURE — 73140 X-RAY EXAM OF FINGER(S): CPT

## 2021-08-26 PROCEDURE — G0439 PPPS, SUBSEQ VISIT: HCPCS | Performed by: NURSE PRACTITIONER

## 2021-08-26 PROCEDURE — G8755 DIAS BP > OR = 90: HCPCS | Performed by: NURSE PRACTITIONER

## 2021-08-26 PROCEDURE — G9899 SCRN MAM PERF RSLTS DOC: HCPCS | Performed by: NURSE PRACTITIONER

## 2021-08-26 PROCEDURE — 3017F COLORECTAL CA SCREEN DOC REV: CPT | Performed by: NURSE PRACTITIONER

## 2021-08-26 PROCEDURE — G8417 CALC BMI ABV UP PARAM F/U: HCPCS | Performed by: NURSE PRACTITIONER

## 2021-08-26 PROCEDURE — G8432 DEP SCR NOT DOC, RNG: HCPCS | Performed by: NURSE PRACTITIONER

## 2021-08-26 PROCEDURE — G8753 SYS BP > OR = 140: HCPCS | Performed by: NURSE PRACTITIONER

## 2021-08-26 PROCEDURE — 99214 OFFICE O/P EST MOD 30 MIN: CPT | Performed by: NURSE PRACTITIONER

## 2021-08-26 PROCEDURE — G8427 DOCREV CUR MEDS BY ELIG CLIN: HCPCS | Performed by: NURSE PRACTITIONER

## 2021-08-26 RX ORDER — GUAIFENESIN 100 MG/5ML
81 LIQUID (ML) ORAL DAILY
Qty: 90 TABLET | Refills: 1 | Status: SHIPPED | OUTPATIENT
Start: 2021-08-26 | End: 2021-12-09 | Stop reason: SDUPTHER

## 2021-08-26 RX ORDER — AMLODIPINE BESYLATE 5 MG/1
5 TABLET ORAL DAILY
Qty: 90 TABLET | Refills: 0 | Status: CANCELLED | OUTPATIENT
Start: 2021-08-26

## 2021-08-26 RX ORDER — MELATONIN 5 MG
5 CAPSULE ORAL
Qty: 90 CAPSULE | Refills: 0 | Status: SHIPPED | OUTPATIENT
Start: 2021-08-26 | End: 2021-12-09 | Stop reason: SDUPTHER

## 2021-08-26 RX ORDER — METOPROLOL TARTRATE 25 MG/1
TABLET, FILM COATED ORAL
Qty: 180 TABLET | Refills: 0 | Status: SHIPPED | OUTPATIENT
Start: 2021-08-26 | End: 2022-01-20 | Stop reason: SDUPTHER

## 2021-08-26 RX ORDER — MULTIVITAMIN
2 TABLET ORAL DAILY
Qty: 60 TABLET | Refills: 11 | Status: SHIPPED | OUTPATIENT
Start: 2021-08-26 | End: 2021-12-09 | Stop reason: SDUPTHER

## 2021-08-26 RX ORDER — ATORVASTATIN CALCIUM 10 MG/1
TABLET, FILM COATED ORAL
Qty: 90 TABLET | Refills: 0 | Status: SHIPPED | OUTPATIENT
Start: 2021-08-26 | End: 2021-11-23

## 2021-08-26 RX ORDER — CLOPIDOGREL BISULFATE 75 MG/1
75 TABLET ORAL DAILY
Qty: 90 TABLET | Refills: 0 | Status: SHIPPED | OUTPATIENT
Start: 2021-08-26 | End: 2022-01-31 | Stop reason: SDUPTHER

## 2021-08-26 RX ORDER — CETIRIZINE HCL 10 MG
10 TABLET ORAL
Qty: 90 TABLET | Refills: 0 | Status: SHIPPED | OUTPATIENT
Start: 2021-08-26 | End: 2022-07-18 | Stop reason: SDUPTHER

## 2021-08-26 RX ORDER — HYDROCORTISONE 1 %
CREAM (GRAM) TOPICAL 2 TIMES DAILY
Qty: 30 G | Refills: 0 | Status: SHIPPED | OUTPATIENT
Start: 2021-08-26 | End: 2022-08-29

## 2021-08-26 NOTE — PROGRESS NOTES
Subjective: (As above and below)     Chief Complaint   Patient presents with    Vaginal Bleeding     pt reports spotting approx every 7 months for about 2 days     Tingling     pt reports pain and pins back, feet, and hands    Sleep Problem     pt states she is not getting sleep at night and this causes her to fall asleep at work    Jalyn Tate Other     pt states she is off balance when walking or standing for long periods of time     Skin Problem     right foot x 2 days     Haley Steve. Ming Escalera is a 56y.o. year old female who presents for     Hypertension ROS:  taking medications as instructed, no medication side effects noted, no TIAs, no chest pain on exertion, no dyspnea on exertion, no swelling of ankles  Hx of CVA on statin, plavix    Wt Readings from Last 3 Encounters:   08/26/21 197 lb (89.4 kg)   02/16/21 195 lb (88.5 kg)   01/28/21 195 lb (88.5 kg)     BP Readings from Last 3 Encounters:   08/26/21 (!) 156/96   02/16/21 132/82   01/28/21 (!) 149/88         Hyperlipidemia tolerating statin    Vaginal spotting: for a few months she has noticed random light red/brown vaginal spotting  Not necessarily after intercourse  Denies rectal bleeding or UTi s/s  No vaginal pain    Insomnia: trouble falling and staying asleep  Has not tried anything otc  No sleep apnea s/s    Neuropathy: has tingling, pins and needles feelings to her legs/arms but also \"everywhere\" does not seem radiating from neck or low back    Body aches: genearlized, no joint swelling  +symmetrical joint pain  No injury  Ongoing for months    r thumb: rough housing today and hurt her thumb, it is swollen and painful     Reviewed PmHx, RxHx, FmHx, SocHx, AllgHx and updated in chart.   Family History   Problem Relation Age of Onset    Cancer Mother     Heart Disease Father        Past Medical History:   Diagnosis Date    Anxiety     Bipolar 1 disorder ([facility] [address]     Depression     Fatigue     Frequent headaches     GERD (gastroesophageal reflux disease)     Hearing loss     Hypertension     Joint pain     Memory disorder     Muscle pain     Psychiatric disorder     bipolar    Snoring     Stomach pain     Stroke [facility])       Social History     Socioeconomic History    Marital status:      Spouse name: Not on file    Number of children: Not on file    Years of education: Not on file    Highest education level: Not on file   Tobacco Use    Smoking status: Current Some Day Smoker    Smokeless tobacco: Never Used   Vaping Use    Vaping Use: Never used   Substance and Sexual Activity    Alcohol use: Not Currently     Comment: \"once a month\"    Drug use: Yes     Types: Marijuana    Sexual activity: Not Currently   Social History Narrative    ** Merged History Encounter **          Social Determinants of Health     Financial Resource Strain:     Difficulty of Paying Living Expenses:    Food Insecurity:     Worried About Running Out of Food in the Last Year:     Ran Out of Food in the Last Year:    Transportation Needs:     Lack of Transportation (Medical):  Lack of Transportation (Non-Medical):    Physical Activity:     Days of Exercise per Week:     Minutes of Exercise per Session:    Stress:     Feeling of Stress :    Social Connections:     Frequency of Communication with Friends and Family:     Frequency of Social Gatherings with Friends and Family:     Attends Mandae[employer] Services:     Active Member of Clubs or Organizations:     Attends Club or Organization Meetings:     Marital Status:           Current Outpatient Medications   Medication Sig    aspirin 81 mg chewable tablet Take 1 Tablet by mouth daily.  atorvastatin (LIPITOR) 10 mg tablet TAKE 1 TABLET BY MOUTH NIGHTLY FOR CHOLESTEROL    calcium-cholecalciferol, D3, (Calcium with Vitamin D) tablet Take 2 Tablets by mouth daily.  clopidogreL (PLAVIX) 75 mg tab Take 1 Tablet by mouth daily.     cetirizine (ZYRTEC) 10 mg tablet Take 1 Tablet by mouth daily as needed for Allergies.  metoprolol tartrate (LOPRESSOR) 25 mg tablet Take 1/2 (one-half) tablet by mouth twice daily    hydrocortisone (CORTAID) 1 % topical cream Apply  to affected area two (2) times a day. use thin layer    melatonin 5 mg cap capsule Take 1 Capsule by mouth nightly.  amLODIPine (NORVASC) 5 mg tablet Take 1 tablet by mouth once daily    gabapentin (NEURONTIN) 100 mg capsule Take 1 Cap by mouth three (3) times daily. Max Daily Amount: 300 mg. Nerve pain (Patient not taking: Reported on 8/26/2021)     No current facility-administered medications for this visit. Objective:     Vitals:    08/26/21 1357 08/26/21 1442   BP: (!) 149/92 (!) 156/96   Pulse: 88 79   Resp: 18    Temp: 98.1 °F (36.7 °C)    TempSrc: Temporal    SpO2: 98%    Weight: 197 lb (89.4 kg)    Height: 5' 4\" (1.626 m)              The patient appears well, alert, oriented x 3, in no distress. ENT normal.  Neck supple. No adenopathy or thyromegaly. MONICA. Lungs are clear, good air entry, no wheezes, rhonchi or rales. S1 and S2 normal, no murmurs, regular rate and rhythm. Abdomen soft without tenderness, guarding, mass or organomegaly. Extremities show no edema, normal peripheral pulses. Neurological is normal, no focal findings. PELVIC EXAM: VULVA: normal appearing vulva with no masses, tenderness or lesions, VAGINA: normal appearing vagina with normal color and discharge, no lesions, CERVIX: friable non painful      Assessment/ Plan:       1. Medicare annual wellness visit, subsequent      2. Essential hypertension  Typically better, did not take meds today fu for recheck  - metoprolol tartrate (LOPRESSOR) 25 mg tablet; Take 1/2 (one-half) tablet by mouth twice daily  Dispense: 180 Tablet; Refill: 0  - METABOLIC PANEL, BASIC; Future  - CBC W/O DIFF; Future    3. Mixed hyperlipidemia      4.  Vaginal bleeding  US next  - AMB POC URINALYSIS DIP STICK AUTO W/O MICRO    5. Screening mammogram, encounter for    - [facility] MAMMO BI SCREENING INCL CAD; Future    6. Cervical cancer screening    - PAP IG, APTIMA HPV AND RFX 16/18,45 ([id number]); Future  - PAP IG, APTIMA HPV AND RFX 16/18,45 ([id number])    7. Hyperlipidemia, unspecified hyperlipidemia type  - atorvastatin (LIPITOR) 10 mg tablet; TAKE 1 TABLET BY MOUTH NIGHTLY FOR CHOLESTEROL  Dispense: 90 Tablet; Refill: 0    8. History of CVA (cerebrovascular accident)    - cloidogreL (PLAVIX) 75 mg tab; Take 1 Tablet by mouth daily. Dispense: 90 Tablet; Refill: 0    9. Elevated glucose    - HEMOGLOBIN A1C WITH EAG; Future    10. Bug bite, initial encounter    -drocortisone (CORTAID) 1 % topical cream; Apply  to affected area two (2) times a day. use thin layer  Dispense: 30 g; Refill: 0    11. Post-menopausal bleeding    - NUSWAB VAGINITIS PLUS    12. Primary insomnia    - mlatonin 5 mg cap capsule; Take 1 Capsule by mouth nightly. Dispense: 90 Capsule; Refill: 0    13. Injury of left thumb, initial encounter    - XR THUMB LT MIN 2 V; Future    14. Neuropathy    - REFERRAL TO NEUROLOGY    15. Myalgia    - RHEUMATOID FACTOR, QL; Future  - RHEUMATOID FACTOR, QL  - CYCLIC CITRUL PEPTIDE AB, IGG; Future        I have discussed the diagnosis with the patient and the intended plan as seen in the above orders. The patient has received an after-visit summary and questions were answered concerning future plans. Pt conveyed understanding of plan. Medication Side Effects and Warnings were discussed with patient: yes  Patient Labs were reviewed: yes  Patient Past Records were reviewed:  yes    Harrison Ogden. Karl Pradhan NP     This is the Subsequent Medicare Annual Wellness Exam, performed 12 months or more after the Initial AWV or the last Subsequent AWV    I have reviewed the patient's medical history in detail and updated the computerized patient record. Assessment/Plan   Education and counseling provided:  Are appropriate based on today's review and evaluation    1.  Essential hypertension  -     METABOLIC PANEL, BASIC; Future  -     CBC W/O DIFF; Future  -     metoprolol tartrate (LOPRESSOR) 25 mg tablet; Take 1/2 (one-half) tablet by mouth twice daily, Normal, Disp-180 Tablet, R-0  2. Mixed hyperlipidemia  3. Medicare annual wellness visit, subsequent  4. Vaginal bleeding  -     AMB POC URINALYSIS DIP STICK AUTO W/O MICRO  5. Screening mammogram, encounter for  -     [facility] MAMMO BI SCREENING INCL CAD; Future  6. Cervical cancer screening  -     PAP IG, APTIMA HPV AND RFX 16/18,45 ([id number]); Future  7. Hyperlipidemia, unspecified hyperlipidemia type  -     atorvastatin (LIPITOR) 10 mg tablet; TAKE 1 TABLET BY MOUTH NIGHTLY FOR CHOLESTEROL, Normal, Disp-90 Tablet, R-0  8. History of CVA (cerebrovascular accident)  -     clopidogreL (PLAVIX) 75 mg tab; Take 1 Tablet by mouth daily. , Normal, Disp-90 Tablet, R-0  9. Elevated glucose  -     HEMOGLOBIN A1C WITH EAG; Future  10. Bug bite, initial encounter  -     hydrocortisone (CORTAID) 1 % topical cream; Apply  to affected area two (2) times a day. use thin layer, Normal, Disp-30 g, R-0  11. Post-menopausal bleeding  -     NUSWAB VAGINITIS PLUS  12. Primary insomnia  -     melatonin 5 mg cap capsule; Take 1 Capsule by mouth nightly., Normal, Disp-90 Capsule, R-0  13. Injury of left thumb, initial encounter  -     XR THUMB LT MIN 2 V; Future  14.  Neuropathy  -     REFERRAL TO NEUROLOGY  15. Myalgia  -     RHEUMATOID FACTOR, QL; Future  -     CYCLIC CITRUL PEPTIDE AB, IGG; Future       Depression Risk Factor Screening     3 most recent PHQ Screens 8/26/2021   PHQ Not Done -   Little interest or pleasure in doing things Not at all   Feeling down, depressed, irritable, or hopeless Not at all   Total Score PHQ 2 0   Trouble falling or staying asleep, or sleeping too much -   Feeling tired or having little energy -   Poor appetite, weight loss, or overeating -   Feeling bad about yourself - or that you are a failure or have let yourself or your family down -   Trouble concentrating on things such as school, work, reading, or watching TV -   Moving or speaking so slowly that other people could have noticed; or the opposite being so fidgety that others notice -   Thoughts of being better off dead, or hurting yourself in some way -   PHQ 9 Score -   How difficult have these problems made it for you to do your work, take care of your home and get along with others -       Alcohol Risk Screen    Do you average more than 1 drink per night or more than 7 drinks a week:  No    On any one occasion in the past three months have you have had more than 3 drinks containing alcohol:  No        Functional Ability and Level of Safety    Hearing: Hearing is good. Activities of Daily Living: The home contains: no safety equipment. Patient does total self care      Ambulation: with no difficulty     Fall Risk:  Fall Risk Assessment, last 12 mths 1/16/2019   Able to walk? Yes   Fall in past 12 months?  No      Abuse Screen:  Patient is not abused       Cognitive Screening    Has your family/caregiver stated any concerns about your memory: no     Cognitive Screening: Normal - based on H&P    Health Maintenance Due     Health Maintenance Due   Topic Date Due    COVID-19 Vaccine (1) Never done    Pneumococcal 0-64 years (1 of 2 - PPSV23) Never done    Shingrix Vaccine Age 50> (2 of 2) 11/18/2019    Medicare Yearly Exam  07/21/2021    PAP AKA CERVICAL CYTOLOGY  11/26/2021       Patient Care Team   Patient Care Team:  Osmany Funez NP as PCP - General (Nurse Practitioner)  Osmany Funez NP as PCP - [facility] Empaneled Provider  Rene Nelson RN as Nurse Navigator  Nancy Garcia LPN as Ambulatory Care Manager    History     Patient Active Problem List   Diagnosis Code    Chest pain, unspecified R07.9    HTN (hypertension) I10    Esophageal reflux K21.9    History of CVA (cerebrovascular accident) Z80.78    Morbid obesity ([facility] [address] E66.01    Cannabis abuse F12.10    ASVD (arteriosclerotic vascular disease) I70.90    Vitamin D deficiency E55.9    Positive hepatitis C antibody test R76.8     Past Medical History:   Diagnosis Date    Anxiety     Bipolar 1 disorder (HCC)     Depression     Fatigue     Frequent headaches     GERD (gastroesophageal reflux disease)     Hearing loss     Hypertension     Joint pain     Memory disorder     Muscle pain     Psychiatric disorder     bipolar    Snoring     Stomach pain     Stroke [facility])       Past Surgical History:   Procedure Laterality Date    HX ADENOIDECTOMY      sweat glands    HX GYN      tubal ligation    HX TONSILLECTOMY       Current Outpatient Medications   Medication Sig Dispense Refill    aspirin 81 mg chewable tablet Take 1 Tablet by mouth daily. 90 Tablet 1    atorvastatin (LIPITOR) 10 mg tablet TAKE 1 TABLET BY MOUTH NIGHTLY FOR CHOLESTEROL 90 Tablet 0    calcium-cholecalciferol, D3, (Calcium with Vitamin D) tablet Take 2 Tablets by mouth daily. 60 Tablet 11    clopidogreL (PLAVIX) 75 mg tab Take 1 Tablet by mouth daily. 90 Tablet 0    cetirizine (ZYRTEC) 10 mg tablet Take 1 Tablet by mouth daily as needed for Allergies. 90 Tablet 0    metoprolol tartrate (LOPRESSOR) 25 mg tablet Take 1/2 (one-half) tablet by mouth twice daily 180 Tablet 0    hydrocortisone (CORTAID) 1 % topical cream Apply  to affected area two (2) times a day. use thin layer 30 g 0    melatonin 5 mg cap capsule Take 1 Capsule by mouth nightly. 90 Capsule 0    amLODIPine (NORVASC) 5 mg tablet Take 1 tablet by mouth once daily 30 Tablet 0    gabapentin (NEURONTIN) 100 mg capsule Take 1 Cap by mouth three (3) times daily. Max Daily Amount: 300 mg.  Nerve pain (Patient not taking: Reported on 8/26/2021) 90 Cap 0     Allergies   Allergen Reactions    Ace Inhibitors Cough    Contrast Dye [Iodine] Hives       Family History   Problem Relation Age of Onset    Cancer Mother     Heart Disease Father      Social History Tobacco Use    Smoking status: Current Some Day Smoker    Smokeless tobacco: Never Used   Substance Use Topics    Alcohol use: Not Currently     Comment: \"once a month\"         Smiley Salgado, NP

## 2021-08-26 NOTE — PROGRESS NOTES
Chief Complaint   Patient presents with    Vaginal Bleeding     pt reports spotting approx every 7 months for about 2 days     Tingling     pt reports pain and pins back, feet, and hands    Sleep Problem     pt states she is not getting sleep at night and this causes her to fall asleep at work    Nydiae Luis Angel Other     pt states she is off balance when walking or standing for long periods of time     Skin Problem     right foot x 2 days         1. Have you been to the ER, urgent care clinic since your last visit? Hospitalized since your last visit? Yes When: 06/2021 Where: [facility]  Reason for visit: boil removable     2. Have you seen or consulted any other health care providers outside of the [address] [place] [address] since your last visit? Include any pap smears or colon screening.  No

## 2021-08-27 LAB — RHEUMATOID FACT SERPL-ACNC: <10 IU/ML (ref 0–13.9)

## 2021-08-27 RX ORDER — IBUPROFEN 800 MG/1
800 TABLET ORAL
Qty: 60 TABLET | Refills: 0 | Status: SHIPPED | OUTPATIENT
Start: 2021-08-27 | End: 2022-07-18 | Stop reason: SDUPTHER

## 2021-08-28 LAB
A VAGINAE DNA VAG QL NAA+PROBE: ABNORMAL SCORE
BVAB2 DNA VAG QL NAA+PROBE: ABNORMAL SCORE
C ALBICANS DNA VAG QL NAA+PROBE: NEGATIVE
C GLABRATA DNA VAG QL NAA+PROBE: NEGATIVE
C TRACH DNA VAG QL NAA+PROBE: NEGATIVE
MEGA1 DNA VAG QL NAA+PROBE: ABNORMAL SCORE
N GONORRHOEA DNA VAG QL NAA+PROBE: NEGATIVE
T VAGINALIS DNA VAG QL NAA+PROBE: NEGATIVE

## 2021-08-30 LAB
CYTOLOGIST CVX/VAG CYTO: NORMAL
CYTOLOGY CVX/VAG DOC CYTO: NORMAL
CYTOLOGY CVX/VAG DOC THIN PREP: NORMAL
DX ICD CODE: NORMAL
HPV I/H RISK 4 DNA CVX QL PROBE+SIG AMP: NEGATIVE
Lab: NORMAL
OTHER STN SPEC: NORMAL
STAT OF ADQ CVX/VAG CYTO-IMP: NORMAL

## 2021-08-31 RX ORDER — METRONIDAZOLE 7.5 MG/G
1 GEL VAGINAL
Qty: 25 G | Refills: 0 | Status: SHIPPED | OUTPATIENT
Start: 2021-08-31 | End: 2021-09-05

## 2021-09-02 ENCOUNTER — HOSPITAL ENCOUNTER (OUTPATIENT)
Dept: MAMMOGRAPHY | Age: 61
Discharge: HOME | End: 2021-09-02
Attending: NURSE PRACTITIONER

## 2021-09-02 DIAGNOSIS — Z12.31 SCREENING MAMMOGRAM, ENCOUNTER FOR: ICD-10-CM

## 2021-09-29 ENCOUNTER — APPOINTMENT (OUTPATIENT)
Dept: CT IMAGING | Age: 61
End: 2021-09-29
Attending: EMERGENCY MEDICINE
Payer: MEDICARE

## 2021-09-29 ENCOUNTER — HOSPITAL ENCOUNTER (EMERGENCY)
Age: 61
Discharge: HOME | End: 2021-09-29
Attending: EMERGENCY MEDICINE
Payer: MEDICARE

## 2021-09-29 VITALS
WEIGHT: 197 LBS | DIASTOLIC BLOOD PRESSURE: 93 MMHG | BODY MASS INDEX: 33.63 KG/M2 | HEIGHT: 64 IN | TEMPERATURE: 97.8 F | HEART RATE: 65 BPM | OXYGEN SATURATION: 99 % | SYSTOLIC BLOOD PRESSURE: 170 MMHG | RESPIRATION RATE: 20 BRPM

## 2021-09-29 DIAGNOSIS — N20.0 KIDNEY STONE: Primary | ICD-10-CM

## 2021-09-29 LAB
ALBUMIN SERPL-MCNC: 4 G/DL (ref 3.5–5)
ALBUMIN/GLOB SERPL: 1.2 {RATIO} (ref 1.1–2.2)
ALP SERPL-CCNC: 114 U/L (ref 45–117)
ALT SERPL-CCNC: 25 U/L (ref 12–78)
ANION GAP SERPL CALC-SCNC: 7 MMOL/L (ref 5–15)
APPEARANCE UR: CLEAR
AST SERPL-CCNC: 19 U/L (ref 15–37)
BACTERIA URNS QL MICRO: ABNORMAL /HPF
BASOPHILS # BLD: 0 K/UL (ref 0–0.1)
BASOPHILS NFR BLD: 1 % (ref 0–1)
BILIRUB SERPL-MCNC: 0.4 MG/DL (ref 0.2–1)
BILIRUB UR QL: NEGATIVE
BUN SERPL-MCNC: 18 MG/DL (ref 6–20)
BUN/CREAT SERPL: 21 (ref 12–20)
CALCIUM SERPL-MCNC: 8.7 MG/DL (ref 8.5–10.1)
CHLORIDE SERPL-SCNC: 109 MMOL/L (ref 97–108)
CO2 SERPL-SCNC: 28 MMOL/L (ref 21–32)
COLOR UR: ABNORMAL
CREAT SERPL-MCNC: 0.87 MG/DL (ref 0.55–1.02)
DIFFERENTIAL METHOD BLD: NORMAL
EOSINOPHIL # BLD: 0.1 K/UL (ref 0–0.4)
EOSINOPHIL NFR BLD: 1 % (ref 0–7)
EPITH CASTS URNS QL MICRO: ABNORMAL /LPF
ERYTHROCYTE [DISTWIDTH] IN BLOOD BY AUTOMATED COUNT: 13.8 % (ref 11.5–14.5)
GLOBULIN SER CALC-MCNC: 3.3 G/DL (ref 2–4)
GLUCOSE SERPL-MCNC: 107 MG/DL (ref 65–100)
GLUCOSE UR STRIP.AUTO-MCNC: NEGATIVE MG/DL
HCT VFR BLD AUTO: 46 % (ref 35–47)
HGB BLD-MCNC: 14.9 G/DL (ref 11.5–16)
HGB UR QL STRIP: ABNORMAL
IMM GRANULOCYTES # BLD AUTO: 0 K/UL (ref 0–0.04)
IMM GRANULOCYTES NFR BLD AUTO: 0 % (ref 0–0.5)
KETONES UR QL STRIP.AUTO: NEGATIVE MG/DL
LEUKOCYTE ESTERASE UR QL STRIP.AUTO: NEGATIVE
LYMPHOCYTES # BLD: 1.4 K/UL (ref 0.8–3.5)
LYMPHOCYTES NFR BLD: 25 % (ref 12–49)
MCH RBC QN AUTO: 32 PG (ref 26–34)
MCHC RBC AUTO-ENTMCNC: 32.4 G/DL (ref 30–36.5)
MCV RBC AUTO: 98.9 FL (ref 80–99)
MONOCYTES # BLD: 0.6 K/UL (ref 0–1)
MONOCYTES NFR BLD: 10 % (ref 5–13)
NEUTS SEG # BLD: 3.6 K/UL (ref 1.8–8)
NEUTS SEG NFR BLD: 63 % (ref 32–75)
NITRITE UR QL STRIP.AUTO: NEGATIVE
NRBC # BLD: 0 K/UL (ref 0–0.01)
NRBC BLD-RTO: 0 PER 100 WBC
PH UR STRIP: 6 [PH] (ref 5–8)
PLATELET # BLD AUTO: 186 K/UL (ref 150–400)
PMV BLD AUTO: 11 FL (ref 8.9–12.9)
POTASSIUM SERPL-SCNC: 4.6 MMOL/L (ref 3.5–5.1)
PROT SERPL-MCNC: 7.3 G/DL (ref 6.4–8.2)
PROT UR STRIP-MCNC: ABNORMAL MG/DL
RBC # BLD AUTO: 4.65 M/UL (ref 3.8–5.2)
RBC #/AREA URNS HPF: ABNORMAL /HPF (ref 0–5)
SODIUM SERPL-SCNC: 144 MMOL/L (ref 136–145)
SP GR UR REFRACTOMETRY: >1.03 (ref 1–1.03)
UROBILINOGEN UR QL STRIP.AUTO: 0.2 EU/DL (ref 0.2–1)
WBC # BLD AUTO: 5.7 K/UL (ref 3.6–11)
WBC URNS QL MICRO: ABNORMAL /HPF (ref 0–4)

## 2021-09-29 PROCEDURE — 96375 TX/PRO/DX INJ NEW DRUG ADDON: CPT

## 2021-09-29 PROCEDURE — 85025 COMPLETE CBC W/AUTO DIFF WBC: CPT

## 2021-09-29 PROCEDURE — 80053 COMPREHEN METABOLIC PANEL: CPT

## 2021-09-29 PROCEDURE — 74011250636 HC RX REV CODE- 250/636: Performed by: EMERGENCY MEDICINE

## 2021-09-29 PROCEDURE — 81001 URINALYSIS AUTO W/SCOPE: CPT

## 2021-09-29 PROCEDURE — 96374 THER/PROPH/DIAG INJ IV PUSH: CPT

## 2021-09-29 PROCEDURE — 74176 CT ABD & PELVIS W/O CONTRAST: CPT

## 2021-09-29 PROCEDURE — 36415 COLL VENOUS BLD VENIPUNCTURE: CPT

## 2021-09-29 PROCEDURE — 99283 EMERGENCY DEPT VISIT LOW MDM: CPT

## 2021-09-29 RX ORDER — KETOROLAC TROMETHAMINE 30 MG/ML
30 INJECTION, SOLUTION INTRAMUSCULAR; INTRAVENOUS
Status: COMPLETED | OUTPATIENT
Start: 2021-09-29 | End: 2021-09-29

## 2021-09-29 RX ORDER — SODIUM CHLORIDE 9 MG/ML
125 INJECTION, SOLUTION INTRAVENOUS CONTINUOUS
Status: DISCONTINUED | OUTPATIENT
Start: 2021-09-29 | End: 2021-09-29 | Stop reason: HOSPADM

## 2021-09-29 RX ORDER — MORPHINE SULFATE 4 MG/ML
4 INJECTION INTRAVENOUS
Status: COMPLETED | OUTPATIENT
Start: 2021-09-29 | End: 2021-09-29

## 2021-09-29 RX ORDER — OXYCODONE AND ACETAMINOPHEN 5; 325 MG/1; MG/1
1 TABLET ORAL
Qty: 10 TABLET | Refills: 0 | Status: SHIPPED | OUTPATIENT
Start: 2021-09-29 | End: 2021-09-30 | Stop reason: SDUPTHER

## 2021-09-29 RX ORDER — ONDANSETRON 4 MG/1
4 TABLET, ORALLY DISINTEGRATING ORAL
Qty: 10 TABLET | Refills: 0 | Status: SHIPPED | OUTPATIENT
Start: 2021-09-29 | End: 2022-07-18 | Stop reason: SDUPTHER

## 2021-09-29 RX ADMIN — KETOROLAC TROMETHAMINE 30 MG: 30 INJECTION, SOLUTION INTRAMUSCULAR; INTRAVENOUS at 06:26

## 2021-09-29 RX ADMIN — SODIUM CHLORIDE 125 ML/HR: 9 INJECTION, SOLUTION INTRAVENOUS at 06:25

## 2021-09-29 RX ADMIN — MORPHINE SULFATE 4 MG: 4 INJECTION INTRAVENOUS at 08:23

## 2021-09-29 NOTE — DISCHARGE INSTRUCTIONS
You may call the [facility] kidney stone hotline for follow-up:  [phone](e) [phone].

## 2021-09-29 NOTE — ED NOTES
Discharge instructions were given to the patient by Samaria Ornelas RN. The patient left the Emergency Department ambulatory, alert and oriented and in no acute distress with 2 prescriptions. The patient was encouraged to call or return to the ED for worsening issues or problems and was encouraged to schedule a follow up appointment for continuing care. Patient has been instructed that they have been given morphine* which contains opioids, benzodiazepines, or other sedating drugs. Patient is aware that they  will need to refrain from driving or operating heavy machinery after taking this medication. Patient also instructed that they need to avoid drinking alcohol and using other products containing opioids, benzodiazepines, or other sedating drugs. Patient verbalized understanding. The patient verbalized understanding of discharge instructions and prescriptions, all questions were answered. The patient has no further concerns at this time.

## 2021-09-29 NOTE — Clinical Note
[address] EMERGENCY DEPT  [address] [facility] [id number] [phone]    Work/School Note    Date: 9/29/2021    To Whom It May concern:    Luna Aragon was seen and treated today in the emergency room by the following provider(s):  Attending Provider: Jared Subramanian MD.      Luna Aragon is excused from work/school on 9/29/2021 through 10/2/2021. She is medically clear to return to work/school on 10/3/2021.         Sincerely,          J Carlos Stewart MD

## 2021-09-29 NOTE — ED PROVIDER NOTES
68-year-old female with a history of anxiety, bipolar, depression, headaches, GERD, hypertension, bipolar disorder, CVA presents with chief complaint of right flank and right-sided abdominal pain for the last 2 days which got acutely worse this morning. Patient states that she could not sleep and has been up going to the bathroom having urinary frequency. Denies dysuria or hematuria. Reports episodes of nausea over the last 2 days, though none currently. Denies vomiting. On review of systems admits to mild diarrhea. Pain now is in the right lower quadrant radiating around to her back. She currently rates it 9 out of 10. It is not worse with eating, moving, or urinating.            Past Medical History:   Diagnosis Date    Anxiety     Bipolar 1 disorder (HCC)     Depression     Fatigue     Frequent headaches     GERD (gastroesophageal reflux disease)     Hearing loss     Hypertension     Joint pain     Memory disorder     Muscle pain     Psychiatric disorder     bipolar    Snoring     Stomach pain     Stroke [facility])        Past Surgical History:   Procedure Laterality Date    HX ADENOIDECTOMY      sweat glands    HX GYN      tubal ligation    HX TONSILLECTOMY           Family History:   Problem Relation Age of Onset    Cancer Mother     Heart Disease Father        Social History     Socioeconomic History    Marital status:      Spouse name: Not on file    Number of children: Not on file    Years of education: Not on file    Highest education level: Not on file   Occupational History    Not on file   Tobacco Use    Smoking status: Current Some Day Smoker    Smokeless tobacco: Never Used   Vaping Use    Vaping Use: Never used   Substance and Sexual Activity    Alcohol use: Not Currently     Comment: \"once a month\"    Drug use: Yes     Types: Marijuana    Sexual activity: Not Currently   Other Topics Concern    Not on file   Social History Narrative    ** Merged History Encounter **          Social Determinants of Health     Financial Resource Strain:     Difficulty of Paying Living Expenses:    Food Insecurity:     Worried About Running Out of Food in the Last Year:     [address] in the Last Year:    Transportation Needs:     Lack of Transportation (Medical):  Lack of Transportation (Non-Medical):    Physical Activity:     Days of Exercise per Week:     Minutes of Exercise per Session:    Stress:     Feeling of Stress :    Social Connections:     Frequency of Communication with Friends and Family:     Frequency of Social Gatherings with Friends and Family:     Attends Jehovah's witness Services:     Active Member of Clubs or Organizations:     Attends Club or Organization Meetings:     Marital Status:    Intimate Partner Violence:     Fear of Current or Ex-Partner:     Emotionally Abused:     Physically Abused:     Sexually Abused: ALLERGIES: Ace inhibitors and Contrast dye [iodine]    Review of Systems   Constitutional: Negative. Negative for chills, fever and unexpected weight change. HENT: Negative. Negative for congestion and trouble swallowing. Eyes: Negative for discharge. Respiratory: Negative. Negative for cough, chest tightness and shortness of breath. Cardiovascular: Negative. Negative for chest pain. Gastrointestinal: Positive for abdominal pain. Negative for abdominal distention, constipation, diarrhea and nausea. Endocrine: Negative. Genitourinary: Positive for flank pain and frequency. Negative for difficulty urinating, dysuria and urgency. Musculoskeletal: Negative for arthralgias and myalgias. Skin: Negative. Negative for color change. Allergic/Immunologic: Negative. Neurological: Negative. Negative for dizziness, speech difficulty and headaches. Hematological: Negative. Psychiatric/Behavioral: Negative. Negative for agitation and confusion. All other systems reviewed and are negative.       Vitals: 09/29/21 0552   BP: (!) 170/93   Pulse: 65   Resp: 20   Temp: 97.8 °F (36.6 °C)   SpO2: 99%   Weight: 89.4 kg (197 lb)   Height: 5' 4\" (1.626 m)            Physical Exam  Abdominal:      Tenderness: There is abdominal tenderness in the right upper quadrant and right lower quadrant. There is no guarding or rebound. MDM  Number of Diagnoses or Management Options  Kidney stone  Diagnosis management comments: Nephrolithiasis, ureterolithiasis, appendicitis, cholelithiasis, cholecystitis, constipation, ovarian cyst, pyelonephritis    ED Course as of Sep 30 0358   Wed Sep 29, 2021   4276 Results discussed with patient. Was beginning to feel better but having some return of pain. Will dose additional analgesic then plan discharge with stone hotline number    [SS]   4515 Printing narcotic prescription because unable to send through computer due to technical issues r/t updating my iPhone recently    [SS]      ED Course User Index  [SS] Latricia Bello MD       Procedures      LABORATORY TESTS:  No results found for this or any previous visit (from the past 12 hour(s)). IMAGING RESULTS:  CT ABD PELV WO CONT   Final Result   Obstructing 2 and 3 mm distal right ureteral stones with upstream   hydronephrosis and right hydroureter. Nonobstructing left nephrolithiasis. MEDICATIONS GIVEN:  Medications   ketorolac (TORADOL) injection 30 mg (30 mg IntraVENous Given 9/29/21 0626)   morphine injection 4 mg (4 mg IntraVENous Given 9/29/21 0823)       IMPRESSION:  1. Kidney stone        PLAN:  1. Discharge Medication List as of 9/29/2021  7:45 AM      START taking these medications    Details   oxyCODONE-acetaminophen (Percocet) 5-325 mg per tablet Take 1 Tablet by mouth every eight (8) hours as needed for Pain for up to 3 days. Max Daily Amount: 3 Tablets.  Indications: pain, Print, Disp-10 Tablet, R-0      ondansetron (Zofran ODT) 4 mg disintegrating tablet Take 1 Tablet by mouth every eight (8) hours as needed for Nausea. , Print, Disp-10 Tablet, R-0         CONTINUE these medications which have NOT CHANGED    Details   ibuprofen (MOTRIN) 800 mg tablet Take 1 Tablet by mouth every eight (8) hours as needed for Pain. With food, Normal, Disp-60 Tablet, R-0      aspirin 81 mg chewable tablet Take 1 Tablet by mouth daily. , Normal, Disp-90 Tablet, R-1      atorvastatin (LIPITOR) 10 mg tablet TAKE 1 TABLET BY MOUTH NIGHTLY FOR CHOLESTEROL, Normal, Disp-90 Tablet, R-0      calcium-cholecalciferol, D3, (Calcium with Vitamin D) tablet Take 2 Tablets by mouth daily. , Normal, Disp-60 Tablet, R-11      clopidogreL (PLAVIX) 75 mg tab Take 1 Tablet by mouth daily. , Normal, Disp-90 Tablet, R-0      cetirizine (ZYRTEC) 10 mg tablet Take 1 Tablet by mouth daily as needed for Allergies. , Normal, Disp-90 Tablet, R-0      metoprolol tartrate (LOPRESSOR) 25 mg tablet Take 1/2 (one-half) tablet by mouth twice daily, Normal, Disp-180 Tablet, R-0      hydrocortisone (CORTAID) 1 % topical cream Apply  to affected area two (2) times a day. use thin layer, Normal, Disp-30 g, R-0      melatonin 5 mg cap capsule Take 1 Capsule by mouth nightly., Normal, Disp-90 Capsule, R-0      amLODIPine (NORVASC) 5 mg tablet Take 1 tablet by mouth once daily, Normal, Disp-30 Tablet, R-0      gabapentin (NEURONTIN) 100 mg capsule Take 1 Cap by mouth three (3) times daily. Max Daily Amount: 300 mg. Nerve pain, Normal, Disp-90 Cap, R-0           2.    Follow-up Information     Follow up With Specialties Details Why Contact Info    Aysha Puri., YANE Nurse Practitioner   [facility]  [phone]          Return to ED if worse

## 2021-09-29 NOTE — ED NOTES
Patient presents to the ED with c/o right sided flank pain with urinary frequency x 2 days. Reports sometimes being nauseous. Denies vomiting. Denies dysuria or hematuria. Denies taking medications. Denies a hx of kidney stones. Pt is alert, oriented and appropriate. Ambulatory on arrival. pts holding her right flank when walking. Emergency Department Nursing Plan of Care       The Nursing Plan of Care is developed from the Nursing assessment and Emergency Department Attending provider initial evaluation. The plan of care may be reviewed in the ED Provider note.     The Plan of Care was developed with the following considerations:   Patient / Family readiness to learn indicated by:verbalized understanding  Persons(s) to be included in education: patient  Barriers to Learning/Limitations:No    Signed     Brandon Galdamez    9/29/2021   6:36 AM

## 2021-09-30 RX ORDER — OXYCODONE AND ACETAMINOPHEN 5; 325 MG/1; MG/1
1 TABLET ORAL
Qty: 10 TABLET | Refills: 0 | Status: SHIPPED | OUTPATIENT
Start: 2021-09-30 | End: 2021-10-03

## 2021-10-08 ENCOUNTER — HOSPITAL ENCOUNTER (OUTPATIENT)
Dept: MAMMOGRAPHY | Age: 61
Discharge: HOME | End: 2021-10-08
Attending: NURSE PRACTITIONER
Payer: MEDICARE

## 2021-10-08 PROCEDURE — 77067 SCR MAMMO BI INCL CAD: CPT

## 2021-11-22 DIAGNOSIS — E78.5 HYPERLIPIDEMIA, UNSPECIFIED HYPERLIPIDEMIA TYPE: ICD-10-CM

## 2021-11-23 RX ORDER — ATORVASTATIN CALCIUM 10 MG/1
TABLET, FILM COATED ORAL
Qty: 90 TABLET | Refills: 0 | Status: SHIPPED | OUTPATIENT
Start: 2021-11-23 | End: 2022-05-16

## 2021-12-09 ENCOUNTER — CLINICAL SUPPORT (OUTPATIENT)
Dept: INTERNAL MEDICINE CLINIC | Age: 61
End: 2021-12-09

## 2021-12-09 ENCOUNTER — OFFICE VISIT (OUTPATIENT)
Dept: INTERNAL MEDICINE CLINIC | Age: 61
End: 2021-12-09

## 2021-12-09 VITALS
HEART RATE: 59 BPM | TEMPERATURE: 98.3 F | HEIGHT: 64 IN | OXYGEN SATURATION: 98 % | BODY MASS INDEX: 34.15 KG/M2 | RESPIRATION RATE: 18 BRPM | SYSTOLIC BLOOD PRESSURE: 158 MMHG | DIASTOLIC BLOOD PRESSURE: 105 MMHG | WEIGHT: 200 LBS

## 2021-12-09 VITALS
WEIGHT: 200 LBS | DIASTOLIC BLOOD PRESSURE: 105 MMHG | HEART RATE: 59 BPM | OXYGEN SATURATION: 98 % | RESPIRATION RATE: 18 BRPM | SYSTOLIC BLOOD PRESSURE: 158 MMHG | BODY MASS INDEX: 34.15 KG/M2 | TEMPERATURE: 98.3 F | HEIGHT: 64 IN

## 2021-12-09 DIAGNOSIS — Z23 NEEDS FLU SHOT: ICD-10-CM

## 2021-12-09 DIAGNOSIS — Z01.30 BP CHECK: Primary | ICD-10-CM

## 2021-12-09 DIAGNOSIS — F51.01 PRIMARY INSOMNIA: ICD-10-CM

## 2021-12-09 DIAGNOSIS — Z23 NEEDS FLU SHOT: Primary | ICD-10-CM

## 2021-12-09 DIAGNOSIS — I10 ESSENTIAL HYPERTENSION: ICD-10-CM

## 2021-12-09 PROCEDURE — 90686 IIV4 VACC NO PRSV 0.5 ML IM: CPT | Performed by: INTERNAL MEDICINE

## 2021-12-09 PROCEDURE — G0008 ADMIN INFLUENZA VIRUS VAC: HCPCS | Performed by: INTERNAL MEDICINE

## 2021-12-09 RX ORDER — MULTIVITAMIN
2 TABLET ORAL DAILY
Qty: 60 TABLET | Refills: 11 | Status: SHIPPED | OUTPATIENT
Start: 2021-12-09 | End: 2021-12-21 | Stop reason: SDUPTHER

## 2021-12-09 RX ORDER — GUAIFENESIN 100 MG/5ML
81 LIQUID (ML) ORAL DAILY
Qty: 90 TABLET | Refills: 1 | Status: SHIPPED | OUTPATIENT
Start: 2021-12-09 | End: 2021-12-21

## 2021-12-09 RX ORDER — MELATONIN 5 MG
5 CAPSULE ORAL
Qty: 90 CAPSULE | Refills: 0 | Status: SHIPPED | OUTPATIENT
Start: 2021-12-09 | End: 2021-12-21 | Stop reason: SDUPTHER

## 2021-12-09 RX ORDER — AMLODIPINE BESYLATE 10 MG/1
5 TABLET ORAL DAILY
Qty: 90 TABLET | Refills: 0 | Status: SHIPPED | OUTPATIENT
Start: 2021-12-09 | End: 2021-12-21

## 2021-12-09 NOTE — PATIENT INSTRUCTIONS
Vaccine Information Statement    Influenza (Flu) Vaccine (Inactivated or Recombinant): What You Need to Know    Many vaccine information statements are available in Turkish and other languages. See www.immunize.org/vis. Hojas de información sobre vacunas están disponibles en español y en muchos otros idiomas. Visite www.immunize.org/vis. 1. Why get vaccinated? Influenza vaccine can prevent influenza (flu). Flu is a contagious disease that spreads around the [facility] every year, usually between October and May. Anyone can get the flu, but it is more dangerous for some people. Infants and young children, people 72 years and older, pregnant people, and people with certain health conditions or a weakened immune system are at greatest risk of flu complications. Pneumonia, bronchitis, sinus infections, and ear infections are examples of flu-related complications. If you have a medical condition, such as heart disease, cancer, or diabetes, flu can make it worse. Flu can cause fever and chills, sore throat, muscle aches, fatigue, cough, headache, and runny or stuffy nose. Some people may have vomiting and diarrhea, though this is more common in children than adults. In an average year, thousands of people in the [facility] die from flu, and many more are hospitalized. Flu vaccine prevents millions of illnesses and flu-related visits to the doctor each year. 2. Influenza vaccines     CDC recommends everyone 6 months and older get vaccinated every flu season. Children 6 months through 6years of age may need 2 doses during a single flu season. Everyone else needs only 1 dose each flu season. It takes about 2 weeks for protection to develop after vaccination. There are many flu viruses, and they are always changing. Each year a new flu vaccine is made to protect against the influenza viruses believed to be likely to cause disease in the upcoming flu season.  Even when the vaccine doesnt exactly match these viruses, it may still provide some protection. Influenza vaccine does not cause flu. Influenza vaccine may be given at the same time as other vaccines. 3. Talk with your health care provider    Tell your vaccination provider if the person getting the vaccine:   Has had an allergic reaction after a previous dose of influenza vaccine, or has any severe, life-threatening allergies    Has ever had Guillain-Barré Syndrome (also called GBS)    In some cases, your health care provider may decide to postpone influenza vaccination until a future visit. Influenza vaccine can be administered at any time during pregnancy. People who are or will be pregnant during influenza season should receive inactivated influenza vaccine. People with minor illnesses, such as a cold, may be vaccinated. People who are moderately or severely ill should usually wait until they recover before getting influenza vaccine. Your health care provider can give you more information. 4. Risks of a vaccine reaction     Soreness, redness, and swelling where the shot is given, fever, muscle aches, and headache can happen after influenza vaccination.  There may be a very small increased risk of Guillain-Barré Syndrome (GBS) after inactivated influenza vaccine (the flu shot). María Dukes children who get the flu shot along with pneumococcal vaccine (PCV13) and/or DTaP vaccine at the same time might be slightly more likely to have a seizure caused by fever. Tell your health care provider if a child who is getting flu vaccine has ever had a seizure. People sometimes faint after medical procedures, including vaccination. Tell your provider if you feel dizzy or have vision changes or ringing in the ears. As with any medicine, there is a very remote chance of a vaccine causing a severe allergic reaction, other serious injury, or death. 5. What if there is a serious problem?     An allergic reaction could occur after the vaccinated person leaves the clinic. If you see signs of a severe allergic reaction (hives, swelling of the face and throat, difficulty breathing, a fast heartbeat, dizziness, or weakness), call 9-1-1 and get the person to the nearest hospital.    For other signs that concern you, call your health care provider. Adverse reactions should be reported to the Vaccine Adverse Event Reporting System (VAERS). Your health care provider will usually file this report, or you can do it yourself. Visit the VAERS website at www.vaers. [facility].gov or call 4-766.564.2432. VAERS is only for reporting reactions, and VAERS staff members do not give medical advice. 6. The National Vaccine Injury Compensation Program    The [facility] Vaccine Injury Compensation Program (VICP) is a federal program that was created to compensate people who may have been injured by certain vaccines. Claims regarding alleged injury or death due to vaccination have a time limit for filing, which may be as short as two years. Visit the VICP website at www.[facility].gov/vaccinecompensation or call 3-757.992.7244 to learn about the program and about filing a claim. 7. How can I learn more?  Ask your health care provider.  Call your local or state health department.  Visit the website of the Food and Drug Administration (FDA) for vaccine package inserts and additional information at www.fda.gov/vaccines-blood-biologics/vaccines.  Contact the Centers for Disease Control and Prevention (CDC):  - Call 1-231.967.9724 (0-516-TRA-INFO) or  - Visit CDCs influenza website at www.cdc.gov/flu. Vaccine Information Statement   Inactivated Influenza Vaccine   8/6/2021  42 [facility] 580VR-57   Department of Health and Human Services  Centers for Disease Control and Prevention    Office Use Only

## 2021-12-09 NOTE — PROGRESS NOTES
Chief Complaint   Patient presents with    Blood Pressure Check       Visit Vitals  BP (!) 158/105 (BP 1 Location: Left arm, BP Patient Position: Sitting, BP Cuff Size: Adult)   Pulse (!) 59   Temp 98.3 °F (36.8 °C) (Temporal)   Resp 18   Ht 5' 4\" (1.626 m)   Wt 200 lb (90.7 kg)   SpO2 98%   BMI 34.33 kg/m²         Provider notified, amlodipine increased, pt will follow up in 3 weeks. Suzy Perry is a 59 y.o. female who presents for routine immunizations. She denies any symptoms , reactions or allergies that would exclude them from being immunized today. Risks and adverse reactions were discussed and the VIS was given to them. All questions were addressed. She was observed for 10 min post injection. There were no reactions observed.     Alfreda Ndiaye    Verbal order given by Patricio Chavez NP

## 2021-12-09 NOTE — PATIENT INSTRUCTIONS
Vaccine Information Statement    Influenza (Flu) Vaccine (Inactivated or Recombinant): What You Need to Know    Many vaccine information statements are available in Swedish and other languages. See www.immunize.org/vis. Hojas de información sobre vacunas están disponibles en español y en muchos otros idiomas. Visite www.immunize.org/vis. 1. Why get vaccinated? Influenza vaccine can prevent influenza (flu). Flu is a contagious disease that spreads around the [facility] every year, usually between October and May. Anyone can get the flu, but it is more dangerous for some people. Infants and young children, people 72 years and older, pregnant people, and people with certain health conditions or a weakened immune system are at greatest risk of flu complications. Pneumonia, bronchitis, sinus infections, and ear infections are examples of flu-related complications. If you have a medical condition, such as heart disease, cancer, or diabetes, flu can make it worse. Flu can cause fever and chills, sore throat, muscle aches, fatigue, cough, headache, and runny or stuffy nose. Some people may have vomiting and diarrhea, though this is more common in children than adults. In an average year, thousands of people in the [facility] die from flu, and many more are hospitalized. Flu vaccine prevents millions of illnesses and flu-related visits to the doctor each year. 2. Influenza vaccines     CDC recommends everyone 6 months and older get vaccinated every flu season. Children 6 months through 6years of age may need 2 doses during a single flu season. Everyone else needs only 1 dose each flu season. It takes about 2 weeks for protection to develop after vaccination. There are many flu viruses, and they are always changing. Each year a new flu vaccine is made to protect against the influenza viruses believed to be likely to cause disease in the upcoming flu season.  Even when the vaccine doesnt exactly match these viruses, it may still provide some protection. Influenza vaccine does not cause flu. Influenza vaccine may be given at the same time as other vaccines. 3. Talk with your health care provider    Tell your vaccination provider if the person getting the vaccine:   Has had an allergic reaction after a previous dose of influenza vaccine, or has any severe, life-threatening allergies    Has ever had Guillain-Barré Syndrome (also called GBS)    In some cases, your health care provider may decide to postpone influenza vaccination until a future visit. Influenza vaccine can be administered at any time during pregnancy. People who are or will be pregnant during influenza season should receive inactivated influenza vaccine. People with minor illnesses, such as a cold, may be vaccinated. People who are moderately or severely ill should usually wait until they recover before getting influenza vaccine. Your health care provider can give you more information. 4. Risks of a vaccine reaction     Soreness, redness, and swelling where the shot is given, fever, muscle aches, and headache can happen after influenza vaccination.  There may be a very small increased risk of Guillain-Barré Syndrome (GBS) after inactivated influenza vaccine (the flu shot). Hildy Paddy children who get the flu shot along with pneumococcal vaccine (PCV13) and/or DTaP vaccine at the same time might be slightly more likely to have a seizure caused by fever. Tell your health care provider if a child who is getting flu vaccine has ever had a seizure. People sometimes faint after medical procedures, including vaccination. Tell your provider if you feel dizzy or have vision changes or ringing in the ears. As with any medicine, there is a very remote chance of a vaccine causing a severe allergic reaction, other serious injury, or death. 5. What if there is a serious problem?     An allergic reaction could occur after the vaccinated person leaves the clinic. If you see signs of a severe allergic reaction (hives, swelling of the face and throat, difficulty breathing, a fast heartbeat, dizziness, or weakness), call 9-1-1 and get the person to the nearest hospital.    For other signs that concern you, call your health care provider. Adverse reactions should be reported to the Vaccine Adverse Event Reporting System (VAERS). Your health care provider will usually file this report, or you can do it yourself. Visit the VAERS website at www.vaers. [facility].gov or call 8-993.546.4855. VAERS is only for reporting reactions, and VAERS staff members do not give medical advice. 6. The National Vaccine Injury Compensation Program    The [facility] Vaccine Injury Compensation Program (VICP) is a federal program that was created to compensate people who may have been injured by certain vaccines. Claims regarding alleged injury or death due to vaccination have a time limit for filing, which may be as short as two years. Visit the VICP website at [facility].gov/vaccinecompensation or call 0-296.391.3102 to learn about the program and about filing a claim. 7. How can I learn more?  Ask your health care provider.  Call your local or state health department.  Visit the website of the Food and Drug Administration (FDA) for vaccine package inserts and additional information at www.fda.gov/vaccines-blood-biologics/vaccines.  Contact the Centers for Disease Control and Prevention (CDC):  - Call 2-506.875.3266 (1-800-CDC-INFO) or  - Visit CDCs influenza website at www.cdc.gov/flu. Vaccine Information Statement   Inactivated Influenza Vaccine   8/6/2021  42 [facility] [id number]-[id number]   Department of Health and Human Services  Centers for Disease Control and Prevention    Office Use Only

## 2022-01-20 DIAGNOSIS — I10 ESSENTIAL HYPERTENSION: ICD-10-CM

## 2022-01-20 RX ORDER — METOPROLOL TARTRATE 25 MG/1
TABLET, FILM COATED ORAL
Qty: 180 TABLET | Refills: 0 | Status: SHIPPED | OUTPATIENT
Start: 2022-01-20 | End: 2022-07-18 | Stop reason: SDUPTHER

## 2022-01-20 NOTE — TELEPHONE ENCOUNTER
----- Message from Amira Elizondo sent at 1/20/2022 12:33 PM EST -----  Subject: Refill Request    QUESTIONS  Name of Medication? metoprolol tartrate (LOPRESSOR) 25 mg tablet  Patient-reported dosage and instructions? 1 daily   How many days do you have left? 0  Preferred Pharmacy? [facility]  Pharmacy phone number (if available)? [phone]  ---------------------------------------------------------------------------  --------------  Seb[employer] INFO  What is the best way for the office to contact you? OK to leave message on   voicemail, OK to respond with electronic message via [employer] portal (only   for patients who have registered [employer] account)  Preferred Call Back Phone Number?  [phone]

## 2022-01-31 DIAGNOSIS — Z86.73 HISTORY OF CVA (CEREBROVASCULAR ACCIDENT): ICD-10-CM

## 2022-01-31 DIAGNOSIS — M54.42 CHRONIC MIDLINE LOW BACK PAIN WITH BILATERAL SCIATICA: ICD-10-CM

## 2022-01-31 DIAGNOSIS — G89.29 CHRONIC MIDLINE LOW BACK PAIN WITH BILATERAL SCIATICA: ICD-10-CM

## 2022-01-31 DIAGNOSIS — M54.41 CHRONIC MIDLINE LOW BACK PAIN WITH BILATERAL SCIATICA: ICD-10-CM

## 2022-01-31 DIAGNOSIS — M54.2 NECK PAIN: ICD-10-CM

## 2022-01-31 RX ORDER — CLOPIDOGREL BISULFATE 75 MG/1
75 TABLET ORAL DAILY
Qty: 90 TABLET | Refills: 0 | Status: SHIPPED | OUTPATIENT
Start: 2022-01-31 | End: 2022-06-02 | Stop reason: SDUPTHER

## 2022-01-31 RX ORDER — GABAPENTIN 100 MG/1
100 CAPSULE ORAL 3 TIMES DAILY
Qty: 90 CAPSULE | Refills: 0 | Status: SHIPPED | OUTPATIENT
Start: 2022-01-31 | End: 2022-04-11 | Stop reason: SDUPTHER

## 2022-03-19 PROBLEM — R76.8 POSITIVE HEPATITIS C ANTIBODY TEST: Status: ACTIVE | Noted: 2018-11-27

## 2022-03-19 PROBLEM — E55.9 VITAMIN D DEFICIENCY: Status: ACTIVE | Noted: 2018-11-27

## 2022-04-11 ENCOUNTER — OFFICE VISIT (OUTPATIENT)
Dept: NEUROLOGY | Age: 62
End: 2022-04-11
Payer: MEDICARE

## 2022-04-11 VITALS
HEIGHT: 64 IN | WEIGHT: 205.2 LBS | TEMPERATURE: 97.9 F | SYSTOLIC BLOOD PRESSURE: 140 MMHG | HEART RATE: 86 BPM | BODY MASS INDEX: 35.03 KG/M2 | DIASTOLIC BLOOD PRESSURE: 85 MMHG | OXYGEN SATURATION: 98 %

## 2022-04-11 DIAGNOSIS — R20.2 PARESTHESIA: ICD-10-CM

## 2022-04-11 DIAGNOSIS — R51.9 HEADACHE DISORDER: ICD-10-CM

## 2022-04-11 DIAGNOSIS — I67.2 CEREBRAL ATHEROSCLEROSIS: ICD-10-CM

## 2022-04-11 DIAGNOSIS — M54.42 CHRONIC MIDLINE LOW BACK PAIN WITH BILATERAL SCIATICA: ICD-10-CM

## 2022-04-11 DIAGNOSIS — G62.9 NEUROPATHY: Primary | ICD-10-CM

## 2022-04-11 DIAGNOSIS — G43.719 CHRONIC MIGRAINE WITHOUT AURA, INTRACTABLE, WITHOUT STATUS MIGRAINOSUS: ICD-10-CM

## 2022-04-11 DIAGNOSIS — M54.2 NECK PAIN: ICD-10-CM

## 2022-04-11 DIAGNOSIS — M54.41 CHRONIC MIDLINE LOW BACK PAIN WITH BILATERAL SCIATICA: ICD-10-CM

## 2022-04-11 DIAGNOSIS — G37.9 DEMYELINATING DISEASE (HCC): ICD-10-CM

## 2022-04-11 DIAGNOSIS — E55.9 VITAMIN D DEFICIENCY, UNSPECIFIED: ICD-10-CM

## 2022-04-11 DIAGNOSIS — G89.29 CHRONIC MIDLINE LOW BACK PAIN WITH BILATERAL SCIATICA: ICD-10-CM

## 2022-04-11 DIAGNOSIS — I67.82 CEREBRAL ISCHEMIA: ICD-10-CM

## 2022-04-11 PROCEDURE — G9899 SCRN MAM PERF RSLTS DOC: HCPCS | Performed by: PSYCHIATRY & NEUROLOGY

## 2022-04-11 PROCEDURE — G8753 SYS BP > OR = 140: HCPCS | Performed by: PSYCHIATRY & NEUROLOGY

## 2022-04-11 PROCEDURE — 99215 OFFICE O/P EST HI 40 MIN: CPT | Performed by: PSYCHIATRY & NEUROLOGY

## 2022-04-11 PROCEDURE — G8510 SCR DEP NEG, NO PLAN REQD: HCPCS | Performed by: PSYCHIATRY & NEUROLOGY

## 2022-04-11 PROCEDURE — G8754 DIAS BP LESS 90: HCPCS | Performed by: PSYCHIATRY & NEUROLOGY

## 2022-04-11 PROCEDURE — 3017F COLORECTAL CA SCREEN DOC REV: CPT | Performed by: PSYCHIATRY & NEUROLOGY

## 2022-04-11 PROCEDURE — G8427 DOCREV CUR MEDS BY ELIG CLIN: HCPCS | Performed by: PSYCHIATRY & NEUROLOGY

## 2022-04-11 PROCEDURE — G8417 CALC BMI ABV UP PARAM F/U: HCPCS | Performed by: PSYCHIATRY & NEUROLOGY

## 2022-04-11 RX ORDER — GABAPENTIN 100 MG/1
100 CAPSULE ORAL 3 TIMES DAILY
Qty: 90 CAPSULE | Refills: 2 | Status: SHIPPED | OUTPATIENT
Start: 2022-04-11 | End: 2022-07-18 | Stop reason: SDUPTHER

## 2022-04-11 RX ORDER — TIZANIDINE 4 MG/1
4 TABLET ORAL
Qty: 30 TABLET | Refills: 2 | Status: SHIPPED | OUTPATIENT
Start: 2022-04-11 | End: 2022-07-18 | Stop reason: SDUPTHER

## 2022-04-11 RX ORDER — PREDNISONE 10 MG/1
TABLET ORAL
Qty: 20 TABLET | Refills: 0 | Status: SHIPPED | OUTPATIENT
Start: 2022-04-11 | End: 2022-08-16 | Stop reason: ALTCHOICE

## 2022-04-11 NOTE — PROGRESS NOTES
Neurology Consult Note      HISTORY PROVIDED BY: patient    Chief Complaint:   Chief Complaint   Patient presents with    Follow-up    Peripheral Neuropathy    Stroke      Subjective:    Jay Carney is a 59 y.o. right handed female who presents in consultation for neuropathy: CVA. This is a 57-year-old right-handed female with history of anxiety disorder, bipolar 1 disorder, major depressive disorder, frequent headache, GERD, fatigue, memory disorder sleep disorder, CVA who was referred to the clinic to evaluate for neuropathy and stroke. Patient said to have had stroke in 2014, she says since then, she has been having periods of dizziness, headache, numbness and tingling sensation and generalized pain. Patient says this has been progressive, she noted that her memory has been deteriorating over time. Patient says she experiences numbness and tingling sensation of the extremity mostly in the hands, numbness and tingling sensation wakes her up at night. She says sometimes she wakes up the arms are numb and she will try to shake it off. She also dropped things with her hands. Patient says she started having frequent headache, headache is mostly temporal , holocephalic, associated with dizziness, blurry vision, occasional double vision. No photophobia or phonophobia. No aggravating factor. Patient says she has tried different kinds of medication including over-the-counter analgesics without improvement. She says her memory is mostly recent things, she tends to be confused a lot of times.   He denies total loss of consciousness, dysphagia or odynophagia  Review of Systems - General ROS: positive for  - fatigue, night sweats and sleep disturbance  Psychological ROS: positive for - anxiety, concentration difficulties, depression, memory difficulties and sleep disturbances  Ophthalmic ROS: positive for - blurry vision and decreased vision  ENT ROS: positive for - headaches, tinnitus and visual changes  Allergy and Immunology ROS: negative  Hematological and Lymphatic ROS: negative  Endocrine ROS: negative  Respiratory ROS: no cough, shortness of breath, or wheezing  Cardiovascular ROS: no chest pain or dyspnea on exertion  Gastrointestinal ROS: no abdominal pain, change in bowel habits, or black or bloody stools  Genito-Urinary ROS: no dysuria, trouble voiding, or hematuria  Musculoskeletal ROS: positive for - gait disturbance, joint pain, joint stiffness, muscle pain and muscular weakness  Neurological ROS: positive for - dizziness, gait disturbance, headaches, impaired coordination/balance, numbness/tingling, visual changes and weakness  Dermatological ROS: negative  Review of the MRI obtained in 2014 showed Punctate acute versus subacute infarct in the anterior, medial left   temporal lobe appears to involve the hippocampus. . Extensive chronic microvascular ischemic disease for age. Chronic demyelination is considered less likely  With patient's new symptoms, I will obtain MRI of the brain with and without gadolinium to evaluate for intracranial lesion versus demyelination.   Past Medical History:   Diagnosis Date    Anxiety     Bipolar 1 disorder (HCC)     Depression     Fatigue     Frequent headaches     GERD (gastroesophageal reflux disease)     Hearing loss     Hypertension     Joint pain     Memory disorder     Muscle pain     Psychiatric disorder     bipolar    Snoring     Stomach pain     Stroke [facility])       Past Surgical History:   Procedure Laterality Date    HX ADENOIDECTOMY      sweat glands    HX GYN      tubal ligation    HX TONSILLECTOMY        Social History     Socioeconomic History    Marital status:      Spouse name: Not on file    Number of children: Not on file    Years of education: Not on file    Highest education level: Not on file   Occupational History    Not on file   Tobacco Use    Smoking status: Current Some Day Smoker    Smokeless tobacco: Never Used   Vaping Use    Vaping Use: Never used   Substance and Sexual Activity    Alcohol use: Not Currently     Comment: \"once a month\"    Drug use: Yes     Types: Marijuana    Sexual activity: Not Currently   Other Topics Concern    Not on file   Social History Narrative    ** Merged History Encounter **          Social Determinants of Health     Financial Resource Strain:     Difficulty of Paying Living Expenses: Not on file   Food Insecurity:     Worried About Running Out of Food in the Last Year: Not on file    Terese of Food in the Last Year: Not on file   Transportation Needs:     Lack of Transportation (Medical): Not on file    Lack of Transportation (Non-Medical):  Not on file   Physical Activity:     Days of Exercise per Week: Not on file    Minutes of Exercise per Session: Not on file   Stress:     Feeling of Stress : Not on file   Social Connections:     Frequency of Communication with Friends and Family: Not on file    Frequency of Social Gatherings with Friends and Family: Not on file    Attends Denominational Services: Not on file    Active Member of [address] [place] [address] or Organizations: Not on file    Attends Club or Organization Meetings: Not on file    Marital Status: Not on file   Intimate Partner Violence:     Fear of Current or Ex-Partner: Not on file    Emotionally Abused: Not on file    Physically Abused: Not on file    Sexually Abused: Not on file   Housing Stability:     Unable to Pay for Housing in the Last Year: Not on file    Number of [place] in the Last Year: Not on file    Unstable Housing in the Last Year: Not on file     Family History   Problem Relation Age of Onset    Cancer Mother     Heart Disease Father          Objective:   ROS  As per HPI  Allergies   Allergen Reactions    Ace Inhibitors Cough    Contrast Dye [Iodine] Hives        Meds:  Outpatient Medications Prior to Visit   Medication Sig Dispense Refill    clopidogreL (PLAVIX) 75 mg tab Take 1 Tablet by mouth daily. 90 Tablet 0    metoprolol tartrate (LOPRESSOR) 25 mg tablet Take 1/2 (one-half) tablet by mouth twice daily 180 Tablet 0    atorvastatin (LIPITOR) 10 mg tablet TAKE 1 TABLET BY MOUTH NIGHTLY FOR CHOLESTEROL 90 Tablet 0    ondansetron (Zofran ODT) 4 mg disintegrating tablet Take 1 Tablet by mouth every eight (8) hours as needed for Nausea. 10 Tablet 0    ibuprofen (MOTRIN) 800 mg tablet Take 1 Tablet by mouth every eight (8) hours as needed for Pain. With food 60 Tablet 0    cetirizine (ZYRTEC) 10 mg tablet Take 1 Tablet by mouth daily as needed for Allergies. 90 Tablet 0    hydrocortisone (CORTAID) 1 % topical cream Apply  to affected area two (2) times a day. use thin layer 30 g 0    gabapentin (NEURONTIN) 100 mg capsule Take 1 Capsule by mouth three (3) times daily. Max Daily Amount: 300 mg. Nerve pain 90 Capsule 0     No facility-administered medications prior to visit. Imaging:  MRI Results (most recent):  Results from Hospital Encounter encounter on 01/09/14    MRI BRAIN W WO CONT    Narrative  **Final Report**      ICD Codes / Adm. Diagnosis: 401.9  784.0 / Unspecified essential hyperten  Headache  Examination:  MR BRAIN W AND WO CON  - [id number] - Jan 9 2014  3:46PM  Accession No:  [id number]  Reason:  TIA      REPORT:  EXAM:  MR BRAIN W AND WO CON    INDICATION:   Hypertension and headache    COMPARISON: CT head earlier today at 0639 hrs. TECHNIQUE: Sagittal T1; coronal post contrast T1; axial T1, T2, FLAIR,  gradient-echo, diffusion weighted MRI of the brain before and following  uneventful intravenous administration of gadolinium 20 mL Magnevist    FINDINGS: Punctate focus of restricted diffusion in the medial left temporal  lobe near the hippocampus. Subtle hyperintense T2 signal in this area as  well. Extensive hyperintense T2/FLAIR signal in the cerebral white matter. No  other restricted diffusion. No hydrocephalus. No mass effect or midline shift.  No extra-axial fluid  collection. No pathologic enhancement. The major intracranial vascular flow-voids are  patent. The midline structures, including the cervicomedullary junction, are  within normal limits. Impression  :    1. Punctate acute versus subacute infarct in the anterior, medial left  temporal lobe appears to involve the hippocampus. 2. Extensive chronic microvascular ischemic disease for age. Chronic  demyelination is considered less likely. 3. No pathologic enhancement. Signing/Reading Doctor: Sreekanth Jaime ([id number])  Approved: Sreekanth Jaime ([id number])  Jan 9 2014  4:46PM     CT Results (most recent):  Results from Hospital Encounter encounter on 09/29/21    CT ABD PELV WO CONT    Narrative  EXAM: CT ABD PELV WO CONT    INDICATION: right-sided renal colic (vs appendicitis vs cholecystitis /  cholelithiasis)    COMPARISON: Ultrasound pelvis 1/19/2012 and ultrasound abdomen the 14th 2010. CONTRAST:  None. TECHNIQUE:  Thin axial images were obtained through the abdomen and pelvis. Coronal and  sagittal reformats were generated. Oral contrast was not administered. CT dose  reduction was achieved through use of a standardized protocol tailored for this  examination and automatic exposure control for dose modulation. The absence of intravenous contrast material reduces the sensitivity for  evaluation of the vasculature and solid organs. FINDINGS:  LOWER THORAX: No significant abnormality in the incidentally imaged lower chest.  Carcinoma calcified granuloma left lower lobe. LIVER: No mass. BILIARY TREE: Gallbladder is within normal limits. CBD is not dilated. SPLEEN: within normal limits. PANCREAS: No focal abnormality. ADRENALS: Unremarkable. KIDNEYS/URETERS: Right hydronephrosis and proximal hydroureter to the distal  third of the ureter where there are 2 intraluminal stones measuring 3 to 4 mm  (601/66). Multiple punctate collecting system stones of the left kidney.  No left  hydronephrosis or left hydroureter. STOMACH: Unremarkable. SMALL BOWEL: No dilatation or wall thickening. COLON: No dilatation or wall thickening. APPENDIX: Unremarkable. PERITONEUM: No ascites or pneumoperitoneum. RETROPERITONEUM: No lymphadenopathy or aortic aneurysm. REPRODUCTIVE ORGANS: Leiomyomatous uterus with calcified exophytic fibroid from  the volar fundus. URINARY BLADDER: Undistended with no evident calculus or mass. BONES: Degenerative spine change. No acute fracture or aggressive lesion. ABDOMINAL WALL: No mass or hernia. ADDITIONAL COMMENTS: N/A    Impression  Obstructing 2 and 3 mm distal right ureteral stones with upstream  hydronephrosis and right hydroureter. Nonobstructing left nephrolithiasis. Reviewed records in [employer] and [employer] tab today    Lab Review   Results for orders placed or performed during the hospital encounter of 09/29/21   URINALYSIS W/ RFLX MICROSCOPIC   Result Value Ref Range    Color YELLOW/STRAW      Appearance CLEAR CLEAR      Specific gravity >1.030 (H) 1.003 - 1.030    pH (UA) 6.0 5.0 - 8.0      Protein TRACE (A) NEG mg/dL    Glucose Negative NEG mg/dL    Ketone Negative NEG mg/dL    Bilirubin Negative NEG      Blood LARGE (A) NEG      Urobilinogen 0.2 0.2 - 1.0 EU/dL    Nitrites Negative NEG      Leukocyte Esterase Negative NEG     METABOLIC PANEL, COMPREHENSIVE   Result Value Ref Range    Sodium 144 136 - 145 mmol/L    Potassium 4.6 3.5 - 5.1 mmol/L    Chloride 109 (H) 97 - 108 mmol/L    CO2 28 21 - 32 mmol/L    Anion gap 7 5 - 15 mmol/L    Glucose 107 (H) 65 - 100 mg/dL    BUN 18 6 - 20 MG/DL    Creatinine 0.87 0.55 - 1.02 MG/DL    BUN/Creatinine ratio 21 (H) 12 - 20      GFR est AA >60 >60 ml/min/1.73m2    GFR est non-AA >60 >60 ml/min/1.73m2    Calcium 8.7 8.5 - 10.1 MG/DL    Bilirubin, total 0.4 0.2 - 1.0 MG/DL    ALT (SGPT) 25 12 - 78 U/L    AST (SGOT) 19 15 - 37 U/L    Alk.  phosphatase 114 45 - 117 U/L    Protein, total 7.3 6.4 - 8.2 g/dL    Albumin 4.0 3.5 - 5.0 g/dL    Globulin 3.3 2.0 - 4.0 g/dL    A-G Ratio 1.2 1.1 - 2.2     CBC WITH AUTOMATED DIFF   Result Value Ref Range    WBC 5.7 3.6 - 11.0 K/uL    RBC 4.65 3.80 - 5.20 M/uL    HGB 14.9 11.5 - 16.0 g/dL    HCT 46.0 35.0 - 47.0 %    MCV 98.9 80.0 - 99.0 FL    MCH 32.0 26.0 - 34.0 PG    MCHC 32.4 30.0 - 36.5 g/dL    RDW 13.8 11.5 - 14.5 %    PLATELET 501 841 - 521 K/uL    MPV 11.0 8.9 - 12.9 FL    NRBC 0.0 0  WBC    ABSOLUTE NRBC 0.00 0.00 - 0.01 K/uL    NEUTROPHILS 63 32 - 75 %    LYMPHOCYTES 25 12 - 49 %    MONOCYTES 10 5 - 13 %    EOSINOPHILS 1 0 - 7 %    BASOPHILS 1 0 - 1 %    IMMATURE GRANULOCYTES 0 0.0 - 0.5 %    ABS. NEUTROPHILS 3.6 1.8 - 8.0 K/UL    ABS. LYMPHOCYTES 1.4 0.8 - 3.5 K/UL    ABS. MONOCYTES 0.6 0.0 - 1.0 K/UL    ABS. EOSINOPHILS 0.1 0.0 - 0.4 K/UL    ABS. BASOPHILS 0.0 0.0 - 0.1 K/UL    ABS. IMM. GRANS. 0.0 0.00 - 0.04 K/UL    DF AUTOMATED     URINE MICROSCOPIC ONLY   Result Value Ref Range    WBC 0-4 0 - 4 /hpf    RBC 5-10 0 - 5 /hpf    Epithelial cells MODERATE (A) FEW /lpf    Bacteria 1+ (A) NEG /hpf        Exam:  Visit Vitals  BP (!) 140/85   Pulse 86   Temp 97.9 °F (36.6 °C) (Temporal)   Ht 5' 4\" (1.626 m)   Wt 205 lb 3.2 oz (93.1 kg)   SpO2 98%   BMI 35.22 kg/m²     General:  Alert, cooperative, no distress. Head:  Normocephalic, without obvious abnormality, atraumatic. Respiratory:  Heart:   Non labored breathing  Regular rate and rhythm, no murmurs   Neck:   2+ carotids, no bruits   Extremities: Warm, no cyanosis or edema. Pulses: 2+ radial pulses. Neurologic:  MS: Alert and oriented x 4, speech intact. Language intact, able to name, repeat, and follow all commands. Attention and fund of knowledge appropriate. Recent and remote memory intact.   Cranial Nerves:  II: visual fields Full to confrontation   II: pupils Equal, round, reactive to light   II: optic disc No papilledema   III,VII: ptosis none   III,IV,VI: extraocular muscles  EOMI, no nystagmus or diplopia   V: facial light touch sensation  normal   VII: facial muscle function   symmetric   VIII: hearing intact   IX: soft palate elevation  normal   XI: trapezius strength  5/5   XI: sternocleidomastoid strength 5/5   XII: tongue  Midline     Motor: normal bulk and tone, no tremor              Strength: 5-/5 throughout, no PD  Sensory: intact to LT, PP, temperature and vibration bilateral lower extremity up to the knee, upper extremity up to the elbow  Coordination: FTN and HTS intact, RODNEY intact,Romberg positive  Gait: Slightly unsteady gait, unable to heel, toe, and tandem walk  Reflexes: 3+ symmetric  Plantar: Withdrawal           Assessment/Plan       ICD-10-CM ICD-9-CM    1. Neuropathy  G62.9 355.9 EMG NCV MOTOR WITH F/WAVE PER NERVE      ALDOLASE      VITAMIN B12      RHEUMATOID FACTOR, QL      SUZANNE, DIRECT, W/REFLEX      ANGIOTENSIN CONVERTING ENZYME      PROTEIN ELECTROPHORESIS      RPR      CK      VITAMIN D, 25 HYDROXY      RHEUMATOID FACTOR, QL   2. Cerebral ischemia  I67.82 437.1 MRI BRAIN W WO CONT      ALDOLASE      VITAMIN B12      RHEUMATOID FACTOR, QL      SUZANNE, DIRECT, W/REFLEX      ANGIOTENSIN CONVERTING ENZYME      PROTEIN ELECTROPHORESIS      RPR      CK      VITAMIN D, 25 HYDROXY      RHEUMATOID FACTOR, QL      CANCELED: HOMOCYSTEINE, PLASMA   3. Paresthesia  R20.2 782.0 MRI BRAIN W WO CONT      EMG NCV MOTOR WITH F/WAVE PER NERVE      ALDOLASE      VITAMIN B12      RHEUMATOID FACTOR, QL      SUZANNE, DIRECT, W/REFLEX      ANGIOTENSIN CONVERTING ENZYME      PROTEIN ELECTROPHORESIS      RPR      CK      VITAMIN D, 25 HYDROXY      RHEUMATOID FACTOR, QL      CANCELED: HOMOCYSTEINE, PLASMA   4.  Chronic migraine without aura, intractable, without status migrainosus  G43.719 346.71 MRI BRAIN W WO CONT      ALDOLASE      VITAMIN B12      RHEUMATOID FACTOR, QL      SUZANNE, DIRECT, W/REFLEX      ANGIOTENSIN CONVERTING ENZYME      PROTEIN ELECTROPHORESIS      RPR      CK      VITAMIN D, 25 HYDROXY      RHEUMATOID FACTOR, QL   5. Headache disorder  R51.9 784.0 MRI BRAIN W WO CONT   6. Demyelinating disease ([address]  G37.9 341.9 MRI BRAIN W WO CONT   7. Neck pain  M54.2 723.1 gabapentin (NEURONTIN) 100 mg capsule   8. Chronic midline low back pain with bilateral sciatica  M54.41 724.2 gabapentin (NEURONTIN) 100 mg capsule    M54.42 724.3     G89.29 338.29    9. Vitamin D deficiency, unspecified   E55.9 268.9 VITAMIN D, 25 HYDROXY   10. Cerebral atherosclerosis   I67.2 437.0 CANCELED: HOMOCYSTEINE, PLASMA   Plan:  Prednisone taper  Nurtec ODT Zofran milligram p.o. as needed for severe headache not to exceed 1 dose in 24 hours  Zanaflex 4 mg p.o. nightly  Neurontin 100 mg p.o. 3 times daily  MRI of the brain without gadolinium  Blood for autoimmune work-up, CK, aldolase, RPR, homocystine, C-reactive protein, protein serum electrophoresis, vitamin D, vitamin B12,  EMG/nerve conduction study all extremities. We will consider neuropsychological evaluation  Follow-up and Dispositions    · Return in about 3 months (around 7/11/2022). Thank you very much for this consultation.   Signed:  Johanna Meyers MD  4/11/2022

## 2022-04-12 LAB — RHEUMATOID FACT SERPL-ACNC: <10 IU/ML (ref 0–15)

## 2022-05-14 DIAGNOSIS — E78.5 HYPERLIPIDEMIA, UNSPECIFIED HYPERLIPIDEMIA TYPE: ICD-10-CM

## 2022-05-16 ENCOUNTER — HOSPITAL ENCOUNTER (OUTPATIENT)
Dept: MRI IMAGING | Age: 62
Discharge: HOME | End: 2022-05-16
Attending: PSYCHIATRY & NEUROLOGY
Payer: MEDICARE

## 2022-05-16 PROCEDURE — 74011250636 HC RX REV CODE- 250/636: Performed by: PSYCHIATRY & NEUROLOGY

## 2022-05-16 PROCEDURE — A9576 INJ PROHANCE MULTIPACK: HCPCS | Performed by: PSYCHIATRY & NEUROLOGY

## 2022-05-16 PROCEDURE — 70553 MRI BRAIN STEM W/O & W/DYE: CPT

## 2022-05-16 RX ORDER — ATORVASTATIN CALCIUM 10 MG/1
TABLET, FILM COATED ORAL
Qty: 90 TABLET | Refills: 0 | Status: SHIPPED | OUTPATIENT
Start: 2022-05-16 | End: 2022-08-17

## 2022-05-16 RX ADMIN — GADOTERIDOL 20 ML: 279.3 INJECTION, SOLUTION INTRAVENOUS at 12:24

## 2022-05-31 ENCOUNTER — OFFICE VISIT (OUTPATIENT)
Dept: NEUROLOGY | Age: 62
End: 2022-05-31
Payer: MEDICARE

## 2022-05-31 DIAGNOSIS — G37.9 DEMYELINATING DISEASE (HCC): ICD-10-CM

## 2022-05-31 DIAGNOSIS — M54.12 CERVICAL RADICULOPATHY: ICD-10-CM

## 2022-05-31 DIAGNOSIS — G56.22 ULNAR NEUROPATHY OF LEFT UPPER EXTREMITY: ICD-10-CM

## 2022-05-31 DIAGNOSIS — R20.2 PARESTHESIA: Primary | ICD-10-CM

## 2022-05-31 DIAGNOSIS — G62.9 NEUROPATHY: ICD-10-CM

## 2022-05-31 PROCEDURE — 95885 MUSC TST DONE W/NERV TST LIM: CPT | Performed by: PSYCHIATRY & NEUROLOGY

## 2022-05-31 PROCEDURE — 95913 NRV CNDJ TEST 13/> STUDIES: CPT | Performed by: PSYCHIATRY & NEUROLOGY

## 2022-05-31 NOTE — PROCEDURES
[facility]  [address], [address], [address]  p: [phone]  f: [phone]    Test Date:  2022    Patient: Sree He : 1960 Physician: Dr. Fidelia Morris   Sex: Female Height:  cm Ref Phys: Dr. Fidelia Morris   ID#: [id number] Weight: 205 lbs. Technician: Bouchra Rincon, EMG Tech     Patient Complaints: Numbness and tingling sensation of the arms and legs, pain      Medications: See chart      Patient History / Exam: This is a 78-year-old right-handed black female who is being evaluated for pain, numbness and tingling sensation of the extremities. Symptoms seem to be worse at night. NCV & EMG Findings:  Evaluation of the left ulnar motor nerve showed reduced amplitude (Wrist, 7.5 mV) and reduced amplitude (A Elbow, 5.6 mV). All remaining nerves (as indicated in the following tables) were within normal limits. All examined muscles (as indicated in the following table) showed no evidence of electrical instability. Impression: There is electrodiagnostic evidence of axonal motor neuropathy left ulnar nerve, this is consistent with compressive neuropathy of the ulnar nerve.   Radiculopathy and small fiber neuropathy cannot be excluded      Recommendations: Neuro imaging of cervical spine suggested      ___________________________  Dr. Cynthia Aceves        Nerve Conduction Studies  Anti Sensory Summary Table     Stim Site NR Peak (ms) Norm Peak (ms) O-P Amp (µV) Norm O-P Amp Site1 Site2 Delta-0 (ms) Dist (cm) Masoud (m/s) Norm Masoud (m/s)   Left Median Anti Sensory (2nd Digit)  34.7°C   Wrist    3.0 <4.0 21.0 >11 Wrist 2nd Digit 2.1 14.0 67    Right Median Anti Sensory (2nd Digit)  34.7°C   Wrist    3.0 <4.0 20.3 >11 Wrist 2nd Digit 2.3 14.0 61    Left Sup Fibular Anti Sensory (Ant Lat Mall)  34.7°C   14 cm    2.2 <4.4 9.4 >6 14 cm Ant Lat Mall 1.6 10.0 63        2.3  26.3          Right Sup Fibular Anti Sensory (Ant Lat Mall) 34.7°C   14 cm    2.0 <4.4 46.0 >6 14 cm Ant Lat Mall 0.9 10.0 111    Left Sural Anti Sensory (Lat Mall)  34.7°C   Calf    2.7 <4.4 24.6 >6 Calf Lat Mall 2.0 14.0 70    Right Sural Anti Sensory (Lat Mall)  34.7°C   Calf    3.2 <4.4 18.5 >6 Calf Lat Mall 2.5 14.0 56    Left Ulnar Anti Sensory (5th Digit)  34.7°C   Wrist    2.5 <4.0 25.1 >10.0 Wrist 5th Digit 2.1 14.0 67    Right Ulnar Anti Sensory (5th Digit)  34.7°C   Wrist    2.5 <4.0 22.8 >10.0 Wrist 5th Digit 2.1 14.0 67      Motor Summary Table     Stim Site NR Onset (ms) Norm Onset (ms) O-P Amp (mV) Norm O-P Amp Site1 Site2 Delta-0 (ms) Dist (cm) Masoud (m/s) Norm Masoud (m/s)   Left Fibular Motor (Ext Dig Brev)  34.7°C   Ankle    2.9 <6.5 7.3 >1.3 B Fib Ankle 7.1 37.0 52 >38   B Fib    10.0  6.1  Poplt B Fib 1.7 10.0 59 >40   Poplt    11.7  5.6          Right Fibular Motor (Ext Dig Brev)  34.7°C   Ankle    3.1 <6.5 5.9 >1.3 B Fib Ankle 6.7 38.0 57 >38   B Fib    9.8  5.3  Poplt B Fib 1.5 10.0 67 >40   Poplt    11.3  5.1          Left Median Motor (Abd Poll Brev)  34.7°C   Wrist    2.7 <4.5 6.4 >4.1 Wrist Abd Poll Brev 2.7 8.0 30    Elbow    7.0  6.2  Elbow Wrist 4.3 22.0 51 >49   Right Median Motor (Abd Poll Brev)  34.7°C   Wrist    3.1 <4.5 8.5 >4.1 Wrist Abd Poll Brev 3.1 22.0 71    Elbow    6.7  7.8  Elbow Wrist 3.6 0.0  >49   Left Tibial Motor (Abd Tello Brev)  34.7°C   Ankle    3.4 <6.1 5.8 >4.4 Knee Ankle 9.1 37.0 41 >39   Knee    12.5  5.6          Right Tibial Motor (Abd Tello Brev)  34.7°C   Ankle    4.0 <6.1 4.5 >4.4 Knee Ankle 9.8 39.0 40 >39   Knee    13.8  3.3          Left Ulnar Motor (Abd Dig Min)  34.7°C   Wrist    2.9 <3.7 7.5 >7.9 Wrist Abd Dig Min 2.9 8.0 28    B Elbow    6.1  6.5 >6.0 B Elbow Wrist 3.2 22.0 69 >52   A Elbow    4.0  5.6 >6.0 A Elbow B Elbow 2.1 10.0 48 >43   Right Ulnar Motor (Abd Dig Min)  34.7°C   Wrist    2.7 <3.7 8.4 >7.9 Wrist Abd Dig Min 2.7 8.0 30    B Elbow    5.9  7.3 >6.0 B Elbow Wrist 3.2 23.0 72 >52   A Elbow    8.1  6.6 >6.0 A Elbow B Elbow 2.2 10.0 45 >43     EMG+     Side Muscle Nerve Root Ins Act Fibs Psw Amp Dur Poly Recrt Int Jeraline Actis Comment   Right Abd Dig Min Ulnar C8-T1 Nml Nml Nml Nml Nml 0 Nml Nml    Right VastusMed Femoral L2-4 Nml Nml Nml Nml Nml 0 Nml Nml    Left VastusLat Femoral L2-4 Nml Nml Nml Nml Nml 0 Nml Nml    Left QuadratusFem QuadFemoris L4-5, S1 Nml Nml Nml Nml Nml 0 Nml Nml    Left AntTibialis Dp Br Fibular L4-5 Nml Nml Nml Nml Nml 0 Nml Nml    Left Fibularis Long Sup Br Fibular L5-S1 Nml Nml Nml Nml Nml 0 Nml Nml    Left Deltoid Axillary C5-6 Nml Nml Nml Nml Nml 0 Nml Nml    Left Biceps Musculocut C5-6 Nml Nml Nml Nml Nml 0 Nml Nml    Left Triceps Radial C6-7-8 Nml Nml Nml Nml Nml 0 Nml Nml    Left BrachioRad Radial C5-6 Nml Nml Nml Nml Nml 0 Nml Nml        Nerve Conduction Studies  Anti Sensory Left/Right Comparison     Stim Site L Lat (ms) R Lat (ms) L-R Lat (ms) L Amp (µV) R Amp (µV) L-R Amp (%) Site1 Site2 L Masoud (m/s) R Masoud (m/s) L-R Masoud (m/s)   Median Anti Sensory (2nd Digit)  34.7°C   Wrist 3.0 3.0 0.0 21.0 20.3 3.3 Wrist 2nd Digit 67 61 6   Sup Fibular Anti Sensory (Ant Lat Mall)  34.7°C   14 cm 2.2 2.0 0.2 9.4 46.0 79.6 14 cm Ant Lat Mall 63 111 48    2.3   26.3          Sural Anti Sensory (Lat Mall)  34.7°C   Calf 2.7 3.2 0.5 24.6 18.5 24.8 Calf Lat Mall 70 56 14   Ulnar Anti Sensory (5th Digit)  34.7°C   Wrist 2.5 2.5 0.0 25.1 22.8 9.2 Wrist 5th Digit 67 67 0     Motor Left/Right Comparison     Stim Site L Lat (ms) R Lat (ms) L-R Lat (ms) L Amp (mV) R Amp (mV) L-R Amp (%) Site1 Site2 L Masoud (m/s) R Masoud (m/s) L-R Masoud (m/s)   Fibular Motor (Ext Dig Brev)  34.7°C   Ankle 2.9 3.1 0.2 7.3 5.9 19.2 B Fib Ankle 52 57 5   B Fib 10.0 9.8 0.2 6.1 5.3 13.1 Poplt B Fib 59 67 8   Poplt 11.7 11.3 0.4 5.6 5.1 8.9        Median Motor (Abd Poll Brev)  34.7°C   Wrist 2.7 3.1 0.4 6.4 8.5 24.7 Wrist Abd Poll Brev 30 71 41   Elbow 7.0 6.7 0.3 6.2 7.8 20.5 Elbow Wrist 51     Tibial Motor (Abd Tello Brev)  34.7°C   Ankle 3.4 4.0 0.6 5.8 4.5 22.4 Knee Ankle 41 40 1   Knee 12.5 13.8 1.3 5.6 3.3 41.1        Ulnar Motor (Abd Dig Min)  34.7°C   Wrist 2.9 2.7 0.2 7.5 8.4 10.7 Wrist Abd Dig Min 28 30 2   B Elbow 6.1 5.9 0.2 6.5 7.3 11.0 B Elbow Wrist 69 72 3   A Elbow 4.0 8.1 4.1 5.6 6.6 15.2 A Elbow B Elbow 48 45 3         Waveforms:

## 2022-06-02 ENCOUNTER — TELEPHONE (OUTPATIENT)
Dept: NEUROLOGY | Age: 62
End: 2022-06-02

## 2022-06-02 DIAGNOSIS — Z86.73 HISTORY OF CVA (CEREBROVASCULAR ACCIDENT): ICD-10-CM

## 2022-06-02 RX ORDER — CLOPIDOGREL BISULFATE 75 MG/1
75 TABLET ORAL DAILY
Qty: 90 TABLET | Refills: 0 | Status: SHIPPED | OUTPATIENT
Start: 2022-06-02 | End: 2022-07-18 | Stop reason: SDUPTHER

## 2022-06-02 NOTE — TELEPHONE ENCOUNTER
Patient called she would like to know her MRI and other test results.  Pt is aware of her apptmt for 7/21; however she is prefer not to wait until then

## 2022-06-02 NOTE — TELEPHONE ENCOUNTER
Pt calling again for results. Verified patient with two patient identifiers. Advised tests are too involved to discuss over the phone. [facility] [address] Patient verbalized understanding.

## 2022-06-03 ENCOUNTER — VIRTUAL VISIT (OUTPATIENT)
Dept: NEUROLOGY | Age: 62
End: 2022-06-03

## 2022-06-03 DIAGNOSIS — G62.9 NEUROPATHY: Primary | ICD-10-CM

## 2022-06-03 NOTE — PROGRESS NOTES
Chief Complaint   Patient presents with    Results     MRI of Brain & EMG , Paresthesia ,neuropathy & Demyelinating disaese       1. Have you been to the ER, urgent care clinic since your last visit? No  Hospitalized since your last visit? No    2. Have you seen or consulted any other health care providers outside of the [address] [place] [address] since your last visit? No Include any pap smears or colon screening. No   Unable to complete this visit due to phone reception.

## 2022-07-18 ENCOUNTER — OFFICE VISIT (OUTPATIENT)
Dept: INTERNAL MEDICINE CLINIC | Age: 62
End: 2022-07-18
Payer: MEDICARE

## 2022-07-18 ENCOUNTER — TELEPHONE (OUTPATIENT)
Dept: CARDIOLOGY CLINIC | Age: 62
End: 2022-07-18

## 2022-07-18 VITALS
WEIGHT: 204 LBS | HEART RATE: 101 BPM | HEIGHT: 64 IN | BODY MASS INDEX: 34.83 KG/M2 | SYSTOLIC BLOOD PRESSURE: 124 MMHG | RESPIRATION RATE: 18 BRPM | OXYGEN SATURATION: 98 % | DIASTOLIC BLOOD PRESSURE: 91 MMHG | TEMPERATURE: 98.2 F

## 2022-07-18 DIAGNOSIS — F17.200 SMOKER: ICD-10-CM

## 2022-07-18 DIAGNOSIS — M54.2 NECK PAIN: ICD-10-CM

## 2022-07-18 DIAGNOSIS — R07.89 ATYPICAL CHEST PAIN: ICD-10-CM

## 2022-07-18 DIAGNOSIS — I10 ESSENTIAL HYPERTENSION: Primary | ICD-10-CM

## 2022-07-18 DIAGNOSIS — E78.2 MIXED HYPERLIPIDEMIA: ICD-10-CM

## 2022-07-18 DIAGNOSIS — G89.29 CHRONIC MIDLINE LOW BACK PAIN WITH BILATERAL SCIATICA: ICD-10-CM

## 2022-07-18 DIAGNOSIS — M54.42 CHRONIC MIDLINE LOW BACK PAIN WITH BILATERAL SCIATICA: ICD-10-CM

## 2022-07-18 DIAGNOSIS — Z86.73 HISTORY OF CVA (CEREBROVASCULAR ACCIDENT): ICD-10-CM

## 2022-07-18 DIAGNOSIS — R42 DIZZINESS: ICD-10-CM

## 2022-07-18 DIAGNOSIS — M54.41 CHRONIC MIDLINE LOW BACK PAIN WITH BILATERAL SCIATICA: ICD-10-CM

## 2022-07-18 PROCEDURE — 3017F COLORECTAL CA SCREEN DOC REV: CPT | Performed by: NURSE PRACTITIONER

## 2022-07-18 PROCEDURE — G8752 SYS BP LESS 140: HCPCS | Performed by: NURSE PRACTITIONER

## 2022-07-18 PROCEDURE — G8417 CALC BMI ABV UP PARAM F/U: HCPCS | Performed by: NURSE PRACTITIONER

## 2022-07-18 PROCEDURE — G8755 DIAS BP > OR = 90: HCPCS | Performed by: NURSE PRACTITIONER

## 2022-07-18 PROCEDURE — 99214 OFFICE O/P EST MOD 30 MIN: CPT | Performed by: NURSE PRACTITIONER

## 2022-07-18 PROCEDURE — G8432 DEP SCR NOT DOC, RNG: HCPCS | Performed by: NURSE PRACTITIONER

## 2022-07-18 PROCEDURE — G9899 SCRN MAM PERF RSLTS DOC: HCPCS | Performed by: NURSE PRACTITIONER

## 2022-07-18 PROCEDURE — G8427 DOCREV CUR MEDS BY ELIG CLIN: HCPCS | Performed by: NURSE PRACTITIONER

## 2022-07-18 RX ORDER — CETIRIZINE HCL 10 MG
10 TABLET ORAL
Qty: 90 TABLET | Refills: 0 | Status: SHIPPED | OUTPATIENT
Start: 2022-07-18 | End: 2022-08-29

## 2022-07-18 RX ORDER — ONDANSETRON 4 MG/1
4 TABLET, ORALLY DISINTEGRATING ORAL
Qty: 10 TABLET | Refills: 0 | Status: SHIPPED | OUTPATIENT
Start: 2022-07-18 | End: 2022-08-16 | Stop reason: ALTCHOICE

## 2022-07-18 RX ORDER — TIZANIDINE 4 MG/1
4 TABLET ORAL
Qty: 30 TABLET | Refills: 2 | Status: SHIPPED | OUTPATIENT
Start: 2022-07-18

## 2022-07-18 RX ORDER — GABAPENTIN 100 MG/1
100 CAPSULE ORAL 3 TIMES DAILY
Qty: 90 CAPSULE | Refills: 2 | Status: SHIPPED | OUTPATIENT
Start: 2022-07-18 | End: 2022-07-21 | Stop reason: DRUGHIGH

## 2022-07-18 RX ORDER — METOPROLOL TARTRATE 25 MG/1
TABLET, FILM COATED ORAL
Qty: 180 TABLET | Refills: 0 | Status: SHIPPED | OUTPATIENT
Start: 2022-07-18

## 2022-07-18 RX ORDER — CLOPIDOGREL BISULFATE 75 MG/1
75 TABLET ORAL DAILY
Qty: 90 TABLET | Refills: 0 | Status: SHIPPED | OUTPATIENT
Start: 2022-07-18

## 2022-07-18 RX ORDER — MECLIZINE HYDROCHLORIDE 25 MG/1
TABLET ORAL
COMMUNITY
Start: 2022-06-10 | End: 2022-08-16 | Stop reason: ALTCHOICE

## 2022-07-18 RX ORDER — IBUPROFEN 800 MG/1
800 TABLET ORAL
Qty: 60 TABLET | Refills: 0 | Status: SHIPPED | OUTPATIENT
Start: 2022-07-18 | End: 2022-09-15

## 2022-07-18 NOTE — TELEPHONE ENCOUNTER
Gina Mcallister.     Please call patient to schedule a new patient appointment with Oneal Braga III, MD per Veronica Elizalde. YANE Lorenzo for dizziness and chest pain. Referral is in [address] [facility].      Thank you,     Cullen Caputo

## 2022-07-18 NOTE — PROGRESS NOTES
Chief Complaint   Patient presents with    ED Follow-up     [place] - dizziness, pt states sx have improved some        1. \"Have you been to the ER, urgent care clinic since your last visit? Hospitalized since your last visit? \" Yes When: 6/8/22 Where: [place]  Reason for visit: vertigo     2. \"Have you seen or consulted any other health care providers outside of the [address] [place] [address] since your last visit? \" Yes Where: neuro Reason for visit: burning at bottoms of feet     3. For patients aged 39-70: Has the patient had a colonoscopy / FIT/ Cologuard? Yes - no Care Gap present      If the patient is female:    4. For patients aged 41-77: Has the patient had a mammogram within the past 2 years? Yes - no Care Gap present      5. For patients aged 21-65: Has the patient had a pap smear?  Yes - no Care Gap present

## 2022-07-18 NOTE — PROGRESS NOTES
Subjective: (As above and below)     Chief Complaint   Patient presents with   [facility] ED Follow-up     [place] - dizziness, pt states sx have improved some      Evangelistaon Altagracia. Lillian Banuelos is a 59y.o. year old female who presents for     Hypertension ROS:  taking medications as instructed, no medication side effects noted, no TIAs, no chest pain on exertion, no dyspnea on exertion, no swelling of ankles      Hyperlipidemia; tolerating statin    Migraines; stable, follows w/ neuro-goes back next week    Dizziness: [facility]. She states that she is much improved but not 100% resolved, was rx'd meclizine which she did not take  She describes it as \"room spinning\" no associated changes in loc, blurred vision, sob,   She felt it some w/ ;positional changes  No URI or allergy s/s    Chest pain; when asking ROS, she does endorse occasional chest pain, L sided, pressure/sharp  Not associated w/ rest vs exertion or foods  No associated sob or leg swelling  She continues to smoke  Stress test from 2012 normal, some LVF on ECHO w/ EF 60%      Reviewed PmHx, RxHx, FmHx, SocHx, AllgHx and updated in chart.   Family History   Problem Relation Age of Onset    Cancer Mother     Heart Disease Father        Past Medical History:   Diagnosis Date    Anxiety     Bipolar 1 disorder ([address]     Depression     Fatigue     Frequent headaches     GERD (gastroesophageal reflux disease)     Hearing loss     Hypertension     Joint pain     Memory disorder     Muscle pain     Psychiatric disorder     bipolar    Snoring     Stomach pain     Stroke ([address]       Social History     Socioeconomic History    Marital status:    Tobacco Use    Smoking status: Current Some Day Smoker    Smokeless tobacco: Never Used   Vaping Use    Vaping Use: Never used   Substance and Sexual Activity    Alcohol use: Not Currently     Comment: \"once a month\"    Drug use: Yes     Types: Marijuana    Sexual activity: Not Currently   Social History Narrative    ** Merged History Encounter **               Current Outpatient Medications   Medication Sig    tiZANidine (ZANAFLEX) 4 mg tablet Take 1 Tablet by mouth nightly.  ondansetron (Zofran ODT) 4 mg disintegrating tablet Take 1 Tablet by mouth every eight (8) hours as needed for Nausea.  metoprolol tartrate (LOPRESSOR) 25 mg tablet Take 1/2 (one-half) tablet by mouth twice daily    ibuprofen (MOTRIN) 800 mg tablet Take 1 Tablet by mouth every eight (8) hours as needed for Pain. With food    gabapentin (NEURONTIN) 100 mg capsule Take 1 Capsule by mouth three (3) times daily. Max Daily Amount: 300 mg. Nerve pain    clopidogreL (PLAVIX) 75 mg tab Take 1 Tablet by mouth daily.  cetirizine (ZYRTEC) 10 mg tablet Take 1 Tablet by mouth daily as needed for Allergies.  atorvastatin (LIPITOR) 10 mg tablet TAKE 1 TABLET BY MOUTH NIGHTLY FOR CHOLESTEROL    rimegepant (NURTEC) 75 mg disintegrating tablet Take 1 tab every other day    hydrocortisone (CORTAID) 1 % topical cream Apply  to affected area two (2) times a day. use thin layer    meclizine (ANTIVERT) 25 mg tablet TAKE 1 TABLET BY MOUTH THREE TIMES DAILY AS NEEDED FOR DIZZINESS (Patient not taking: Reported on 7/18/2022)    predniSONE (DELTASONE) 10 mg tablet Take 1 tab p.o. tidx 3days, 1 tab p.o bidx 4 days, 1  Tab p.o. every day x3 days (Patient not taking: Reported on 6/3/2022)     No current facility-administered medications for this visit. Review of Systems:   Constitutional:    Negative for fever and chills, negative diaphoresis. HEENT:              Negative for neck pain and stiffness. Eyes:                  Negative for visual disturbance, itching, redness or discharge. Respiratory:        Negative for cough and shortness of breath. Cardiovascular:  Negative for chest pain and palpitations.    Gastrointestinal: Negative for nausea, vomiting, abdominal pain, diarrhea or constipation. Genitourinary:     Negative for dysuria and frequency. Musculoskeletal: Negative for falls, tenderness and swelling. Skin:                    Negative for rash, masses or lesions. Neurological:       Negative for dizzyness, seizure, loss of consciousness, weakness and numbness. Objective:     Vitals:    07/18/22 1324   BP: (!) 124/91   Pulse: (!) 101   Resp: 18   Temp: 98.2 °F (36.8 °C)   TempSrc: Temporal   SpO2: 98%   Weight: 204 lb (92.5 kg)   Height: 5' 4\" (1.626 m)           Gen: Oriented to person, place and time and well-developed, well-nourished and in no distress. HEENT:    Head: normocephalic and atraumatic. Eyes:  EOM are normal. Pupils equal and round. Neck:  Normal range of motion. Neck supple. Cardiovascular: normal rate, regular rhythm and normal heart sounds. Pulmonary/Chest:  Effort normal and breath sounds normal.  No respiratory distress. No wheezes, no rales. Abdominal: soft, normal  bowel sounds. Musculoskeletal:  No edema, no tenderness. No calf tenderness or edema. Neurological:  Alert, oriented to person, place and time. Skin: skin is warm and dry. Assessment/ Plan:     Follow-up and Dispositions    · Return in about 4 months (around 11/18/2022). 1. Essential hypertension    - CBC WITH AUTOMATED DIFF; Future  - METABOLIC PANEL, COMPREHENSIVE; Future  - LIPID PANEL; Future  - metoprolol tartrate (LOPRESSOR) 25 mg tablet; Take 1/2 (one-half) tablet by mouth twice daily  Dispense: 180 Tablet; Refill: 0    2. Mixed hyperlipidemia      3. Dizziness  Fu w/ neuro as well  If worsening can try meclzine  - REFERRAL TO CARDIOLOGY    4. Smoker  Encouraged cessation    5. Neck pain    - gabapentin (NEURONTIN) 100 mg capsule; Take 1 Capsule by mouth three (3) times daily. Max Daily Amount: 300 mg. Nerve pain  Dispense: 90 Capsule; Refill: 2    6.  Chronic midline low back pain with bilateral sciatica  - gabapentin (NEURONTIN) 100 mg capsule; Take 1 Capsule by mouth three (3) times daily. Max Daily Amount: 300 mg. Nerve pain  Dispense: 90 Capsule; Refill: 2    7. History of CVA (cerebrovascular accident)    - clopidogreL (PLAVIX) 75 mg tab; Take 1 Tablet by mouth daily. Dispense: 90 Tablet; Refill: 0    8. Atypical chest pain  She has risk factors, believe dizziness if vertigo, however, w/ chest pain, smoking, HTN recc cardio eval  - REFERRAL TO CARDIOLOGY        I have discussed the diagnosis with the patient and the intended plan as seen in the above orders. The patient has received an after-visit summary and questions were answered concerning future plans. Pt conveyed understanding of plan. Medication Side Effects and Warnings were discussed with patient: yes  Patient Labs were reviewed: yes  Patient Past Records were reviewed:  yes    Heidi Salvador, NP

## 2022-07-19 ENCOUNTER — OFFICE VISIT (OUTPATIENT)
Dept: ORTHOPEDIC SURGERY | Age: 62
End: 2022-07-19
Payer: MEDICARE

## 2022-07-19 VITALS — HEIGHT: 64 IN | BODY MASS INDEX: 35.17 KG/M2 | WEIGHT: 206 LBS

## 2022-07-19 DIAGNOSIS — M25.561 CHRONIC PAIN OF BOTH KNEES: Primary | ICD-10-CM

## 2022-07-19 DIAGNOSIS — M17.0 BILATERAL PRIMARY OSTEOARTHRITIS OF KNEE: Primary | ICD-10-CM

## 2022-07-19 DIAGNOSIS — G89.29 CHRONIC PAIN OF BOTH KNEES: Primary | ICD-10-CM

## 2022-07-19 DIAGNOSIS — M17.0 BILATERAL PRIMARY OSTEOARTHRITIS OF KNEE: ICD-10-CM

## 2022-07-19 DIAGNOSIS — M25.562 CHRONIC PAIN OF BOTH KNEES: Primary | ICD-10-CM

## 2022-07-19 PROCEDURE — 99214 OFFICE O/P EST MOD 30 MIN: CPT | Performed by: ORTHOPAEDIC SURGERY

## 2022-07-19 PROCEDURE — G8427 DOCREV CUR MEDS BY ELIG CLIN: HCPCS | Performed by: ORTHOPAEDIC SURGERY

## 2022-07-19 PROCEDURE — 3017F COLORECTAL CA SCREEN DOC REV: CPT | Performed by: ORTHOPAEDIC SURGERY

## 2022-07-19 PROCEDURE — G9899 SCRN MAM PERF RSLTS DOC: HCPCS | Performed by: ORTHOPAEDIC SURGERY

## 2022-07-19 PROCEDURE — G8756 NO BP MEASURE DOC: HCPCS | Performed by: ORTHOPAEDIC SURGERY

## 2022-07-19 PROCEDURE — G8432 DEP SCR NOT DOC, RNG: HCPCS | Performed by: ORTHOPAEDIC SURGERY

## 2022-07-19 PROCEDURE — G8417 CALC BMI ABV UP PARAM F/U: HCPCS | Performed by: ORTHOPAEDIC SURGERY

## 2022-07-19 NOTE — PROGRESS NOTES
Guru Vincent (: 1960) is a 59 y.o. female, patient, here for evaluation of the following chief complaint(s):  Knee Pain (bilateral knee pain, would like to discuss surgery today )       HPI:    Complaint is bilateral knee pain left worse than right. Severe pain both knees. Prior treatments have included injections, medications, physical therapy. She was at one point scheduled for knee replacement. Back today to reschedule. History of prior stroke. Taking Plavix. Pain is intractable with decline in function and problems performing her activities of daily living. Allergies   Allergen Reactions    Ace Inhibitors Cough    Contrast Dye [Iodine] Hives       Current Outpatient Medications   Medication Sig    meclizine (ANTIVERT) 25 mg tablet TAKE 1 TABLET BY MOUTH THREE TIMES DAILY AS NEEDED FOR DIZZINESS (Patient not taking: Reported on 2022)    tiZANidine (ZANAFLEX) 4 mg tablet Take 1 Tablet by mouth nightly.  ondansetron (Zofran ODT) 4 mg disintegrating tablet Take 1 Tablet by mouth every eight (8) hours as needed for Nausea.  metoprolol tartrate (LOPRESSOR) 25 mg tablet Take 1/2 (one-half) tablet by mouth twice daily    ibuprofen (MOTRIN) 800 mg tablet Take 1 Tablet by mouth every eight (8) hours as needed for Pain. With food    gabapentin (NEURONTIN) 100 mg capsule Take 1 Capsule by mouth three (3) times daily. Max Daily Amount: 300 mg. Nerve pain    clopidogreL (PLAVIX) 75 mg tab Take 1 Tablet by mouth daily.  cetirizine (ZYRTEC) 10 mg tablet Take 1 Tablet by mouth daily as needed for Allergies.     atorvastatin (LIPITOR) 10 mg tablet TAKE 1 TABLET BY MOUTH NIGHTLY FOR CHOLESTEROL    rimegepant (NURTEC) 75 mg disintegrating tablet Take 1 tab every other day    predniSONE (DELTASONE) 10 mg tablet Take 1 tab p.o. tidx 3days, 1 tab p.o bidx 4 days, 1  Tab p.o. every day x3 days (Patient not taking: Reported on 6/3/2022)    hydrocortisone (CORTAID) 1 % topical cream Apply  to affected area two (2) times a day. use thin layer     No current facility-administered medications for this visit. Past Medical History:   Diagnosis Date    Anxiety     Bipolar 1 disorder (HCC)     Depression     Fatigue     Frequent headaches     GERD (gastroesophageal reflux disease)     Hearing loss     Hypertension     Joint pain     Memory disorder     Muscle pain     Psychiatric disorder     bipolar    Snoring     Stomach pain     Stroke [facility])         Past Surgical History:   Procedure Laterality Date    HX ADENOIDECTOMY      sweat glands    HX GYN      tubal ligation    HX TONSILLECTOMY         Family History   Problem Relation Age of Onset    Cancer Mother     Heart Disease Father         Social History     Socioeconomic History    Marital status:      Spouse name: Not on file    Number of children: Not on file    Years of education: Not on file    Highest education level: Not on file   Occupational History    Not on file   Tobacco Use    Smoking status: Current Some Day Smoker    Smokeless tobacco: Never Used   Vaping Use    Vaping Use: Never used   Substance and Sexual Activity    Alcohol use: Not Currently     Comment: \"once a month\"    Drug use: Yes     Types: Marijuana    Sexual activity: Not Currently   Other Topics Concern    Not on file   Social History Narrative    ** Merged History Encounter **          Social Determinants of Health     Financial Resource Strain:     Difficulty of Paying Living Expenses: Not on file   Food Insecurity:     Worried About Running Out of Food in the Last Year: Not on file    Terese of Food in the Last Year: Not on file   Transportation Needs:     Lack of Transportation (Medical): Not on file    Lack of Transportation (Non-Medical):  Not on file   Physical Activity:     Days of Exercise per Week: Not on file    Minutes of Exercise per Session: Not on file   Stress:     Feeling of Stress : Not on file   Social Connections:     Frequency of Communication with Friends and Family: Not on file    Frequency of Social Gatherings with Friends and Family: Not on file    Attends Muslim Services: Not on file    Active Member of Clubs or Organizations: Not on file    Attends Club or Organization Meetings: Not on file    Marital Status: Not on file   Intimate Partner Violence:     Fear of Current or Ex-Partner: Not on file    Emotionally Abused: Not on file    Physically Abused: Not on file    Sexually Abused: Not on file   Housing Stability:     Unable to Pay for Housing in the Last Year: Not on file    Number of [place] in the Last Year: Not on file    Unstable Housing in the Last Year: Not on file       ROS     Positive for: Musculoskeletal    Last edited by Ashwin Hancock on 7/19/2022  2:46 PM. (History)            Vitals:  Ht 5' 4\" (1.626 m)   Wt 206 lb (93.4 kg)   BMI 35.36 kg/m²    Body mass index is 35.36 kg/m². PHYSICAL EXAM:  Bilateral hips are examined painless range of motion. Bilateral knees show slight varus overall alignment less than 5 degrees. Range of motion both knees is 3 to 120 degrees. No instability is noted. Pain is mostly along the medial joint lines. Bilateral lower extremities are otherwise sensate and perfused. IMAGING:  XR Results (most recent):  Results from Appointment encounter on 07/19/22    XR KNEES BI MIN 4 V    Narrative  4 x-ray views. Both knees show severe DJD with medial compartment bone-on-bone medial osteophytes and medial subchondral cyst.  There are tricompartmental osteophytes. Patellofemoral joints are significantly involved. ASSESSMENT/PLAN:  1. Chronic pain of both knees  -     XR KNEES BI MIN 4 V; Future    We discussed continued conservative treatment measures versus total joint replacement. Symptoms have progressed despite conservative treatment measures outlined above and they desire to proceed with left total knee.     Patient has had symptoms for over 48 months. They have decline in their activities of daily living with inability to walk long distances. There has been progressive decline in function. Discussed risks, benefits, and alternatives in detail, as well as anticipated hospital stay and course of rehabilitation. They will see their primary care physician prior to surgery. All questions answered. An electronic signature was used to authenticate this note.   --Ariel Thomas MD

## 2022-07-19 NOTE — Clinical Note
7/19/2022    Patient: Nirav Aguilar   YOB: 1960   Date of Visit: 7/19/2022     Dot Patel. Chelsea Carpenter NP  [address] [facility] [address] [place]    Dear Dot Patel. Chelsea Carpenter NP,      Thank you for referring Ms. Lonnie Arshad to [facility] for evaluation. My notes for this consultation are attached. If you have questions, please do not hesitate to call me. I look forward to following your patient along with you.       Sincerely,    Chavez Perry MD

## 2022-07-19 NOTE — Clinical Note
7/19/2022 3:21 PM    Ms. Cherry Chery  [address] [address]              Sincerely,      Jeff Shanks MD

## 2022-07-21 ENCOUNTER — OFFICE VISIT (OUTPATIENT)
Dept: NEUROLOGY | Age: 62
End: 2022-07-21
Payer: MEDICARE

## 2022-07-21 VITALS
RESPIRATION RATE: 18 BRPM | DIASTOLIC BLOOD PRESSURE: 74 MMHG | SYSTOLIC BLOOD PRESSURE: 120 MMHG | HEIGHT: 64 IN | TEMPERATURE: 98.2 F | OXYGEN SATURATION: 99 % | BODY MASS INDEX: 35.63 KG/M2 | WEIGHT: 208.7 LBS | HEART RATE: 100 BPM

## 2022-07-21 DIAGNOSIS — G56.22 ULNAR NEUROPATHY OF LEFT UPPER EXTREMITY: ICD-10-CM

## 2022-07-21 DIAGNOSIS — E55.9 VITAMIN D DEFICIENCY: ICD-10-CM

## 2022-07-21 DIAGNOSIS — G43.719 CHRONIC MIGRAINE WITHOUT AURA, INTRACTABLE, WITHOUT STATUS MIGRAINOSUS: ICD-10-CM

## 2022-07-21 DIAGNOSIS — I67.2 CEREBRAL ATHEROSCLEROSIS: ICD-10-CM

## 2022-07-21 DIAGNOSIS — G31.84 MCI (MILD COGNITIVE IMPAIRMENT): ICD-10-CM

## 2022-07-21 DIAGNOSIS — R20.2 PARESTHESIA: ICD-10-CM

## 2022-07-21 DIAGNOSIS — G62.9 NEUROPATHY: Primary | ICD-10-CM

## 2022-07-21 PROCEDURE — 99214 OFFICE O/P EST MOD 30 MIN: CPT | Performed by: PSYCHIATRY & NEUROLOGY

## 2022-07-21 PROCEDURE — G9899 SCRN MAM PERF RSLTS DOC: HCPCS | Performed by: PSYCHIATRY & NEUROLOGY

## 2022-07-21 PROCEDURE — G8510 SCR DEP NEG, NO PLAN REQD: HCPCS | Performed by: PSYCHIATRY & NEUROLOGY

## 2022-07-21 PROCEDURE — G8752 SYS BP LESS 140: HCPCS | Performed by: PSYCHIATRY & NEUROLOGY

## 2022-07-21 PROCEDURE — G8754 DIAS BP LESS 90: HCPCS | Performed by: PSYCHIATRY & NEUROLOGY

## 2022-07-21 PROCEDURE — G8427 DOCREV CUR MEDS BY ELIG CLIN: HCPCS | Performed by: PSYCHIATRY & NEUROLOGY

## 2022-07-21 PROCEDURE — G8417 CALC BMI ABV UP PARAM F/U: HCPCS | Performed by: PSYCHIATRY & NEUROLOGY

## 2022-07-21 PROCEDURE — 3017F COLORECTAL CA SCREEN DOC REV: CPT | Performed by: PSYCHIATRY & NEUROLOGY

## 2022-07-21 RX ORDER — GABAPENTIN 300 MG/1
CAPSULE ORAL
Qty: 90 CAPSULE | Refills: 3 | Status: SHIPPED | OUTPATIENT
Start: 2022-07-21

## 2022-07-21 RX ORDER — FOLIC ACID 1 MG/1
1 TABLET ORAL DAILY
Qty: 30 TABLET | Refills: 5 | Status: SHIPPED | OUTPATIENT
Start: 2022-07-21

## 2022-07-21 RX ORDER — ERGOCALCIFEROL 1.25 MG/1
50000 CAPSULE ORAL
Qty: 4 CAPSULE | Refills: 5 | Status: SHIPPED | OUTPATIENT
Start: 2022-07-21

## 2022-07-25 NOTE — TELEPHONE ENCOUNTER
Patient is scheduled to see Laurence Dong III, MD on 8/16/2022 at 2:40 at [facility] office.

## 2022-07-26 ENCOUNTER — TELEPHONE (OUTPATIENT)
Dept: NEUROLOGY | Age: 62
End: 2022-07-26

## 2022-07-26 NOTE — TELEPHONE ENCOUNTER
Patient called to schedule appt with Dr. Darinel Vale. Pt is unable to receive calls unless the number is saved in her phone so leave message on [employer].  [phone]

## 2022-08-16 ENCOUNTER — OFFICE VISIT (OUTPATIENT)
Dept: CARDIOLOGY CLINIC | Age: 62
End: 2022-08-16
Payer: MEDICARE

## 2022-08-16 VITALS
OXYGEN SATURATION: 98 % | HEART RATE: 92 BPM | RESPIRATION RATE: 16 BRPM | WEIGHT: 209 LBS | BODY MASS INDEX: 35.68 KG/M2 | SYSTOLIC BLOOD PRESSURE: 130 MMHG | DIASTOLIC BLOOD PRESSURE: 90 MMHG | HEIGHT: 64 IN

## 2022-08-16 DIAGNOSIS — I10 PRIMARY HYPERTENSION: ICD-10-CM

## 2022-08-16 DIAGNOSIS — R07.89 OTHER CHEST PAIN: ICD-10-CM

## 2022-08-16 DIAGNOSIS — Z01.810 PREOP CARDIOVASCULAR EXAM: Primary | ICD-10-CM

## 2022-08-16 DIAGNOSIS — E78.2 MIXED HYPERLIPIDEMIA: ICD-10-CM

## 2022-08-16 PROCEDURE — 99204 OFFICE O/P NEW MOD 45 MIN: CPT | Performed by: SPECIALIST

## 2022-08-16 PROCEDURE — 93010 ELECTROCARDIOGRAM REPORT: CPT | Performed by: SPECIALIST

## 2022-08-16 NOTE — PROGRESS NOTES
HISTORY OF PRESENT ILLNESS  Nirmala Chaves is a 59 y.o. female     SUMMARY:   Problem List  Date Reviewed: 8/16/2022            Codes Class Noted    Morbid obesity ([address] (Chronic) ICD-10-CM: E66.01  ICD-9-CM: 278.01 Chronic 5/10/2014        Cannabis abuse ICD-10-CM: F12.10  ICD-9-CM: 305.20 Chronic 5/10/2014        ASVD (arteriosclerotic vascular disease) ICD-10-CM: I70.90  ICD-9-CM: 440. 9 Chronic 5/10/2014        History of CVA (cerebrovascular accident) ICD-10-CM: Z86.73  ICD-9-CM: V12.54 Present on Admission 1/9/2014        Vitamin D deficiency ICD-10-CM: E55.9  ICD-9-CM: 268.9  11/27/2018        Positive hepatitis C antibody test ICD-10-CM: R76.8  ICD-9-CM: 795.79  11/27/2018        Chest pain, unspecified ICD-10-CM: R07.9  ICD-9-CM: 786.50  10/24/2012        HTN (hypertension) ICD-10-CM: I10  ICD-9-CM: 401.9  10/24/2012        Esophageal reflux ICD-10-CM: K21.9  ICD-9-CM: 530.81  10/24/2012           Current Outpatient Medications on File Prior to Visit   Medication Sig    ergocalciferol (ERGOCALCIFEROL) 1,250 mcg (50,000 unit) capsule Take 1 Capsule by mouth every seven (7) days. folic acid (FOLVITE) 1 mg tablet Take 1 Tablet by mouth in the morning.    gabapentin (NEURONTIN) 300 mg capsule Take 1 cap in am, 2 caps in pm    tiZANidine (ZANAFLEX) 4 mg tablet Take 1 Tablet by mouth nightly. ibuprofen (MOTRIN) 800 mg tablet Take 1 Tablet by mouth every eight (8) hours as needed for Pain. With food    clopidogreL (PLAVIX) 75 mg tab Take 1 Tablet by mouth daily. cetirizine (ZYRTEC) 10 mg tablet Take 1 Tablet by mouth daily as needed for Allergies. atorvastatin (LIPITOR) 10 mg tablet TAKE 1 TABLET BY MOUTH NIGHTLY FOR CHOLESTEROL    hydrocortisone (CORTAID) 1 % topical cream Apply  to affected area two (2) times a day.  use thin layer    metoprolol tartrate (LOPRESSOR) 25 mg tablet Take 1/2 (one-half) tablet by mouth twice daily     No current facility-administered medications on file prior to visit.       CARDIOLOGY STUDIES TO DATE:  7-- per patient she has been to the ER for dizziness and chest pains-  no ER information in the system- advised patient to go to the ER-  [facility]    Chief Complaint   Patient presents with    New Patient     HPI :  She is referred for preop cardiovascular exam fjor knee replacement. She has hypertension and is a past cigarette smoker currently smokes just an occasional small cigar. There is no history of diabetes and cholesterols been okay. Family history is negative for premature coronary disease. She is morbidly obese and with her knee issues she is not able to do a whole lot of activity however other than some mild dyspnea on exertion she has no other symptoms suggestive of any cardiac type problems. Her EKG today shows sinus rhythm with left atrial enlargement. CARDIAC ROS:   negative for chest pain, palpitations, syncope, orthopnea, paroxysmal nocturnal dyspnea, exertional chest pressure/discomfort, claudication, lower extremity edema    Family History   Problem Relation Age of Onset    Cancer Mother     Heart Disease Father        Past Medical History:   Diagnosis Date    Anxiety     Bipolar 1 disorder (HCC)     Depression     Fatigue     Frequent headaches     GERD (gastroesophageal reflux disease)     Hearing loss     Hypertension     Joint pain     Memory disorder     Muscle pain     Psychiatric disorder     bipolar    Snoring     Stomach pain     Stroke ([address]        GENERAL ROS:  A comprehensive review of systems was negative except for that written in the HPI.     Visit Vitals  BP (!) 130/90   Pulse 92   Resp 16   Ht 5' 4\" (1.626 m)   Wt 209 lb (94.8 kg)   SpO2 98%   BMI 35.87 kg/m²       Wt Readings from Last 3 Encounters:   08/16/22 209 lb (94.8 kg)   07/21/22 208 lb 11.2 oz (94.7 kg)   07/19/22 206 lb (93.4 kg)            BP Readings from Last 3 Encounters:   08/16/22 (!) 130/90   07/21/22 120/74   07/18/22 (!) 124/91       PHYSICAL EXAM  General appearance: alert, cooperative, appears stated age  Neurologic: Alert and oriented X 3  Neck: supple, symmetrical, trachea midline, no adenopathy, no carotid bruit, and no JVD  Lungs: clear to auscultation bilaterally  Heart: regular rate and rhythm, S1, S2 normal, no murmur, click, rub or gallop  Abdomen: morbidly obese  Extremities: extremities normal, atraumatic, no cyanosis or edema  Pulses: 2+ and symmetric    Lab Results   Component Value Date/Time    Cholesterol, total 179 07/27/2022 09:59 AM    Cholesterol, total 193 12/28/2020 01:18 PM    Cholesterol, total 181 01/28/2020 10:00 AM    Cholesterol, total 234 (H) 11/26/2018 01:58 PM    Cholesterol, total 227 (H) 05/01/2017 12:00 AM    HDL Cholesterol 64 07/27/2022 09:59 AM    HDL Cholesterol 73 12/28/2020 01:18 PM    HDL Cholesterol 67 01/28/2020 10:00 AM    HDL Cholesterol 71 11/26/2018 01:58 PM    HDL Cholesterol 62 05/01/2017 12:00 AM    LDL, calculated 86.8 07/27/2022 09:59 AM    LDL, calculated 96 12/28/2020 01:18 PM    LDL, calculated 81 01/28/2020 10:00 AM    LDL, calculated 138 (H) 11/26/2018 01:58 PM    LDL, calculated 137 (H) 05/01/2017 12:00 AM    LDL, calculated 113 (H) 02/27/2015 12:00 AM    Triglyceride 141 07/27/2022 09:59 AM    Triglyceride 139 12/28/2020 01:18 PM    Triglyceride 166 (H) 01/28/2020 10:00 AM    Triglyceride 127 11/26/2018 01:58 PM    Triglyceride 142 05/01/2017 12:00 AM    CHOL/HDL Ratio 2.8 07/27/2022 09:59 AM    CHOL/HDL Ratio 5.0 01/10/2014 04:00 AM    CHOL/HDL Ratio 5.3 (H) 10/24/2012 06:00 AM     ASSESSMENT :      She has minimal cardiac risk factors and no worrisome symptoms so I think surgery can proceed without special precautions or further cardiac testing. current treatment plan is effective, no change in therapy  lab results and schedule of future lab studies reviewed with patient  reviewed diet, exercise and weight control    Encounter Diagnoses   Name Primary?     Preop cardiovascular exam Yes    Other chest pain Primary hypertension     Mixed hyperlipidemia      Orders Placed This Encounter    AMB POC EKG ROUTINE W/ 12 LEADS, INTER & REP       Follow-up and Dispositions    Return if symptoms worsen or fail to improve. Vladimir[address] [facility] [address] [facility], MD  8/16/2022  Please note that this dictation was completed with [employer], the computer voice recognition software. Quite often unanticipated grammatical, syntax, homophones, and other interpretive errors are inadvertently transcribed by the computer software. Please disregard these errors. Please excuse any errors that have escaped final proofreading. Thank you.

## 2022-08-17 DIAGNOSIS — E78.5 HYPERLIPIDEMIA, UNSPECIFIED HYPERLIPIDEMIA TYPE: ICD-10-CM

## 2022-08-17 RX ORDER — ATORVASTATIN CALCIUM 10 MG/1
TABLET, FILM COATED ORAL
Qty: 90 TABLET | Refills: 0 | Status: SHIPPED | OUTPATIENT
Start: 2022-08-17

## 2022-08-22 DIAGNOSIS — M25.561 CHRONIC PAIN OF BOTH KNEES: ICD-10-CM

## 2022-08-22 DIAGNOSIS — G89.29 CHRONIC PAIN OF BOTH KNEES: ICD-10-CM

## 2022-08-22 DIAGNOSIS — M25.562 CHRONIC PAIN OF BOTH KNEES: ICD-10-CM

## 2022-08-22 DIAGNOSIS — M17.0 BILATERAL PRIMARY OSTEOARTHRITIS OF KNEE: Primary | ICD-10-CM

## 2022-08-26 NOTE — PROGRESS NOTES
Neurology Progress Note    NAME:  Shahbaz Villalobos   :   1960   MRN:   [id number]     Date/Time:  2022  Subjective:      Shahbaz Villalobos is a 59 y.o. female here today for follow-up for CVA, headaches, memory difficulty, neuropathy, test results. Patient says he still having periods of dizziness, headache, numbness and tingling sensation and generalized pain. Patient says this has been progressive, she noted that her memory has been deteriorating over time. Patient says she experiences numbness and tingling sensation of the extremity mostly in the hands, numbness and tingling sensation wakes her up at night. She says sometimes she wakes up the arms are numb and she will try to shake it off. She also dropped things with her hands. Patient says she started having frequent headache, frequency is about 3-4 times per week at times more than 20 days headache per month. Headache is mostly temporal , holocephalic, associated with dizziness, blurry vision, occasional double vision. No photophobia or phonophobia. No aggravating factor. Patient says she has tried different kinds of medication including over-the-counter analgesics without improvement. Patient has been tried on the following:  Topamax  Verapamil  Depakote  Elavil  Due to multiple infarcts, patient triptans are contraindicated. I will start patient on Nurtec for both prophylaxis and rescue. She says her memory is mostly recent things, she tends to be confused a lot of times. She says she experiences pain mostly in the hands, on a scale of 1-10, patient rates pain level to be between 7 and 8  MRI of the brain reviewed with patient showed moderate to severe chronic microvascular ischemic change with mild temporal  predominant cerebral atrophy. No intracranial mass, hemorrhage or evidence of acute infarction.   Blood work showed decreased vitamin D22.1  EMG/nerve conduction study reviewed with patient showed electrodiagnostic evidence of axonal motor neuropathy left ulnar nerve, this is consistent with compressive neuropathy of the ulnar nerve. Radiculopathy and small fiber neuropathy cannot be excluded  I will refer patient for neuropsychological evaluation to qualify or quantify memory difficulties  She denies total loss of consciousness, dysphagia or odynophagia  Review of Systems - General ROS: positive for  - fatigue, night sweats and sleep disturbance  Psychological ROS: positive for - anxiety, concentration difficulties, depression, memory difficulties and sleep disturbances  Ophthalmic ROS: positive for - blurry vision and decreased vision  ENT ROS: positive for - headaches, tinnitus and visual changes  Allergy and Immunology ROS: negative  Hematological and Lymphatic ROS: negative  Endocrine ROS: negative  Respiratory ROS: no cough, shortness of breath, or wheezing  Cardiovascular ROS: no chest pain or dyspnea on exertion  Gastrointestinal ROS: no abdominal pain, change in bowel habits, or black or bloody stools  Genito-Urinary ROS: no dysuria, trouble voiding, or hematuria  Musculoskeletal ROS: positive for - gait disturbance, joint pain, joint stiffness, muscle pain and muscular weakness  Neurological ROS: positive for - dizziness, gait disturbance, headaches, impaired coordination/balance, numbness/tingling, visual changes and weakness  Dermatological ROS: negative      Medications reviewed:  Current Outpatient Medications   Medication Sig Dispense Refill    ergocalciferol (ERGOCALCIFEROL) 1,250 mcg (50,000 unit) capsule Take 1 Capsule by mouth every seven (7) days. 4 Capsule 5    folic acid (FOLVITE) 1 mg tablet Take 1 Tablet by mouth in the morning. 30 Tablet 5    gabapentin (NEURONTIN) 300 mg capsule Take 1 cap in am, 2 caps in pm 90 Capsule 3    tiZANidine (ZANAFLEX) 4 mg tablet Take 1 Tablet by mouth nightly.  30 Tablet 2    metoprolol tartrate (LOPRESSOR) 25 mg tablet Take 1/2 (one-half) tablet by mouth twice daily 180 Tablet 0 ibuprofen (MOTRIN) 800 mg tablet Take 1 Tablet by mouth every eight (8) hours as needed for Pain. With food 60 Tablet 0    clopidogreL (PLAVIX) 75 mg tab Take 1 Tablet by mouth daily. 90 Tablet 0    cetirizine (ZYRTEC) 10 mg tablet Take 1 Tablet by mouth daily as needed for Allergies. 90 Tablet 0    atorvastatin (LIPITOR) 10 mg tablet TAKE 1 TABLET BY MOUTH NIGHTLY FOR CHOLESTEROL 90 Tablet 0    hydrocortisone (CORTAID) 1 % topical cream Apply  to affected area two (2) times a day.  use thin layer 30 g 0        Objective:   Vitals:  Vitals:    07/21/22 1336   BP: 120/74   Pulse: 100   Resp: 18   Temp: 98.2 °F (36.8 °C)   SpO2: 99%   Weight: 208 lb 11.2 oz (94.7 kg)   Height: 5' 4\" (1.626 m)   PainSc:   8   PainLoc: Knee               Lab Data Reviewed:  Lab Results   Component Value Date/Time    WBC 6.4 07/27/2022 09:59 AM    HCT 42.8 07/27/2022 09:59 AM    HGB 14.0 07/27/2022 09:59 AM    PLATELET 134 59/25/1807 09:59 AM       Lab Results   Component Value Date/Time    Sodium 141 07/27/2022 09:59 AM    Potassium 4.8 07/27/2022 09:59 AM    Chloride 109 (H) 07/27/2022 09:59 AM    CO2 26 07/27/2022 09:59 AM    Glucose 82 07/27/2022 09:59 AM    BUN 12 07/27/2022 09:59 AM    Creatinine 0.70 07/27/2022 09:59 AM    Calcium 9.0 07/27/2022 09:59 AM       No components found for: TROPQUANT    No results found for: SUZANNE      Lab Results   Component Value Date/Time    Hemoglobin A1c 5.2 08/26/2021 03:01 PM    Hemoglobin A1c (POC) 5.7 05/01/2017 09:06 AM        Lab Results   Component Value Date/Time    Vitamin B12 588 04/11/2022 03:50 PM       No results found for: SUZANNE, Manuel Ebbs, XBANA    Lab Results   Component Value Date/Time    Cholesterol, total 179 07/27/2022 09:59 AM    HDL Cholesterol 64 07/27/2022 09:59 AM    LDL, calculated 86.8 07/27/2022 09:59 AM    VLDL, calculated 28.2 07/27/2022 09:59 AM    Triglyceride 141 07/27/2022 09:59 AM    CHOL/HDL Ratio 2.8 07/27/2022 09:59 AM         CT Results (recent):  Results from Hospital Encounter encounter on 09/29/21    CT ABD PELV WO CONT    Narrative  EXAM: CT ABD PELV WO CONT    INDICATION: right-sided renal colic (vs appendicitis vs cholecystitis /  cholelithiasis)    COMPARISON: Ultrasound pelvis 1/19/2012 and ultrasound abdomen the 14th 2010. CONTRAST:  None. TECHNIQUE:  Thin axial images were obtained through the abdomen and pelvis. Coronal and  sagittal reformats were generated. Oral contrast was not administered. CT dose  reduction was achieved through use of a standardized protocol tailored for this  examination and automatic exposure control for dose modulation. The absence of intravenous contrast material reduces the sensitivity for  evaluation of the vasculature and solid organs. FINDINGS:  LOWER THORAX: No significant abnormality in the incidentally imaged lower chest.  Carcinoma calcified granuloma left lower lobe. LIVER: No mass. BILIARY TREE: Gallbladder is within normal limits. CBD is not dilated. SPLEEN: within normal limits. PANCREAS: No focal abnormality. ADRENALS: Unremarkable. KIDNEYS/URETERS: Right hydronephrosis and proximal hydroureter to the distal  third of the ureter where there are 2 intraluminal stones measuring 3 to 4 mm  (601/66). Multiple punctate collecting system stones of the left kidney. No left  hydronephrosis or left hydroureter. STOMACH: Unremarkable. SMALL BOWEL: No dilatation or wall thickening. COLON: No dilatation or wall thickening. APPENDIX: Unremarkable. PERITONEUM: No ascites or pneumoperitoneum. RETROPERITONEUM: No lymphadenopathy or aortic aneurysm. REPRODUCTIVE ORGANS: Leiomyomatous uterus with calcified exophytic fibroid from  the volar fundus. URINARY BLADDER: Undistended with no evident calculus or mass. BONES: Degenerative spine change. No acute fracture or aggressive lesion. ABDOMINAL WALL: No mass or hernia.   ADDITIONAL COMMENTS: N/A    Impression  Obstructing 2 and 3 mm distal right ureteral stones with upstream  hydronephrosis and right hydroureter. Nonobstructing left nephrolithiasis. MRI Results (recent):  [facility]    MRI BRAIN W WO CONT    Narrative  Clinical history: Paresthesias, cerebral ischemia  INDICATION:   Paresthesias, ischemia    COMPARISON: 2016  TECHNIQUE: MR examination of the brain includes axial and sagittal T1 , axial  T2, axial FLAIR, axial gradient echo, axial DWI, coronal T1 . Pre and post  contrast axial T1-weighted imaging. Postcontrast T1-weighted imaging coronal  plane. CONTRAST: 20 ml ProHance  FINDINGS:  There is no intracranial mass, hemorrhage or acute infarction. There are confluent periventricular and scattered foci of increased T2 signal  intensity demonstrated in the corona radiata, centrum semiovale and central  eric. Pattern is most consistent with chronic microvascular change. Small  chronic infarct in the subcortical white matter on the right. There is no  abnormal parenchymal enhancement. There is no abnormal meningeal enhancement  demonstrated. The brain architecture is normal. There is no evidence of midline  shift or mass-effect. The ventricles are normal in size, position and  configuration. There are no extra-axial fluid collections. Major intracranial  vascular flow-voids are unremarkable. The orbits are grossly symmetric. Dural  venous sinuses are grossly unremarkable. There is no Chiari or syrinx. Pituitary  and infundibulum grossly unremarkable. Cerebellopontine angles are unremarkable. No skull base mass is identified. Cavernous sinuses are symmetric. The mastoid  air cells and are well pneumatized and clear. Impression  Moderate to severe chronic microvascular ischemic change with mild temporal  predominant cerebral atrophy. No intracranial mass, hemorrhage or evidence of acute infarction. IR Results (recent):  No results found for this or any previous visit.       VAS/US Results (recent):  Results from Hospital Encounter encounter on 01/09/14    DUPLEX CAROTID BILATERAL      PHYSICAL EXAM:  General:    Alert, cooperative, no distress, appears stated age. Head:   Normocephalic, without obvious abnormality, atraumatic. Eyes:   Conjunctivae/corneas clear. PERRLA  Nose:  Nares normal. No drainage or sinus tenderness. Throat:    Lips, mucosa, and tongue normal.  No Thrush  Neck:  Supple, symmetrical,  no adenopathy, thyroid: non tender    no carotid bruit and no JVD. Back:    Symmetric,  No CVA tenderness. Lungs:   Clear to auscultation bilaterally. No Wheezing or Rhonchi. No rales. Chest wall:  No tenderness or deformity. No Accessory muscle use. Heart:   Regular rate and rhythm,  no murmur, rub or gallop. Abdomen:   Soft, non-tender. Not distended. Bowel sounds normal. No masses  Extremities: Extremities normal, atraumatic, No cyanosis. No edema. No clubbing  Skin:     Texture, turgor normal. No rashes or lesions. Not Jaundiced  Lymph nodes: Cervical, supraclavicular normal.  Psych:  Good insight. Not depressed. Anxious      NEUROLOGICAL EXAM:  Appearance: The patient is well developed, well nourished, provides a coherent history and is in no acute distress. Mental Status: Oriented to time, place and person. Mood and affect flat   Cranial Nerves:   Intact visual fields. Fundi are benign. MONICA, EOM's full, no nystagmus, no ptosis. Facial sensation is normal. Corneal reflexes are intact. Facial movement is symmetric. Hearing is normal bilaterally. Palate is midline with normal sternocleidomastoid and trapezius muscles are normal. Tongue is midline. Motor:  5-/5 strength in upper and lower proximal and distal muscles. Normal bulk and tone. No fasciculations. Reflexes:   Deep tendon reflexes 3+/4 and symmetrical.   Sensory:   Decreased sensation to touch, pinprick and vibration bilateral lower extremity up to the knee, upper extremity up to the elbow. Gait:  Slightly unsteady gait. Tremor:   No tremor noted. Cerebellar:  No cerebellar signs present. Neurovascular:  Normal heart sounds and regular rhythm, peripheral pulses intact, and no carotid bruits. Assesment  1. Neuropathy    - gabapentin (NEURONTIN) 300 mg capsule; Take 1 cap in am, 2 caps in pm  Dispense: 90 Capsule; Refill: 3    2. Paresthesia    - gabapentin (NEURONTIN) 300 mg capsule; Take 1 cap in am, 2 caps in pm  Dispense: 90 Capsule; Refill: 3    3. Chronic migraine without aura, intractable, without status migrainosus  Nurtec    4. Ulnar neuropathy of left upper extremity  Neurontin    5. MCI (mild cognitive impairment)    - REFERRAL TO NEUROLOGY  Neuropsychology  6. Vitamin D deficiency  Replace vitamin D with vitamin D2 50,000 units q. weekly    7. Cerebral atherosclerosis   Continue Plavix    ___________________________________________________  PLAN: Medication and plan discussed with patient      ICD-10-CM ICD-9-CM    1. Neuropathy  G62.9 355.9 gabapentin (NEURONTIN) 300 mg capsule      2. Paresthesia  R20.2 782.0 gabapentin (NEURONTIN) 300 mg capsule      3. Chronic migraine without aura, intractable, without status migrainosus  G43.719 346.71       4. Ulnar neuropathy of left upper extremity  G56.22 354.2       5. MCI (mild cognitive impairment)  G31.84 331.83 REFERRAL TO NEUROLOGY      6. Vitamin D deficiency  E55.9 268.9       7. Cerebral atherosclerosis   I67.2 437.0         Follow-up and Dispositions    Return in about 4 months (around 11/21/2022).                ___________________________________________________    Attending Physician: Josh Johnson MD

## 2022-08-29 ENCOUNTER — HOSPITAL ENCOUNTER (OUTPATIENT)
Dept: PREADMISSION TESTING | Age: 62
Discharge: HOME | End: 2022-08-29
Payer: MEDICARE

## 2022-08-29 VITALS
SYSTOLIC BLOOD PRESSURE: 121 MMHG | BODY MASS INDEX: 34.74 KG/M2 | WEIGHT: 203.48 LBS | DIASTOLIC BLOOD PRESSURE: 83 MMHG | RESPIRATION RATE: 18 BRPM | HEART RATE: 84 BPM | TEMPERATURE: 98.7 F | HEIGHT: 64 IN

## 2022-08-29 LAB
ABO + RH BLD: NORMAL
ANION GAP SERPL CALC-SCNC: 1 MMOL/L (ref 5–15)
APPEARANCE UR: CLEAR
BACTERIA URNS QL MICRO: NEGATIVE /HPF
BILIRUB UR QL: NEGATIVE
BLOOD GROUP ANTIBODIES SERPL: NORMAL
BUN SERPL-MCNC: 14 MG/DL (ref 6–20)
BUN/CREAT SERPL: 19 (ref 12–20)
CALCIUM SERPL-MCNC: 9.2 MG/DL (ref 8.5–10.1)
CHLORIDE SERPL-SCNC: 112 MMOL/L (ref 97–108)
CO2 SERPL-SCNC: 30 MMOL/L (ref 21–32)
COLOR UR: ABNORMAL
CREAT SERPL-MCNC: 0.75 MG/DL (ref 0.55–1.02)
EPITH CASTS URNS QL MICRO: ABNORMAL /LPF
ERYTHROCYTE [DISTWIDTH] IN BLOOD BY AUTOMATED COUNT: 13.9 % (ref 11.5–14.5)
EST. AVERAGE GLUCOSE BLD GHB EST-MCNC: 111 MG/DL
GLUCOSE SERPL-MCNC: 111 MG/DL (ref 65–100)
GLUCOSE UR STRIP.AUTO-MCNC: NEGATIVE MG/DL
HBA1C MFR BLD: 5.5 % (ref 4–5.6)
HCT VFR BLD AUTO: 44.7 % (ref 35–47)
HGB BLD-MCNC: 14.7 G/DL (ref 11.5–16)
HGB UR QL STRIP: NEGATIVE
INR PPP: 1.1 (ref 0.9–1.1)
KETONES UR QL STRIP.AUTO: NEGATIVE MG/DL
LEUKOCYTE ESTERASE UR QL STRIP.AUTO: NEGATIVE
MCH RBC QN AUTO: 31.9 PG (ref 26–34)
MCHC RBC AUTO-ENTMCNC: 32.9 G/DL (ref 30–36.5)
MCV RBC AUTO: 97 FL (ref 80–99)
NITRITE UR QL STRIP.AUTO: NEGATIVE
NRBC # BLD: 0 K/UL (ref 0–0.01)
NRBC BLD-RTO: 0 PER 100 WBC
PH UR STRIP: 5.5 [PH] (ref 5–8)
PLATELET # BLD AUTO: 173 K/UL (ref 150–400)
PMV BLD AUTO: 11.7 FL (ref 8.9–12.9)
POTASSIUM SERPL-SCNC: 4.6 MMOL/L (ref 3.5–5.1)
PROT UR STRIP-MCNC: ABNORMAL MG/DL
PROTHROMBIN TIME: 11.3 SEC (ref 9–11.1)
RBC # BLD AUTO: 4.61 M/UL (ref 3.8–5.2)
RBC #/AREA URNS HPF: ABNORMAL /HPF (ref 0–5)
SODIUM SERPL-SCNC: 143 MMOL/L (ref 136–145)
SP GR UR REFRACTOMETRY: 1.03 (ref 1–1.03)
SPECIMEN EXP DATE BLD: NORMAL
UA: UC IF INDICATED,UAUC: ABNORMAL
UROBILINOGEN UR QL STRIP.AUTO: 0.2 EU/DL (ref 0.2–1)
WBC # BLD AUTO: 6.6 K/UL (ref 3.6–11)
WBC URNS QL MICRO: ABNORMAL /HPF (ref 0–4)

## 2022-08-29 PROCEDURE — 86900 BLOOD TYPING SEROLOGIC ABO: CPT

## 2022-08-29 PROCEDURE — 85610 PROTHROMBIN TIME: CPT

## 2022-08-29 PROCEDURE — 81001 URINALYSIS AUTO W/SCOPE: CPT

## 2022-08-29 PROCEDURE — 85027 COMPLETE CBC AUTOMATED: CPT

## 2022-08-29 PROCEDURE — 83036 HEMOGLOBIN GLYCOSYLATED A1C: CPT

## 2022-08-29 PROCEDURE — 80048 BASIC METABOLIC PNL TOTAL CA: CPT

## 2022-08-29 NOTE — PERIOP NOTES
[address] [facility] INSTRUCTIONS  ORTHOPAEDIC    Surgery Date:   09/12/2022    Your surgeon's office or [facility] staff will call you between 4 PM- 8 PM the day before surgery with your arrival time. If your surgery is on a Monday, you will receive a call the preceding Friday. Please report to [facility]Admitting on the 1st floor. Bring your insurance card, photo identification, and any copayment (if applicable). If you are going home the same day of your surgery, you must have a responsible adult to drive you home. You need to have a responsible adult to stay with you the first 24 hours after surgery and you should not drive a car for 24 hours following your surgery. Do NOT eat any solid foods after midnight the night before surgery including candy, mints or gum. You may drink clear liquids from midnight until 1 hour prior to arrival time. You may drink up to 12 ounces at one time every 4 hours. Do NOT drink alcohol or smoke 24 hours before surgery. STOP smoking for 14 days prior as it helps with breathing and healing after surgery. If your arrival time is 3pm or later, you may eat a light breakfast before 8am (toast, bagel-no butter, black coffee, plain tea, fruit juice-no pulp) Please note special instructions, if applicable, below for medications. If you are being admitted to the hospital,please leave personal belongings/luggage in your car until you have an assigned hospital room number. Please wear comfortable clothes. Wear your glasses instead of contacts. We ask that all money, jewelry and valuables be left at home. Wear no make up, particularly mascara, the day of surgery. All body piercings, rings, and jewelry need to be removed and left at home. Please remove any nail polish or artificial nails from your fingernails. Please wear your hair loose or down. Please no pony-tails, buns, or any metal hair accessories.  If you shower the morning of surgery, please do not apply any lotions or powders afterwards. You may wear deodorant. Do not shave any body area within 24 hours of your surgery. Please follow all instructions to avoid any potential surgical cancellation. Should your physical condition change, (i.e. fever, cold, flu, etc.) please notify your surgeon as soon as possible. It is important to be on time. If a situation occurs where you may be delayed, please call:  [phone] / [phone] on the day of surgery. The Preadmission Testing staff can be reached at [phone]. Special instructions: NONE    Current Outpatient Medications   Medication Sig    rimegepant (NURTEC) 75 mg disintegrating tablet Take 1 tablet every other day, then 1 tablet p.o. as needed for severe headache, not to DC 1 dose in 24 hours    atorvastatin (LIPITOR) 10 mg tablet TAKE 1 TABLET BY MOUTH NIGHTLY FOR CHOLESTEROL    ergocalciferol (ERGOCALCIFEROL) 1,250 mcg (50,000 unit) capsule Take 1 Capsule by mouth every seven (7) days. (Patient taking differently: Take 50,000 Units by mouth every seven (7) days. TAKES ON Friday)    folic acid (FOLVITE) 1 mg tablet Take 1 Tablet by mouth in the morning.    gabapentin (NEURONTIN) 300 mg capsule Take 1 cap in am, 2 caps in pm (Patient taking differently: Take 300 mg by mouth three (3) times daily. Take 1 cap in am, 2 caps in pm)    tiZANidine (ZANAFLEX) 4 mg tablet Take 1 Tablet by mouth nightly. metoprolol tartrate (LOPRESSOR) 25 mg tablet Take 1/2 (one-half) tablet by mouth twice daily    clopidogreL (PLAVIX) 75 mg tab Take 1 Tablet by mouth daily. ibuprofen (MOTRIN) 800 mg tablet Take 1 Tablet by mouth every eight (8) hours as needed for Pain. With food     No current facility-administered medications for this encounter.        YOU MUST ONLY TAKE THESE MEDICATIONS THE MORNING OF SURGERY WITH A SIP OF WATER: GABAPENTIN, METOPROLOL,   MEDICATIONS TO TAKE THE MORNING OF SURGERY ONLY IF NEEDED: TYLENOL  HOLD these prescription medications BEFORE Surgery: DO NOT TAKE MORNING OF SURGERY: RIMEGEPANT, TIZANIDINE   STOP TAKING AS OF 07/32/4311 FOLIC ACID, ERGOCALCIFEROL  Ask your surgeon/prescribing physician about when/if to STOP taking these medications: COLPIDOGREL  Stop any non-steroidal anti-inflammatory drugs (i.e. Ibuprofen, Naproxen, Advil, Aleve) 3 days before surgery. You may take Tylenol. STOP all vitamins and herbal supplements 1 week prior to  surgery. If you are currently taking Plavix, Coumadin, or any other blood-thinning/anticoagulant medication contact your prescribing physician for instructions. Preventing Infections Before and After - Your Surgery    IMPORTANT INSTRUCTIONS    You play an important role in your health and preparation for surgery. To reduce the germs on your skin you will need to shower with CHG soap (Chorhexidine gluconate 4%) two times before surgery. CHG soap (Hibiclens, Hex-A-Clens or store brand)  CHG soap will be provided at your Preadmission Testing (PAT) appointment. If you do not have a PAT appointment before surgery, you may arrange to  CHG soap from our office or purchase CHG soap at a pharmacy, grocery or department store. You need to purchase TWO 4 ounce bottles to use for your 2 showers. Steps to follow:  [facility] your hair with your normal shampoo and your body with regular soap and rinse well to remove shampoo and soap from your skin. Wet a clean washcloth and turn off the shower. Put CHG soap on washcloth and apply to your entire body from the neck down. Do not use on your head, face or private parts(genitals). Do not use CHG soap on open sores, wounds or areas of skin irritation. Wash you body gently for 5 minutes. Do not wash your skin too hard. This soap does not create lather. Pay special attention to your underarms and from your belly button to your feet. Turn the shower back on and rinse well to get CHG soap off your body. Pat your skin dry with a clean, dry towel. Do not apply lotions or moisturizer. Put on clean clothes and sleep on fresh bed sheets and do not allow pets to sleep with you. Shower with CHG soap 2 times before your surgery  The evening before your surgery  The morning of your surgery      Tips to help prevent infections after your surgery:  Protect your surgical wound from germs:  Hand washing is the most important thing you and your caregivers can do to prevent infections. Keep your bandage clean and dry! Do not touch your surgical wound. Use clean, freshly washed towels and washcloths every time you shower; do not share bath linens with others. Until your surgical wound is healed, wear clothing and sleep on bed linens each day that are clean and freshly washed. Do not allow pets to sleep in your bed with you or touch your surgical wound. Do not smoke - smoking delays wound healing. This may be a good time to stop smoking. If you have diabetes, it is important for you to manage your blood sugar levels properly before your surgery as well as after your surgery. Poorly managed blood sugar levels slow down wound healing and prevent you from healing completely. Prevention of Infection  Testing for Staphylococcus aureus on your skin before surgery    Staphylococcus aureus (staph) is a common bacteria that is found on the body. It normally does not cause infection on healthy skin. Before surgery, you will be tested to see if you have staph by swabbing the inside of your nose. When you have an incision with surgery, the goal is to protect that incision from infection. Removal of the staph bacteria before surgery can decrease the risk of a surgical site infection. If your nose swab is positive for staph you will be called. Your treatment will include 2 steps:  Prescription for Mupirocin ointment to be used in each nostril twice a day for 5 days. Showering with Chlorhexidine (CHG) liquid soap for 5 days prior to surgery.     How to use Mupirocin ointment in your nose   the prescription from your pharmacy. You will receive a large tube of ointment which will be big enough for all of your treatments. You will apply this ointment to each nostril 2 times a day for 5 days. Wash your hands with  gel or soap and water for 20 seconds before using ointment. Place a pea-sized amount of ointment on a cotton Q-tip. Apply ointment just inside of each nostril with the Q-tip. Do not push Q-tip or ointment deep inside you nose. Press your nostrils together and massage for a few seconds. Wash your hands with  gel or soap and water after you are finished. Do not get ointment near your eyes. If it gets into your eyes, rinse them with cool water. If you need to use nasal spray, clean the tip of the bottle with alcohol before use and do not use both at the same time. If you are scheduled for COVID testing during the 5 days, do NOT apply morning dose until after the COVID test has been performed. How to use Chlorhexidine (CHG) 4% liquid soap  Purchase an 8 ounce bottle of CHG liquid soap (Chlorhexidine 4%, Hibiclens, Hex-A-Clens or store brand) at a pharmacy or grocery store. Wash your hair with your normal shampoo and your body with regular soap and rinse well to remove shampoo and soap from your skin. Wet a clean washcloth and turn off the shower. Put CHG soap on washcloth and apply to your entire body from the neck down. Do not use on your head, face or private parts(genitals). Do not use CHG soap on open sores, wounds or areas of skin irritation. Wash your body gently for 5 minutes. Do not wash your skin too hard. This soap does not create lather. Pay special attention to your underarms and from your belly button to your feet. Turn the shower back on and rinse well to get CHG soap off your body. Pat your skin dry with a clean, dry towel. Do not apply lotions or moisturizer.   Put on clean clothes and sleep on fresh bed sheets the night before surgery. Do not allow pets to sleep with you. Eating and Drinking Before Surgery    You may eat a regular dinner at the usual time on the day before your surgery. Do NOT eat any solid foods after midnight unless your arrival time at the hospital is 3pm or later. You may drink clear liquids only from 12 midnight until 1 hours prior to your arrival time at the hospital on the day of your surgery. Do NOT drink alcohol. Clear liquids include:  Water  Fruit juices without pulp( i.e. apple juice)  Carbonated beverages  Black coffee (no cream/milk)  Tea (no cream/milk)  Gatorade  You may drink up to 12-16 ounces at one time every 4 hours between the hours of midnight and 1 hour before your arrival time at the hospital. Example- if your arrival time at the hospital is 6am, you may drink 12-16 ounces of clear liquids no later than 5am.  If your arrival time at the hospital is 3pm or later, you may eat a light breakfast before 8am.  A light breakfast includes: Toast or bagel (no butter)  Black coffee (no cream/milk)  Tea (no cream/milk)  Fruit juices without pulp ( i.e. apple juice)  Do NOT eat meat, eggs, vegetables or fruit  If you have any questions, please contact your surgeon's office. Patient Information Regarding COVID Restrictions    Day of Procedure    Please park in the parking deck or any designated visitor parking lot. Enter the facility through the Main Entrance of the hospital.  On the day of surgery, please provide the cell phone number of the person who will be waiting for you to the Patient Access representative at the time of registration. Please wear a mask on the day of your procedure. We are now allowing two designated visitors per stay. Pediatric patients may have 2 designated visitors. These two people may come in with you on the day of your procedure. The designated visitor must also wear a mask.   Once your procedure and the immediate recovery period is completed, a nurse in the recovery area will contact your designated visitor to inform them of your room number or to otherwise review other pertinent information regarding your care. Social distancing practices are to be adhered to in waiting areas and the cafeteria. The patient was contacted in person. She verbalized understanding of all instructions does not  need reinforcement.

## 2022-08-29 NOTE — PERIOP NOTES
INSTRUCTIONS GIVEN AND REVIEWED, 2 BOTTLES OF SOAP GIVEN, PT GIVEN TIME TO ASK QUESTIONS     COPY OF COVID CARD PLACED ON CHART

## 2022-08-30 LAB
BACTERIA SPEC CULT: NORMAL
BACTERIA SPEC CULT: NORMAL
SERVICE CMNT-IMP: NORMAL

## 2022-08-30 NOTE — PERIOP NOTES
PAT Nurse Practitioner   Pre-Operative Chart Review/Assessment:-ORTHOPEDIC                Patient Name:  Rena Ayala                                                           Age:   59 y.o.    :  1960     Today's Date:  2022     Date of PAT:   2022      Date of Surgery:    2022      Procedure(s):  Left Total Knee Arthroplasty     Surgeon:   Dr. Keena Murry                       PLAN:      1)  Medical Clearance:  Ortega Friedman NP      2)  Cardiac Clearance:  Pt seen by Dr. William Gloria on 22. Clearance obtained. OV note in CC. EKG showed SR w/ LAE. 3)  Diabetic Treatment Consult:  Not indicated. A1c-5.5      4)  Sleep Apnea evaluation:   Pt had previous SS that was neg.        5) Treatment for MRSA/Staph Aureus:  Neg      6) Additional Concerns:  Former smoker, THC use, HTN, GERD, CVA(memory loss), dep/anx, bipolar 1                Vital Signs:         Vitals:    22 0823   BP: 121/83   Pulse: 84   Resp: 18   Temp: 98.7 °F (37.1 °C)   Weight: 92.3 kg (203 lb 7.8 oz)   Height: 5' 4\" (1.626 m)          Body mass index is 34.93 kg/m².         ____________________________________________  PAST MEDICAL HISTORY  Past Medical History:   Diagnosis Date    Anxiety     Bipolar 1 disorder (HCC)     Depression     Fatigue     Frequent headaches     GERD (gastroesophageal reflux disease)     Hearing loss     PT DENIES ANY HEARING    Hypertension     Joint pain     Memory disorder     LAPSE OF MEMORY DUE TO STROKES    Muscle pain     Nerve damage     ALL OVER    Snoring     Stomach pain     Stroke ([address]     X2 OVER 10 YRS AGO UNABLE TO REMEMBER DATE      ____________________________________________  PAST SURGICAL HISTORY  Past Surgical History:   Procedure Laterality Date    HX ADENOIDECTOMY      sweat glands    HX GYN      tubal ligation    HX TONSILLECTOMY        ____________________________________________  HOME MEDICATIONS  Current Outpatient Medications   Medication Sig    rimegepant (NURTEC) 75 mg disintegrating tablet Take 1 tablet every other day, then 1 tablet p.o. as needed for severe headache, not to DC 1 dose in 24 hours    atorvastatin (LIPITOR) 10 mg tablet TAKE 1 TABLET BY MOUTH NIGHTLY FOR CHOLESTEROL    ergocalciferol (ERGOCALCIFEROL) 1,250 mcg (50,000 unit) capsule Take 1 Capsule by mouth every seven (7) days. (Patient taking differently: Take 50,000 Units by mouth every seven (7) days. TAKES ON Friday)    folic acid (FOLVITE) 1 mg tablet Take 1 Tablet by mouth in the morning.    gabapentin (NEURONTIN) 300 mg capsule Take 1 cap in am, 2 caps in pm (Patient taking differently: Take 300 mg by mouth three (3) times daily. Take 1 cap in am, 2 caps in pm)    tiZANidine (ZANAFLEX) 4 mg tablet Take 1 Tablet by mouth nightly. metoprolol tartrate (LOPRESSOR) 25 mg tablet Take 1/2 (one-half) tablet by mouth twice daily    clopidogreL (PLAVIX) 75 mg tab Take 1 Tablet by mouth daily. ibuprofen (MOTRIN) 800 mg tablet Take 1 Tablet by mouth every eight (8) hours as needed for Pain.  With food     No current facility-administered medications for this encounter.      ____________________________________________  ALLERGIES  Allergies   Allergen Reactions    Ace Inhibitors Cough    Contrast Dye [Iodine] Hives      ____________________________________________  SOCIAL HISTORY  Social History     Tobacco Use    Smoking status: Some Days     Types: Cigars     Passive exposure: Current    Smokeless tobacco: Never   Substance Use Topics    Alcohol use: Yes     Comment: \"once a month\"      ____________________________________________   Internal Administration   First Dose COVID-19, PFIZER PURPLE top, DILUTE for use, (age 15 y+), IM, 30mcg/0.3mL  04/07/2021   Second Dose COVID-19, PFIZER PURPLE top, DILUTE for use, (age 15 y+), IM, 30mcg/0.3mL  04/28/2021      Last COVID Lab SARS-CoV-2 ( )   Date Value   01/28/2021 NOT DETECTED     SARS-CoV-2 by PCR ( )   Date Value   01/28/2021 Please find results under separate order                    Labs:     Hospital Outpatient Visit on 08/29/2022   Component Date Value Ref Range Status    Sodium 08/29/2022 143  136 - 145 mmol/L Final    Potassium 08/29/2022 4.6  3.5 - 5.1 mmol/L Final    Chloride 08/29/2022 112 (A) 97 - 108 mmol/L Final    CO2 08/29/2022 30  21 - 32 mmol/L Final    Anion gap 08/29/2022 1 (A) 5 - 15 mmol/L Final    Glucose 08/29/2022 111 (A) 65 - 100 mg/dL Final    BUN 08/29/2022 14  6 - 20 MG/DL Final    Creatinine 08/29/2022 0.75  0.55 - 1.02 MG/DL Final    BUN/Creatinine ratio 08/29/2022 19  12 - 20   Final    GFR est AA 08/29/2022 >60  >60 ml/min/1.73m2 Final    GFR est non-AA 08/29/2022 >60  >60 ml/min/1.73m2 Final    Estimated GFR is calculated using the IDMS-traceable Modification of Diet in Renal Disease (MDRD) Study equation, reported for both  Americans (GFRAA) and non- Americans (GFRNA), and normalized to 1.73m2 body surface area. The physician must decide which value applies to the patient.     Calcium 08/29/2022 9.2  8.5 - 10.1 MG/DL Final    WBC 08/29/2022 6.6  3.6 - 11.0 K/uL Final    RBC 08/29/2022 4.61  3.80 - 5.20 M/uL Final    HGB 08/29/2022 14.7  11.5 - 16.0 g/dL Final    HCT 08/29/2022 44.7  35.0 - 47.0 % Final    MCV 08/29/2022 97.0  80.0 - 99.0 FL Final    MCH 08/29/2022 31.9  26.0 - 34.0 PG Final    MCHC 08/29/2022 32.9  30.0 - 36.5 g/dL Final    RDW 08/29/2022 13.9  11.5 - 14.5 % Final    PLATELET 40/19/2226 084  150 - 400 K/uL Final    MPV 08/29/2022 11.7  8.9 - 12.9 FL Final    NRBC 08/29/2022 0.0  0  WBC Final    ABSOLUTE NRBC 08/29/2022 0.00  0.00 - 0.01 K/uL Final    Crossmatch Expiration 08/29/2022 09/12/2022,2359   Final    ABO/Rh(D) 08/29/2022 A POSITIVE   Final    Antibody screen 08/29/2022 NEG   Final    INR 08/29/2022 1.1  0.9 - 1.1   Final    A single therapeutic range for Vit K antagonists may not be optimal for all indications - see June, 2008 issue of Chest[employer] Evidence-Based Clinical Practice Guidelines, 8th Edition. Prothrombin time 08/29/2022 11.3 (A) 9.0 - 11.1 sec Final    Color 08/29/2022 DARK YELLOW    Final    Color Reference Range: Straw, Yellow or Dark Yellow    Appearance 08/29/2022 CLEAR  CLEAR   Final    Specific gravity 08/29/2022 1.027  1.003 - 1.030   Final    pH (UA) 08/29/2022 5.5  5.0 - 8.0   Final    Protein 08/29/2022 TRACE (A) NEG mg/dL Final    Glucose 08/29/2022 Negative  NEG mg/dL Final    Ketone 08/29/2022 Negative  NEG mg/dL Final    Bilirubin 08/29/2022 Negative  NEG   Final    Blood 08/29/2022 Negative  NEG   Final    Urobilinogen 08/29/2022 0.2  0.2 - 1.0 EU/dL Final    Nitrites 08/29/2022 Negative  NEG   Final    Leukocyte Esterase 08/29/2022 Negative  NEG   Final    WBC 08/29/2022 0-4  0 - 4 /hpf Final    RBC 08/29/2022 0-5  0 - 5 /hpf Final    Epithelial cells 08/29/2022 FEW  FEW /lpf Final    Epithelial cell category consists of squamous cells and /or transitional urothelial cells. Renal tubular cells, if present, are separately identified as such. Bacteria 08/29/2022 Negative  NEG /hpf Final    UA:UC IF INDICATED 08/29/2022 CULTURE NOT INDICATED BY UA RESULT  CNI   Final    Hemoglobin A1c 08/29/2022 5.5  4.0 - 5.6 % Final    Comment: NEW METHOD  PLEASE NOTE NEW REFERENCE RANGE  (NOTE)  HbA1C Interpretive Ranges  <5.7              Normal  5.7 - 6.4         Consider Prediabetes  >6.5              Consider Diabetes      Est. average glucose 08/29/2022 111  mg/dL Final    Special Requests: 08/29/2022 NO SPECIAL REQUESTS    Final    Culture result: 08/29/2022 MRSA NOT PRESENT    Final    Culture result: 08/29/2022     Final                    Value:Screening of patient nares for MRSA is for surveillance purposes and, if positive, to facilitate isolation considerations in high risk settings. It is not intended for automatic decolonization interventions per se as regimens are not sufficiently effective to warrant routine use.      Orders Only on 07/27/2022   Component Date Value Ref Range Status    WBC 07/27/2022 6.4  3.6 - 11.0 K/uL Final    RBC 07/27/2022 4.36  3.80 - 5.20 M/uL Final    HGB 07/27/2022 14.0  11.5 - 16.0 g/dL Final    HCT 07/27/2022 42.8  35.0 - 47.0 % Final    MCV 07/27/2022 98.2  80.0 - 99.0 FL Final    MCH 07/27/2022 32.1  26.0 - 34.0 PG Final    MCHC 07/27/2022 32.7  30.0 - 36.5 g/dL Final    RDW 07/27/2022 13.7  11.5 - 14.5 % Final    PLATELET 54/30/3532 093  150 - 400 K/uL Final    MPV 07/27/2022 11.9  8.9 - 12.9 FL Final    NRBC 07/27/2022 0.0  0  WBC Final    ABSOLUTE NRBC 07/27/2022 0.00  0.00 - 0.01 K/uL Final    NEUTROPHILS 07/27/2022 53  32 - 75 % Final    LYMPHOCYTES 07/27/2022 37  12 - 49 % Final    MONOCYTES 07/27/2022 8  5 - 13 % Final    EOSINOPHILS 07/27/2022 1  0 - 7 % Final    BASOPHILS 07/27/2022 1  0 - 1 % Final    IMMATURE GRANULOCYTES 07/27/2022 0  0.0 - 0.5 % Final    ABS. NEUTROPHILS 07/27/2022 3.3  1.8 - 8.0 K/UL Final    ABS. LYMPHOCYTES 07/27/2022 2.4  0.8 - 3.5 K/UL Final    ABS. MONOCYTES 07/27/2022 0.5  0.0 - 1.0 K/UL Final    ABS. EOSINOPHILS 07/27/2022 0.1  0.0 - 0.4 K/UL Final    ABS. BASOPHILS 07/27/2022 0.0  0.0 - 0.1 K/UL Final    ABS. IMM.  GRANS. 07/27/2022 0.0  0.00 - 0.04 K/UL Final    DF 07/27/2022 AUTOMATED    Final    Sodium 07/27/2022 141  136 - 145 mmol/L Final    Potassium 07/27/2022 4.8  3.5 - 5.1 mmol/L Final    Chloride 07/27/2022 109 (A) 97 - 108 mmol/L Final    CO2 07/27/2022 26  21 - 32 mmol/L Final    Anion gap 07/27/2022 6  5 - 15 mmol/L Final    Glucose 07/27/2022 82  65 - 100 mg/dL Final    BUN 07/27/2022 12  6 - 20 MG/DL Final    Creatinine 07/27/2022 0.70  0.55 - 1.02 MG/DL Final    BUN/Creatinine ratio 07/27/2022 17  12 - 20   Final    GFR est AA 07/27/2022 >60  >60 ml/min/1.73m2 Final    GFR est non-AA 07/27/2022 >60  >60 ml/min/1.73m2 Final    Estimated GFR is calculated using the IDMS-traceable Modification of Diet in Renal Disease (MDRD) Study equation, reported for both  Americans (GFRAA) and non- Americans (GFRNA), and normalized to 1.73m2 body surface area. The physician must decide which value applies to the patient. Calcium 07/27/2022 9.0  8.5 - 10.1 MG/DL Final    Bilirubin, total 07/27/2022 0.5  0.2 - 1.0 MG/DL Final    ALT (SGPT) 07/27/2022 18  12 - 78 U/L Final    AST (SGOT) 07/27/2022 12 (A) 15 - 37 U/L Final    Alk. phosphatase 07/27/2022 123 (A) 45 - 117 U/L Final    Protein, total 07/27/2022 6.8  6.4 - 8.2 g/dL Final    Albumin 07/27/2022 3.8  3.5 - 5.0 g/dL Final    Globulin 07/27/2022 3.0  2.0 - 4.0 g/dL Final    A-G Ratio 07/27/2022 1.3  1.1 - 2.2   Final    Cholesterol, total 07/27/2022 179  <200 MG/DL Final    Triglyceride 07/27/2022 141  <150 MG/DL Final    Based on NCEP-ATP III:  Triglycerides <150 mg/dL  is considered normal, 150-199 mg/dL  borderline high,  200-499 mg/dL high and  greater than or equal to 500 mg/dL very high. HDL Cholesterol 07/27/2022 64  MG/DL Final    Based on NCEP ATP III, HDL Cholesterol <40 mg/dL is considered low and >60 mg/dL is elevated. LDL, calculated 07/27/2022 86.8  0 - 100 MG/DL Final    Comment: Based on the NCEP-ATP: LDL-C concentrations are considered  optimal <100 mg/dL,  near optimal/above Normal 100-129 mg/dL  Borderline High: 130-159, High: 160-189 mg/dL  Very High: Greater than or equal to 190 mg/dL      VLDL, calculated 07/27/2022 28.2  MG/DL Final    CHOL/HDL Ratio 07/27/2022 2.8  0.0 - 5.0   Final       Skin:     Denies open wounds, cuts, sores, rashes or other areas of concern in PAT assessment.           Karla Sanchez NP

## 2022-09-12 ENCOUNTER — ANESTHESIA (OUTPATIENT)
Dept: SURGERY | Age: 62
End: 2022-09-12
Payer: MEDICARE

## 2022-09-12 ENCOUNTER — HOSPITAL ENCOUNTER (OUTPATIENT)
Age: 62
Setting detail: OBSERVATION
Discharge: HOME HEALTH | End: 2022-09-15
Attending: ORTHOPAEDIC SURGERY | Admitting: ORTHOPAEDIC SURGERY
Payer: MEDICARE

## 2022-09-12 ENCOUNTER — ANESTHESIA EVENT (OUTPATIENT)
Dept: SURGERY | Age: 62
End: 2022-09-12
Payer: MEDICARE

## 2022-09-12 DIAGNOSIS — Z96.652 S/P TOTAL KNEE REPLACEMENT, LEFT: ICD-10-CM

## 2022-09-12 DIAGNOSIS — M17.12 PRIMARY OSTEOARTHRITIS OF LEFT KNEE: Primary | ICD-10-CM

## 2022-09-12 LAB
GLUCOSE BLD STRIP.AUTO-MCNC: 104 MG/DL (ref 65–117)
SERVICE CMNT-IMP: NORMAL

## 2022-09-12 PROCEDURE — 76010000171 HC OR TIME 2 TO 2.5 HR INTENSV-TIER 1: Performed by: ORTHOPAEDIC SURGERY

## 2022-09-12 PROCEDURE — 74011250637 HC RX REV CODE- 250/637: Performed by: PHYSICIAN ASSISTANT

## 2022-09-12 PROCEDURE — 2709999900 HC NON-CHARGEABLE SUPPLY

## 2022-09-12 PROCEDURE — C9290 INJ, BUPIVACAINE LIPOSOME: HCPCS | Performed by: ORTHOPAEDIC SURGERY

## 2022-09-12 PROCEDURE — 77030010783 HC BOWL MX BN CEM J&J -B: Performed by: ORTHOPAEDIC SURGERY

## 2022-09-12 PROCEDURE — 77030031139 HC SUT VCRL2 J&J -A: Performed by: ORTHOPAEDIC SURGERY

## 2022-09-12 PROCEDURE — G0378 HOSPITAL OBSERVATION PER HR: HCPCS

## 2022-09-12 PROCEDURE — 77030006822 HC BLD SAW SAG BRSM -B: Performed by: ORTHOPAEDIC SURGERY

## 2022-09-12 PROCEDURE — 77030005513 HC CATH URETH FOL11 MDII -B: Performed by: ORTHOPAEDIC SURGERY

## 2022-09-12 PROCEDURE — 74011250636 HC RX REV CODE- 250/636: Performed by: PHYSICIAN ASSISTANT

## 2022-09-12 PROCEDURE — 74011000258 HC RX REV CODE- 258: Performed by: ORTHOPAEDIC SURGERY

## 2022-09-12 PROCEDURE — 97116 GAIT TRAINING THERAPY: CPT

## 2022-09-12 PROCEDURE — C1713 ANCHOR/SCREW BN/BN,TIS/BN: HCPCS | Performed by: ORTHOPAEDIC SURGERY

## 2022-09-12 PROCEDURE — C1776 JOINT DEVICE (IMPLANTABLE): HCPCS | Performed by: ORTHOPAEDIC SURGERY

## 2022-09-12 PROCEDURE — 2709999900 HC NON-CHARGEABLE SUPPLY: Performed by: ORTHOPAEDIC SURGERY

## 2022-09-12 PROCEDURE — 74011250636 HC RX REV CODE- 250/636: Performed by: ANESTHESIOLOGY

## 2022-09-12 PROCEDURE — 74011250637 HC RX REV CODE- 250/637

## 2022-09-12 PROCEDURE — 77030000032 HC CUF TRNQT ZIMM -B: Performed by: ORTHOPAEDIC SURGERY

## 2022-09-12 PROCEDURE — 76060000035 HC ANESTHESIA 2 TO 2.5 HR: Performed by: ORTHOPAEDIC SURGERY

## 2022-09-12 PROCEDURE — 97530 THERAPEUTIC ACTIVITIES: CPT

## 2022-09-12 PROCEDURE — 74011000250 HC RX REV CODE- 250: Performed by: PHYSICIAN ASSISTANT

## 2022-09-12 PROCEDURE — 77030038692 HC WND DEB SYS IRMX -B: Performed by: ORTHOPAEDIC SURGERY

## 2022-09-12 PROCEDURE — 27447 TOTAL KNEE ARTHROPLASTY: CPT | Performed by: ORTHOPAEDIC SURGERY

## 2022-09-12 PROCEDURE — 27447 TOTAL KNEE ARTHROPLASTY: CPT | Performed by: PHYSICIAN ASSISTANT

## 2022-09-12 PROCEDURE — 77030008462 HC STPLR SKN PROX J&J -A: Performed by: ORTHOPAEDIC SURGERY

## 2022-09-12 PROCEDURE — 77030040361 HC SLV COMPR DVT MDII -B

## 2022-09-12 PROCEDURE — 97161 PT EVAL LOW COMPLEX 20 MIN: CPT

## 2022-09-12 PROCEDURE — 77030041279 HC DRSG PRMSL AG MDII -B: Performed by: ORTHOPAEDIC SURGERY

## 2022-09-12 PROCEDURE — 77030035236 HC SUT PDS STRATFX BARB J&J -B: Performed by: ORTHOPAEDIC SURGERY

## 2022-09-12 PROCEDURE — 74011250636 HC RX REV CODE- 250/636: Performed by: NURSE ANESTHETIST, CERTIFIED REGISTERED

## 2022-09-12 PROCEDURE — 74011250636 HC RX REV CODE- 250/636: Performed by: ORTHOPAEDIC SURGERY

## 2022-09-12 PROCEDURE — 76210000017 HC OR PH I REC 1.5 TO 2 HR: Performed by: ORTHOPAEDIC SURGERY

## 2022-09-12 PROCEDURE — 77030028907 HC WRP KNEE WO BGS SOLM -B

## 2022-09-12 PROCEDURE — 74011000250 HC RX REV CODE- 250: Performed by: NURSE ANESTHETIST, CERTIFIED REGISTERED

## 2022-09-12 PROCEDURE — 82962 GLUCOSE BLOOD TEST: CPT

## 2022-09-12 PROCEDURE — 77030007866 HC KT SPN ANES BBMI -B: Performed by: ANESTHESIOLOGY

## 2022-09-12 PROCEDURE — 74011000250 HC RX REV CODE- 250: Performed by: ORTHOPAEDIC SURGERY

## 2022-09-12 DEVICE — IMPLANTABLE DEVICE
Type: IMPLANTABLE DEVICE | Site: KNEE | Status: FUNCTIONAL
Brand: PERSONA® VIVACIT-E®

## 2022-09-12 DEVICE — IMPLANTABLE DEVICE
Type: IMPLANTABLE DEVICE | Site: KNEE | Status: FUNCTIONAL
Brand: PERSONA®

## 2022-09-12 DEVICE — IMPLANTABLE DEVICE
Type: IMPLANTABLE DEVICE | Site: KNEE | Status: FUNCTIONAL
Brand: PERSONA® NATURAL TIBIA®

## 2022-09-12 DEVICE — CEMENT BNE 40GM FULL DOSE PMMA W/ GENT HI VISC RADPQ LNG: Type: IMPLANTABLE DEVICE | Site: KNEE | Status: FUNCTIONAL

## 2022-09-12 DEVICE — KNEE K1 TOT HEMI STD CEM IMPL CAPPED K1 ZIM: Type: IMPLANTABLE DEVICE | Status: FUNCTIONAL

## 2022-09-12 RX ORDER — HYDROMORPHONE HYDROCHLORIDE 1 MG/ML
0.2 INJECTION, SOLUTION INTRAMUSCULAR; INTRAVENOUS; SUBCUTANEOUS
Status: DISCONTINUED | OUTPATIENT
Start: 2022-09-12 | End: 2022-09-12 | Stop reason: HOSPADM

## 2022-09-12 RX ORDER — ONDANSETRON 2 MG/ML
4 INJECTION INTRAMUSCULAR; INTRAVENOUS
Status: DISPENSED | OUTPATIENT
Start: 2022-09-12 | End: 2022-09-14

## 2022-09-12 RX ORDER — SODIUM CHLORIDE 0.9 % (FLUSH) 0.9 %
5-40 SYRINGE (ML) INJECTION EVERY 8 HOURS
Status: DISCONTINUED | OUTPATIENT
Start: 2022-09-12 | End: 2022-09-15 | Stop reason: HOSPADM

## 2022-09-12 RX ORDER — SODIUM CHLORIDE 9 MG/ML
INJECTION, SOLUTION INTRAVENOUS
Status: DISCONTINUED | OUTPATIENT
Start: 2022-09-12 | End: 2022-09-12 | Stop reason: HOSPADM

## 2022-09-12 RX ORDER — ONDANSETRON 2 MG/ML
4 INJECTION INTRAMUSCULAR; INTRAVENOUS AS NEEDED
Status: DISCONTINUED | OUTPATIENT
Start: 2022-09-12 | End: 2022-09-12 | Stop reason: HOSPADM

## 2022-09-12 RX ORDER — METOPROLOL TARTRATE 25 MG/1
12.5 TABLET, FILM COATED ORAL 2 TIMES DAILY
Status: DISCONTINUED | OUTPATIENT
Start: 2022-09-12 | End: 2022-09-15 | Stop reason: HOSPADM

## 2022-09-12 RX ORDER — AMOXICILLIN 250 MG
1 CAPSULE ORAL 2 TIMES DAILY
Status: DISCONTINUED | OUTPATIENT
Start: 2022-09-12 | End: 2022-09-15 | Stop reason: HOSPADM

## 2022-09-12 RX ORDER — CLOPIDOGREL BISULFATE 75 MG/1
75 TABLET ORAL DAILY
Status: DISCONTINUED | OUTPATIENT
Start: 2022-09-13 | End: 2022-09-15 | Stop reason: HOSPADM

## 2022-09-12 RX ORDER — HYDROXYZINE HYDROCHLORIDE 10 MG/1
10 TABLET, FILM COATED ORAL
Status: DISCONTINUED | OUTPATIENT
Start: 2022-09-12 | End: 2022-09-15 | Stop reason: HOSPADM

## 2022-09-12 RX ORDER — SODIUM CHLORIDE 0.9 % (FLUSH) 0.9 %
5-40 SYRINGE (ML) INJECTION AS NEEDED
Status: DISCONTINUED | OUTPATIENT
Start: 2022-09-12 | End: 2022-09-12 | Stop reason: HOSPADM

## 2022-09-12 RX ORDER — OXYCODONE HYDROCHLORIDE 5 MG/1
5 TABLET ORAL
Status: DISCONTINUED | OUTPATIENT
Start: 2022-09-12 | End: 2022-09-15 | Stop reason: HOSPADM

## 2022-09-12 RX ORDER — SODIUM CHLORIDE, SODIUM LACTATE, POTASSIUM CHLORIDE, CALCIUM CHLORIDE 600; 310; 30; 20 MG/100ML; MG/100ML; MG/100ML; MG/100ML
75 INJECTION, SOLUTION INTRAVENOUS CONTINUOUS
Status: DISCONTINUED | OUTPATIENT
Start: 2022-09-12 | End: 2022-09-12 | Stop reason: HOSPADM

## 2022-09-12 RX ORDER — SODIUM CHLORIDE, SODIUM LACTATE, POTASSIUM CHLORIDE, CALCIUM CHLORIDE 600; 310; 30; 20 MG/100ML; MG/100ML; MG/100ML; MG/100ML
INJECTION, SOLUTION INTRAVENOUS
Status: DISCONTINUED | OUTPATIENT
Start: 2022-09-12 | End: 2022-09-12 | Stop reason: HOSPADM

## 2022-09-12 RX ORDER — PROPOFOL 10 MG/ML
INJECTION, EMULSION INTRAVENOUS
Status: DISCONTINUED | OUTPATIENT
Start: 2022-09-12 | End: 2022-09-12 | Stop reason: HOSPADM

## 2022-09-12 RX ORDER — SODIUM CHLORIDE 9 MG/ML
50 INJECTION, SOLUTION INTRAVENOUS CONTINUOUS
Status: DISCONTINUED | OUTPATIENT
Start: 2022-09-12 | End: 2022-09-12 | Stop reason: HOSPADM

## 2022-09-12 RX ORDER — FENTANYL CITRATE 50 UG/ML
25 INJECTION, SOLUTION INTRAMUSCULAR; INTRAVENOUS
Status: DISCONTINUED | OUTPATIENT
Start: 2022-09-12 | End: 2022-09-12 | Stop reason: HOSPADM

## 2022-09-12 RX ORDER — POLYETHYLENE GLYCOL 3350 17 G/17G
17 POWDER, FOR SOLUTION ORAL DAILY
Status: DISCONTINUED | OUTPATIENT
Start: 2022-09-13 | End: 2022-09-15 | Stop reason: HOSPADM

## 2022-09-12 RX ORDER — SODIUM CHLORIDE 9 MG/ML
125 INJECTION, SOLUTION INTRAVENOUS CONTINUOUS
Status: DISPENSED | OUTPATIENT
Start: 2022-09-12 | End: 2022-09-13

## 2022-09-12 RX ORDER — GABAPENTIN 300 MG/1
300 CAPSULE ORAL 3 TIMES DAILY
Status: DISCONTINUED | OUTPATIENT
Start: 2022-09-12 | End: 2022-09-15 | Stop reason: HOSPADM

## 2022-09-12 RX ORDER — ACETAMINOPHEN 500 MG
1000 TABLET ORAL EVERY 6 HOURS
Status: DISCONTINUED | OUTPATIENT
Start: 2022-09-12 | End: 2022-09-15 | Stop reason: HOSPADM

## 2022-09-12 RX ORDER — SODIUM CHLORIDE 0.9 % (FLUSH) 0.9 %
5-40 SYRINGE (ML) INJECTION AS NEEDED
Status: DISCONTINUED | OUTPATIENT
Start: 2022-09-12 | End: 2022-09-15 | Stop reason: HOSPADM

## 2022-09-12 RX ORDER — PROPOFOL 10 MG/ML
INJECTION, EMULSION INTRAVENOUS AS NEEDED
Status: DISCONTINUED | OUTPATIENT
Start: 2022-09-12 | End: 2022-09-12 | Stop reason: HOSPADM

## 2022-09-12 RX ORDER — DIPHENHYDRAMINE HYDROCHLORIDE 50 MG/ML
12.5 INJECTION, SOLUTION INTRAMUSCULAR; INTRAVENOUS AS NEEDED
Status: DISCONTINUED | OUTPATIENT
Start: 2022-09-12 | End: 2022-09-12 | Stop reason: HOSPADM

## 2022-09-12 RX ORDER — MIDAZOLAM HYDROCHLORIDE 1 MG/ML
0.5 INJECTION, SOLUTION INTRAMUSCULAR; INTRAVENOUS
Status: DISCONTINUED | OUTPATIENT
Start: 2022-09-12 | End: 2022-09-12 | Stop reason: HOSPADM

## 2022-09-12 RX ORDER — FACIAL-BODY WIPES
10 EACH TOPICAL DAILY PRN
Status: DISCONTINUED | OUTPATIENT
Start: 2022-09-14 | End: 2022-09-15 | Stop reason: HOSPADM

## 2022-09-12 RX ORDER — PROCHLORPERAZINE EDISYLATE 5 MG/ML
5 INJECTION INTRAMUSCULAR; INTRAVENOUS
Status: ACTIVE | OUTPATIENT
Start: 2022-09-12 | End: 2022-09-14

## 2022-09-12 RX ORDER — ROPIVACAINE HYDROCHLORIDE 5 MG/ML
INJECTION, SOLUTION EPIDURAL; INFILTRATION; PERINEURAL
Status: COMPLETED | OUTPATIENT
Start: 2022-09-12 | End: 2022-09-12

## 2022-09-12 RX ORDER — OXYCODONE HYDROCHLORIDE 5 MG/1
10 TABLET ORAL
Status: DISCONTINUED | OUTPATIENT
Start: 2022-09-12 | End: 2022-09-15 | Stop reason: HOSPADM

## 2022-09-12 RX ORDER — MIDAZOLAM HYDROCHLORIDE 1 MG/ML
1 INJECTION, SOLUTION INTRAMUSCULAR; INTRAVENOUS AS NEEDED
Status: DISCONTINUED | OUTPATIENT
Start: 2022-09-12 | End: 2022-09-12 | Stop reason: HOSPADM

## 2022-09-12 RX ORDER — ATORVASTATIN CALCIUM 10 MG/1
10 TABLET, FILM COATED ORAL
Status: DISCONTINUED | OUTPATIENT
Start: 2022-09-12 | End: 2022-09-15 | Stop reason: HOSPADM

## 2022-09-12 RX ORDER — LIDOCAINE HYDROCHLORIDE 10 MG/ML
0.1 INJECTION, SOLUTION EPIDURAL; INFILTRATION; INTRACAUDAL; PERINEURAL AS NEEDED
Status: DISCONTINUED | OUTPATIENT
Start: 2022-09-12 | End: 2022-09-12 | Stop reason: HOSPADM

## 2022-09-12 RX ORDER — CYCLOBENZAPRINE HCL 10 MG
10 TABLET ORAL
Status: DISCONTINUED | OUTPATIENT
Start: 2022-09-12 | End: 2022-09-15 | Stop reason: HOSPADM

## 2022-09-12 RX ORDER — KETAMINE HYDROCHLORIDE 50 MG/ML
INJECTION, SOLUTION INTRAMUSCULAR; INTRAVENOUS AS NEEDED
Status: DISCONTINUED | OUTPATIENT
Start: 2022-09-12 | End: 2022-09-12 | Stop reason: HOSPADM

## 2022-09-12 RX ORDER — ONDANSETRON 2 MG/ML
INJECTION INTRAMUSCULAR; INTRAVENOUS AS NEEDED
Status: DISCONTINUED | OUTPATIENT
Start: 2022-09-12 | End: 2022-09-12 | Stop reason: HOSPADM

## 2022-09-12 RX ORDER — HYDROMORPHONE HYDROCHLORIDE 1 MG/ML
1 INJECTION, SOLUTION INTRAMUSCULAR; INTRAVENOUS; SUBCUTANEOUS
Status: DISPENSED | OUTPATIENT
Start: 2022-09-12 | End: 2022-09-13

## 2022-09-12 RX ORDER — FENTANYL CITRATE 50 UG/ML
INJECTION, SOLUTION INTRAMUSCULAR; INTRAVENOUS AS NEEDED
Status: DISCONTINUED | OUTPATIENT
Start: 2022-09-12 | End: 2022-09-12 | Stop reason: HOSPADM

## 2022-09-12 RX ORDER — FENTANYL CITRATE 50 UG/ML
50 INJECTION, SOLUTION INTRAMUSCULAR; INTRAVENOUS AS NEEDED
Status: DISCONTINUED | OUTPATIENT
Start: 2022-09-12 | End: 2022-09-12 | Stop reason: HOSPADM

## 2022-09-12 RX ORDER — DEXAMETHASONE SODIUM PHOSPHATE 4 MG/ML
INJECTION, SOLUTION INTRA-ARTICULAR; INTRALESIONAL; INTRAMUSCULAR; INTRAVENOUS; SOFT TISSUE AS NEEDED
Status: DISCONTINUED | OUTPATIENT
Start: 2022-09-12 | End: 2022-09-12 | Stop reason: HOSPADM

## 2022-09-12 RX ORDER — OXYCODONE AND ACETAMINOPHEN 5; 325 MG/1; MG/1
1 TABLET ORAL AS NEEDED
Status: DISCONTINUED | OUTPATIENT
Start: 2022-09-12 | End: 2022-09-12 | Stop reason: HOSPADM

## 2022-09-12 RX ORDER — MIDAZOLAM HYDROCHLORIDE 1 MG/ML
INJECTION, SOLUTION INTRAMUSCULAR; INTRAVENOUS AS NEEDED
Status: DISCONTINUED | OUTPATIENT
Start: 2022-09-12 | End: 2022-09-12 | Stop reason: HOSPADM

## 2022-09-12 RX ORDER — NALOXONE HYDROCHLORIDE 0.4 MG/ML
0.4 INJECTION, SOLUTION INTRAMUSCULAR; INTRAVENOUS; SUBCUTANEOUS AS NEEDED
Status: DISCONTINUED | OUTPATIENT
Start: 2022-09-12 | End: 2022-09-15 | Stop reason: HOSPADM

## 2022-09-12 RX ORDER — TRANEXAMIC ACID 100 MG/ML
INJECTION, SOLUTION INTRAVENOUS AS NEEDED
Status: DISCONTINUED | OUTPATIENT
Start: 2022-09-12 | End: 2022-09-12 | Stop reason: HOSPADM

## 2022-09-12 RX ORDER — SODIUM CHLORIDE 0.9 % (FLUSH) 0.9 %
5-40 SYRINGE (ML) INJECTION EVERY 8 HOURS
Status: DISCONTINUED | OUTPATIENT
Start: 2022-09-12 | End: 2022-09-12 | Stop reason: HOSPADM

## 2022-09-12 RX ORDER — MORPHINE SULFATE 2 MG/ML
2 INJECTION, SOLUTION INTRAMUSCULAR; INTRAVENOUS
Status: DISCONTINUED | OUTPATIENT
Start: 2022-09-12 | End: 2022-09-12 | Stop reason: HOSPADM

## 2022-09-12 RX ADMIN — OXYCODONE 5 MG: 5 TABLET ORAL at 14:55

## 2022-09-12 RX ADMIN — PROPOFOL 25 MG: 10 INJECTION, EMULSION INTRAVENOUS at 09:19

## 2022-09-12 RX ADMIN — WATER 2 G: 1 INJECTION INTRAMUSCULAR; INTRAVENOUS; SUBCUTANEOUS at 09:37

## 2022-09-12 RX ADMIN — FENTANYL CITRATE 100 MCG: 50 INJECTION, SOLUTION INTRAMUSCULAR; INTRAVENOUS at 08:47

## 2022-09-12 RX ADMIN — ROPIVACAINE HYDROCHLORIDE 30 ML: 5 INJECTION, SOLUTION EPIDURAL; INFILTRATION; PERINEURAL at 08:57

## 2022-09-12 RX ADMIN — PHENYLEPHRINE HYDROCHLORIDE 40 MCG/MIN: 10 INJECTION INTRAVENOUS at 09:53

## 2022-09-12 RX ADMIN — SODIUM CHLORIDE: 900 INJECTION, SOLUTION INTRAVENOUS at 10:51

## 2022-09-12 RX ADMIN — SODIUM CHLORIDE, POTASSIUM CHLORIDE, SODIUM LACTATE AND CALCIUM CHLORIDE: 600; 310; 30; 20 INJECTION, SOLUTION INTRAVENOUS at 09:11

## 2022-09-12 RX ADMIN — ONDANSETRON HYDROCHLORIDE 4 MG: 2 INJECTION, SOLUTION INTRAMUSCULAR; INTRAVENOUS at 09:53

## 2022-09-12 RX ADMIN — DEXAMETHASONE SODIUM PHOSPHATE 4 MG: 4 INJECTION, SOLUTION INTRAMUSCULAR; INTRAVENOUS at 09:53

## 2022-09-12 RX ADMIN — GABAPENTIN 300 MG: 300 CAPSULE ORAL at 21:18

## 2022-09-12 RX ADMIN — KETAMINE HYDROCHLORIDE 30 MG: 50 INJECTION, SOLUTION INTRAMUSCULAR; INTRAVENOUS at 09:33

## 2022-09-12 RX ADMIN — METOPROLOL TARTRATE 12.5 MG: 25 TABLET, FILM COATED ORAL at 18:47

## 2022-09-12 RX ADMIN — PROPOFOL 50 MCG/KG/MIN: 10 INJECTION, EMULSION INTRAVENOUS at 09:13

## 2022-09-12 RX ADMIN — OXYCODONE 10 MG: 5 TABLET ORAL at 23:13

## 2022-09-12 RX ADMIN — GABAPENTIN 300 MG: 300 CAPSULE ORAL at 17:00

## 2022-09-12 RX ADMIN — ACETAMINOPHEN 1000 MG: 500 TABLET, FILM COATED ORAL at 18:47

## 2022-09-12 RX ADMIN — DOCUSATE SODIUM 50MG AND SENNOSIDES 8.6MG 1 TABLET: 8.6; 5 TABLET, FILM COATED ORAL at 18:47

## 2022-09-12 RX ADMIN — SODIUM CHLORIDE, PRESERVATIVE FREE 10 ML: 5 INJECTION INTRAVENOUS at 21:17

## 2022-09-12 RX ADMIN — HYDROMORPHONE HYDROCHLORIDE 1 MG: 1 INJECTION, SOLUTION INTRAMUSCULAR; INTRAVENOUS; SUBCUTANEOUS at 14:05

## 2022-09-12 RX ADMIN — PROPOFOL 150 MG: 10 INJECTION, EMULSION INTRAVENOUS at 09:48

## 2022-09-12 RX ADMIN — MIDAZOLAM HYDROCHLORIDE 2 MG: 1 INJECTION, SOLUTION INTRAMUSCULAR; INTRAVENOUS at 08:47

## 2022-09-12 RX ADMIN — HYDROMORPHONE HYDROCHLORIDE 1 MG: 1 INJECTION, SOLUTION INTRAMUSCULAR; INTRAVENOUS; SUBCUTANEOUS at 21:17

## 2022-09-12 RX ADMIN — PROPOFOL 25 MG: 10 INJECTION, EMULSION INTRAVENOUS at 09:13

## 2022-09-12 RX ADMIN — MIDAZOLAM 2 MG: 1 INJECTION INTRAMUSCULAR; INTRAVENOUS at 09:13

## 2022-09-12 RX ADMIN — HYDROMORPHONE HYDROCHLORIDE 1 MG: 1 INJECTION, SOLUTION INTRAMUSCULAR; INTRAVENOUS; SUBCUTANEOUS at 18:14

## 2022-09-12 RX ADMIN — ATORVASTATIN CALCIUM 10 MG: 10 TABLET, FILM COATED ORAL at 21:18

## 2022-09-12 RX ADMIN — CYCLOBENZAPRINE 10 MG: 10 TABLET, FILM COATED ORAL at 14:55

## 2022-09-12 RX ADMIN — FENTANYL CITRATE 50 MCG: 50 INJECTION, SOLUTION INTRAMUSCULAR; INTRAVENOUS at 09:41

## 2022-09-12 RX ADMIN — CEFAZOLIN 2 G: 1 INJECTION, POWDER, FOR SOLUTION INTRAMUSCULAR; INTRAVENOUS at 18:47

## 2022-09-12 RX ADMIN — SODIUM CHLORIDE, POTASSIUM CHLORIDE, SODIUM LACTATE AND CALCIUM CHLORIDE 75 ML/HR: 600; 310; 30; 20 INJECTION, SOLUTION INTRAVENOUS at 08:35

## 2022-09-12 RX ADMIN — KETAMINE HYDROCHLORIDE 20 MG: 50 INJECTION, SOLUTION INTRAMUSCULAR; INTRAVENOUS at 09:48

## 2022-09-12 RX ADMIN — MEPIVACAINE HYDROCHLORIDE 60 MG: 20 INJECTION, SOLUTION EPIDURAL; INFILTRATION at 09:28

## 2022-09-12 RX ADMIN — ACETAMINOPHEN 1000 MG: 500 TABLET, FILM COATED ORAL at 23:13

## 2022-09-12 NOTE — ANESTHESIA PROCEDURE NOTES
Peripheral Block    Performed by: Patricia Gil DO  Authorized by: Patricia Gil DO       Pre-procedure: Indications: at surgeon's request and post-op pain management    Preanesthetic Checklist: patient identified, risks and benefits discussed, site marked, timeout performed, anesthesia consent given and patient being monitored      Block Type:   Block Type:   Adductor canal  Laterality:  Left  Monitoring:  Standard ASA monitoring, continuous pulse ox, frequent vital sign checks, heart rate, responsive to questions and oxygen  Injection Technique:  Single shot  Procedures: ultrasound guided    Patient Position: supine  Prep: DuraPrep    Needle Type:  Stimuplex  Needle Gauge:  22 G  Needle Localization:  Ultrasound guidance  Medication Injected:  Ropivacaine (PF) (NAROPIN)(0.5%) 5 mg/mL injection - Peripheral Nerve Block   30 mL - 9/12/2022 8:57:00 AM  Med Admin Time: 9/12/2022 8:57 AM    Assessment:  Number of attempts:  1  Injection Assessment:  Incremental injection every 5 mL, local visualized surrounding nerve on ultrasound, negative aspiration for blood, no paresthesia, no intravascular symptoms and ultrasound image on chart  Patient tolerance:  Patient tolerated the procedure well with no immediate complications

## 2022-09-12 NOTE — PERIOP NOTES
TRANSFER - OUT REPORT:    Verbal report given to Hitesh Levy RN (name) on Carren See  being transferred to  (unit) for routine post - op       Report consisted of patients Situation, Background, Assessment and   Recommendations(SBAR). Time Pre op antibiotic given: 0937  Anesthesia Stop time: 1125    Information from the following report(s) OR Summary, Intake/Output, MAR, Recent Results, Med Rec Status, and Cardiac Rhythm SR  was reviewed with the receiving nurse. Opportunity for questions and clarification was provided. Is the patient on 02? YES       L/Min 2    Is the patient on a monitor? NO    Is the nurse transporting with the patient? NO    Surgical Waiting Area notified of patient's transfer from PACU? YES (Haresh--Mark)       The following personal items collected during your admission accompanied patient upon transfer:   Dental Appliance: Dental Appliances: None  Vision:    Hearing Aid:    Jewelry:    Clothing: Clothing: Other (comment) (clothing bag and shoes returned to patient in pacu)  Other Valuables:  Other Valuables: Mable Pitts (returned to patient in pacu)  Valuables sent to safe:

## 2022-09-12 NOTE — ANESTHESIA PREPROCEDURE EVALUATION
Relevant Problems   No relevant active problems       Anesthetic History   No history of anesthetic complications            Review of Systems / Medical History  Patient summary reviewed, nursing notes reviewed and pertinent labs reviewed    Pulmonary                   Neuro/Psych       CVA  Headaches and psychiatric history    Comments: Bipolar 1 disorder (HCC) (F31.9)  Memory disorder (R41.3)  Drug use: Marijuana Cardiovascular    Hypertension              Exercise tolerance: >4 METS     GI/Hepatic/Renal     GERD  Hepatitis: type C         Endo/Other        Obesity and arthritis     Other Findings              Physical Exam    Airway  Mallampati: II  TM Distance: 4 - 6 cm  Neck ROM: normal range of motion   Mouth opening: Normal     Cardiovascular  Regular rate and rhythm,  S1 and S2 normal,  no murmur, click, rub, or gallop  Rhythm: regular  Rate: normal         Dental    Dentition: Caps/crowns     Pulmonary  Breath sounds clear to auscultation               Abdominal  GI exam deferred       Other Findings            Anesthetic Plan    ASA: 3  Anesthesia type: spinal and general - backup      Post-op pain plan if not by surgeon: peripheral nerve block single    Induction: Intravenous  Anesthetic plan and risks discussed with: Patient

## 2022-09-12 NOTE — PROGRESS NOTES
Problem: Mobility Impaired (Adult and Pediatric)  Goal: *Acute Goals and Plan of Care (Insert Text)  Description: FUNCTIONAL STATUS PRIOR TO ADMISSION: Patient was independent and active without use of DME.    HOME SUPPORT PRIOR TO ADMISSION: The patient lived with fiance but did not require assist.    Physical Therapy Goals  Initiated 9/12/2022    1. Patient will move from supine to sit and sit to supine , scoot up and down, and roll side to side in bed with modified independence within 4 days. 2. Patient will perform sit to stand with modified independence within 4 days. 3. Patient will ambulate with modified independence for 150 feet with the least restrictive device within 4 days. 4. Patient will ascend/descend 4 stairs with cane and one handrail(s) with modified independence within 4 days. 5. Patient will perform home exercise program per protocol with independence within 4 days. 6. Patient will demonstrate AROM 0-90 degrees in operative joint within 4 days. Outcome: Progressing Towards Goal   PHYSICAL THERAPY EVALUATION  Patient: Nirmala Chaves (43 y.o. female)  Date: 9/12/2022  Primary Diagnosis: Primary osteoarthritis of left knee [M17.12]  Procedure(s) (LRB):  LEFT TOTAL KNEE ARTHROPLASTY (Left) Day of Surgery   Precautions:   Fall, WBAT    ASSESSMENT  Based on the objective data described below, the patient presents with  impairment in functional mobility, activity tolerance and balance s/p L TKA. PLOF: Independent with ADLs and IADLs. Patient lives in a one story apartment with 2 steps and bilateral rails to enter. She is having extreme post-op pain which is being addressed by nursing and ortho NP. Patient agreed to get up with PT as she needed to urinate. Patient performed bed mobility, transfers to bed side commode, and a few steps forward, then back to bed. Mobility this afternoon was limited by PAIN.  Patient instructed NOT to get up from bed, chair or commode without calling for assistance. Initiated post TKA exercise protocol and wrote same on [employer]. Anticipate discharge after 3-4 more PT sessions. Current Level of Function Impacting Discharge (mobility/balance): Moderate assistance for all mobility. Unable to effectively ambulate, could only take a few steps with RW. Functional Outcome Measure: The patient scored 40/100 on the Barthel outcome measure which is indicative of moderate amimpaired ability to care for basic self-needs/dependency on others. .      Other factors to consider for discharge: High post-op Pain Levels/Motivated/A & O x 4/Supportive Family/Independent PLOF      Patient will benefit from skilled therapy intervention to address the above noted impairments. PLAN :  Recommendations and Planned Interventions: bed mobility training, transfer training, gait training, therapeutic exercises, patient and family training/education, and therapeutic activities      Frequency/Duration: Patient will be followed by physical therapy:  twice daily to address goals. Recommendation for discharge: (in order for the patient to meet his/her long term goals)  Physical therapy at least 2 days/week in the home     This discharge recommendation:  Has been made in collaboration with the attending provider and/or case management    IF patient discharges home will need the following DME: Rolling walker order placed.          SUBJECTIVE:   Patient stated This hurts so bad!!!!.    OBJECTIVE DATA SUMMARY:   HISTORY:    Past Medical History:   Diagnosis Date    Anxiety     Bipolar 1 disorder ([address]     Depression     Fatigue     Frequent headaches     GERD (gastroesophageal reflux disease)     Hearing loss     PT DENIES ANY HEARING    Hypertension     Joint pain     Memory disorder     LAPSE OF MEMORY DUE TO STROKES    Muscle pain     Nerve damage     ALL OVER    Snoring     Stomach pain     Stroke (HCC)     X2 OVER 10 YRS AGO UNABLE TO REMEMBER DATE Past Surgical History:   Procedure Laterality Date    HX ADENOIDECTOMY      sweat glands    HX GYN      tubal ligation    HX TONSILLECTOMY         Personal factors and/or comorbidities impacting plan of care: High post-op pain levels/Motivated/A & O x 4/Supportive Family/Independent PLOF     Home Situation  Home Environment: Apartment  # Steps to Enter: 2  Rails to Enter: Yes  Hand Rails : Bilateral  One/Two Story Residence: One story  Living Alone: Yes  Support Systems: Other Family Member(s) (niece staying for 3 days to help)  Patient Expects to be Discharged to[de-identified] Home with home health  Current DME Used/Available at Home: Walker, rolling, Grab bars  Tub or Shower Type: Shower    EXAMINATION/PRESENTATION/DECISION MAKING:   Critical Behavior:  Neurologic State: Drowsy, Eyes open to stimulus           Hearing:     Skin:  Ace Wrap Intact with no drainage appreciated. Edema: Normal post-op inflammation   Range Of Motion:  AROM: Generally decreased, functional           PROM: Generally decreased, functional           Strength:    Strength: Generally decreased, functional                    Tone & Sensation:   Tone: Normal              Sensation: Intact               Coordination:  Coordination: Within functional limits  Vision:      Functional Mobility:  Bed Mobility:     Supine to Sit: Minimum assistance; Moderate assistance  Sit to Supine: Minimum assistance  Scooting: Minimum assistance  Transfers:  Sit to Stand: Moderate assistance  Stand to Sit: Moderate assistance                       Balance:   Sitting: Impaired  Sitting - Static: Fair (occasional)  Sitting - Dynamic: Fair (occasional)  Standing: Impaired; With support  Standing - Static: Fair;Constant support  Standing - Dynamic : Fair;Poor;Constant support  Ambulation/Gait Training:  Distance (ft): 3 Feet (ft)  Assistive Device: Walker, rolling;Gait belt  Ambulation - Level of Assistance:  Moderate assistance        Gait Abnormalities: Antalgic;Decreased step clearance (could barely move feet)     Left Side Weight Bearing: As tolerated  Base of Support: Widened;Shift to right  Stance: Left decreased  Speed/Valeria: Shuffled;Pace decreased (<100 feet/min)  Step Length: Right shortened;Left shortened        Interventions: Safety awareness training;Verbal cues              Therapeutic Exercises: Ankle Pumps  Ham Sets  Quad Sets  Heel Slides  X 10 each, 5-7x daily      Functional Measure:  Barthel Index:    Bathin  Bladder: 10  Bowels: 10  Groomin  Dressin  Feeding: 10  Mobility: 0  Stairs: 0  Toilet Use: 5  Transfer (Bed to Chair and Back): 5  Total: 40/100       The Barthel ADL Index: Guidelines  1. The index should be used as a record of what a patient does, not as a record of what a patient could do. 2. The main aim is to establish degree of independence from any help, physical or verbal, however minor and for whatever reason. 3. The need for supervision renders the patient not independent. 4. A patient's performance should be established using the best available evidence. Asking the patient, friends/relatives and nurses are the usual sources, but direct observation and common sense are also important. However direct testing is not needed. 5. Usually the patient's performance over the preceding 24-48 hours is important, but occasionally longer periods will be relevant. 6. Middle categories imply that the patient supplies over 50 per cent of the effort. 7. Use of aids to be independent is allowed. Score Interpretation (from [address] [address])    Independent   60-79 Minimally independent   40-59 Partially dependent   20-39 Very dependent   <20 Totally dependent     -Doretha Ortiz., Barthel, D.W. (1965). Functional evaluation: the Barthel Index. [facility] ([address] (1997). The Barthel activities of daily living index: self-reporting versus actual performance in the old (> or = 75 years).  Journal of [facility]5 [facility] Society 45(7), 14 [facility], JENNIFER, Thong Vizcaino., Elle Cobb. (1999). Measuring the change in disability after inpatient rehabilitation; comparison of the responsiveness of the Barthel Index and Functional Avery Measure. Journal of Neurology, Neurosurgery, and Psychiatry, 66(4), 474-803. RODRI Benitez, LONI Kramer, & Wei Shultz M.A. (2004) Assessment of post-stroke quality of life in cost-effectiveness studies: The usefulness of the Barthel Index and the EuroQoL-5D. Quality of Life Research, 15, 212-00           Physical Therapy Evaluation Charge Determination   History Examination Presentation Decision-Making   LOW Complexity : Zero comorbidities / personal factors that will impact the outcome / POC LOW Complexity : 1-2 Standardized tests and measures addressing body structure, function, activity limitation and / or participation in recreation  LOW Complexity : Stable, uncomplicated  LOW Complexity : FOTO score of       Based on the above components, the patient evaluation is determined to be of the following complexity level: LOW     Pain Ratin/10  (has been medicated and pain management is being addressed by nurse and ortho N.P.)    Activity Tolerance:   Fair--limited by pain    After treatment patient left in no apparent distress:   Supine in bed, Call bell within reach, Side rails x 3, and nurse notified. COMMUNICATION/EDUCATION:   The patients plan of care was discussed with: Registered nurse, Physician, Case management, and Rehabilitation technician. Fall prevention education was provided and the patient/caregiver indicated understanding., Patient/family have participated as able in goal setting and plan of care. , and Patient/family agree to work toward stated goals and plan of care.     Thank you for this referral.  Gregorio Sosa   Time Calculation: 30 mins

## 2022-09-12 NOTE — OP NOTES
Name: Sachi Aguilar  MRN:  [id number]  : 1960  Age:  57 y.o. Surgery Date: 2022      OPERATIVE REPORT - LEFT TOTAL KNEE REPLACEMENT    PREOPERATIVE DIAGNOSIS: Osteoarthritis, left knee. POSTOPERATIVE DIAGNOSIS: Osteoarthritis, left knee. PROCEDURE PERFORMED: Left total knee arthroplasty. SURGEON: Louise Jackson MD    FIRST ASSISTANT: Kaden Orozco PA-C    ANESTHESIA: Spinal    PRE-OP ANTIBIOTIC: Ancef 2g    COMPLICATIONS: None. ESTIMATED BLOOD LOSS: 50 mL. SPECIMENS REMOVED: None. COMPONENTS IMPLANTED:   Implant Name Type Inv. Item Serial No.  Lot No. LRB No. Used Action   PAT PSN VE POLY 32MM -- PERSONA - SNA  PAT PSN VE POLY 32MM -- PERSONA NA [employer] [place] [id number] Left 1 Implanted   COMPONENT FEM SZ 7 DEBORA L KNEE CO CHROM MARVIN CRUCE RET COR - SNA  COMPONENT FEM SZ 7 DEBORA L KNEE CO CHROM MARVIN CRUCE RET COR NA [employer]AC[employer]I [employer]_ [id number] Left 1 Implanted   TIB PRN NP STM 5 DEG SZ EL -- PERSONA - SNA  TIB PRN NP STM 5 DEG SZ EL -- PERSONA NA [employer] [place] [id number] Left 1 Implanted   PSN MC VE ASF L 11MM 6-7/EF - SNA  PSN MC VE ASF L 11MM 6-7/EF NA ARACELI [employer]_ [id number] Left 1 Implanted   CEMENT BNE 40GM FULL DOSE PMMA W/ GENT HI VISC RADPQ LNG - SNA  CEMENT BNE 40GM FULL DOSE PMMA W/ GENT HI VISC RADPQ LNG NA JN [employer]_ [id number] Left 2 Implanted       INDICATIONS: The patient is an 57 yrs female with progressive debilitating left knee pain due to severe osteoarthritis. Symptoms have progressed despite comprehensive conservative treatment and the patient presents for left total knee replacement. Risks, benefits, alternatives of the procedure were reviewed with the patient in detail and the patient desires to proceed. The patient understands the risk for perioperative medical complications. DESCRIPTION OF PROCEDURE: Spinal anesthesia was initiated. Preoperative dose of antibiotic was given. Valdivia catheter was not placed. The left lower extremity was prepped and draped in the usual sterile fashion. The skin was covered with Ioban occlusive dressing. The left lower extremity was exsanguinated. A medial parapatellar incision was made to the left knee. The left knee was exposed and the distal femur was cut at 5 degrees distal femoral valgus. Femur was sized for a size 7. An AP cutting block was placed, rotated based on bony landmarks. Femoral cuts were completed for a size 7. The tibia was subluxed and a neutral varus/valgus proximal tibial cut was made matching the patient's slope. The meniscal remnants were removed. The posterior osteophytes were removed from the femur. Gaps were examined. The flexion and extension gaps were 11 mm. The femur was finished for a 7, tibia for a E. Trials were placed. Patella was cut and a 32 mm trial was fitted . The knee tracked well with all trial implants. The trials were then removed. Bony surfaces were drilled, lavaged, and dried. All components were cemented. Excess cement was removed. A 11 mm polyethylene component was placed. After the cement had fully cured, the knee was ranged and  irrigated copiously with pulsatile lavage. All surrounding soft tissues were infiltrated with local anesthetic. The arthrotomy was closed with #2 Vicryl sutures and 0 Vicryl sutures. Skin and subcutaneous tissues were irrigated and closed in standard fashion. Sterile dressing was applied. There were no complications. The procedure was a LEFT TOTAL KNEE REPLACEMENT. A Dolores total knee construct was utilized. No specimens were obtained/sent. The patient was transferred to the recovery room in stable condition. Varus release with intact PCL. Tone Gardiner PA-C was critical throughout the case to assist with positioning, retraction and closure. There were no other available residents or surgical assistants to assist during this procedure.       Aleah Manriquez MD

## 2022-09-12 NOTE — PROGRESS NOTES
Occupational Therapy   Orders received, chart review completed. Note patient POD #0 s/p L TKA and is not indicated as a fast track. OT will follow up tomorrow for evaluation. Recommend OOB to chair three times a day for meals, self-completion of ADLs as able and medically stable. Thank you.

## 2022-09-12 NOTE — PROGRESS NOTES
Ortho NP Note    POD# 0  s/p LEFT TOTAL KNEE ARTHROPLASTY     Pt resting in bed. Reports feeling of tightness/spasm to posterior left leg. She states this happened shortly after waking up in PACU and has persisted a tightness to posterior knee region. Has improved by sitting on edge of bed, ambulating. Medications also \"helping\" but still rating pain 6/10. Patient has not had something to eat/drink. Denies nausea, vomiting. No CP, SOB. No cough. VSS Afebrile. Visit Vitals  BP (!) 157/97 (BP 1 Location: Right upper arm)   Pulse 79   Temp 97.1 °F (36.2 °C)   Resp 18   Ht 5' 4\" (1.626 m)   Wt 92.3 kg (203 lb 7.8 oz)   SpO2 98%   BMI 34.93 kg/m²       Most Recent Labs:   Lab Results   Component Value Date/Time    HGB 14.7 08/29/2022 08:53 AM    Hemoglobin A1c 5.5 08/29/2022 08:53 AM       Body mass index is 34.93 kg/m². Reference: BMI greater than 30 is classified as obesity and greater than 40 is classified as morbid obesity. STOP BANG Score: sleep study done, negative    Voiding status: remains due to void     Ace wrap + gauze to left knee c.d.i. Cryotherapy in place over incision. Bilateral LEs warm, dry. 1+ DP pulses  Sensation and motor intact. DF/PF/EHL 4/5. Left leg not tender to palpation, no reaction on exam.    Foot Pumps for mechanical DVT proph    Plan:  1) PT: starting today, WBAT  2) Isabella-op Antibiotics Ancef  3) Pain:  Perioperative anesthesia: Spinal adductor canal block with back up general.  Post operative analgesia includes scheduled tylenol, home gabapentin and PRN flexeril, oxycodone, IV dilaudid depending on nature/severity of pain. Discussed scheduled vs PRN medications. Expect pain control may be challenging as   4) DVT Prophylaxis:  As per surgeon's team who confirmed Plavix. Encouraged early mobilization, bed exercises, and SCD use. 5) HTN: Current BP: 157/97. Resume lopressor  6) Post operative care: Encouraged IS, OBR.     7) Discharge plans: to home with granddaughter's support. Nino at bedside. Rx: [address]          Shea Giles, YANE

## 2022-09-12 NOTE — ANESTHESIA POSTPROCEDURE EVALUATION
Procedure(s):  LEFT TOTAL KNEE ARTHROPLASTY. spinal, general - backup    Anesthesia Post Evaluation      Multimodal analgesia: multimodal analgesia used between 6 hours prior to anesthesia start to PACU discharge  Patient location during evaluation: PACU  Patient participation: complete - patient participated  Level of consciousness: awake  Pain management: adequate  Airway patency: patent  Anesthetic complications: no  Cardiovascular status: acceptable  Respiratory status: acceptable  Hydration status: acceptable  Comments: Seen, no complaints   Post anesthesia nausea and vomiting:  none  Final Post Anesthesia Temperature Assessment:  Normothermia (36.0-37.5 degrees C)      INITIAL Post-op Vital signs:   Vitals Value Taken Time   /100 09/12/22 1135   Temp 36.3 °C (97.3 °F) 09/12/22 1124   Pulse 72 09/12/22 1139   Resp 17 09/12/22 1139   SpO2 98 % 09/12/22 1139   Vitals shown include unvalidated device data.

## 2022-09-12 NOTE — PROGRESS NOTES
Medicare Outpatient Observation Notice (MOON) provided to patient/representative with verbal explanation of the notice. Time allotted for questions regarding the notice. Patient /representative provided a completed copy of the MOON notice. Copy placed on bedside chart.   Renee Nielsen, Care Management Assistant

## 2022-09-12 NOTE — ANESTHESIA PROCEDURE NOTES
Spinal Block    Performed by: Ricardo Gaytan DO  Authorized by: Ricardo Gaytan DO     Pre-procedure:   Indications: at surgeon's request and primary anesthetic  Preanesthetic Checklist: patient identified, risks and benefits discussed, anesthesia consent, site marked, patient being monitored, timeout performed and fire risk safety assessment completed and verbalized      Spinal Block:   Patient Position:  Seated    Prep: Betadine      Location:  L3-4  Technique:  Single shot  Local: mepivacaine (PF) (POLOCAINE) 2%  PF injection - Other   60 mg - 9/12/2022 9:28:00 AM    Med Admin Time: 9/12/2022 9:28 AM    Needle:   Needle Type:  Pencil-tip  Needle Gauge:  25 G  Attempts:  1      Events: CSF confirmed, no blood with aspiration and no paresthesia        Assessment:  Insertion:  Uncomplicated  Patient tolerance:  Patient tolerated the procedure well with no immediate complications

## 2022-09-13 LAB
ANION GAP SERPL CALC-SCNC: 7 MMOL/L (ref 5–15)
BUN SERPL-MCNC: 12 MG/DL (ref 6–20)
BUN/CREAT SERPL: 17 (ref 12–20)
CALCIUM SERPL-MCNC: 8 MG/DL (ref 8.5–10.1)
CHLORIDE SERPL-SCNC: 109 MMOL/L (ref 97–108)
CO2 SERPL-SCNC: 24 MMOL/L (ref 21–32)
CREAT SERPL-MCNC: 0.72 MG/DL (ref 0.55–1.02)
GLUCOSE SERPL-MCNC: 120 MG/DL (ref 65–100)
HGB BLD-MCNC: 12.4 G/DL (ref 11.5–16)
POTASSIUM SERPL-SCNC: 4.2 MMOL/L (ref 3.5–5.1)
SODIUM SERPL-SCNC: 140 MMOL/L (ref 136–145)

## 2022-09-13 PROCEDURE — 36415 COLL VENOUS BLD VENIPUNCTURE: CPT

## 2022-09-13 PROCEDURE — 74011250637 HC RX REV CODE- 250/637

## 2022-09-13 PROCEDURE — 85018 HEMOGLOBIN: CPT

## 2022-09-13 PROCEDURE — 94760 N-INVAS EAR/PLS OXIMETRY 1: CPT

## 2022-09-13 PROCEDURE — 74011250637 HC RX REV CODE- 250/637: Performed by: PHYSICIAN ASSISTANT

## 2022-09-13 PROCEDURE — 77030028907 HC WRP KNEE WO BGS SOLM -B

## 2022-09-13 PROCEDURE — 74011000250 HC RX REV CODE- 250: Performed by: PHYSICIAN ASSISTANT

## 2022-09-13 PROCEDURE — 97530 THERAPEUTIC ACTIVITIES: CPT

## 2022-09-13 PROCEDURE — 97165 OT EVAL LOW COMPLEX 30 MIN: CPT | Performed by: OCCUPATIONAL THERAPIST

## 2022-09-13 PROCEDURE — 97530 THERAPEUTIC ACTIVITIES: CPT | Performed by: OCCUPATIONAL THERAPIST

## 2022-09-13 PROCEDURE — 96374 THER/PROPH/DIAG INJ IV PUSH: CPT

## 2022-09-13 PROCEDURE — 80048 BASIC METABOLIC PNL TOTAL CA: CPT

## 2022-09-13 PROCEDURE — 97116 GAIT TRAINING THERAPY: CPT

## 2022-09-13 PROCEDURE — 2709999900 HC NON-CHARGEABLE SUPPLY

## 2022-09-13 PROCEDURE — G0378 HOSPITAL OBSERVATION PER HR: HCPCS

## 2022-09-13 PROCEDURE — 97535 SELF CARE MNGMENT TRAINING: CPT | Performed by: OCCUPATIONAL THERAPIST

## 2022-09-13 PROCEDURE — 74011250636 HC RX REV CODE- 250/636

## 2022-09-13 PROCEDURE — 96375 TX/PRO/DX INJ NEW DRUG ADDON: CPT

## 2022-09-13 PROCEDURE — 74011250636 HC RX REV CODE- 250/636: Performed by: PHYSICIAN ASSISTANT

## 2022-09-13 PROCEDURE — 77030027138 HC INCENT SPIROMETER -A

## 2022-09-13 RX ORDER — HYDRALAZINE HYDROCHLORIDE 20 MG/ML
10 INJECTION INTRAMUSCULAR; INTRAVENOUS
Status: DISCONTINUED | OUTPATIENT
Start: 2022-09-13 | End: 2022-09-15 | Stop reason: HOSPADM

## 2022-09-13 RX ADMIN — CYCLOBENZAPRINE 10 MG: 10 TABLET, FILM COATED ORAL at 18:47

## 2022-09-13 RX ADMIN — OXYCODONE 10 MG: 5 TABLET ORAL at 23:40

## 2022-09-13 RX ADMIN — ONDANSETRON HYDROCHLORIDE 4 MG: 2 INJECTION, SOLUTION INTRAMUSCULAR; INTRAVENOUS at 16:49

## 2022-09-13 RX ADMIN — DOCUSATE SODIUM 50MG AND SENNOSIDES 8.6MG 1 TABLET: 8.6; 5 TABLET, FILM COATED ORAL at 18:47

## 2022-09-13 RX ADMIN — HYDROMORPHONE HYDROCHLORIDE 1 MG: 1 INJECTION, SOLUTION INTRAMUSCULAR; INTRAVENOUS; SUBCUTANEOUS at 14:59

## 2022-09-13 RX ADMIN — CYCLOBENZAPRINE 10 MG: 10 TABLET, FILM COATED ORAL at 11:44

## 2022-09-13 RX ADMIN — ACETAMINOPHEN 1000 MG: 500 TABLET, FILM COATED ORAL at 12:37

## 2022-09-13 RX ADMIN — SODIUM CHLORIDE, PRESERVATIVE FREE 10 ML: 5 INJECTION INTRAVENOUS at 07:33

## 2022-09-13 RX ADMIN — CEFAZOLIN 2 G: 1 INJECTION, POWDER, FOR SOLUTION INTRAMUSCULAR; INTRAVENOUS at 03:48

## 2022-09-13 RX ADMIN — HYDROMORPHONE HYDROCHLORIDE 1 MG: 1 INJECTION, SOLUTION INTRAMUSCULAR; INTRAVENOUS; SUBCUTANEOUS at 03:48

## 2022-09-13 RX ADMIN — HYDROMORPHONE HYDROCHLORIDE 1 MG: 1 INJECTION, SOLUTION INTRAMUSCULAR; INTRAVENOUS; SUBCUTANEOUS at 00:51

## 2022-09-13 RX ADMIN — HYDRALAZINE HYDROCHLORIDE 10 MG: 20 INJECTION, SOLUTION INTRAMUSCULAR; INTRAVENOUS at 16:01

## 2022-09-13 RX ADMIN — OXYCODONE 10 MG: 5 TABLET ORAL at 19:59

## 2022-09-13 RX ADMIN — ATORVASTATIN CALCIUM 10 MG: 10 TABLET, FILM COATED ORAL at 21:56

## 2022-09-13 RX ADMIN — DOCUSATE SODIUM 50MG AND SENNOSIDES 8.6MG 1 TABLET: 8.6; 5 TABLET, FILM COATED ORAL at 09:15

## 2022-09-13 RX ADMIN — CLOPIDOGREL BISULFATE 75 MG: 75 TABLET ORAL at 09:15

## 2022-09-13 RX ADMIN — OXYCODONE 10 MG: 5 TABLET ORAL at 12:38

## 2022-09-13 RX ADMIN — ACETAMINOPHEN 1000 MG: 500 TABLET, FILM COATED ORAL at 07:33

## 2022-09-13 RX ADMIN — HYDROMORPHONE HYDROCHLORIDE 1 MG: 1 INJECTION, SOLUTION INTRAMUSCULAR; INTRAVENOUS; SUBCUTANEOUS at 10:29

## 2022-09-13 RX ADMIN — OXYCODONE 10 MG: 5 TABLET ORAL at 09:14

## 2022-09-13 RX ADMIN — GABAPENTIN 300 MG: 300 CAPSULE ORAL at 21:56

## 2022-09-13 RX ADMIN — ACETAMINOPHEN 1000 MG: 500 TABLET, FILM COATED ORAL at 23:40

## 2022-09-13 RX ADMIN — GABAPENTIN 300 MG: 300 CAPSULE ORAL at 14:57

## 2022-09-13 RX ADMIN — METOPROLOL TARTRATE 12.5 MG: 25 TABLET, FILM COATED ORAL at 18:48

## 2022-09-13 RX ADMIN — OXYCODONE 10 MG: 5 TABLET ORAL at 16:38

## 2022-09-13 RX ADMIN — SODIUM CHLORIDE, PRESERVATIVE FREE 10 ML: 5 INJECTION INTRAVENOUS at 21:57

## 2022-09-13 RX ADMIN — GABAPENTIN 300 MG: 300 CAPSULE ORAL at 09:14

## 2022-09-13 RX ADMIN — POLYETHYLENE GLYCOL 3350 17 G: 17 POWDER, FOR SOLUTION ORAL at 09:17

## 2022-09-13 RX ADMIN — SODIUM CHLORIDE, PRESERVATIVE FREE 10 ML: 5 INJECTION INTRAVENOUS at 14:00

## 2022-09-13 RX ADMIN — METOPROLOL TARTRATE 12.5 MG: 25 TABLET, FILM COATED ORAL at 09:15

## 2022-09-13 NOTE — PROGRESS NOTES
Problem: Mobility Impaired (Adult and Pediatric)  Goal: *Acute Goals and Plan of Care (Insert Text)  Description: FUNCTIONAL STATUS PRIOR TO ADMISSION: Patient was independent and active without use of DME.    HOME SUPPORT PRIOR TO ADMISSION: The patient lived with fiance but did not require assist.    Physical Therapy Goals  Initiated 9/12/2022    1. Patient will move from supine to sit and sit to supine , scoot up and down, and roll side to side in bed with modified independence within 4 days. 2. Patient will perform sit to stand with modified independence within 4 days. 3. Patient will ambulate with modified independence for 150 feet with the least restrictive device within 4 days. 4. Patient will ascend/descend 4 stairs with cane and one handrail(s) with modified independence within 4 days. 5. Patient will perform home exercise program per protocol with independence within 4 days. 6. Patient will demonstrate AROM 0-90 degrees in operative joint within 4 days. 9/13/2022 1122 by Means, Josefa HECK  Outcome: Progressing Towards Goal   PHYSICAL THERAPY AFTERNOON SESSION NOTE  Patient: Tracy Gan (39 y.o. female)  Date: 9/13/2022  Primary Diagnosis: Primary osteoarthritis of left knee [M17.12]  Procedure(s) (LRB):  LEFT TOTAL KNEE ARTHROPLASTY (Left) 1 Day Post-Op   Precautions:   Fall, WBAT    Tracy Gan was seen for afternoon PT session. Pt is received sitting EOB w/ RN present and providing meds. She is walking 45 feet w/ the RW this afternoon, pt gradually increasing gait distance. Provided verbal instruction to increase support of BUE's on RW during stance phase of LLE. Continues to report 7/10 pain at rest and w/ activity. The primary limiting factors are pain tolerance/ management. Currently, expect Tracy Gan will need 2-3  more session(s) to meet the therapy goals in preparation for returning home w/ HHPT.  She returned to bed w/ Min A for LLE support despite use of gait belt for assist. Ice pack in place and needs w/ in reach. Afternoon session functional mobility:  Bed Mobility:     Supine to Sit:  (received seated EOB)  Sit to Supine: Minimum assistance; Additional time;Assist x1;Adaptive equipment (gait belt for LLE assist/support)     Transfers:  Sit to Stand: Contact guard assistance  Stand to Sit: Contact guard assistance        Bed to Chair: Contact guard assistance; Adaptive equipment; Additional time;Assist x1              Balance:   Sitting: Intact  Sitting - Static: Good (unsupported)  Sitting - Dynamic: Fair (occasional)  Standing: Impaired; Without support  Standing - Static: Constant support;Good  Standing - Dynamic : Constant support;Fair;Good  Ambulation/Gait Training:  Distance (ft): 45 Feet (ft)  Assistive Device: Gait belt;Walker, rolling  Ambulation - Level of Assistance: Contact guard assistance;Assist x1        Gait Abnormalities: Antalgic;Decreased step clearance; Step to gait  Right Side Weight Bearing: Full  Left Side Weight Bearing: As tolerated  Base of Support: Widened;Shift to right  Stance: Left decreased  Speed/Valeria: Slow  Step Length: Left shortened;Right shortened  Swing Pattern: Left asymmetrical     Interventions: Safety awareness training        Stairs:       Will plan for tomorrow AM       Thank you,  Josefa Connelly,PTA   Time Calculation: 23 mins

## 2022-09-13 NOTE — PROGRESS NOTES
Problem: Self Care Deficits Care Plan (Adult)  Goal: *Acute Goals and Plan of Care (Insert Text)  Description: FUNCTIONAL STATUS PRIOR TO ADMISSION: Patient was independent and active without use of DME.    HOME SUPPORT: The patient lived with her fiance but did not require assist.    Occupational Therapy Goals  Initiated 9/13/2022  1. Patient will perform lower body ADLs with supervision/set-up within 4 day(s). 2.  Patient will perform upper body ADLs standing 5 mins without fatigue or LOB with supervision/set-up within 4 day(s). 3.  Patient will perform all aspects of toileting with supervision/set-up within 4 day(s). 4.  Patient will participate in upper extremity therapeutic exercise/activities with supervision/set-up for 10 minutes within 4 day(s). 5.  Patient will utilize energy conservation techniques during functional activities without cues within 4 day(s). Outcome: Not Met     OCCUPATIONAL THERAPY EVALUATION  Patient: Keo Dixon (71 y.o. female)  Date: 9/13/2022  Primary Diagnosis: Primary osteoarthritis of left knee [M17.12]  Procedure(s) (LRB):  LEFT TOTAL KNEE ARTHROPLASTY (Left) 1 Day Post-Op   Precautions:   Fall, WBAT    ASSESSMENT  Based on the objective data described below, the patient presents with increased pain following mobility in room and receptive to all instruction. Reports her 23year old granddaughter will be staying with her to assist prn. Focus this date on transfers in room, bed, chair and ADL mobility. Will benefit from additional therapy to further instruct/perform LE dressing and focus on pain management. Current Level of Function Impacting Discharge (ADLs/self-care): mod assist LE ADL's, CGA toileting    Functional Outcome Measure: The patient scored 65/100 on the Barthel Index outcome measure which is indicative of minimal  independence.       Other factors to consider for discharge: plans to have her right knee done in 6 weeks     Patient will benefit from skilled therapy intervention to address the above noted impairments. PLAN :  Recommendations and Planned Interventions: self care training, functional mobility training, therapeutic exercise, balance training, therapeutic activities, endurance activities, patient education, home safety training, and family training/education    Frequency/Duration: Patient will be followed by occupational therapy 5 times a week to address goals. Recommendation for discharge: (in order for the patient to meet his/her long term goals)  No skilled occupational therapy/ follow up rehabilitation needs identified at this time. This discharge recommendation:  Has been made in collaboration with the attending provider and/or case management    IF patient discharges home will need the following DME:        SUBJECTIVE:   Patient stated I gotta be better so I can walk down the aisle.  re: having other knee done for wedding in 2/2023    OBJECTIVE DATA SUMMARY:   HISTORY:   Past Medical History:   Diagnosis Date    Anxiety     Bipolar 1 disorder ([address]     Depression     Fatigue     Frequent headaches     GERD (gastroesophageal reflux disease)     Hearing loss     PT DENIES ANY HEARING    Hypertension     Joint pain     Memory disorder     LAPSE OF MEMORY DUE TO STROKES    Muscle pain     Nerve damage     ALL OVER    Snoring     Stomach pain     Stroke ([address]     X2 OVER 10 YRS AGO UNABLE TO REMEMBER DATE     Past Surgical History:   Procedure Laterality Date    HX ADENOIDECTOMY      sweat glands    HX GYN      tubal ligation    HX TONSILLECTOMY         Expanded or extensive additional review of patient history:     Home Situation  Home Environment: Apartment  # Steps to Enter: 2  Rails to Enter: Yes  Hand Rails : Bilateral  One/Two Story Residence: One story  Living Alone: Yes  Support Systems: Other Family Member(s) (niece staying for 3 days to help)  Patient Expects to be Discharged to[de-identified] Home with home health  Current DME Used/Available at Home: Walker, rolling, Grab bars  Tub or Shower Type: Shower    Hand dominance:     EXAMINATION OF PERFORMANCE DEFICITS:  Cognitive/Behavioral Status:  Neurologic State: Alert  Orientation Level: Oriented X4  Cognition: Follows commands; Appropriate safety awareness; Appropriate for age attention/concentration  Perception: Appears intact  Perseveration: No perseveration noted  Safety/Judgement: Awareness of environment; Fall prevention;Good awareness of safety precautions; Home safety; Insight into deficits    Skin: dressing intact    Edema: left LE    Hearing: Auditory  Auditory Impairment: None    Vision/Perceptual:            WFL  Range of Motion:  AROM: Within functional limits        Strength:  Strength: Within functional limits        Coordination:  Coordination: Within functional limits  Fine Motor Skills-Upper: Left Intact; Right Intact    Gross Motor Skills-Upper: Left Intact; Right Intact    Tone & Sensation:  Tone: Normal  Sensation: Intact           Balance:  Sitting: Impaired  Sitting - Static: Good (unsupported)  Sitting - Dynamic: Fair (occasional)  Standing: Impaired; Without support  Standing - Static: Constant support;Good  Standing - Dynamic : Fair;Constant support    Functional Mobility and Transfers for ADLs:  Bed Mobility:  Supine to Sit:  (seated edge of bed on arrival, educated on transfer technique)    Transfers:  Sit to Stand: Minimum assistance; Additional time;Assist x1;Adaptive equipment (instructed on hand and left foot placement)  Stand to Sit: Minimum assistance; Adaptive equipment; Additional time;Assist x1 (instructed on positioning of left LE)  Bed to Chair: Contact guard assistance; Adaptive equipment; Additional time;Assist x1  Bathroom Mobility: Contact guard assistance  Toilet Transfer : Contact guard assistance; Adaptive equipment; Additional time;Assist x1    Performed ADL mobility in room ambulating around bed 2 x's with minimal cues for sequencing of rolling walker and positioning of walker and feet with turns to prevent twisting    ADL Assessment:  Feeding: Independent    Oral Facial Hygiene/Grooming: Modified Independent (seated)       Type of Bath: Chlorhexidine (CHG); Basin/Soap/Water    Upper Body Dressing: Independent    Lower Body Dressing: Moderate assistance (distal left LE assist)    Toileting: Contact guard assistance                ADL Intervention and task modifications:   Educated on role of OT     Patient instructed and indicated understanding the benefits of maintaining activity tolerance, functional mobility, and independence with self care tasks during acute stay  to ensure safe return home and to baseline. Encouraged patient to increase frequency and duration OOB, be out of bed for all meals, perform daily ADLs (as approved by RN/MD regarding bathing etc), and performing functional mobility to/from bathroom. Educated on home modification of chair to have hips > knees    Cognitive Retraining  Safety/Judgement: Awareness of environment; Fall prevention;Good awareness of safety precautions; Home safety; Insight into deficits    Dressing joint: Patient instructed and demonstrated to don/doff Left LE first/last minimal cues. Patient instructed  to don all clothing while sitting prior to standing, doff all clothing to knees while standing, then sit to doff clothing off from knees to feet to facilitate fall prevention, pain management, and energy conservation with Moderate Assistance. Home safety: Patient instructed on home modifications and safety (raise height of ADL objects, appropriate height of chair surfaces, recliner safety, change of floor surfaces, clear pathways) to increase independence and fall prevention. Patient indicated understanding. Standing: Patient instructed and demonstrated during ADLs to walk up to sink/countertop/surfaces, step into walker to increase safety of joint and fall prevention with Contact guard assistance. Patient educated about knee anatomy verbally and with pictures and educated to avoid rotation of Bilateral LE. Instructed to apply concept to ADLs within the home (no twisting of knee during reaching across body, square off while using objects, slide objects along surfaces). Patient instructed to increase amount of time standing, observe standing position during ADLs to increase even weight bearing through bilateral LEs to increase independence with ADLs. Goal to be reached 30 days post - op, per orthopedic surgeon or per PT. Patient indicated understanding. Therapeutic Exercise: Instructed on positioning of hips in chair to increase ability to perform knee flexion and extension, instructed on use of wax paper under foot to facilitate moving without need to pick leg up   Functional Measure:    Barthel Index:  Bathin  Bladder: 10  Bowels: 10  Groomin  Dressin  Feeding: 10  Mobility: 10  Stairs: 0  Toilet Use: 5  Transfer (Bed to Chair and Back): 10  Total: 65/100      The Barthel ADL Index: Guidelines  1. The index should be used as a record of what a patient does, not as a record of what a patient could do. 2. The main aim is to establish degree of independence from any help, physical or verbal, however minor and for whatever reason. 3. The need for supervision renders the patient not independent. 4. A patient's performance should be established using the best available evidence. Asking the patient, friends/relatives and nurses are the usual sources, but direct observation and common sense are also important. However direct testing is not needed. 5. Usually the patient's performance over the preceding 24-48 hours is important, but occasionally longer periods will be relevant. 6. Middle categories imply that the patient supplies over 50 per cent of the effort. 7. Use of aids to be independent is allowed.     Score Interpretation (from [address] [facility] [address])    Independent   60-79 Minimally independent   40-59 Partially dependent   20-39 Very dependent   <20 Totally dependent     -Jarrett Ortiz, Barthel, D.W. (1541). Functional evaluation: the Barthel Index. [address] ([address] (1997). The Barthel activities of daily living index: self-reporting versus actual performance in the old (> or = 75 years). Journal of [address] [place] [address] 45(7), [facility], JJENNIFER, Nigel Sanders., Arturo Joyce. (1999). Measuring the change in disability after inpatient rehabilitation; comparison of the responsiveness of the Barthel Index and Functional [place] Measure. Journal of Neurology, Neurosurgery, and Psychiatry, 66(4), 130-155. RODRI Lucero, LONI Kramer, & Sanford Potter MGOLDEN. (2004) Assessment of post-stroke quality of life in cost-effectiveness studies: The usefulness of the Barthel Index and the EuroQoL-5D. Quality of Life Research, 15, 604-00         Occupational Therapy Evaluation Charge Determination   History Examination Decision-Making   LOW Complexity : Brief history review  LOW Complexity : 1-3 performance deficits relating to physical, cognitive , or psychosocial skils that result in activity limitations and / or participation restrictions  MEDIUM Complexity : Patient may present with comorbidities that affect occupational performnce.  Miniml to moderate modification of tasks or assistance (eg, physical or verbal ) with assesment(s) is necessary to enable patient to complete evaluation       Based on the above components, the patient evaluation is determined to be of the following complexity level: LOW   Pain Ratin/10 after tx, RN and PTA aware    Activity Tolerance:   Fair, requires rest breaks, and limited by pain    After treatment patient left in no apparent distress:    Sitting in chair and Call bell within reach    COMMUNICATION/EDUCATION:   The patients plan of care was discussed with: Physical therapy assistant and Registered nurse. Home safety education was provided and the patient/caregiver indicated understanding. and Patient/family have participated as able in goal setting and plan of care. This patients plan of care is appropriate for delegation to MICHA.     Thank you for this referral.  Leonila Lin OTR/L  Time Calculation: 28 mins

## 2022-09-13 NOTE — PROGRESS NOTES
Ortho NP Note    POD# 1  s/p LEFT TOTAL KNEE ARTHROPLASTY   Pt seen with Dr Eric Carter    Patient in bathroom performing AM care. Reports pain this morning has improved slightly since immediate post operative period yesterday. Today rating pain 5/10 to knee its self, worsened slightly after therapy. States oxycodone is helpful but feels dilaudid is more effective/faster. Denies side effects to oxycodone. No Cp, no SOB. Denies N/V, ate breakfast.  No dizziness, lightheadedness. VSS Afebrile. Visit Vitals  BP (!) 151/94 (BP 1 Location: Right arm, BP Patient Position: Semi fowlers)   Pulse 71   Temp 97.4 °F (36.3 °C)   Resp 16   Ht 5' 4\" (1.626 m)   Wt 92.3 kg (203 lb 7.8 oz)   SpO2 96%   BMI 34.93 kg/m²       Voiding status: spontaneous void   Output (mL)  Last Bowel Movement Date: 09/11/22 (09/12/22 1412)  Straight Cath  Straight Cath: Nurse performed cath Nigel Anderson RN) (09/12/22 1116)  Number of Attempts: 1 (09/12/22 1116)  Catheter Size: 16 FR (09/12/22 1116)  Time Catheter Inserted: 1110 (09/12/22 1116)  Time Catheter Removed: 1116 (09/12/22 1116)  Urine: 200 mL (09/12/22 1116)      Labs    Lab Results   Component Value Date/Time    HGB 12.4 09/13/2022 03:59 AM      Lab Results   Component Value Date/Time    INR 1.1 08/29/2022 08:53 AM      Lab Results   Component Value Date/Time    Sodium 140 09/13/2022 03:59 AM    Potassium 4.2 09/13/2022 03:59 AM    Chloride 109 (H) 09/13/2022 03:59 AM    CO2 24 09/13/2022 03:59 AM    Glucose 120 (H) 09/13/2022 03:59 AM    BUN 12 09/13/2022 03:59 AM    Creatinine 0.72 09/13/2022 03:59 AM    Calcium 8.0 (L) 09/13/2022 03:59 AM     Recent Glucose Results:   Lab Results   Component Value Date/Time     (H) 09/13/2022 03:59 AM           Body mass index is 34.93 kg/m². : A BMI > 30 is classified as obesity and > 40 is classified as morbid obesity. Aqaucel dressing x 2 to left knee with small amount of midline drainage, seal intact.     Cryotherapy in place over incision  Calves soft and supple; No pain with passive stretch  Bilateral LEs warm, dry. 2+ DP pulses. Sensation and motor intact - PF/DF/EHL intact 5/5  Foot Pumps for mechanical DVT proph while in bed     PLAN:  1) PT: BID WBAT  2) Anticoagulation:  Discussed with surgeon, continue home Plavix 75 mg daily. Encouraged early mobilization, bed exercises, and SCD use. 3) Pain - Multimodal approach including cryotherapy, scheduled Tylenol, home gabapentin with  PRN  flexeril, oxycodone, IV dilaudid. Discussed need to transition to oral regimen prior to discharge, scheduled vs prn medications. 4) HTN: Current BP: 157/97. Continue lopressor, PRN hydralazine. Pain control as per above. 5) Post op care: Continue OBR, encouraged IS. Follow up in 3 weeks with Dr Maureen Navarrete. Aquacel to remain in place x 7 days unless integrity is lost.   6) Readiness for discharge:     [x] Vital Signs stable    [x] Hgb stable :12.4    [x] + Voiding    [x] Wound intact, drainage minimal    [x] Tolerating PO intake     [] Cleared by PT (OT if applicable)     [] Stair training completed (if applicable)    [] Independent / Contact Guard Assist (household distance)     [] Bed mobility     [] Car transfers     [] ADLs    [] Adequate pain control on oral medication alone     Goal for discharge to home with home health and family support pending PT progress, pain control with oral regimen. More likely tomorrow.       Georges Simpson NP  Available via [employer]

## 2022-09-13 NOTE — PROGRESS NOTES
Transition Of Care: The [facility] and grand daughter to transport when stable for discharge. Rolling walker ordered through [facility] and delivered by CM. RUR: N/A    The patient is from home and lives alone. Orthopedics, PT/OT following. The patient plans to discharge home. Care Management Interventions  PCP Verified by CM: Yes  Palliative Care Criteria Met (RRAT>21 & CHF Dx)?: No  Mode of Transport at Discharge: Other (see comment)  Transition of Care Consult (CM Consult): [address]: No  Reason Outside [place]: Physician referred to specific agency  MyChart Signup: Yes  Discharge Durable Medical Equipment: Yes  Health Maintenance Reviewed: Yes  Physical Therapy Consult: Yes  Occupational Therapy Consult: Yes  Speech Therapy Consult: No  Support Systems: Other Family Member(s)  Confirm Follow Up Transport: Family  The Plan for Transition of Care is Related to the Following Treatment Goals : The patient plans to discharge home with home health. The Patient and/or Patient Representative was Provided with a Choice of Provider and Agrees with the Discharge Plan?: Yes  Freedom of Choice List was Provided with Basic Dialogue that Supports the Patient's Individualized Plan of Care/Goals, Treatment Preferences and Shares the Quality Data Associated with the Providers?: Yes  [place] Resource Information Provided?: No  Discharge Location  Patient Expects to be Discharged to[de-identified] Home with home health     Reason for Admission:  Left Total Knee                     RUR Score: N/A                    Plan for utilizing home health: The Plan for Transition of Care is related to the following treatment goals: Home health. The patient has no preferences of agencies. The Patient was provided with a choice of provider and agrees   with the discharge plan.  [x] Yes [] No    Freedom of choice list was provided with basic dialogue that supports the patient's individualized plan of care/goals, treatment preferences and shares the quality data associated with the providers. [x] Yes [] No        PCP: First and Last name:  Cathy Laws., NP     Name of Practice:    Are you a current patient: Yes/No: Y   Approximate date of last visit: Sept, 2022   Can you participate in a virtual visit with your PCP:                     Current Advanced Directive/Advance Care Plan: Full Code      Healthcare Decision Maker:   Click here to complete [address] including selection of the Healthcare Decision Maker Relationship (ie \"Primary\")             Primary Decision MakerAlpheus Marrow - Father - [phone]                  Transition of Care Plan:        CM met with the patient in room 555. The patient is alert and oriented x4. Confirmed demographics. The patient uses [employer] on [address] [facility], lives alone and grand daughter will be coming to stay with her at her apt to assist with her needs while recovering. There are 2 steps into apartment building and an elevator to her floor. The patient is independent with ADL's/IADL's, drives a vehicle and owns a cane. The patient plans to discharge home with home health and family to transport when stable for discharge. Wheelchair ordered through [employer] and delivered to the patient. CM following for discharge needs.     Geeta Baker RN/CRM

## 2022-09-13 NOTE — PROGRESS NOTES
Problem: Mobility Impaired (Adult and Pediatric)  Goal: *Acute Goals and Plan of Care (Insert Text)  Description: FUNCTIONAL STATUS PRIOR TO ADMISSION: Patient was independent and active without use of DME.    HOME SUPPORT PRIOR TO ADMISSION: The patient lived with fiance but did not require assist.    Physical Therapy Goals  Initiated 9/12/2022    1. Patient will move from supine to sit and sit to supine , scoot up and down, and roll side to side in bed with modified independence within 4 days. 2. Patient will perform sit to stand with modified independence within 4 days. 3. Patient will ambulate with modified independence for 150 feet with the least restrictive device within 4 days. 4. Patient will ascend/descend 4 stairs with cane and one handrail(s) with modified independence within 4 days. 5. Patient will perform home exercise program per protocol with independence within 4 days. 6. Patient will demonstrate AROM 0-90 degrees in operative joint within 4 days. Outcome: Progressing Towards Goal   PHYSICAL THERAPY TREATMENT  Patient: Palmira Ford (35 y.o. female)  Date: 9/13/2022  Diagnosis: Primary osteoarthritis of left knee [M17.12] <principal problem not specified>  Procedure(s) (LRB):  LEFT TOTAL KNEE ARTHROPLASTY (Left) 1 Day Post-Op  Precautions: Fall, WBAT  Chart, physical therapy assessment, plan of care and goals were reviewed. ASSESSMENT  Patient continues with skilled PT services and is progressing towards goals. She is received sitting up in chair w/ wash cloth under her heel; reports she had been performing heel slides. She is reporting 7/10 this session. BP also elevated post-activity and pt w/ no complaints other than RLE discomfort. RN and NP aware. She is tolerating approx 40 FT of amb w/ RW as she demonstrates step to,antalgic gait pattern. Noted what appeared to be a soft buckle of LLE during gait- encouraged shorter stride length. She returned to bed w/ all needs in reach. Ice pack(s) in place and LLE elevated while maintaining knee extension. Pt mat benefit from 2-3 more sessions to meet therapy goals in preparation for d/c. Vitals:    09/13/22 1058 09/13/22 1106   BP: (!) 174/106 (!) 184/110   BP 1 Location:     BP Patient Position: Semi fowlers;Supine; Other (Comment)  Comment: LLE elevated    Pulse: 78 75   Temp:     Resp:     Height:     Weight:     SpO2:           Current Level of Function Impacting Discharge (mobility/balance): CGA/Min Ax1 (Min A for RLE into bed only)    Other factors to consider for discharge: mobility below baseline. BP elevated         PLAN :  Patient continues to benefit from skilled intervention to address the above impairments. Continue treatment per established plan of care. to address goals. Recommendation for discharge: (in order for the patient to meet his/her long term goals)  Physical therapy at least 2 days/week in the home     This discharge recommendation:  Has been made in collaboration with the attending provider and/or case management    IF patient discharges home will need the following DME: rolling walker to be delivered        SUBJECTIVE:   Patient stated I just walked! . Pt still agreeable to amb w/ PT.     OBJECTIVE DATA SUMMARY:   Critical Behavior:  Neurologic State: Alert  Orientation Level: Oriented X4  Cognition: Follows commands, Appropriate safety awareness, Appropriate for age attention/concentration  Safety/Judgement: Awareness of environment, Fall prevention, Good awareness of safety precautions, Home safety, Insight into deficits    Range of Motion:  AROM: Within functional limits                         Functional Mobility Training:  Bed Mobility:     Supine to Sit:  (received OOB in chair)  Sit to Supine: Minimum assistance (RLE)           Transfers:  Sit to Stand: Contact guard assistance;Assist x1  Stand to Sit: Contact guard assistance        Bed to Chair: Contact guard assistance; Adaptive equipment; Additional time;Assist x1                    Balance:  Sitting: Intact  Sitting - Static: Good (unsupported)  Sitting - Dynamic: Fair (occasional)  Standing: Impaired; Without support  Standing - Static: Constant support;Good  Standing - Dynamic : Constant support;Fair;Good  Ambulation/Gait Training:  Distance (ft): 40 Feet (ft)  Assistive Device: Walker, rolling;Gait belt  Ambulation - Level of Assistance: Contact guard assistance;Assist x1        Gait Abnormalities: Antalgic;Decreased step clearance; Step to gait  Right Side Weight Bearing: Full  Left Side Weight Bearing: As tolerated  Base of Support: Widened;Shift to right  Stance: Left decreased  Speed/Valeria: Slow;Pace decreased (<100 feet/min)  Step Length: Left shortened;Right shortened  Swing Pattern: Left asymmetrical     Interventions: Safety awareness training          Stairs:    Will plan for this afternoon vs tomorrow AM           Therapeutic Exercises:     EXERCISE   Sets   Reps   Active Active Assist   Passive Self ROM   Comments   Ankle Pumps   []                                        []                                        []                                        []                                           Quad Sets   []                                        []                                        []                                        []                                           Hamstring Sets   []                                        []                                        []                                        []                                           Short Arc Quads   []                                        []                                        []                                        []                                           Knee Extension Stretch     []                                          []                                          []                                          [] Heel Slides   [x]                                        []                                        []                                        []                                        Pt performing prior to therapy   Long Arc Quads   []                                        []                                        []                                        []                                           Knee Flexion Stretch   []                                        []                                        []                                        []                                           Straight Leg Raises   []                                        []                                        []                                        []                                               Pain Ratin/10    Activity Tolerance:   BP elevated. RN aware. Pt w/ no complaints    After treatment patient left in no apparent distress:   Supine in bed, Call bell within reach, Side rails x 3, and LLE elevated. COMMUNICATION/COLLABORATION:   The patients plan of care was discussed with: Registered nurse.      Josefa Connelly PTA   Time Calculation: 27 mins

## 2022-09-13 NOTE — PROGRESS NOTES
Physical Therapy  9/13/2022    Chart reviewed. Followed up w/ pt for afternoon mobility. Pt was asleep upon arrival but awakens w/ knock on door. Pt reports this is her first time being able to get some sleep and she is requesting additional time to rest. Will check back once more this afternoon as time allows.     Josefa Means, PTA

## 2022-09-13 NOTE — PROGRESS NOTES
Bedside and Verbal shift change report given to Erendira Gray RN (oncoming nurse) by REMI Tirado (offgoing nurse). Report included the following information SBAR, Kardex, ED Summary, Intake/Output, MAR, and Recent Results.

## 2022-09-13 NOTE — PROGRESS NOTES
Problem: Falls - Risk of  Goal: *Absence of Falls  Description: Document [place] Fall Risk and appropriate interventions in the flowsheet.   Outcome: Progressing Towards Goal  Note: Fall Risk Interventions:            Medication Interventions: Assess postural VS orthostatic hypotension, Evaluate medications/consider consulting pharmacy, Patient to call before getting OOB, Teach patient to arise slowly                   Problem: Patient Education: Go to Patient Education Activity  Goal: Patient/Family Education  Outcome: Progressing Towards Goal     Problem: Pain  Goal: *Control of Pain  Outcome: Progressing Towards Goal     Problem: Patient Education: Go to Patient Education Activity  Goal: Patient/Family Education  Outcome: Progressing Towards Goal

## 2022-09-14 PROCEDURE — 74011250637 HC RX REV CODE- 250/637: Performed by: PHYSICIAN ASSISTANT

## 2022-09-14 PROCEDURE — 96375 TX/PRO/DX INJ NEW DRUG ADDON: CPT

## 2022-09-14 PROCEDURE — 96376 TX/PRO/DX INJ SAME DRUG ADON: CPT

## 2022-09-14 PROCEDURE — 74011250636 HC RX REV CODE- 250/636

## 2022-09-14 PROCEDURE — G0378 HOSPITAL OBSERVATION PER HR: HCPCS

## 2022-09-14 PROCEDURE — 97110 THERAPEUTIC EXERCISES: CPT

## 2022-09-14 PROCEDURE — 74011000250 HC RX REV CODE- 250: Performed by: PHYSICIAN ASSISTANT

## 2022-09-14 PROCEDURE — 74011250637 HC RX REV CODE- 250/637

## 2022-09-14 PROCEDURE — 97116 GAIT TRAINING THERAPY: CPT

## 2022-09-14 PROCEDURE — 97530 THERAPEUTIC ACTIVITIES: CPT

## 2022-09-14 RX ORDER — KETOROLAC TROMETHAMINE 30 MG/ML
15 INJECTION, SOLUTION INTRAMUSCULAR; INTRAVENOUS
Status: DISCONTINUED | OUTPATIENT
Start: 2022-09-14 | End: 2022-09-15 | Stop reason: HOSPADM

## 2022-09-14 RX ORDER — AMLODIPINE BESYLATE 5 MG/1
5 TABLET ORAL DAILY
Status: DISCONTINUED | OUTPATIENT
Start: 2022-09-14 | End: 2022-09-15

## 2022-09-14 RX ADMIN — CYCLOBENZAPRINE 10 MG: 10 TABLET, FILM COATED ORAL at 14:27

## 2022-09-14 RX ADMIN — DOCUSATE SODIUM 50MG AND SENNOSIDES 8.6MG 1 TABLET: 8.6; 5 TABLET, FILM COATED ORAL at 17:07

## 2022-09-14 RX ADMIN — POLYETHYLENE GLYCOL 3350 17 G: 17 POWDER, FOR SOLUTION ORAL at 09:04

## 2022-09-14 RX ADMIN — SODIUM CHLORIDE, PRESERVATIVE FREE 10 ML: 5 INJECTION INTRAVENOUS at 21:08

## 2022-09-14 RX ADMIN — GABAPENTIN 300 MG: 300 CAPSULE ORAL at 21:07

## 2022-09-14 RX ADMIN — METOPROLOL TARTRATE 12.5 MG: 25 TABLET, FILM COATED ORAL at 09:04

## 2022-09-14 RX ADMIN — KETOROLAC TROMETHAMINE 15 MG: 30 INJECTION, SOLUTION INTRAMUSCULAR; INTRAVENOUS at 20:29

## 2022-09-14 RX ADMIN — ACETAMINOPHEN 1000 MG: 500 TABLET, FILM COATED ORAL at 11:53

## 2022-09-14 RX ADMIN — GABAPENTIN 300 MG: 300 CAPSULE ORAL at 17:07

## 2022-09-14 RX ADMIN — OXYCODONE 5 MG: 5 TABLET ORAL at 20:28

## 2022-09-14 RX ADMIN — AMLODIPINE BESYLATE 5 MG: 5 TABLET ORAL at 09:03

## 2022-09-14 RX ADMIN — KETOROLAC TROMETHAMINE 15 MG: 30 INJECTION, SOLUTION INTRAMUSCULAR; INTRAVENOUS at 14:27

## 2022-09-14 RX ADMIN — DOCUSATE SODIUM 50MG AND SENNOSIDES 8.6MG 1 TABLET: 8.6; 5 TABLET, FILM COATED ORAL at 09:03

## 2022-09-14 RX ADMIN — ACETAMINOPHEN 1000 MG: 500 TABLET, FILM COATED ORAL at 06:36

## 2022-09-14 RX ADMIN — OXYCODONE 10 MG: 5 TABLET ORAL at 03:57

## 2022-09-14 RX ADMIN — OXYCODONE 5 MG: 5 TABLET ORAL at 17:07

## 2022-09-14 RX ADMIN — METOPROLOL TARTRATE 12.5 MG: 25 TABLET, FILM COATED ORAL at 17:07

## 2022-09-14 RX ADMIN — ATORVASTATIN CALCIUM 10 MG: 10 TABLET, FILM COATED ORAL at 21:07

## 2022-09-14 RX ADMIN — GABAPENTIN 300 MG: 300 CAPSULE ORAL at 09:03

## 2022-09-14 RX ADMIN — ACETAMINOPHEN 1000 MG: 500 TABLET, FILM COATED ORAL at 17:07

## 2022-09-14 RX ADMIN — OXYCODONE 10 MG: 5 TABLET ORAL at 09:04

## 2022-09-14 RX ADMIN — SODIUM CHLORIDE, PRESERVATIVE FREE 10 ML: 5 INJECTION INTRAVENOUS at 14:27

## 2022-09-14 RX ADMIN — CLOPIDOGREL BISULFATE 75 MG: 75 TABLET ORAL at 09:03

## 2022-09-14 NOTE — PROGRESS NOTES
Occupational Therapy    Chart reviewed in prep for skilled OT treatment; however, pt currently working with PTA. Will defer and follow up later as able and appropriate.     Thank you,  Nick Samaniego, OT

## 2022-09-14 NOTE — PROGRESS NOTES
Ortho NP Note    POD# 2 s/p LEFT TOTAL KNEE ARTHROPLASTY   Pt seen in room    Pt resting in bed. Reports that she continues to experience pain to left knee worsened by activity. Describes as aching to left knee, primarily anteriorly. When taking oxycodone, pain does mauricio some but never pain free. Has been up to [facility] intermittently, to  this morning for AM care. Patient states \"It's just harder than I thought to get around. \"  Of note, patient attempting knee exercises in bed but confusion around mechanics of exercises, required re-teaching. Intermittent feeling of hot which patient attributes to menopause. Denies N/V. No CP, no SOB.  + Flatus. VSS Afebrile.     Visit Vitals  BP (!) 163/98 (BP 1 Location: Right upper arm, BP Patient Position: At rest;Lying) Comment: scheduled BP meds given   Pulse 86   Temp 97.8 °F (36.6 °C)   Resp 16   Ht 5' 4\" (1.626 m)   Wt 92.3 kg (203 lb 7.8 oz)   SpO2 96%   BMI 34.93 kg/m²       Voiding status: spontaneous void   Output (mL)  Urine Voided: 850 ml (09/14/22 0712)  Last Bowel Movement Date: 09/12/22 (09/14/22 0830)  Unmeasurable Output  Urine Occurrence(s): 1 (09/13/22 1238)  Straight Cath  Straight Cath: Nurse performed cath Catherine Ferguson RN) (09/12/22 1116)  Number of Attempts: 1 (09/12/22 1116)  Catheter Size: 16 FR (09/12/22 1116)  Time Catheter Inserted: 1110 (09/12/22 1116)  Time Catheter Removed: 1116 (09/12/22 1116)  Urine: 200 mL (09/12/22 1116)      Labs    Lab Results   Component Value Date/Time    HGB 12.4 09/13/2022 03:59 AM      Lab Results   Component Value Date/Time    INR 1.1 08/29/2022 08:53 AM      Lab Results   Component Value Date/Time    Sodium 140 09/13/2022 03:59 AM    Potassium 4.2 09/13/2022 03:59 AM    Chloride 109 (H) 09/13/2022 03:59 AM    CO2 24 09/13/2022 03:59 AM    Glucose 120 (H) 09/13/2022 03:59 AM    BUN 12 09/13/2022 03:59 AM    Creatinine 0.72 09/13/2022 03:59 AM    Calcium 8.0 (L) 09/13/2022 03:59 AM     Recent Glucose Results: No results found for: GLU, GLUPOC, GLUCPOC        Body mass index is 34.93 kg/m². : A BMI > 30 is classified as obesity and > 40 is classified as morbid obesity. Aqaucel dressing x 2 to left knee with small amount of midline drainage, seal intact. Cryotherapy in place over incision  Calves soft and supple; No pain with passive stretch  Bilateral LEs warm, dry. 2+ DP pulses. Sensation and motor intact - PF/DF/EHL intact 5/5  Foot Pumps for mechanical DVT proph while in bed     PLAN:  1) PT: BID WBAT  2) Anticoagulation:  Discussed with surgeon, continue home Plavix 75 mg daily. Encouraged early mobilization, bed exercises, and SCD use. 3) Pain - Multimodal approach including cryotherapy, scheduled Tylenol, home gabapentin with  PRN  flexeril, oxycodone, IV toradol added. Discussed pain expectations, use of scheduled vs prn medications. 4) HTN: Current BP: 157/97. Continue lopressor, added amlodipine 5 mg daily. PRN hydralazine. Pain control as per above. 5) Post op care: Continue OBR, encouraged IS. Follow up in 3 weeks with Dr Cecil Hussein. Aquacel to remain in place x 7 days unless integrity is lost.   6) Readiness for discharge:     [] Vital Signs stable : BP elevated. [x] Hgb stable : 12.4    [x] + Voiding    [x] Wound intact, drainage minimal    [x] Tolerating PO intake     [] Cleared by PT (OT if applicable)     [] Stair training completed (if applicable)    [] Independent / Contact Guard Assist (household distance)     [] Bed mobility     [] Car transfers     [] ADLs    [] Adequate pain control on oral medication alone     Discharge pending PT progress. Goal for home with [facility] and daughter support more likely tomorrow.       Priscila Rosas NP  Available via [employer]

## 2022-09-14 NOTE — PROGRESS NOTES
Problem: Mobility Impaired (Adult and Pediatric)  Goal: *Acute Goals and Plan of Care (Insert Text)  Description: FUNCTIONAL STATUS PRIOR TO ADMISSION: Patient was independent and active without use of DME.    HOME SUPPORT PRIOR TO ADMISSION: The patient lived with fiance but did not require assist.    Physical Therapy Goals  Initiated 9/12/2022    1. Patient will move from supine to sit and sit to supine , scoot up and down, and roll side to side in bed with modified independence within 4 days. 2. Patient will perform sit to stand with modified independence within 4 days. 3. Patient will ambulate with modified independence for 150 feet with the least restrictive device within 4 days. 4. Patient will ascend/descend 4 stairs with cane and one handrail(s) with modified independence within 4 days. 5. Patient will perform home exercise program per protocol with independence within 4 days. 6. Patient will demonstrate AROM 0-90 degrees in operative joint within 4 days. Outcome: Progressing Towards Goal   PHYSICAL THERAPY TREATMENT  Patient: Sachi Aguilar (26 y.o. female)  Date: 9/14/2022  Diagnosis: Primary osteoarthritis of left knee [M17.12] <principal problem not specified>  Procedure(s) (LRB):  LEFT TOTAL KNEE ARTHROPLASTY (Left) 2 Days Post-Op  Precautions: Fall, WBAT  Chart, physical therapy assessment, plan of care and goals were reviewed. ASSESSMENT  Patient continues with skilled PT services. Pt w/ increased pain this morning (8/10) and declined EOB/OOB mobility. Reports pain is bad that it made her yell out. BP also remains elevated w/DBP in upper 90's at rest. Meds, including pain and BP meds, on board. Pt agreeable to TKA bed level exercises. Tolerated and completed 2 sets x10 reps w/ active-assisted to active ROM. Discussed proper positioning of knee in bed and when elevated on pillow (keeping knee in extension).   Encourage continuation of bed level exercises for strengthening and increase ROM. Will follow up this afternoon for progression of gait as able/appropriate. Current Level of Function Impacting Discharge (mobility/balance): CGA/Min A x1 (based off previous session)    Other factors to consider for discharge: elevated BP. Mobility below baseline. Pain limiting progress. PLAN :  Patient continues to benefit from skilled intervention to address the above impairments. Continue treatment per established plan of care. to address goals. Recommendation for discharge: (in order for the patient to meet his/her long term goals)  Physical therapy at least 2 days/week in the home     This discharge recommendation:  Has been made in collaboration with the attending provider and/or case management    IF patient discharges home will need the following DME: RW has been delivered and adjusted for discharge       SUBJECTIVE:   Patient stated What's the difference between a total and partial (replacement)?  Provided education of TKA vs Partial. Also discussed positioning of LLE in bed (neutral position) and 'no pillow under bend of knee' rule.      OBJECTIVE DATA SUMMARY:   Critical Behavior:  Neurologic State: Alert  Orientation Level: Oriented X4  Cognition: Appropriate decision making  Safety/Judgement: Awareness of environment, Fall prevention, Good awareness of safety precautions, Home safety, Insight into deficits    Range of Motion:  AROM: Generally decreased, functional     Functional Mobility Training:  Bed Mobility:     EOB/OOB mobility declined d/t pain             Therapeutic Exercises:     EXERCISE   Sets   Reps   Active Active Assist   Passive Self ROM   Comments   Ankle Pumps   []                                        []                                        []                                        []                                           Quad Sets 2 10 [x]                                        [x]                                        [] []                                           Hamstring Sets 2 10 [x]                                        [x]                                        []                                        []                                           Short Arc Quads   []                                        []                                        []                                        []                                           Knee Extension Stretch     []                                          []                                          []                                          []                                           Heel Slides 2 10 [x]                                        [x]                                        []                                        []                                           Long Arc Quads   []                                        []                                        []                                        []                                           Knee Flexion Stretch   []                                        []                                        []                                        []                                           Straight Leg Raises   []                                        []                                        []                                        []                                               Pain Ratin/10    Activity Tolerance:   Good    After treatment patient left in no apparent distress:   Supine in bed, Call bell within reach, and Side rails x 3    COMMUNICATION/COLLABORATION:   The patients plan of care was discussed with: Registered nurse.      Josefa Connelly PTA   Time Calculation: 26 mins

## 2022-09-14 NOTE — PROGRESS NOTES
Physical Therapy  9/14/2022    Chart reviewed. Followed up w/ pt for afternoon PT. Pt soundly asleep upon arrival; responds to gentle tactile stimuli. Pt reports this is the first time she had been able to sleep and would like to keep resting. Will check back at later time this afternoon as able/appropriate. 54 Pt awake w/ PCT at bedside completing VS. Pt reports she just received IV pain med and would like a few more minutes to take effect. Noted pt's face wet w/ sweat and fanning herself w/ paper. /75 mmHg. NP made aware. Will return in few short minutes for OOB mobility.     Josefa Means, PTA

## 2022-09-14 NOTE — PROGRESS NOTES
Problem: Mobility Impaired (Adult and Pediatric)  Goal: *Acute Goals and Plan of Care (Insert Text)  Description: FUNCTIONAL STATUS PRIOR TO ADMISSION: Patient was independent and active without use of DME.    HOME SUPPORT PRIOR TO ADMISSION: The patient lived with fiance but did not require assist.    Physical Therapy Goals  Initiated 9/12/2022    1. Patient will move from supine to sit and sit to supine , scoot up and down, and roll side to side in bed with modified independence within 4 days. 2. Patient will perform sit to stand with modified independence within 4 days. 3. Patient will ambulate with modified independence for 150 feet with the least restrictive device within 4 days. 4. Patient will ascend/descend 4 stairs with cane and one handrail(s) with modified independence within 4 days. 5. Patient will perform home exercise program per protocol with independence within 4 days. 6. Patient will demonstrate AROM 0-90 degrees in operative joint within 4 days. 9/14/2022 1059 by Means, Josefa HECK  Outcome: Progressing Towards Goal   PHYSICAL THERAPY AFTERNOON SESSION NOTE  Patient: Tracy Gan (79 y.o. female)  Date: 9/14/2022  Primary Diagnosis: Primary osteoarthritis of left knee [M17.12]  Procedure(s) (LRB):  LEFT TOTAL KNEE ARTHROPLASTY (Left) 2 Days Post-Op   Precautions:   Fall, WBAT    Tracy Gan was seen for PT session. She is walking approx 50 Ft w/ the RW and CGA. Pt continues to experience increased but seems to be improved compared to this AM. The primary limiting factors continue to be pain/pain management  (adjustments made as pt received IV pain med- toradol ?). Pt determined to improve. She did tolerate stair training w/ CGA; pt completed 2 steps. She did report that she does have the option to use a ramp to enter her apt building and elevator also.  Currently, expect Tracy Gan will need 1-2 more session(s) to meet the therapy goals in preparation for returning home w/ [facility]. Anticipate that she will be able to d/c after AM session (pending pain management- hopefully further improved). Afternoon session functional mobility:  Bed Mobility:     Supine to Sit: Minimum assistance; Additional time;Assist x1 (LLE)  Sit to Supine: Minimum assistance (LLE)     Transfers:  Sit to Stand: Contact guard assistance;Assist x1  Stand to Sit: Contact guard assistance;Assist x1                       Balance:   Sitting: Intact  Sitting - Static: Good (unsupported)  Standing: Impaired; With support  Standing - Static: Constant support;Good  Standing - Dynamic : Good;Constant support; Fair  Ambulation/Gait Training:  Distance (ft): 50 Feet (ft)  Assistive Device: Gait belt;Walker, rolling  Ambulation - Level of Assistance: Contact guard assistance;Assist x1        Gait Abnormalities: Antalgic        Base of Support: Widened;Shift to right  Stance: Left decreased  Speed/Valeria: Slow  Step Length: Right shortened;Left shortened  Swing Pattern: Left asymmetrical     Interventions: Safety awareness training        Stairs:  Number of Stairs Trained: 2  Stairs - Level of Assistance: Contact guard assistance; Additional time;Assist X1  Rail Use: Both    Thank you,  Josefa Connelly,PTA   Time Calculation: 34 mins

## 2022-09-15 ENCOUNTER — DOCUMENTATION ONLY (OUTPATIENT)
Dept: INTERNAL MEDICINE CLINIC | Age: 62
End: 2022-09-15

## 2022-09-15 VITALS
RESPIRATION RATE: 18 BRPM | TEMPERATURE: 97.7 F | BODY MASS INDEX: 34.74 KG/M2 | DIASTOLIC BLOOD PRESSURE: 87 MMHG | HEIGHT: 64 IN | OXYGEN SATURATION: 98 % | WEIGHT: 203.48 LBS | SYSTOLIC BLOOD PRESSURE: 125 MMHG | HEART RATE: 89 BPM

## 2022-09-15 PROCEDURE — 74011000250 HC RX REV CODE- 250: Performed by: PHYSICIAN ASSISTANT

## 2022-09-15 PROCEDURE — G0378 HOSPITAL OBSERVATION PER HR: HCPCS

## 2022-09-15 PROCEDURE — 97535 SELF CARE MNGMENT TRAINING: CPT

## 2022-09-15 PROCEDURE — 97116 GAIT TRAINING THERAPY: CPT

## 2022-09-15 PROCEDURE — 74011250637 HC RX REV CODE- 250/637: Performed by: PHYSICIAN ASSISTANT

## 2022-09-15 RX ORDER — ACETAMINOPHEN 325 MG/1
650 TABLET ORAL EVERY 6 HOURS
Qty: 100 TABLET | Refills: 0 | Status: SHIPPED | OUTPATIENT
Start: 2022-09-15

## 2022-09-15 RX ORDER — NALOXONE HYDROCHLORIDE 4 MG/.1ML
SPRAY NASAL
Qty: 1 EACH | Refills: 0 | Status: SHIPPED | OUTPATIENT
Start: 2022-09-15

## 2022-09-15 RX ORDER — OXYCODONE HYDROCHLORIDE 5 MG/1
5-10 TABLET ORAL
Qty: 60 TABLET | Refills: 0 | Status: SHIPPED | OUTPATIENT
Start: 2022-09-15 | End: 2022-09-16 | Stop reason: HOSPADM

## 2022-09-15 RX ORDER — AMOXICILLIN 250 MG
1 CAPSULE ORAL 2 TIMES DAILY
Qty: 60 TABLET | Refills: 0 | Status: SHIPPED | OUTPATIENT
Start: 2022-09-15 | End: 2022-10-15

## 2022-09-15 RX ORDER — POLYETHYLENE GLYCOL 3350 17 G/17G
17 POWDER, FOR SOLUTION ORAL DAILY
Qty: 14 PACKET | Refills: 0 | Status: SHIPPED | OUTPATIENT
Start: 2022-09-15 | End: 2022-09-29

## 2022-09-15 RX ADMIN — ACETAMINOPHEN 1000 MG: 500 TABLET, FILM COATED ORAL at 11:24

## 2022-09-15 RX ADMIN — POLYETHYLENE GLYCOL 3350 17 G: 17 POWDER, FOR SOLUTION ORAL at 09:49

## 2022-09-15 RX ADMIN — CLOPIDOGREL BISULFATE 75 MG: 75 TABLET ORAL at 09:50

## 2022-09-15 RX ADMIN — ACETAMINOPHEN 1000 MG: 500 TABLET, FILM COATED ORAL at 06:20

## 2022-09-15 RX ADMIN — DOCUSATE SODIUM 50MG AND SENNOSIDES 8.6MG 1 TABLET: 8.6; 5 TABLET, FILM COATED ORAL at 09:49

## 2022-09-15 RX ADMIN — OXYCODONE 5 MG: 5 TABLET ORAL at 09:50

## 2022-09-15 RX ADMIN — GABAPENTIN 300 MG: 300 CAPSULE ORAL at 09:49

## 2022-09-15 RX ADMIN — METOPROLOL TARTRATE 12.5 MG: 25 TABLET, FILM COATED ORAL at 09:50

## 2022-09-15 RX ADMIN — SODIUM CHLORIDE, PRESERVATIVE FREE 10 ML: 5 INJECTION INTRAVENOUS at 06:21

## 2022-09-15 NOTE — DISCHARGE SUMMARY
Ortho Discharge Summary    Patient ID:  Tray Rivas  [id number]  female  57 y.o.  1960    Admit date: 9/12/2022    Discharge date: 9/15/2022    Admitting Physician: Josué Lott MD     Consulting Physician(s):   Treatment Team: Attending Provider: Liza Ley MD; Utilization Review: Siena Larry RN; Care Manager: Malathi Deluna RN; Staff Nurse: Dianna De Los Santos RN; Occupational Therapist: Ck Martinez OT; Physical Therapist: Josefa Connelly    Date of Surgery:   9/12/2022     Preoperative Diagnosis:  Primary osteoarthritis of left knee [M17.12]    Postoperative Diagnosis:   Primary osteoarthritis of left knee [M17.12]    Procedure(s):   LEFT TOTAL KNEE ARTHROPLASTY     Anesthesia Type:   Spinal     Surgeon: Liza Ley MD                            HPI:  Pt is a 57 y.o. female who has a history of Primary osteoarthritis of left knee [M17.12]  with pain and limitations of activities of daily living who presents at this time for a left LEFT TOTAL KNEE ARTHROPLASTY following the failure of conservative management. PMH:   Past Medical History:   Diagnosis Date    Anxiety     Bipolar 1 disorder ([address]     Depression     Fatigue     Frequent headaches     GERD (gastroesophageal reflux disease)     Hearing loss     PT DENIES ANY HEARING    Hypertension     Joint pain     Memory disorder     LAPSE OF MEMORY DUE TO STROKES    Muscle pain     Nerve damage     ALL OVER    Snoring     Stomach pain     Stroke ([address]     X2 OVER 10 YRS AGO UNABLE TO REMEMBER DATE       Body mass index is 34.93 kg/m². : A BMI > 30 is classified as obesity and > 40 is classified as morbid obesity. Medications upon admission :   Prior to Admission Medications   Prescriptions Last Dose Informant Patient Reported?  Taking?   atorvastatin (LIPITOR) 10 mg tablet 9/10/2022  No Yes   Sig: TAKE 1 TABLET BY MOUTH NIGHTLY FOR CHOLESTEROL   clopidogreL (PLAVIX) 75 mg tab 8/19/2022  No Yes   Sig: Take 1 Tablet by mouth daily.   ergocalciferol (ERGOCALCIFEROL) 1,250 mcg (50,000 unit) capsule 9/2/2022  No Yes   Sig: Take 1 Capsule by mouth every seven (7) days. Patient taking differently: Take 50,000 Units by mouth every seven (7) days. TAKES ON Friday   folic acid (FOLVITE) 1 mg tablet 9/10/2022  No Yes   Sig: Take 1 Tablet by mouth in the morning.   gabapentin (NEURONTIN) 300 mg capsule 9/5/2022  No Yes   Sig: Take 1 cap in am, 2 caps in pm   Patient taking differently: Take 300 mg by mouth three (3) times daily. Take 1 cap in am, 2 caps in pm   ibuprofen (MOTRIN) 800 mg tablet 9/5/2022  No Yes   Sig: Take 1 Tablet by mouth every eight (8) hours as needed for Pain. With food   metoprolol tartrate (LOPRESSOR) 25 mg tablet 9/11/2022  No Yes   Sig: Take 1/2 (one-half) tablet by mouth twice daily   rimegepant (NURTEC) 75 mg disintegrating tablet 9/5/2022  No Yes   Sig: Take 1 tablet every other day, then 1 tablet p.o. as needed for severe headache, not to DC 1 dose in 24 hours   tiZANidine (ZANAFLEX) 4 mg tablet 9/5/2022  No Yes   Sig: Take 1 Tablet by mouth nightly. Facility-Administered Medications: None        Allergies: Allergies   Allergen Reactions    Ace Inhibitors Cough    Contrast Dye [Iodine] Hives        Hospital Course: The patient underwent surgery. Complications:  None; patient tolerated the procedure well. Was taken to the PACU in stable condition and then transferred to the ortho floor. Patient mobilized on day of surgery. Throughout early portion of hospitalization, pain limiting patient therapy progress. Received tylenol, oxycodone, IV dilaudid, toradol, Flexeril + home gapentin during hospitalization. At time of discharge, patient  mobilizing appropriately using primarily oxycodone, tylenol; both sent to pharmacy at discharge. Patient cleared by therapy on POD 3 for discharge to home with home health and daughter's support.       Perioperative Antibiotics:  Ancef     Postoperative Pain Management: Oxycodone & Tylenol with home gabapentin and Zanaflex    DVT Prophylaxis: Plavix 75 mg daily     Postoperative transfusions:    Number of units banked PRBCs =   none     Post Op complications: none    Hemoglobin at discharge:    Lab Results   Component Value Date/Time    HGB 12.4 09/13/2022 03:59 AM    INR 1.1 08/29/2022 08:53 AM       Aquacel dressing remained in place with small amount of midline drainage at discharge. No significant erythema or swelling. Wound appears to be healing without any evidence of infection. Neurovascular exam found to be within normal limits. Physical Therapy started following surgery and participated in bed mobility, transfers and ambulation. Gait:  Gait  Base of Support: Widened, Shift to right  Speed/Valeria: Slow  Step Length: Left shortened, Right shortened  Swing Pattern: Left asymmetrical  Stance: Left decreased  Gait Abnormalities: Antalgic, Decreased step clearance, Step to gait  Ambulation - Level of Assistance: Contact guard assistance, Assist x1  Distance (ft): 50 Feet (ft)  Assistive Device: Gait belt, Walker, rolling  Rail Use: Both  Stairs - Level of Assistance: Contact guard assistance, Assist X1  Number of Stairs Trained: 2  Interventions: Safety awareness training            Interventions: Safety awareness training      Discharged to: Home. Condition on Discharge:   stable    Discharge instructions:  - Anticoagulate with Plavix 75 mg daily, prior to admit medication  - Take pain medications as prescribed  - Resume pre hospital diet      - Discharge activity: activity as tolerated  - Ambulate with assistive device as needed. - Weight bearing status as tolerated  - Wound Care Keep wound clean and dry. See discharge instruction sheet. -DISCHARGE MEDICATION LIST     Current Discharge Medication List        START taking these medications    Details   acetaminophen (TYLENOL) 325 mg tablet Take 2 Tablets by mouth every six (6) hours.   Qty: 100 Tablet, Refills: 0  Start date: 9/15/2022      oxyCODONE IR (ROXICODONE) 5 mg immediate release tablet Take 1-2 Tablets by mouth every four (4) hours as needed for Pain for up to 7 days. Max Daily Amount: 60 mg.  Qty: 60 Tablet, Refills: 0  Start date: 9/15/2022, End date: 9/22/2022    Associated Diagnoses: S/P total knee replacement, left      polyethylene glycol (MIRALAX) 17 gram packet Take 1 Packet by mouth daily for 14 days. Qty: 14 Packet, Refills: 0  Start date: 9/15/2022, End date: 9/29/2022      senna-docusate (PERICOLACE) 8.6-50 mg per tablet Take 1 Tablet by mouth two (2) times a day for 30 days. Qty: 60 Tablet, Refills: 0  Start date: 9/15/2022, End date: 10/15/2022      naloxone (Narcan) 4 mg/actuation nasal spray Use 1 spray intranasally, then discard. Repeat with new spray every 2 min as needed for opioid overdose symptoms, alternating nostrils. Qty: 1 Each, Refills: 0  Start date: 9/15/2022           CONTINUE these medications which have NOT CHANGED    Details   rimegepant (NURTEC) 75 mg disintegrating tablet Take 1 tablet every other day, then 1 tablet p.o. as needed for severe headache, not to DC 1 dose in 24 hours  Qty: 16 Tablet, Refills: 11      atorvastatin (LIPITOR) 10 mg tablet TAKE 1 TABLET BY MOUTH NIGHTLY FOR CHOLESTEROL  Qty: 90 Tablet, Refills: 0    Associated Diagnoses: Hyperlipidemia, unspecified hyperlipidemia type      ergocalciferol (ERGOCALCIFEROL) 1,250 mcg (50,000 unit) capsule Take 1 Capsule by mouth every seven (7) days. Qty: 4 Capsule, Refills: 5      folic acid (FOLVITE) 1 mg tablet Take 1 Tablet by mouth in the morning. Qty: 30 Tablet, Refills: 5      gabapentin (NEURONTIN) 300 mg capsule Take 1 cap in am, 2 caps in pm  Qty: 90 Capsule, Refills: 3    Associated Diagnoses: Neuropathy; Paresthesia      tiZANidine (ZANAFLEX) 4 mg tablet Take 1 Tablet by mouth nightly.   Qty: 30 Tablet, Refills: 2      metoprolol tartrate (LOPRESSOR) 25 mg tablet Take 1/2 (one-half) tablet by mouth twice daily  Qty: 180 Tablet, Refills: 0    Associated Diagnoses: Essential hypertension      clopidogreL (PLAVIX) 75 mg tab Take 1 Tablet by mouth daily.   Qty: 90 Tablet, Refills: 0    Associated Diagnoses: History of CVA (cerebrovascular accident)           STOP taking these medications       ibuprofen (MOTRIN) 800 mg tablet Comments:   Reason for Stopping:            per medical continuation form      -Follow up in office in 3 weeks with Dr Edinson Schroeder  - Follow up with PCP following hospital admission      Signed:  William Perez NP  Orthopaedic Nurse Practitioner    9/15/2022  10:34 AM

## 2022-09-15 NOTE — PROGRESS NOTES
Care plan reviewed. Problem: Patient Education: Go to Patient Education Activity  Goal: Patient/Family Education  Outcome: Progressing Towards Goal     Problem: Falls - Risk of  Goal: *Absence of Falls  Description: Document Nate Garcia Fall Risk and appropriate interventions in the flowsheet.   Outcome: Progressing Towards Goal  Note: Fall Risk Interventions:  Mobility Interventions: Communicate number of staff needed for ambulation/transfer, Patient to call before getting OOB, Utilize walker, cane, or other assistive device, Utilize gait belt for transfers/ambulation         Medication Interventions: Assess postural VS orthostatic hypotension, Bed/chair exit alarm, Patient to call before getting OOB, Teach patient to arise slowly    Elimination Interventions: Bed/chair exit alarm, Call light in reach, Patient to call for help with toileting needs, Toileting schedule/hourly rounds              Problem: Patient Education: Go to Patient Education Activity  Goal: Patient/Family Education  Outcome: Progressing Towards Goal     Problem: Pain  Goal: *Control of Pain  Outcome: Progressing Towards Goal

## 2022-09-15 NOTE — PROGRESS NOTES
Ortho NP Note    POD# 1  s/p LEFT TOTAL KNEE ARTHROPLASTY   Pt seen in room. Pt resting in bed. Just returned from [facility], completed AM care. Per patient, pain overnight has improved to left knee. States that she has been much more comfortable since yesterday afternoon. Currently while sitting rating pain 3/10. Aware of plan for repeat PT evaluation today with patient stated goal of discharge to home. Denies N/V. No CP, SOB. No fever, chills. VSS Afebrile. Visit Vitals  /73   Pulse 76   Temp 97.8 °F (36.6 °C)   Resp 16   Ht 5' 4\" (1.626 m)   Wt 92.3 kg (203 lb 7.8 oz)   SpO2 99%   BMI 34.93 kg/m²       Voiding status: spontaneous void  Output (mL)  Urine Voided: 500 ml (09/15/22 0734)  Last Bowel Movement Date: 09/12/22 (09/14/22 2030)  Unmeasurable Output  Urine Occurrence(s): 1 (09/13/22 1238)  Straight Cath  Straight Cath: Nurse performed cath Caryle Eke RN) (09/12/22 1116)  Number of Attempts: 1 (09/12/22 1116)  Catheter Size: 16 FR (09/12/22 1116)  Time Catheter Inserted: 1110 (09/12/22 1116)  Time Catheter Removed: 8888 (09/12/22 1116)  Urine: 200 mL (09/12/22 1116)      Labs    Lab Results   Component Value Date/Time    HGB 12.4 09/13/2022 03:59 AM      Lab Results   Component Value Date/Time    INR 1.1 08/29/2022 08:53 AM      Lab Results   Component Value Date/Time    Sodium 140 09/13/2022 03:59 AM    Potassium 4.2 09/13/2022 03:59 AM    Chloride 109 (H) 09/13/2022 03:59 AM    CO2 24 09/13/2022 03:59 AM    Glucose 120 (H) 09/13/2022 03:59 AM    BUN 12 09/13/2022 03:59 AM    Creatinine 0.72 09/13/2022 03:59 AM    Calcium 8.0 (L) 09/13/2022 03:59 AM     Recent Glucose Results: No results found for: GLU, GLUPOC, GLUCPOC        Body mass index is 34.93 kg/m². : A BMI > 30 is classified as obesity and > 40 is classified as morbid obesity. Aqaucel dressing x 2 to left knee with small amount of midline drainage, seal intact.     Cryotherapy in place over incision  Calves soft and supple; No pain with passive stretch  Bilateral LEs warm, dry. 2+ DP pulses. Sensation and motor intact - PF/DF/EHL intact 5/5  Foot Pumps for mechanical DVT proph while in bed     PLAN:  1) PT: BID WBAT  2) Anticoagulation:  Discussed with surgeon, continue home Plavix 75 mg daily. Encouraged early mobilization, bed exercises, and SCD use. 3) Pain - Multimodal approach including cryotherapy, scheduled Tylenol, home gabapentin with  PRN  flexeril, oxycodone, IV toradol. Discussed pain expectations, use of scheduled vs prn medications. D/c rx sent to [facility] on file. 4) HTN: Current BP: 116/73. Improving with improved pain control. Continue lopressor. PRN hydralazine. Recommended PCP follow up for BP recheck. 5) Post op care: Continue OBR, encouraged IS. Follow up in 3 weeks with Dr Shabnam Diehl. Aquacel to remain in place x 7 days unless integrity is lost.   6) Readiness for discharge[de-identified]     [x] Vital Signs stable    [x] Hgb stable    [x] + Voiding    [x] Wound intact, drainage minimal    [x] Tolerating PO intake     [] Cleared by PT (OT if applicable)     [] Stair training completed (if applicable)    [] Independent / Contact Guard Assist (household distance)     [] Bed mobility     [] Car transfers     [] ADLs    [x] Adequate pain control on oral medication alone     Discharge to home with home health and granddaughter's support today pending PT clearance. Rx's sent to [facility] on file, med rec done.      Fela Steven NP  Available via [employer]

## 2022-09-15 NOTE — DISCHARGE INSTRUCTIONS
Discharge Instructions Knee Replacement  Dr. Chucho Luz      Patient Name  Brea Garza  Date of procedure  9/12/2022    Procedure  Procedure(s):  LEFT TOTAL KNEE ARTHROPLASTY  Surgeon  Surgeon(s) and Role:     * Thelma Davis MD - Primary  Date of discharge: [unfilled]  PCP: @PCP@    Follow up care  Follow up visit with Dr. Chucho Luz in 3 weeks. Call [phone]  to make an appointment  If you have staples, they will be taken out in 10-14 days by [address] [place] [address] staff. Activity at home  Take a short walk every hour; except at night when sleeping  Do your Home Exercise Program 3 times every day   After exercising lie down and elevate your leg on pillows for 15-30 minutes to decrease swelling  Refer to your patient notebook for more information    Bathing and caring for your incision  You may take a shower with your waterproof dressing on your knee. The waterproof dressing is to stay on your knee for 7 days. On the 7th day have someone gently peel the dressing off by lifting the edge and stretching it to break the seal.  You may then leave your incision open to air unless you see drainage from your knee. Preventing blood clots  Take  Plavix  every day as prescribed. Call Dr. Chucho Luz if you have side effects of blood thinning medication: bleeding, bruising, upset stomach, or diarrhea. Call Dr. Chucho Luz for signs of a blood clot in your leg: calf pain, tenderness, redness, swelling of lower leg    Preventing lung congestion  Use your incentive spirometer 4 times a day; do 10 repetitions each time  Remember to keep the small blue ball between the two arrows when taking a slow, deep breath     Pain Management  Get up and walk a short distance to relieve pain and stiffness. Place ice wrap on your knee except when you are walking.  The gel ice packs should be changed about every 4 hours  Elevate your leg on pillows for 15-30 minutes   Take Tylenol 650mg (take two 325mg tablets) every 6 hours for pain. If needed, take a narcotic pain pill every 4-6 hours as prescribed. Take all medications with a small amount of food. As your pain decreases, take the narcotics less often or take ½ of a pill  Call Dr. Jerel Aschoff if you have side effects from your narcotic pain medication: itching, drowsiness, dizziness, upset stomach, dry mouth, constipation or if you medication is not relieving your pain. Diet after surgery  You may resume your normal diet. Include vegetables, fruit, whole grains, lean meats, and low-fat dairy products. Eat food high in fiber   Drink plenty of fluids, including 8 cups of water daily  Take Senokot-s twice a day to prevent constipation    Avoid after surgery  Do not take any over-the-counter medication for pain except Tylenol  Do not take more than 3000mg (3 grams) of Tylenol in 24 hours. Do not drink alcoholic beverages  Do not smoke  Do not drive until seen for follow up appointment  Do not place frozen gel pack directly on your skin. It can cause frostbite. Do not take a tub bath, swim or get in a hot tub for 6 weeks  Prevention of falls and safety at home  Set up an area where you can rest comfortably leaving space around furniture to allow you to walk with your walker  Keep stairs, hallways and bathrooms well lit; especially at night  Arrange for care for your pets  Keep your home free of clutter      Call Dr. Jerel Aschoff at [phone] for:  Pain that is not relieved by pain medication, ice and activity  Side effects of medications  Increased/spread of bruising  Warning signs of infection:  persistent fever greater than 100 degrees  shaking or chills  increased redness, tenderness, swelling or drainage from incision  increased pain during activity or rest  Warning signs of a blood clot in your leg:  increased pain in your calf  tenderness or redness  increased swelling or knee, calf, ankle or foot    Call [phone] after 5pm or on a weekend.  The on call physician will return your phone call   Call your Primary Care Doctor for:   Concerns about your medical conditions such as diabetes, high blood pressure, asthma, congestive heart failure  Blood sugars greater than 180  Persistent headache or dizziness  Coughing or congestion  Constipation or diarrhea  Burning when you go to the bathroom  Abnormal heart rate (fast or slow)     Call 911 and go to the nearest hospital for:   Sudden increased shortness of breath  Sudden onset of chest pain  Difficulty breathing  Localized chest pain with coughing or taking a deep breath

## 2022-09-15 NOTE — PROGRESS NOTES
Problem: Self Care Deficits Care Plan (Adult)  Goal: *Acute Goals and Plan of Care (Insert Text)  Description: FUNCTIONAL STATUS PRIOR TO ADMISSION: Patient was independent and active without use of DME.    HOME SUPPORT: The patient lived with her fiance but did not require assist.    Occupational Therapy Goals  Initiated 9/13/2022  1. Patient will perform lower body ADLs with supervision/set-up within 4 day(s). 2.  Patient will perform upper body ADLs standing 5 mins without fatigue or LOB with supervision/set-up within 4 day(s). 3.  Patient will perform all aspects of toileting with supervision/set-up within 4 day(s). 4.  Patient will participate in upper extremity therapeutic exercise/activities with supervision/set-up for 10 minutes within 4 day(s). 5.  Patient will utilize energy conservation techniques during functional activities without cues within 4 day(s). Outcome: Progressing Towards Goal    OCCUPATIONAL THERAPY TREATMENT  Patient: Tray Rivas (55 y.o. female)  Date: 9/15/2022  Diagnosis: Primary osteoarthritis of left knee [M17.12] <principal problem not specified>  Procedure(s) (LRB):  LEFT TOTAL KNEE ARTHROPLASTY (Left) 3 Days Post-Op  Precautions: Fall, WBAT  Chart, occupational therapy assessment, plan of care, and goals were reviewed. ASSESSMENT  Patient continues with skilled OT services and is progressing towards goals. Pt progressing with mobility/balance and activity tolerance, completing RW level ADLs with Min A for distal management and good understanding of modified dressing techniques. Pt reports she plan to borrow reacher from father and reports good technique understanding. Pt's HR noted to peak at 110 during ADL completion; however, quickly decreased to mid 90's with rest; pt educated on energy conservation, with good understanding verbalized. Pt safe for discharge home from a self-care standpoint, with no anticipated OT needs.   Acute OT to continue to follow during acute hospitalization. Current Level of Function Impacting Discharge (ADLs): Min A    Other factors to consider for discharge: Min A         PLAN :  Patient continues to benefit from skilled intervention to address the above impairments. Continue treatment per established plan of care to address goals. Recommend with staff: OOB meals, Active ADL    Recommend next OT session: POC progression    Recommendation for discharge: (in order for the patient to meet his/her long term goals)  No skilled occupational therapy/ follow up rehabilitation needs identified at this time. This discharge recommendation:  Has been made in collaboration with the attending provider and/or case management    IF patient discharges home will need the following DME: patient owns DME required for discharge       SUBJECTIVE:   Patient stated That was easier.  re: return to supine with leg-    OBJECTIVE DATA SUMMARY:   Cognitive/Behavioral Status:  Neurologic State: Alert  Orientation Level: Oriented X4  Cognition: Appropriate decision making; Appropriate for age attention/concentration; Appropriate safety awareness  Perception: Appears intact  Perseveration: No perseveration noted  Safety/Judgement: Awareness of environment    Functional Mobility and Transfers for ADLs:  Bed Mobility:  Supine to Sit: Contact guard assistance  Sit to Supine: Supervision (with use of leg-)    Transfers:  Sit to Stand: Stand-by assistance    Pt educated on safe transfer techniques, with specific emphasis on proper hand placement to push up from seated surface rather than attempt to pull self up, fully positioning self in-front of desired seated location, feeling chair on back of legs and reaching back with 1-2 UE to slowly lower self to seated position. Balance:  Sitting: Intact  Sitting - Static: Good (unsupported)  Standing: Impaired; With support  Standing - Static: Constant support;Good  Standing - Dynamic : Constant support;Fair;Good    ADL Intervention:  Type of Bath: Full;Chlorhexidine (CHG)    Lower Body Bathing  Bathing Assistance:  (for LLE)  Lower Body : Minimum assistance  Position Performed: Seated edge of bed    Upper Body Dressing Assistance  Pullover Shirt: Set-up (at EOB; pullover dress)    Lower Body Dressing Assistance  Underpants: Minimum assistance  Position Performed: Seated edge of bed;Standing (RW)  Cues: Verbal cues provided (for recommendation for AE, as well as, for modified dressing technique)    Cognitive Retraining  Safety/Judgement: Awareness of environment    Pain:  Mild c/o L knee pain with movement, did not quantify; RN aware and following    Activity Tolerance:   Good, Fair, and requires rest breaks    After treatment patient left in no apparent distress:   Supine in bed, Call bell within reach, Bed / chair alarm activated, and Side rails x 3    COMMUNICATION/COLLABORATION:   The patients plan of care was discussed with: Physical therapy assistant, Registered nurse, Case management, and NP .      Nae Valadez OT  Time Calculation: 29 mins

## 2022-09-15 NOTE — PROGRESS NOTES
Problem: Mobility Impaired (Adult and Pediatric)  Goal: *Acute Goals and Plan of Care (Insert Text)  Description: FUNCTIONAL STATUS PRIOR TO ADMISSION: Patient was independent and active without use of DME.    HOME SUPPORT PRIOR TO ADMISSION: The patient lived with fiance but did not require assist.    Physical Therapy Goals  Initiated 9/12/2022    1. Patient will move from supine to sit and sit to supine , scoot up and down, and roll side to side in bed with modified independence within 4 days. 2. Patient will perform sit to stand with modified independence within 4 days. 3. Patient will ambulate with modified independence for 150 feet with the least restrictive device within 4 days. 4. Patient will ascend/descend 4 stairs with cane and one handrail(s) with modified independence within 4 days. 5. Patient will perform home exercise program per protocol with independence within 4 days. 6. Patient will demonstrate AROM 0-90 degrees in operative joint within 4 days. Outcome: Progressing Towards Goal   PHYSICAL THERAPY NOTE  Patient: Mara Woodward (89 y.o. female)  Date: 9/15/2022  Primary Diagnosis: Primary osteoarthritis of left knee [M17.12]  Procedure(s) (LRB):  LEFT TOTAL KNEE ARTHROPLASTY (Left) 3 Days Post-Op   Precautions:   Fall, WBAT    Mara Woodward was seen for morning PT session. She is walking 50 feet with the RW and CGA to occasional SBA. Patient was instructed in stair management and able to negotiate 4 stairs using both rails during PM session yesterday. Pt has ramp to enter building and access to an elevator and therefore opted out of additional  stair training this morning. BP has improved but noted elevated HR of 128 bpm- RN and NP aware. Reviewed activity recommendations and restrictions with patient. Mara Woodward is cleared for discharge home w/ HHPT from a mobility standpoint. RN and NP aware. Pt remained in bed w/ LLE on bed and RLE resting off EOB.      Visit Vitals  BP 125/87 (BP 1 Location: Right upper arm, BP Patient Position: At rest;Lying)   Pulse 89   Temp 97.7 °F (36.5 °C)   Resp 18   Ht 5' 4\" (1.626 m)   Wt 92.3 kg (203 lb 7.8 oz)   SpO2 98%   BMI 34.93 kg/m²        Morning session functional mobility:  Bed Mobility:     Supine to Sit:  (pt seated EOB)  Sit to Supine: Minimum assistance (LLE into bed; RLE/foot resting off EOB.)     Transfers:  Sit to Stand: Contact guard assistance;Assist x1  Stand to Sit: Stand-by assistance (verbal cue to extend LLE)                       Balance:   Sitting: Intact  Sitting - Static: Good (unsupported)  Standing: Impaired; With support  Standing - Static: Constant support;Good  Standing - Dynamic : Constant support;Fair;Good  Ambulation/Gait Training:  Distance (ft): 50 Feet (ft)  Assistive Device: Gait belt;Walker, rolling  Ambulation - Level of Assistance: Contact guard assistance;Assist x1        Gait Abnormalities: Antalgic;Decreased step clearance; Step to gait  Right Side Weight Bearing: Full  Left Side Weight Bearing: As tolerated  Base of Support: Widened;Shift to right  Stance: Left decreased  Speed/Valeria: Slow  Step Length: Left shortened;Right shortened  Swing Pattern: Left asymmetrical     Interventions: Safety awareness training        Stairs:     Stairs - Level of Assistance: Contact guard assistance;Assist X1       Thank you,  Josefa Connelly,PTA   Time Calculation: 33 mins

## 2022-09-15 NOTE — PROGRESS NOTES
New orders rec'd for patient discharge home. Discharge instructions reviewed with patient; special regard given to surgical aftercare, rx, and followup appointments. Questions asked and answered. Patient awaiting ride who will arrive at approximately 1PM. Patient instructed to call out when ride arrives. All personal belongings and discharge instructions with patient. Discharge completed without incident.

## 2022-09-15 NOTE — PROGRESS NOTES
Spiritual Care Assessment/Progress Note  [facility]      NAME: Keo Dixon      MRN: [id number]  AGE: 57 y.o. SEX: female  Congregation Affiliation: Mandaeism   Language: English     9/15/2022           Spiritual Assessment begun in [address] through conversation with:         [x]Patient        [] Family    [] Friend(s)        Reason for Consult: Initial/Spiritual assessment, patient floor     Spiritual beliefs: (Please include comment if needed)     [x] Identifies with a afsaneh tradition: Bahai     [] Supported by a afsaneh community:            [] Claims no spiritual orientation:           [] Seeking spiritual identity:                [] Adheres to an individual form of spirituality:           [] Not able to assess:                           Identified resources for coping:      [x] Prayer                               [] Music                  [] Guided Imagery     [] Family/friends                 [] Pet visits     [] Devotional reading                         [] Unknown     [x] Other: her afsaneh                                              Interventions offered during this visit: (See comments for more details)    Patient Interventions: Affirmation of afsaneh, Initial/Spiritual assessment, patient floor           Plan of Care:     [] Support spiritual and/or cultural needs    [] Support AMD and/or advance care planning process      [] Support grieving process   [] Coordinate Rites and/or Rituals    [] Coordination with community clergy   [x] No spiritual needs identified at this time   [] Detailed Plan of Care below (See Comments)  [] Make referral to Music Therapy  [] Make referral to Pet Therapy     [] Make referral to Addiction services  [] Make referral to [facility]  [] Make referral to Spiritual Care Partner  [] No future visits requested        [] Contact Spiritual Care for further referrals     Comments: I provided spiritual support to Ms. Reg Banuelos during this initial spiritual assessment. She is Freddy Esparza and shared of [place] [facility] and how God has brought her through difficult times in her life. Her afsaneh is a source of support for her as she recovers from surgery. She expressed a spirit of gratitude, and I believe benefited from our interaction. Spiritual Care remains available as needed. Rev. Perri Valdez M.Div, [facility] Specialist

## 2022-09-15 NOTE — PROGRESS NOTES
Verbal shift change report given to Margarita Tse RN (oncoming nurse) by Alyssa Gooden RN (offgoing nurse). Report included the following information SBAR, Kardex, Intake/Output, and MAR.

## 2022-09-15 NOTE — NURSE NAVIGATOR
[address]  [facility] Orthopaedic Pathway Handoff     FROM:                                TO: At [address]                                                      ([address] [place] [address] or Facility name)  [place] [address]  Dept: [address]: [phone]                                      Room#:  555/01                                                       Nurse Navigator:  Lalo Wu RN         SITUATION      ASAScore: ASA 3 - Patient with moderate systemic disease with functional limitations    Admitted:  9/12/2022  Hospital Day: 4      Attending Provider:  Romero Corbett MD     Consultations:  None    PCP:  Parker Baez, NP   [phone]     Admitting Dx:  Primary osteoarthritis of left knee [M17.12]       Active Problems:    Primary osteoarthritis of left knee (9/12/2022)      3 Days Post-Op of   Procedure(s):  LEFT TOTAL KNEE ARTHROPLASTY   BY: Romero Corbett MD             ON: 9/12/2022                  Code Status: Full Code             Advance Directive? No Doesnt Have (Send w/patient)     Isolation:  There are currently no Active Isolations       MDRO: No current active infections    BACKGROUND     Allergies:   Allergies   Allergen Reactions    Ace Inhibitors Cough    Contrast Dye [Iodine] Hives       Past Medical History:   Diagnosis Date    Anxiety     Bipolar 1 disorder (HCC)     Depression     Fatigue     Frequent headaches     GERD (gastroesophageal reflux disease)     Hearing loss     PT DENIES ANY HEARING    Hypertension     Joint pain     Memory disorder     LAPSE OF MEMORY DUE TO STROKES    Muscle pain     Nerve damage     ALL OVER    Snoring     Stomach pain     Stroke ([address]     X2 OVER 10 YRS AGO UNABLE TO REMEMBER DATE       Past Surgical History:   Procedure Laterality Date    HX ADENOIDECTOMY      sweat glands    HX GYN      tubal ligation    HX TONSILLECTOMY         Prior to Admission Medications   Prescriptions Last Dose Informant Patient Reported? Taking?   atorvastatin (LIPITOR) 10 mg tablet 9/10/2022  No Yes   Sig: TAKE 1 TABLET BY MOUTH NIGHTLY FOR CHOLESTEROL   clopidogreL (PLAVIX) 75 mg tab 8/19/2022  No Yes   Sig: Take 1 Tablet by mouth daily. ergocalciferol (ERGOCALCIFEROL) 1,250 mcg (50,000 unit) capsule 9/2/2022  No Yes   Sig: Take 1 Capsule by mouth every seven (7) days. Patient taking differently: Take 50,000 Units by mouth every seven (7) days. TAKES ON Friday   folic acid (FOLVITE) 1 mg tablet 9/10/2022  No Yes   Sig: Take 1 Tablet by mouth in the morning.   gabapentin (NEURONTIN) 300 mg capsule 9/5/2022  No Yes   Sig: Take 1 cap in am, 2 caps in pm   Patient taking differently: Take 300 mg by mouth three (3) times daily. Take 1 cap in am, 2 caps in pm   ibuprofen (MOTRIN) 800 mg tablet 9/5/2022  No Yes   Sig: Take 1 Tablet by mouth every eight (8) hours as needed for Pain. With food   metoprolol tartrate (LOPRESSOR) 25 mg tablet 9/11/2022  No Yes   Sig: Take 1/2 (one-half) tablet by mouth twice daily   rimegepant (NURTEC) 75 mg disintegrating tablet 9/5/2022  No Yes   Sig: Take 1 tablet every other day, then 1 tablet p.o. as needed for severe headache, not to DC 1 dose in 24 hours   tiZANidine (ZANAFLEX) 4 mg tablet 9/5/2022  No Yes   Sig: Take 1 Tablet by mouth nightly.       Facility-Administered Medications: None       Vaccinations:    Immunization History   Administered Date(s) Administered    (RETIRED) Pneumococcal Vaccine (Unspecified Type) 10/27/2012    COVID-19, PFIZER PURPLE top, DILUTE for use, (age 15 y+), IM, 30mcg/0.3mL 04/07/2021, 04/28/2021, 11/08/2021    Influenza Vaccine 09/23/2019    Influenza Vaccine Intradermal PF 10/28/2014    Influenza Vaccine Split 10/27/2012    Influenza, FLUARIX, FLULAVAL, FLUZONE (age 10 mo+) AND AFLURIA, (age 1 y+), PF, 0.5mL 09/23/2019, 12/03/2020, 12/09/2021    TB Skin Test (PPD) Intradermal 10/28/2019    Tdap 07/20/2020    Zoster Recombinant 09/23/2019         ASSESSMENT   Age: 57 y.o. Gender: female        Height: Height: 5' 4\" (162.6 cm)                    Weight:Weight: 92.3 kg (203 lb 7.8 oz)     Patient Vitals for the past 8 hrs:   Temp Pulse Resp BP SpO2   09/15/22 0950 97.7 °F (36.5 °C) 89 18 125/87 98 %   09/15/22 0936 -- (!) 128 -- 125/87 98 %            Active Orders   Diet    ADULT DIET Regular       Orientation: Orientation Level: Oriented X4    Active Lines/Drains:  (Peg Tube / [place] / CL or S/L?):no    Urinary Status: Voiding, External catheter      Last BM: Last Bowel Movement Date: 09/12/22     Skin Integrity: Incision (comment) (L knee; surgical incision)             Mobility: Slightly limited   Weight Bearing Status: WBAT (Weight Bearing as Tolerated)      Gait Training  Assistive Device: Gait belt, Walker, rolling  Ambulation - Level of Assistance: Contact guard assistance, Assist x1  Distance (ft): 50 Feet (ft)  Stairs - Level of Assistance: Contact guard assistance, Assist X1  Number of Stairs Trained: 2  Rail Use: Both  Interventions: Safety awareness training     On Anticoagulation? YES   Plavix                                          Pain Medications given:  oxycodone                                   Lab Results   Component Value Date/Time    Glucose 120 (H) 09/13/2022 03:59 AM    Hemoglobin A1c 5.5 08/29/2022 08:53 AM    INR 1.1 08/29/2022 08:53 AM    INR 1.1 03/02/2016 06:10 PM    HGB 12.4 09/13/2022 03:59 AM    HGB 14.7 08/29/2022 08:53 AM    HGB 14.0 07/27/2022 09:59 AM    HGB 14.9 09/29/2021 06:29 AM       Readmission Risks:  Score:         RECOMMENDATION     See After Visit Summary (AVS) for:  Discharge instructions  After [facility]   Medication Reconciliation          [facility] Orthopaedic Nurse Navigator  BUTCH Velez, RN-BC       Office  [phone]  Cell      [phone]  Fax      [phone]  [email or web address]             . Marvin

## 2022-09-16 ENCOUNTER — PATIENT OUTREACH (OUTPATIENT)
Dept: CASE MANAGEMENT | Age: 62
End: 2022-09-16

## 2022-09-16 DIAGNOSIS — Z96.652 STATUS POST TOTAL KNEE REPLACEMENT, LEFT: Primary | ICD-10-CM

## 2022-09-16 RX ORDER — OXYCODONE HYDROCHLORIDE 5 MG/1
5-10 TABLET ORAL
Qty: 42 TABLET | Refills: 0 | Status: SHIPPED | OUTPATIENT
Start: 2022-09-16 | End: 2022-09-23

## 2022-09-16 NOTE — PROGRESS NOTES
Care Transitions Initial Call    Call within 2 business days of discharge: Yes     Patient: Danelle Austin Patient : 1960 MRN: [id number]    Last Discharge [address]       Date Complaint Diagnosis Description Type Department Provider    22  Primary osteoarthritis of left knee . .. Admission (Discharged) Lucy Hernandez MD        Called - unable to reach. Will continue to try and contact.

## 2022-09-16 NOTE — TELEPHONE ENCOUNTER
Patient called to have her oxycodone filled at a different pharmacy, patient became upset and canceled her RX when [address] refused to fill due to strength and quantity, pharmacy needed to speak to pre scriber for clarification before filling RX.       Patient was discharged from hospital 09/15 LT 09/13

## 2022-09-20 ENCOUNTER — PATIENT OUTREACH (OUTPATIENT)
Dept: CASE MANAGEMENT | Age: 62
End: 2022-09-20

## 2022-09-20 NOTE — PROGRESS NOTES
Care Transitions Initial Call    Call within 2 business days of discharge: Yes     Patient: Keo Dixon Patient : 1960 MRN: [id number]    Last Discharge [address]       Date Complaint Diagnosis Description Type Department Provider    22  Primary osteoarthritis of left knee . .. Admission (Discharged) Margo Reza MD            Was this an external facility discharge? No Discharge Facility: [facility] -9/15 Left TKR    Challenges to be reviewed by the provider   Additional needs identified to be addressed with provider: yes  Emotional,tearful. Had help at home for first few days. Method of communication with provider : face to face, chart routing, staff message, phone, none    Discussed COVID-19 related testing which was not done at this time. Advance Care Planning:   Does patient have an Advance Directive: not on file, declined education. Inpatient Readmission Risk score: No data recorded  Was this a readmission? no   Patient stated reason for the admission: scheduled knee replacement    Patients top risk factors for readmission: depression, ineffective coping, medical condition-s/p Left TKR, and support system   Interventions to address risk factors: Scheduled appointment with PCP-patient declining RIOT at this time, Scheduled appointment with Specialist-patient reminded to call and schedule, and Obtained and reviewed discharge summary and/or continuity of care documents    Care Transition Nurse (CTN) contacted the patient by telephone to perform post hospital discharge assessment. Verified name and  with patient as identifiers. Provided introduction to self, and explanation of the CTN role. Had called her  and LM. Spoke to her briefly today, said she was very emotional, tearful at times. Rates pain about #6. Had not taken any Oxycodone today, took most recent dose last night. Suggested she take a dose now to keep pain from worsening.  Expecting Home Health to see her today. Using ice when resting. Home alone, said she had some help for first few days, suggested calling family for assistance. Patient said she could not talk, asked if I could call back another day. CTN agreed to call back in 1-2 days for update. CTN reviewed discharge instructions, medical action plan and red flags with patient who verbalized understanding. Were discharge instructions available to patient? yes. Reviewed appropriate site of care based on symptoms and resources available to patient including: PCP, Specialist, Urgent [address], When to call 911, and [employer] Messaging. Patient given an opportunity to ask questions and does not have any further questions or concerns at this time. The patient agrees to contact the PCP office for questions related to their healthcare. Medication reconciliation was not completed with patient, asked if we could do this at a later time. Referral to Pharm D needed: no     Home Health/Outpatient orders at discharge: home health care, PT, and [address]: [facility]  Date of initial visit: 9/16/22    Durable Medical Equipment ordered at discharge:  [address]: [address] [facility]  [address] received: prior to discharge    Was patient discharged with a pulse oximeter? no    Discussed follow-up appointments. If no appointment was previously scheduled, appointment scheduling offered: yes. Is follow up appointment scheduled within 7 days of discharge?  no .   [facility] follow up appointment(s):   Future Appointments   Date Time Provider Miles Randolph   12/8/2022  8:40 AM Edward Aceves MD [facility] BS [facility]     Non-BS follow up appointment(s): - reminded to call and schedule. Given info on [employer] as resource. Plan for follow-up call in 3-5 days based on severity of symptoms and risk factors.   Plan for next call: symptom management-assess current symptoms, self management-following discharge instructions, follow up appointment-saw ortho for f/u, and medication management-taking meds as ordered. CTN provided contact information for future needs. Goals Addressed                   This Visit's Progress     Understands red flags post discharge. 9/20/22 [facility] 9/12-9/15 Left TKR  Attempted to review discharge instructions with patient today, she asked if we could do this another time, felt too emotional now. Attempted to review meds, again, patient asked if this could be done another time. Said her most recent dose of Oxycodone was last night. She was trying to avoid taking it, currently pain about #7. CTN suggested she take one tab to make sure pain does not worsen. Reviewed red flags: fever,nausea,vomiting,diarrhea, pain not managed by pain med, sob, chest discomfort, signs of infection at incision site. Attempted to give patient info on [facility] as resource, she declined. Suggested she call family for assistance, she said she would do so. Asked CTN to call her back some other time, said she was too upset to talk now. Support given. Advised will check back with her in 1-2 days. vivian

## 2022-09-22 ENCOUNTER — PATIENT OUTREACH (OUTPATIENT)
Dept: CASE MANAGEMENT | Age: 62
End: 2022-09-22

## 2022-09-23 ENCOUNTER — PATIENT OUTREACH (OUTPATIENT)
Dept: CASE MANAGEMENT | Age: 62
End: 2022-09-23

## 2022-09-23 NOTE — PROGRESS NOTES
Had called and LM 9/22 and sent [employer] message also. Called again today, 9/23 and LM. Notified patient CTN will be out of office next week, call before 4:30pm today if possible.

## 2022-09-29 DIAGNOSIS — Z96.652 STATUS POST TOTAL KNEE REPLACEMENT, LEFT: Primary | ICD-10-CM

## 2022-09-29 RX ORDER — OXYCODONE HYDROCHLORIDE 5 MG/1
5 TABLET ORAL
Qty: 30 TABLET | Refills: 0 | Status: SHIPPED | OUTPATIENT
Start: 2022-09-29 | End: 2022-10-06

## 2022-10-05 ENCOUNTER — OFFICE VISIT (OUTPATIENT)
Dept: ORTHOPEDIC SURGERY | Age: 62
End: 2022-10-05
Payer: MEDICARE

## 2022-10-05 ENCOUNTER — PATIENT OUTREACH (OUTPATIENT)
Dept: CASE MANAGEMENT | Age: 62
End: 2022-10-05

## 2022-10-05 VITALS — BODY MASS INDEX: 34.66 KG/M2 | WEIGHT: 203 LBS | HEIGHT: 64 IN

## 2022-10-05 DIAGNOSIS — Z96.652 STATUS POST TOTAL LEFT KNEE REPLACEMENT: Primary | ICD-10-CM

## 2022-10-05 PROCEDURE — 99024 POSTOP FOLLOW-UP VISIT: CPT | Performed by: PHYSICIAN ASSISTANT

## 2022-10-05 NOTE — PROGRESS NOTES
Flossie Collet (: 1960) is a 57 y.o. female, established patient, here for evaluation of the following chief complaint(s):  Surgical Follow-up         SUBJECTIVE/OBJECTIVE:    Flossie Collet (: 1960) is a 57 y.o. female. Left Total Knee Arthroplasty - Left 2022     The patient is doing well at this time and is making slow, gradual gains with home physical therapy. The patient states that she is \"coming along\". The patient states that she is ready to schedule her other knee replacement. Allergies   Allergen Reactions    Ace Inhibitors Cough    Contrast Dye [Iodine] Hives       Current Outpatient Medications   Medication Sig    oxyCODONE IR (ROXICODONE) 5 mg immediate release tablet Take 1 Tablet by mouth every four to six (4-6) hours as needed for Pain for up to 7 days. Max Daily Amount: 30 mg.    acetaminophen (TYLENOL) 325 mg tablet Take 2 Tablets by mouth every six (6) hours. senna-docusate (PERICOLACE) 8.6-50 mg per tablet Take 1 Tablet by mouth two (2) times a day for 30 days. naloxone (Narcan) 4 mg/actuation nasal spray Use 1 spray intranasally, then discard. Repeat with new spray every 2 min as needed for opioid overdose symptoms, alternating nostrils. rimegepant (NURTEC) 75 mg disintegrating tablet Take 1 tablet every other day, then 1 tablet p.o. as needed for severe headache, not to DC 1 dose in 24 hours    atorvastatin (LIPITOR) 10 mg tablet TAKE 1 TABLET BY MOUTH NIGHTLY FOR CHOLESTEROL    ergocalciferol (ERGOCALCIFEROL) 1,250 mcg (50,000 unit) capsule Take 1 Capsule by mouth every seven (7) days. (Patient taking differently: Take 50,000 Units by mouth every seven (7) days. TAKES ON Friday)    folic acid (FOLVITE) 1 mg tablet Take 1 Tablet by mouth in the morning.    gabapentin (NEURONTIN) 300 mg capsule Take 1 cap in am, 2 caps in pm (Patient taking differently: Take 300 mg by mouth three (3) times daily.  Take 1 cap in am, 2 caps in pm)    tiZANidine (ZANAFLEX) 4 mg tablet Take 1 Tablet by mouth nightly. metoprolol tartrate (LOPRESSOR) 25 mg tablet Take 1/2 (one-half) tablet by mouth twice daily    clopidogreL (PLAVIX) 75 mg tab Take 1 Tablet by mouth daily. No current facility-administered medications for this visit.        Social History     Socioeconomic History    Marital status:      Spouse name: Not on file    Number of children: Not on file    Years of education: Not on file    Highest education level: Not on file   Occupational History    Not on file   Tobacco Use    Smoking status: Some Days     Types: Cigars     Passive exposure: Current    Smokeless tobacco: Never   Vaping Use    Vaping Use: Never used   Substance and Sexual Activity    Alcohol use: Yes     Comment: \"once a month\"    Drug use: Yes     Types: Marijuana    Sexual activity: Not Currently   Other Topics Concern    Not on file   Social History Narrative    ** Merged History Encounter **          Social Determinants of Health     Financial Resource Strain: Not on file   Food Insecurity: Not on file   Transportation Needs: Not on file   Physical Activity: Not on file   Stress: Not on file   Social Connections: Not on file   Intimate Partner Violence: Not on file   Housing Stability: Not on file       Past Surgical History:   Procedure Laterality Date    HX ADENOIDECTOMY      sweat glands    HX GYN      tubal ligation    HX TONSILLECTOMY         Family History   Problem Relation Age of Onset    Cancer Mother     Heart Disease Father         stents late 66's    Pacemaker Father     Other Sister         MURDERED SHOT    Anesth Problems Neg Hx         OB History          5    Para   5    Term   5       0    AB   0    Living             SAB   0    IAB   0    Ectopic   0    Molar        Multiple        Live Births   5                   REVIEW OF SYSTEMS:    Patient denies any recent fever, chills, nausea, vomiting, chest pain, abdominal pain, blurred vision, dizziness or shortness of breath. Vitals:    Ht 5' 4\" (1.626 m)   Wt 203 lb (92.1 kg)   BMI 34.84 kg/m²    Body mass index is 34.84 kg/m². PHYSICAL EXAM:    The patient is alert and oriented x 3 and in no acute distress. Patient ambulates with the support of a rolling walker. The postoperative wound is well healed without erythema or drainage. Range of motion of the operative knee demonstrates -5 degrees with flexion to 90 degrees. Mild swelling of the operative knee is noted. There is no calf tenderness to palpation. Distal motor and sensation is intact. IMAGING:    Results from Appointment encounter on 10/05/22    XR KNEE LT 3 V    Narrative  AP standing, lateral and Merchant view digital radiographs of the left knee obtained in the office today were reviewed and demonstrate anatomic alignment of components with no evidence of hardware loosening or subsidence. Orders Placed This Encounter    XR KNEE LT 3 V     Standing Status:   Future     Number of Occurrences:   1     Standing Expiration Date:   10/6/2023    REFERRAL TO PHYSICAL THERAPY     Referral Priority:   Routine     Referral Type:   PT/OT/ST     Requested Specialty:   Physical Therapy     Number of Visits Requested:   1          ASSESSMENT/PLAN:      1. Status post total left knee replacement  -     XR KNEE LT 3 V; Future  -     REFERRAL TO PHYSICAL THERAPY        Below is the assessment and plan developed based on review of pertinent history, physical exam, labs, studies, and medications. Have discussed radiographic findings and have answered all patient questions to her satisfaction. The patient is to continue DVT prophylaxis which she takes chronically. Have provided the patient with a prescription for outpatient PT for ROM, strengthening and gait training. Have asked the patient to follow up in 8 weeks time for reevaluation with Dr Brodie Haywood, sooner if she should develop any surgery related complications.   The patient should remain out of work until follow-up appointment. Patient may contact our office by phone if she wishes to schedule her other knee replacement. The patient was asked to contact the office with any questions or concerns. The patient understands and agrees to the treatment plan as outlined above. Return in about 2 months (around 12/5/2022). Dr. Payton Lott was available for immediate consultation as needed. An electronic signature was used to authenticate this note.   -- Allen Agarwal PA-C

## 2022-10-05 NOTE — PROGRESS NOTES
Called patient today, unable to reach or LM. VM was full. Message sent to her via [employer] portal.  Will continue to try and reach.

## 2022-10-06 ENCOUNTER — TELEPHONE (OUTPATIENT)
Dept: NEUROLOGY | Age: 62
End: 2022-10-06

## 2022-10-11 ENCOUNTER — PATIENT OUTREACH (OUTPATIENT)
Dept: CASE MANAGEMENT | Age: 62
End: 2022-10-11

## 2022-10-11 NOTE — PROGRESS NOTES
Called patient, unable to reach or LM. Her VM was full. Message sent via [employer] requesting she call [facility] back.

## 2022-10-12 ENCOUNTER — OFFICE VISIT (OUTPATIENT)
Dept: ORTHOPEDIC SURGERY | Age: 62
End: 2022-10-12
Payer: MEDICARE

## 2022-10-12 DIAGNOSIS — Z96.652 STATUS POST TOTAL LEFT KNEE REPLACEMENT: ICD-10-CM

## 2022-10-12 DIAGNOSIS — R26.81 UNSTEADINESS ON FEET: ICD-10-CM

## 2022-10-12 DIAGNOSIS — M62.81 QUADRICEPS WEAKNESS: ICD-10-CM

## 2022-10-12 DIAGNOSIS — M25.662 STIFFNESS OF KNEE JOINT, LEFT: Primary | ICD-10-CM

## 2022-10-12 PROCEDURE — 97110 THERAPEUTIC EXERCISES: CPT | Performed by: PHYSICAL THERAPIST

## 2022-10-12 PROCEDURE — 97162 PT EVAL MOD COMPLEX 30 MIN: CPT | Performed by: PHYSICAL THERAPIST

## 2022-10-12 NOTE — PROGRESS NOTES
Patient Name: Anel Escamilla  Date:10/12/2022  : 1960  [x]  Patient  Verified  Payor: Francisco Hopper / Plan: [place] / Product Type: Medicare /    Total Treatment Time (min): 50min  1:1 Treatment Time ([address] only): 50 min   Kaden Orozco PA-C  1. Stiffness of knee joint, left  2. Quadriceps weakness  3. Status post total left knee replacement  4. Unsteadiness on feet      Subjective:    Patient is a 57 y.o. female referred to physical therapy by Rohini Russo PA-C for Dr. Radha Reeves s/p left TKA on 22. Pt had home health physical therapy with good results. Pt arrives to clinic ambulating with straight cane and antalgic gait. Pt c/o stiffness and pain in LLE as well as difficulty walking. 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 Pt has been unable to return to those activities to date. Objective: Incisions: healing well  Gait: ambulates w/SC w/decreased step length and lack of heel strike  Strength: quads: 3/5, hamstrings 3/5   Left knee ROM: -15 degrees extension to 75 degrees knee flexion  Right knee ROM:   Circumfererence: increased by approximately +3cm MP as compared to contralateral side  LEFS:30 %  Ex: 30 min  Treatment today to include instruction in a home exercise program as well as providing patient with written and visual handouts. PT Exercise Log         EXERCISE 10/12/2022   Quad sets 20x   Hamstring stretch 5x30\"   SLR flexion 20x   SAQ 20x   LAQ 20x                                                              Man:  min    NMR:  min    All questions were addressed. Assessment:    Patient presents with increased pain and decreased ROM, strength, and mobility consistent with s/p left TKA. Patient will benefit from skilled PT to address above deficits. Long Term Goals. 12 weeks  1. Patient will demonstrate the ability to ambulate on level surfaces, uneven surfaces, stairs with a smooth and nonantalgic gait pattern.   2. Patient will demonstrate improved AROM of the knee to within 95% or greater as compared to the contralateral side to assist with home/work/community/recreational ADL activity. 3.  Patient will report pain to be consistently less than or equal to 1/10 with all home/work/community/recreational ADL activity. Short Term Goals. 2 visits. Patient will demonstrate independence with HEP. Plan:  Plan of care: Physical therapy consisted of frequency of 2/week for the next 12 weeks. Physical therapy will consist of therapeutic exercise, modalities, patient education, neuromuscular reeducation, manual therapy, therapeutic activity, dry needling, and instruction in home exercise program as appropriate. Eval  Ex: 30 min  Man:   NMR:     The referring physician has reviewed and approved this evaluation and plan of care as noted by the electronic signature attached to note.     CLAUDIA BolanosT, DPT

## 2022-10-13 DIAGNOSIS — M25.662 STIFFNESS OF KNEE JOINT, LEFT: Primary | ICD-10-CM

## 2022-10-13 DIAGNOSIS — Z96.652 STATUS POST TOTAL LEFT KNEE REPLACEMENT: ICD-10-CM

## 2022-10-13 RX ORDER — OXYCODONE HYDROCHLORIDE 5 MG/1
5-10 TABLET ORAL
Qty: 30 TABLET | Refills: 0 | Status: SHIPPED | OUTPATIENT
Start: 2022-10-13 | End: 2022-10-20

## 2022-10-14 ENCOUNTER — OFFICE VISIT (OUTPATIENT)
Dept: ORTHOPEDIC SURGERY | Age: 62
End: 2022-10-14
Payer: MEDICARE

## 2022-10-14 DIAGNOSIS — M25.662 STIFFNESS OF KNEE JOINT, LEFT: Primary | ICD-10-CM

## 2022-10-14 DIAGNOSIS — Z96.652 STATUS POST TOTAL LEFT KNEE REPLACEMENT: ICD-10-CM

## 2022-10-14 DIAGNOSIS — M62.81 QUADRICEPS WEAKNESS: ICD-10-CM

## 2022-10-14 PROCEDURE — 97140 MANUAL THERAPY 1/> REGIONS: CPT | Performed by: PHYSICAL THERAPIST

## 2022-10-14 PROCEDURE — 97110 THERAPEUTIC EXERCISES: CPT | Performed by: PHYSICAL THERAPIST

## 2022-10-14 NOTE — PROGRESS NOTES
Patient Name: Claudia Darnell  Date:10/14/2022  : 1960  [x]  Patient  Verified  Payor: Cesar Dean / Plan: [place] MED[place] / Product Type: Medicare /    Total Treatment Time (min): 60 min  1:1 Treatment Time ( only): 30 min, 20min manual w/PT, 10 min ex w/PT  Referring provider: Gala Emanuel PA-C  1. Stiffness of knee joint, left  2. Status post total left knee replacement  3. Quadriceps weakness    SUBJECTIVE  I'm having a lot of pain in my knee. I could barely walk after I left here. I called MD and asked for more pain medicine. OBJECTIVE  AROM: knee flexion:70 degrees, knee extension:-5 degrees  PROM: 80 degrees flexion  TTP over medial/lateral hamstrings, quadriceps mm. Modality:   []  E-Stim: type _ x _ min     []att   []unatt   []w/US   []w/ice   []w/heat  []  Ultrasound: []cont   []pulse    _ W/cm2 x _  min   []1MHz   []3MHz  [x]  Ice pack _  Post       [] Hot pack _  Pre       []  Other:    Man: 20 min PF/TF mobilization in multiple angles of flexion/extension to improve ROM. MFR to hamstrings and quad on left. 1:1 w/PT          NMR:  min  Neuromuscular reeducation/proprioceptive training listed in exercise below. Ex: 40 min  Therapeutic exercise/strength/endurance completed here in clinic today per the exercise log. PT Exercise Log         EXERCISE 10/14/2022   Quad sets 20x   Seated hamstring stretch 5x30\"   SLR flex 20x   SAQ/LAQ 20x   Heel slides 20x   Sit to stands 10x   TKE w/green t band 20x   sidestepping 2 laps                                                      ASSESSMENT  [x]  See Plan of Care  [x]  Patient will continue to benefit from skilled therapy to address remaining functional deficits:   Pt c/o increased pain since last visit. Patient has improved AROM as PT session progressed today. Needs vc to ambulate w/heel/toe gait. Tolertated    PLAN  Continue with current plan of care and progress as appropriate towards functional goals.   [x] Upgrade activities as tolerated     [x]  Continue plan of care  []  Discharge due to:_  [] Other:_       Flaca Kemp, PT  10/14/2022    10:09 AM

## 2022-10-17 ENCOUNTER — PATIENT OUTREACH (OUTPATIENT)
Dept: CASE MANAGEMENT | Age: 62
End: 2022-10-17

## 2022-10-17 NOTE — PROGRESS NOTES
Unable to reach by phone, [facility]ER[facility] full. Saw ortho on 10/5, 10/12 and 10/14. No visits to ED or hospitalizations since [facility] 9/12-9/15 for Left TKR. Closing this episode of care as unable to contact patient and has been 30 days since hospitalization.

## 2022-10-18 ENCOUNTER — TRANSCRIBE ORDER (OUTPATIENT)
Dept: SCHEDULING | Age: 62
End: 2022-10-18

## 2022-10-18 DIAGNOSIS — Z12.31 VISIT FOR SCREENING MAMMOGRAM: Primary | ICD-10-CM

## 2022-10-18 DIAGNOSIS — M17.0 BILATERAL PRIMARY OSTEOARTHRITIS OF KNEE: Primary | ICD-10-CM

## 2022-10-19 ENCOUNTER — OFFICE VISIT (OUTPATIENT)
Dept: ORTHOPEDIC SURGERY | Age: 62
End: 2022-10-19
Payer: MEDICARE

## 2022-10-19 DIAGNOSIS — M25.662 STIFFNESS OF KNEE JOINT, LEFT: Primary | ICD-10-CM

## 2022-10-19 DIAGNOSIS — Z96.652 STATUS POST TOTAL LEFT KNEE REPLACEMENT: ICD-10-CM

## 2022-10-19 DIAGNOSIS — M62.81 QUADRICEPS WEAKNESS: ICD-10-CM

## 2022-10-19 PROCEDURE — 97110 THERAPEUTIC EXERCISES: CPT | Performed by: PHYSICAL THERAPIST

## 2022-10-19 PROCEDURE — 97140 MANUAL THERAPY 1/> REGIONS: CPT | Performed by: PHYSICAL THERAPIST

## 2022-10-19 NOTE — PROGRESS NOTES
Patient Name: Joaquim Jaramillo  Date:10/19/2022  : 1960  [x]  Patient  Verified  Payor: Cristino Rogers / Plan: [place] MED[place]ARE [place] / Product Type: Medicare /    Total Treatment Time (min): 60 min  1:1 Treatment Time ( only): 30 min, 20min manual w/PT, 10 min ex w/PT  Referring provider: Rosa M Dixon PA-C  1. Stiffness of knee joint, left  2. Quadriceps weakness  3. Status post total left knee replacement    SUBJECTIVE  My knee is a little better. Not as stiff. I'm moving better. OBJECTIVE  AROM: knee flexion:90 degrees, knee extension:-5 degrees  PROM: 95 degrees flexion  TTP over medial/lateral hamstrings, quadriceps mm. Modality:   []  E-Stim: type _ x _ min     []att   []unatt   []w/US   []w/ice   []w/heat  []  Ultrasound: []cont   []pulse    _ W/cm2 x _  min   []1MHz   []3MHz  [x]  Ice pack _  Post       [] Hot pack _  Pre       []  Other:    Man: 20 min PF/TF mobilization in multiple angles of flexion/extension to improve ROM. MFR to hamstrings and quad on left. 1:1 w/PT      NMR:  min  Neuromuscular reeducation/proprioceptive training listed in exercise below. Ex: 40 min  Therapeutic exercise/strength/endurance completed here in clinic today per the exercise log. PT Exercise Log         EXERCISE 10/19/2022   Quad sets 20x   Seated hamstring stretch 5x30\"   SLR flex 20x   SAQ/LAQ 20x   Heel slides 20x   Sit to stands 10x   TKE w/green t band 20x   sidestepping 2 laps                                                      ASSESSMENT  [x]  See Plan of Care  [x]  Patient will continue to benefit from skilled therapy to address remaining functional deficits:   Pt. Progressing slowly in PT. Tolerating exercise. Needs vc to ambulate heel/toe. Has increased in knee flexion. C/o pain and stiffness at endrange flexion w/PROM. Patient has decreased c/o pain as PT session progresses.      PLAN  Continue with current plan of care and progress as appropriate towards functional goals.  [x]  Upgrade activities as tolerated     [x]  Continue plan of care  []  Discharge due to:_  [] Other:_       Daniel Mcclellan, PT  10/19/2022    10:09 AM

## 2022-10-24 ENCOUNTER — OFFICE VISIT (OUTPATIENT)
Dept: ORTHOPEDIC SURGERY | Age: 62
End: 2022-10-24
Payer: MEDICARE

## 2022-10-24 DIAGNOSIS — M25.662 STIFFNESS OF KNEE JOINT, LEFT: Primary | ICD-10-CM

## 2022-10-24 DIAGNOSIS — M62.81 QUADRICEPS WEAKNESS: ICD-10-CM

## 2022-10-24 DIAGNOSIS — Z96.652 STATUS POST TOTAL LEFT KNEE REPLACEMENT: ICD-10-CM

## 2022-10-24 PROCEDURE — 97140 MANUAL THERAPY 1/> REGIONS: CPT | Performed by: PHYSICAL THERAPIST

## 2022-10-24 PROCEDURE — 97110 THERAPEUTIC EXERCISES: CPT | Performed by: PHYSICAL THERAPIST

## 2022-10-24 NOTE — PROGRESS NOTES
Patient Name: Dana Lindsay  Date:10/24/2022  : 1960  [x]  Patient  Verified  Payor: Morales Subramanian / Plan: [place] MEDICARE PART A & B / Product Type: Medicare /    Total Treatment Time (min): 60 min  1:1 Treatment Time ( only): 35 min, 15 min manual w/PT, 20 min ex w/PT  Referring provider: Zahraa Vitale PA-C  1. Stiffness of knee joint, left  2. Quadriceps weakness  3. Status post total left knee replacement    SUBJECTIVE  I had some unusual pains in my left leg this weekend. It's getting better though. My right leg has been painful. OBJECTIVE  AROM: knee flexion:90 degrees, knee extension:-5 degrees  PROM: 95 degrees flexion, extension: 0 degrees  TTP over medial/lateral hamstrings, quadriceps mm. Modality:   []  E-Stim: type _ x _ min     []att   []unatt   []w/US   []w/ice   []w/heat  []  Ultrasound: []cont   []pulse    _ W/cm2 x _  min   []1MHz   []3MHz  []  Ice pack _  Post       [] Hot pack _  Pre       []  Other:    Man: 15 min PF/TF mobilization in multiple angles of flexion/extension to improve ROM. MFR to hamstrings and quad on left. 1:1 w/PT      NMR:  min  Neuromuscular reeducation/proprioceptive training listed in exercise below. Ex: 45 min  Therapeutic exercise/strength/endurance completed here in clinic today per the exercise log. PT Exercise Log         EXERCISE 10/24/2022   Quad sets 20x   Seated hamstring stretch 5x30\"   SLR flex 20x   SAQ/LAQ 20x   Heel slides 20x   Sit to stands 10x   TKE w/green t band 20x   sidestepping 2 laps   minisquats 20x                                                  ASSESSMENT  [x]  See Plan of Care  [x]  Patient will continue to benefit from skilled therapy to address remaining functional deficits:   Patient limited in PT by her RLE, which she is having replaced in December. Patient continues to ambulate w/antalgic gait. Tolerating exercise w/increased quad strength. Continues to be stiff in knee flexion.      PLAN  Continue with current plan of care and progress as appropriate towards functional goals.   [x]  Upgrade activities as tolerated     [x]  Continue plan of care  []  Discharge due to:_  [] Other:_       Claudine Hampton PT  10/24/2022    10:09 AM

## 2022-10-25 ENCOUNTER — HOSPITAL ENCOUNTER (OUTPATIENT)
Dept: MAMMOGRAPHY | Age: 62
Discharge: HOME | End: 2022-10-25
Attending: NURSE PRACTITIONER
Payer: MEDICARE

## 2022-10-25 DIAGNOSIS — Z12.31 VISIT FOR SCREENING MAMMOGRAM: ICD-10-CM

## 2022-10-25 PROCEDURE — 77063 BREAST TOMOSYNTHESIS BI: CPT

## 2022-10-26 ENCOUNTER — OFFICE VISIT (OUTPATIENT)
Dept: ORTHOPEDIC SURGERY | Age: 62
End: 2022-10-26
Payer: MEDICARE

## 2022-10-26 DIAGNOSIS — M25.662 STIFFNESS OF KNEE JOINT, LEFT: ICD-10-CM

## 2022-10-26 DIAGNOSIS — M62.81 QUADRICEPS WEAKNESS: Primary | ICD-10-CM

## 2022-10-26 DIAGNOSIS — R26.81 UNSTEADINESS ON FEET: ICD-10-CM

## 2022-10-26 PROCEDURE — 97110 THERAPEUTIC EXERCISES: CPT | Performed by: PHYSICAL THERAPIST

## 2022-10-26 PROCEDURE — 97140 MANUAL THERAPY 1/> REGIONS: CPT | Performed by: PHYSICAL THERAPIST

## 2022-10-26 NOTE — PROGRESS NOTES
Patient Name: Vick Galindo  Date:10/26/2022  : 1960  [x]  Patient  Verified  Payor: Denver Ley / Plan: [place] MEDICARE PART A & B / Product Type: Medicare /    Total Treatment Time (min): 60 min  1:1 Treatment Time ( only): 35 min, 15 min manual w/PT, 20 min ex w/PT  Referring provider: Nadja Tolliver PA-C  1. Quadriceps weakness  2. Unsteadiness on feet  3. Stiffness of knee joint, left    SUBJECTIVE  My right leg has been really sore. I can barely walk because of it. My left leg is doing better. OBJECTIVE  AROM: knee flexion:80 degrees, knee extension:-5 degrees  PROM: 85 degrees flexion, extension: 0 degrees  TTP over   Modality:   []  E-Stim: type _ x _ min     []att   []unatt   []w/US   []w/ice   []w/heat  []  Ultrasound: []cont   []pulse    _ W/cm2 x _  min   []1MHz   []3MHz  []  Ice pack _  Post       [] Hot pack _  Pre       []  Other:    Man: 15 min PF/TF mobilization in multiple angles of flexion/extension to improve ROM. MFR to hamstrings and quad on left. 1:1 w/PT      NMR:  min  Neuromuscular reeducation/proprioceptive training listed in exercise below. Ex: 45 min  Therapeutic exercise/strength/endurance completed here in clinic today per the exercise log. PT Exercise Log         EXERCISE 10/26/2022   Quad sets 20x   Seated hamstring stretch 5x30\"   SLR flex 20x   SAQ/LAQ 20x   Heel slides 20x   Sit to stands 10x   TKE w/green t band 20x   sidestepping 2 laps   minisquats 20x hold   Leg press 20x #40                                              ASSESSMENT  [x]  See Plan of Care  [x]  Patient will continue to benefit from skilled therapy to address remaining functional deficits:   Patient instructed in using SC for right knee pain. Ambulating w/decreased knee flexion bilaterally 2nd right knee pain. Left knee progressing slowly. Has increased quad strength overall. C/o left knee pain w/SAQ. Has decreased knee flexion today. Poor tolerance for PT today. PLAN  Continue with current plan of care and progress as appropriate towards functional goals.   [x]  Upgrade activities as tolerated     [x]  Continue plan of care  []  Discharge due to:_  [] Other:_       Joseph Campos PT  10/26/2022    10:09 AM

## 2022-10-31 ENCOUNTER — OFFICE VISIT (OUTPATIENT)
Dept: ORTHOPEDIC SURGERY | Age: 62
End: 2022-10-31
Payer: MEDICARE

## 2022-10-31 DIAGNOSIS — M25.662 STIFFNESS OF KNEE JOINT, LEFT: ICD-10-CM

## 2022-10-31 DIAGNOSIS — M62.81 QUADRICEPS WEAKNESS: Primary | ICD-10-CM

## 2022-10-31 DIAGNOSIS — R26.81 UNSTEADINESS ON FEET: ICD-10-CM

## 2022-10-31 PROCEDURE — 97110 THERAPEUTIC EXERCISES: CPT | Performed by: PHYSICAL THERAPIST

## 2022-10-31 PROCEDURE — 97140 MANUAL THERAPY 1/> REGIONS: CPT | Performed by: PHYSICAL THERAPIST

## 2022-10-31 NOTE — PROGRESS NOTES
Patient Name: Ezio Cloud  Date:10/31/2022  : 1960  [x]  Patient  Verified  Payor: Chet Memory / Plan: [place] MEDICARE PART A & B / Product Type: Medicare /    Total Treatment Time (min): 60 min  1:1 Treatment Time ([facility] only): 35 min, 15 min manual w/PT, 20 min ex w/PT  Referring provider: Analia Rodriguez PA-C  1. Quadriceps weakness  2. Unsteadiness on feet  3. Stiffness of knee joint, left    SUBJECTIVE  Both of my knees have been sore. I haven't done any exercise since Friday. OBJECTIVE  AROM: knee flexion:85 degrees, knee extension:-5 degrees  PROM: 85 degrees flexion, extension: 0 degrees  TTP over   Modality:   []  E-Stim: type _ x _ min     []att   []unatt   []w/US   []w/ice   []w/heat  []  Ultrasound: []cont   []pulse    _ W/cm2 x _  min   []1MHz   []3MHz  []  Ice pack _  Post       [] Hot pack _  Pre       []  Other:    Man: 15 min PF/TF mobilization in multiple angles of flexion/extension to improve ROM. MFR to hamstrings and quad on left. 1:1 w/PT      NMR:  min  Neuromuscular reeducation/proprioceptive training listed in exercise below. Ex: 45 min  Therapeutic exercise/strength/endurance completed here in clinic today per the exercise log. PT Exercise Log         EXERCISE 10/31/2022   Quad sets 20x   Seated hamstring stretch 5x30\"   SLR flex 20x   SAQ/LAQ 20x   Heel slides 20x   Sit to stands 10x   TKE w/green t band 20x   sidestepping 2 laps   minisquats 20x hold NT   Leg press 20x #40                                              ASSESSMENT  [x]  See Plan of Care  [x]  Patient will continue to benefit from skilled therapy to address remaining functional deficits:   Continues to c/o Left knee pain that feels stiff. RLE also painful. Patient states she has been noncompliant w/HEP. PT encouraged pt to do home exercises. Ambulating w/wide LYNN and decreased knee flexion. PLAN  Continue with current plan of care and progress as appropriate towards functional goals.   [x] Upgrade activities as tolerated     [x]  Continue plan of care  []  Discharge due to:_  [] Other:_       Twila Jonas, PT  10/31/2022    10:09 AM

## 2022-11-02 ENCOUNTER — OFFICE VISIT (OUTPATIENT)
Dept: ORTHOPEDIC SURGERY | Age: 62
End: 2022-11-02
Payer: MEDICARE

## 2022-11-02 DIAGNOSIS — M62.81 QUADRICEPS WEAKNESS: Primary | ICD-10-CM

## 2022-11-02 DIAGNOSIS — M25.662 STIFFNESS OF KNEE JOINT, LEFT: ICD-10-CM

## 2022-11-02 DIAGNOSIS — R26.81 UNSTEADINESS ON FEET: ICD-10-CM

## 2022-11-02 PROCEDURE — 97140 MANUAL THERAPY 1/> REGIONS: CPT | Performed by: PHYSICAL THERAPIST

## 2022-11-02 PROCEDURE — 97110 THERAPEUTIC EXERCISES: CPT | Performed by: PHYSICAL THERAPIST

## 2022-11-02 NOTE — PROGRESS NOTES
Patient Name: Jaquan Tolbert  Date:2022  : 1960  [x]  Patient  Verified  Payor: Mojgan Cardona / Plan: [place] MEDICARE PART A & B / Product Type: Medicare /    Total Treatment Time (min): 45 min  1:1 Treatment Time ( only): 25 min, 15 min manual w/PT, 10 min ex w/PT  Referring provider: Paevl Tovar PA-C  1. Quadriceps weakness  2. Unsteadiness on feet  3. Stiffness of knee joint, left    SUBJECTIVE  Doing maybe a little better w/my left knee. Not as sore. Patient arrives to PT 30 min late      OBJECTIVE  AROM: knee flexion:90 degrees, knee extension:0 degrees  PROM: 85 degrees flexion, extension: 0 degrees  TTP over   Modality:   []  E-Stim: type _ x _ min     []att   []unatt   []w/US   []w/ice   []w/heat  []  Ultrasound: []cont   []pulse    _ W/cm2 x _  min   []1MHz   []3MHz  []  Ice pack _  Post       [] Hot pack _  Pre       []  Other:    Man: 15 min PF/TF mobilization in multiple angles of flexion/extension to improve ROM. MFR to hamstrings and quad on left. 1:1 w/PT      NMR:  min  Neuromuscular reeducation/proprioceptive training listed in exercise below. Ex: 20 min  Therapeutic exercise/strength/endurance completed here in clinic today per the exercise log. PT Exercise Log         EXERCISE 2022   Quad sets 20x   Seated hamstring stretch 5x30\"   SLR flex 20x   SAQ/LAQ 20x   Heel slides 20x   Sit to stands 10x   TKE w/green t band 20x   sidestepping 2 laps   minisquats 20x hold NT   Leg press 20x #40                                              ASSESSMENT  [x]  See Plan of Care  [x]  Patient will continue to benefit from skilled therapy to address remaining functional deficits:   Has increased knee extension. Ambulating w/continued antalgia 2nd RLE pain. Patient tolerating exercise. Had abbreviated PT 2nd time constraints. PLAN  Continue with current plan of care and progress as appropriate towards functional goals.   [x]  Upgrade activities as tolerated     [x] Continue plan of care  []  Discharge due to:_  [] Other:_       Adalberto Rogers, PT  11/2/2022    10:09 AM

## 2022-11-16 ENCOUNTER — OFFICE VISIT (OUTPATIENT)
Dept: ORTHOPEDIC SURGERY | Age: 62
End: 2022-11-16
Payer: MEDICARE

## 2022-11-16 DIAGNOSIS — M62.81 QUADRICEPS WEAKNESS: Primary | ICD-10-CM

## 2022-11-16 DIAGNOSIS — R26.81 UNSTEADINESS ON FEET: ICD-10-CM

## 2022-11-16 DIAGNOSIS — M25.662 STIFFNESS OF KNEE JOINT, LEFT: ICD-10-CM

## 2022-11-16 PROCEDURE — 97140 MANUAL THERAPY 1/> REGIONS: CPT

## 2022-11-16 PROCEDURE — 97110 THERAPEUTIC EXERCISES: CPT

## 2022-11-17 ENCOUNTER — OFFICE VISIT (OUTPATIENT)
Dept: ORTHOPEDIC SURGERY | Age: 62
End: 2022-11-17
Payer: MEDICARE

## 2022-11-17 DIAGNOSIS — M17.11 PRIMARY OSTEOARTHRITIS OF RIGHT KNEE: Primary | ICD-10-CM

## 2022-11-17 PROCEDURE — 3017F COLORECTAL CA SCREEN DOC REV: CPT | Performed by: ORTHOPAEDIC SURGERY

## 2022-11-17 PROCEDURE — 99214 OFFICE O/P EST MOD 30 MIN: CPT | Performed by: ORTHOPAEDIC SURGERY

## 2022-11-17 PROCEDURE — G8756 NO BP MEASURE DOC: HCPCS | Performed by: ORTHOPAEDIC SURGERY

## 2022-11-17 PROCEDURE — G8432 DEP SCR NOT DOC, RNG: HCPCS | Performed by: ORTHOPAEDIC SURGERY

## 2022-11-17 PROCEDURE — G9899 SCRN MAM PERF RSLTS DOC: HCPCS | Performed by: ORTHOPAEDIC SURGERY

## 2022-11-17 PROCEDURE — G8427 DOCREV CUR MEDS BY ELIG CLIN: HCPCS | Performed by: ORTHOPAEDIC SURGERY

## 2022-11-17 PROCEDURE — G8417 CALC BMI ABV UP PARAM F/U: HCPCS | Performed by: ORTHOPAEDIC SURGERY

## 2022-11-17 NOTE — PROGRESS NOTES
Anel Escamilla (: 1960) is a 57 y.o. female, patient, here for evaluation of the following chief complaint(s):  Surgical Follow-up (Post op follow up - [facility] 2022)       HPI:    Patient is here today for follow-up of her left total knee. She is doing well with this. Right knee is very painful for her. He is having her right knee replaced at the end of December. Allergies   Allergen Reactions    Ace Inhibitors Cough    Contrast Dye [Iodine] Hives       Current Outpatient Medications   Medication Sig    acetaminophen (TYLENOL) 325 mg tablet Take 2 Tablets by mouth every six (6) hours. naloxone (Narcan) 4 mg/actuation nasal spray Use 1 spray intranasally, then discard. Repeat with new spray every 2 min as needed for opioid overdose symptoms, alternating nostrils. rimegepant (NURTEC) 75 mg disintegrating tablet Take 1 tablet every other day, then 1 tablet p.o. as needed for severe headache, not to DC 1 dose in 24 hours    atorvastatin (LIPITOR) 10 mg tablet TAKE 1 TABLET BY MOUTH NIGHTLY FOR CHOLESTEROL    ergocalciferol (ERGOCALCIFEROL) 1,250 mcg (50,000 unit) capsule Take 1 Capsule by mouth every seven (7) days. (Patient taking differently: Take 50,000 Units by mouth every seven (7) days. TAKES ON Friday)    folic acid (FOLVITE) 1 mg tablet Take 1 Tablet by mouth in the morning.    gabapentin (NEURONTIN) 300 mg capsule Take 1 cap in am, 2 caps in pm (Patient taking differently: Take 300 mg by mouth three (3) times daily. Take 1 cap in am, 2 caps in pm)    tiZANidine (ZANAFLEX) 4 mg tablet Take 1 Tablet by mouth nightly. metoprolol tartrate (LOPRESSOR) 25 mg tablet Take 1/2 (one-half) tablet by mouth twice daily    clopidogreL (PLAVIX) 75 mg tab Take 1 Tablet by mouth daily. No current facility-administered medications for this visit.        Past Medical History:   Diagnosis Date    Anxiety     Bipolar 1 disorder (HCC)     Depression     Fatigue     Frequent headaches     GERD (gastroesophageal reflux disease)     Hearing loss     PT DENIES ANY HEARING    Hypertension     Joint pain     Memory disorder     LAPSE OF MEMORY DUE TO STROKES    Muscle pain     Nerve damage     ALL OVER    Snoring     Stomach pain     Stroke ([address]     X2 OVER 10 YRS AGO UNABLE TO REMEMBER DATE        Past Surgical History:   Procedure Laterality Date    HX ADENOIDECTOMY      sweat glands    HX GYN      tubal ligation    HX TONSILLECTOMY         Family History   Problem Relation Age of Onset    Cancer Mother     Heart Disease Father         stents late 66's    Pacemaker Father     Other Sister         MURDERED SHOT    Anesth Problems Neg Hx         Social History     Socioeconomic History    Marital status:      Spouse name: Not on file    Number of children: Not on file    Years of education: Not on file    Highest education level: Not on file   Occupational History    Not on file   Tobacco Use    Smoking status: Some Days     Types: Cigars     Passive exposure: Current    Smokeless tobacco: Never   Vaping Use    Vaping Use: Never used   Substance and Sexual Activity    Alcohol use: Yes     Comment: \"once a month\"    Drug use: Yes     Types: Marijuana    Sexual activity: Not Currently   Other Topics Concern    Not on file   Social History Narrative    ** Merged History Encounter **          Social Determinants of Health     Financial Resource Strain: Not on file   Food Insecurity: Not on file   Transportation Needs: Not on file   Physical Activity: Not on file   Stress: Not on file   Social Connections: Not on file   Intimate Partner Violence: Not on file   Housing Stability: Not on file       ROS    Positive for: Musculoskeletal  Last edited by Dai Macario on 11/17/2022  9:42 AM.            Grey Whalen: There were no vitals taken for this visit. There is no height or weight on file to calculate BMI.     PHYSICAL EXAM:  On physical examination the patient's right knee has full extension flexes only to about 110 degrees. Varus alignment pain along medial joint line. Patient's left knee has well-healed incision with range of motion 0 to 95 degrees. ASSESSMENT/PLAN:  1. Primary osteoarthritis of right knee  Patient is scheduled for right total knee. She is doing well with her left knee. All questions were answered. We discussed continued conservative treatment measures versus total joint replacement. Symptoms have progressed despite conservative treatment measures outlined above and they desire to proceed with right total knee. Patient has had symptoms for over 12 months. They have decline in their activities of daily living with inability to walk long distances. There has been progressive decline in function. Discussed risks, benefits, and alternatives in detail, as well as anticipated hospital stay and course of rehabilitation. They will see their primary care physician prior to surgery. All questions answered. An electronic signature was used to authenticate this note.   --Disha Palomares MD

## 2022-11-21 NOTE — PROGRESS NOTES
I have reviewed the notes, assessments, and/or procedures performed by Brandi Mc PTA, I concur with her/his documentation of Giovanni Spencer.

## 2022-11-28 ENCOUNTER — OFFICE VISIT (OUTPATIENT)
Dept: ORTHOPEDIC SURGERY | Age: 62
End: 2022-11-28
Payer: MEDICARE

## 2022-11-28 DIAGNOSIS — M62.81 QUADRICEPS WEAKNESS: Primary | ICD-10-CM

## 2022-11-28 DIAGNOSIS — M25.662 STIFFNESS OF KNEE JOINT, LEFT: ICD-10-CM

## 2022-11-28 DIAGNOSIS — R26.81 UNSTEADINESS ON FEET: ICD-10-CM

## 2022-11-28 PROCEDURE — 97140 MANUAL THERAPY 1/> REGIONS: CPT | Performed by: PHYSICAL THERAPIST

## 2022-11-28 PROCEDURE — 97110 THERAPEUTIC EXERCISES: CPT | Performed by: PHYSICAL THERAPIST

## 2022-11-28 NOTE — PROGRESS NOTES
Patient Name: Marky Weiss  Date:2022  : 1960  [x]  Patient  Verified  Payor: [place] MEDICARE / Plan: [place] MEDICARE PART A & B / Product Type: Medicare /    Total Treatment Time (min): 55  1:1 Treatment Time ( [address] only): 30  Referring provider: Daniela Singleton PA-C  1. Quadriceps weakness  2. Unsteadiness on feet  3. Stiffness of knee joint, left    SUBJECTIVE    My right knee is really painful. My left knee is ok, just stiff. OBJECTIVE  AROM: knee flexion:90 degrees, knee extension:0 degrees  PROM:   TTP over     Modality:   []  E-Stim: type _ x _ min     []att   []unatt   []w/US   []w/ice   []w/heat  []  Ultrasound: []cont   []pulse    _ W/cm2 x _  min   []1MHz   []3MHz  []  Ice pack _  Post       [] Hot pack _  Pre       []  Other:    Man: 15 min   PF/TF mobilizations to affected limb to promote improved joint mobility. PROM for knee flexion and extension mobility. STM/MFR to the distal IT band, quadriceps, peripatellar structures and popliteal musculature. Passive quad and hamstring stretching in supine and with leg off table. NMR:  min  Neuromuscular reeducation/proprioceptive training listed in exercise below. Ex: 15 min  Therapeutic exercise/strength/endurance completed here in clinic today per the exercise log. PT Exercise Log         EXERCISE 2022   Quad sets 20x   Seated hamstring stretch 5x30\"   SLR flex 20x   SAQ/LAQ 20x   Heel slides 20x   Sit to stands 10x   TKE w/green t band 20x   sidestepping 2 laps   minisquats 20x hold NT   Leg press 20x #40   Tandem stance x                                          ASSESSMENT  [x]  See Plan of Care  [x]  Patient will continue to benefit from skilled therapy to address remaining functional deficits:     Patient c/o increased RLE pain. Has continued stiffness in knee flexion. Ambulating w/decreased antalgia LLE. Having TKA in December for RLE.    PLAN  Continue with current plan of care and progress as appropriate towards functional goals.   [x]  Upgrade activities as tolerated     [x]  Continue plan of care  []  Discharge due to:_  [] Other:_       Araseli Arnold, PT  11/28/2022    10:09 AM

## 2022-11-30 ENCOUNTER — OFFICE VISIT (OUTPATIENT)
Dept: ORTHOPEDIC SURGERY | Age: 62
End: 2022-11-30
Payer: MEDICARE

## 2022-11-30 DIAGNOSIS — R26.81 UNSTEADINESS ON FEET: ICD-10-CM

## 2022-11-30 DIAGNOSIS — M17.11 PRIMARY OSTEOARTHRITIS OF RIGHT KNEE: ICD-10-CM

## 2022-11-30 DIAGNOSIS — M62.81 QUADRICEPS WEAKNESS: Primary | ICD-10-CM

## 2022-11-30 DIAGNOSIS — Z96.652 STATUS POST TOTAL LEFT KNEE REPLACEMENT: ICD-10-CM

## 2022-11-30 NOTE — PROGRESS NOTES
Patient Name: Alvarez Thomas  Date:2022  : 1960  [x]  Patient  Verified  Payor: [place] MEDICARE / Plan: VA MEDICARE PART A & B / Product Type: Medicare /    Total Treatment Time (min): 55  1:1 Treatment Time ( [address] only): 30  Referring provider: Adal Odonnell PA-C    1. Quadriceps weakness  2. Unsteadiness on feet  3. Primary osteoarthritis of right knee  4. Status post total left knee replacement    SUBJECTIVE    Patient reports her L knee still feels tight but seems to be improving. R TKR scheduled for 22. OBJECTIVE    AROM: knee flexion:105 degrees, knee extension:0 degrees  SPADI 48/80  TTP over     Modality:   []  E-Stim: type _ x _ min     []att   []unatt   []w/US   []w/ice   []w/heat  []  Ultrasound: []cont   []pulse    _ W/cm2 x _  min   []1MHz   []3MHz  []  Ice pack _  Post       [] Hot pack _  Pre       []  Other:    Man: 15 min   PF/TF mobilizations to affected limb to promote improved joint mobility. PROM for knee flexion and extension mobility. STM/MFR to the distal IT band, quadriceps, peripatellar structures and popliteal musculature. Passive quad and hamstring stretching in supine and with leg off table. NMR:  min  Neuromuscular reeducation/proprioceptive training listed in exercise below. Ex: 15 min  Therapeutic exercise/strength/endurance completed here in clinic today per the exercise log. PT Exercise Log         EXERCISE 2022   Quad sets 20x   Seated hamstring stretch 5x30\"   SLR flex 20x   SAQ/LAQ 3#/5#   Heel slides 20x   Sit to stands 10x   TKE w/green t band 20x   sidestepping 2 laps   minisquats 20x hold NT   Leg press 20x #40   Tandem stance x                                          ASSESSMENT  [x]  See Plan of Care  [x]  Patient will continue to benefit from skilled therapy to address remaining functional deficits:     She has made nice gains in knee flexion ROM and is walking with less antalgia.  Most limitations are due to contralateral knee pain at this point. We will continue to progress strength/stability in the knee as able/tolerated. PLAN  Continue with current plan of care and progress as appropriate towards functional goals.   [x]  Upgrade activities as tolerated     [x]  Continue plan of care  []  Discharge due to:_  [] Other:_       Sivan Archuleta, AUDRA  11/30/2022    10:09 AM

## 2022-11-30 NOTE — PROGRESS NOTES
I have reviewed the notes, assessments, and/or procedures performed by Zoie Hyman PTA, I concur with her/his documentation of Conchita Hood.

## 2022-12-07 ENCOUNTER — OFFICE VISIT (OUTPATIENT)
Dept: ORTHOPEDIC SURGERY | Age: 62
End: 2022-12-07
Payer: MEDICARE

## 2022-12-07 DIAGNOSIS — M62.81 QUADRICEPS WEAKNESS: Primary | ICD-10-CM

## 2022-12-07 DIAGNOSIS — R26.81 UNSTEADINESS ON FEET: ICD-10-CM

## 2022-12-07 DIAGNOSIS — Z96.652 STATUS POST TOTAL LEFT KNEE REPLACEMENT: ICD-10-CM

## 2022-12-07 DIAGNOSIS — M17.11 PRIMARY OSTEOARTHRITIS OF RIGHT KNEE: ICD-10-CM

## 2022-12-07 PROCEDURE — 97140 MANUAL THERAPY 1/> REGIONS: CPT

## 2022-12-07 PROCEDURE — 97110 THERAPEUTIC EXERCISES: CPT

## 2022-12-07 NOTE — PROGRESS NOTES
Patient Name: Cat Beaver  Date:2022  : 1960  [x]  Patient  Verified  Payor: [place] MEDICARE / Plan: VA MEDICARE PART A & B / Product Type: Medicare /    Total Treatment Time (min): 55  1:1 Treatment Time ( [address] only): 40  Referring provider: Dot Mallory PA-C    1. Quadriceps weakness  2. Unsteadiness on feet  3. Primary osteoarthritis of right knee  4. Status post total left knee replacement    SUBJECTIVE    Patient reports L knee is feeling pretty good, but her R knee is getting worse as she approaches her surgical date. OBJECTIVE    AROM: knee flexion:108 degrees, knee extension:0 degrees  SPADI 48/80  TTP over     Modality:   []  E-Stim: type _ x _ min     []att   []unatt   []w/US   []w/ice   []w/heat  []  Ultrasound: []cont   []pulse    _ W/cm2 x _  min   []1MHz   []3MHz  []  Ice pack _  Post       [] Hot pack _  Pre       []  Other:    Man: 15 min   PF/TF mobilizations to affected limb to promote improved joint mobility. PROM for knee flexion and extension mobility. STM/MFR to the distal IT band, quadriceps, peripatellar structures and popliteal musculature. Passive quad and hamstring stretching in supine and with leg off table. NMR:  min  Neuromuscular reeducation/proprioceptive training listed in exercise below. Ex: 25 min  Therapeutic exercise/strength/endurance completed here in clinic today per the exercise log. PT Exercise Log         EXERCISE 2022   Quad sets 20x   Seated hamstring stretch 5x30\"   SLR flex 20x 2#   SAQ/LAQ 3#/5#   Heel slides 20x   Sit to stands 10x   TKE w/green t band 20x   sidestepping 2 laps   minisquats 20x hold NT   Leg press 20x #40   Tandem stance x   SLB x                                      ASSESSMENT  [x]  See Plan of Care  [x]  Patient will continue to benefit from skilled therapy to address remaining functional deficits:     Good balance with SLS. Strength improving with exercises here in clinic.  R knee is more limiting at this point.     PLAN  Continue with current plan of care and progress as appropriate towards functional goals.   [x]  Upgrade activities as tolerated     [x]  Continue plan of care  []  Discharge due to:_  [] Other:_       Peri Grissom, AUDRA  12/7/2022    10:09 AM

## 2022-12-07 NOTE — PROGRESS NOTES
I have reviewed the notes, assessments, and/or procedures performed by Joe Hill PTA, I concur with her/his documentation of Claudia Darnell.

## 2022-12-08 ENCOUNTER — OFFICE VISIT (OUTPATIENT)
Dept: INTERNAL MEDICINE CLINIC | Age: 62
End: 2022-12-08
Payer: MEDICARE

## 2022-12-08 VITALS
DIASTOLIC BLOOD PRESSURE: 77 MMHG | SYSTOLIC BLOOD PRESSURE: 119 MMHG | HEART RATE: 74 BPM | OXYGEN SATURATION: 98 % | WEIGHT: 205.6 LBS | BODY MASS INDEX: 35.1 KG/M2 | RESPIRATION RATE: 20 BRPM | HEIGHT: 64 IN

## 2022-12-08 DIAGNOSIS — Z86.73 HISTORY OF CVA (CEREBROVASCULAR ACCIDENT): ICD-10-CM

## 2022-12-08 DIAGNOSIS — Z01.818 PREOP EXAMINATION: Primary | ICD-10-CM

## 2022-12-08 DIAGNOSIS — G43.009 MIGRAINE WITHOUT AURA AND WITHOUT STATUS MIGRAINOSUS, NOT INTRACTABLE: ICD-10-CM

## 2022-12-08 DIAGNOSIS — I10 ESSENTIAL HYPERTENSION: ICD-10-CM

## 2022-12-08 DIAGNOSIS — E78.2 MIXED HYPERLIPIDEMIA: ICD-10-CM

## 2022-12-08 DIAGNOSIS — F17.200 SMOKER: ICD-10-CM

## 2022-12-08 NOTE — PROGRESS NOTES
Subjective: (As above and below)   No chief complaint on file. Rena VickNita Velez is a 57y.o. year old female who presents for     Pre-op; undergoing RTKA on 12/30/22  She has help at from family      Hypertension ROS:  taking medications as instructed, no medication side effects noted, no TIAs, no chest pain on exertion, no dyspnea on exertion, no swelling of ankles    Migraines stable    Hyperlipidemia tolerating statin    Hx of CVA  On plavix    No latex allergy  No hx of anesthesia complications- she just had L knee done in September and is doing great    She is having labs done thru PAT    Reviewed PmHx, RxHx, FmHx, SocHx, AllgHx and updated in chart. Family History   Problem Relation Age of Onset    Cancer Mother     Heart Disease Father         stents late 66's    Pacemaker Father     Other Sister         MURDERED SHOT    Anesth Problems Neg Hx        Past Medical History:   Diagnosis Date    Anxiety     Bipolar 1 disorder ([address]     Depression     Fatigue     Frequent headaches     GERD (gastroesophageal reflux disease)     Hearing loss     PT DENIES ANY HEARING    Hypertension     Joint pain     Memory disorder     LAPSE OF MEMORY DUE TO STROKES    Muscle pain     Nerve damage     ALL OVER    Snoring     Stomach pain     Stroke ([address]     X2 OVER 10 YRS AGO UNABLE TO REMEMBER DATE      Social History     Socioeconomic History    Marital status:    Tobacco Use    Smoking status: Some Days     Types: Cigars     Passive exposure: Current    Smokeless tobacco: Never   Vaping Use    Vaping Use: Never used   Substance and Sexual Activity    Alcohol use: Yes     Comment: \"once a month\"    Drug use: Yes     Types: Marijuana    Sexual activity: Not Currently   Social History Narrative    ** Merged History Encounter **               Current Outpatient Medications   Medication Sig    acetaminophen (TYLENOL) 325 mg tablet Take 2 Tablets by mouth every six (6) hours.     naloxone (Narcan) 4 mg/actuation nasal spray Use 1 spray intranasally, then discard. Repeat with new spray every 2 min as needed for opioid overdose symptoms, alternating nostrils. rimegepant (NURTEC) 75 mg disintegrating tablet Take 1 tablet every other day, then 1 tablet p.o. as needed for severe headache, not to DC 1 dose in 24 hours    atorvastatin (LIPITOR) 10 mg tablet TAKE 1 TABLET BY MOUTH NIGHTLY FOR CHOLESTEROL    ergocalciferol (ERGOCALCIFEROL) 1,250 mcg (50,000 unit) capsule Take 1 Capsule by mouth every seven (7) days. (Patient taking differently: Take 50,000 Units by mouth every seven (7) days. TAKES ON Friday)    folic acid (FOLVITE) 1 mg tablet Take 1 Tablet by mouth in the morning.    gabapentin (NEURONTIN) 300 mg capsule Take 1 cap in am, 2 caps in pm (Patient taking differently: Take 300 mg by mouth three (3) times daily. Take 1 cap in am, 2 caps in pm)    tiZANidine (ZANAFLEX) 4 mg tablet Take 1 Tablet by mouth nightly. metoprolol tartrate (LOPRESSOR) 25 mg tablet Take 1/2 (one-half) tablet by mouth twice daily    clopidogreL (PLAVIX) 75 mg tab Take 1 Tablet by mouth daily. No current facility-administered medications for this visit. Review of Systems:   Constitutional:    Negative for fever and chills, negative diaphoresis. HEENT:              Negative for neck pain and stiffness. Eyes:                  Negative for visual disturbance, itching, redness or discharge. Respiratory:        Negative for cough and shortness of breath. Cardiovascular:  Negative for chest pain and palpitations. Gastrointestinal: Negative for nausea, vomiting, abdominal pain, diarrhea or constipation. Genitourinary:     Negative for dysuria and frequency. Musculoskeletal: Negative for falls, tenderness and swelling. Skin:                    Negative for rash, masses or lesions. Neurological:       Negative for dizzyness, seizure, loss of consciousness, weakness and numbness.      Objective:     Vitals:    12/08/22 1334   BP: 119/77   Pulse: 74   Resp: 20   SpO2: 98%   Weight: 205 lb 9.6 oz (93.3 kg)   Height: 5' 4\" (1.626 m)       Results for orders placed or performed during the hospital encounter of 77/88/19   METABOLIC PANEL, BASIC   Result Value Ref Range    Sodium 140 136 - 145 mmol/L    Potassium 4.2 3.5 - 5.1 mmol/L    Chloride 109 (H) 97 - 108 mmol/L    CO2 24 21 - 32 mmol/L    Anion gap 7 5 - 15 mmol/L    Glucose 120 (H) 65 - 100 mg/dL    BUN 12 6 - 20 MG/DL    Creatinine 0.72 0.55 - 1.02 MG/DL    BUN/Creatinine ratio 17 12 - 20      GFR est AA >60 >60 ml/min/1.73m2    GFR est non-AA >60 >60 ml/min/1.73m2    Calcium 8.0 (L) 8.5 - 10.1 MG/DL   HEMOGLOBIN   Result Value Ref Range    HGB 12.4 11.5 - 16.0 g/dL   GLUCOSE, POC   Result Value Ref Range    Glucose (POC) 104 65 - 117 mg/dL    Performed by Clemencia Metzger          Gen: Oriented to person, place and time and well-developed, well-nourished and in no distress. HEENT:    Head: normocephalic and atraumatic. Eyes:  EOM are normal. Pupils equal and round. Neck:  Normal range of motion. Neck supple. Cardiovascular: normal rate, regular rhythm and normal heart sounds. Pulmonary/Chest:  Effort normal and breath sounds normal.  No respiratory distress. No wheezes, no rales. Abdominal: soft, normal  bowel sounds. Musculoskeletal:  No edema, no tenderness. No calf tenderness or edema. Neurological:  Alert, oriented to person, place and time. Skin: skin is warm and dry. Assessment/ Plan:       1. Preop examination  Cleared,low risk    2. Essential hypertension      3. Mixed hyperlipidemia      4. Migraine without aura and without status migrainosus, not intractable      5. History of CVA (cerebrovascular accident)  Stop plavix 1 week before unless PAT states otherwise    6. Smoker  Greatly reduced        I have discussed the diagnosis with the patient and the intended plan as seen in the above orders.   The patient has received an after-visit summary and questions were answered concerning future plans. Pt conveyed understanding of plan. Medication Side Effects and Warnings were discussed with patient: yes  Patient Labs were reviewed: yes  Patient Past Records were reviewed:  yes    Patricia Sue, NP

## 2022-12-08 NOTE — PROGRESS NOTES
No chief complaint on file.       Visit Vitals  Resp 20   Ht 5' 4\" (1.626 m)   Wt 205 lb 9.6 oz (93.3 kg)   BMI 35.29 kg/m²

## 2022-12-14 ENCOUNTER — OFFICE VISIT (OUTPATIENT)
Dept: ORTHOPEDIC SURGERY | Age: 62
End: 2022-12-14
Payer: MEDICARE

## 2022-12-14 DIAGNOSIS — M62.81 QUADRICEPS WEAKNESS: Primary | ICD-10-CM

## 2022-12-14 DIAGNOSIS — R26.81 UNSTEADINESS ON FEET: ICD-10-CM

## 2022-12-14 DIAGNOSIS — Z96.652 STATUS POST TOTAL LEFT KNEE REPLACEMENT: ICD-10-CM

## 2022-12-14 DIAGNOSIS — M17.11 PRIMARY OSTEOARTHRITIS OF RIGHT KNEE: ICD-10-CM

## 2022-12-14 PROCEDURE — 97110 THERAPEUTIC EXERCISES: CPT

## 2022-12-14 PROCEDURE — 97140 MANUAL THERAPY 1/> REGIONS: CPT

## 2022-12-14 NOTE — PROGRESS NOTES
I have reviewed the notes, assessments, and/or procedures performed by Manuel Nguyen PTA, I concur with her/his documentation of Marianna Michael.

## 2022-12-14 NOTE — PROGRESS NOTES
Patient Name: Marky Weiss  Date:2022  : 1960  [x]  Patient  Verified  Payor: Alfonso Puri / Plan: [place] MEDICARE PART A & B / Product Type: Medicare /    Total Treatment Time (min): 55  1:1 Treatment Time ( [address] only): 40  Referring provider: Daniela Singleton PA-C    1. Quadriceps weakness  2. Unsteadiness on feet  3. Primary osteoarthritis of right knee  4. Status post total left knee replacement    SUBJECTIVE    Patient reports L knee is feeling good with ADLs and she is walking more normally. OBJECTIVE    AROM: knee flexion:108 degrees, knee extension:0 degrees  SPADI 48/80  TTP over     Modality:   []  E-Stim: type _ x _ min     []att   []unatt   []w/US   []w/ice   []w/heat  []  Ultrasound: []cont   []pulse    _ W/cm2 x _  min   []1MHz   []3MHz  []  Ice pack _  Post       [] Hot pack _  Pre       []  Other:    Man: 15 min   PF/TF mobilizations to affected limb to promote improved joint mobility. PROM for knee flexion and extension mobility. STM/MFR to the distal IT band, quadriceps, peripatellar structures and popliteal musculature. NMR:  min  Neuromuscular reeducation/proprioceptive training listed in exercise below. Ex: 25 min  Therapeutic exercise/strength/endurance completed here in clinic today per the exercise log. PT Exercise Log         EXERCISE 2022   Quad sets 20x   Seated hamstring stretch 5x30\"   SLR flex 20x 2#   SAQ/LAQ 3#/5#   Heel slides 20x   Sit to stands 10x   TKE w/green t band 20x   sidestepping 2 laps   minisquats 20x hold NT   Leg press 20x #40   Tandem stance x   SLB x                                      ASSESSMENT  [x]  See Plan of Care  [x]  Patient will continue to benefit from skilled therapy to address remaining functional deficits:      L knee has progressed well with therapy. She is not using A. D.and gait is non antalgic. Most limitations/pain at this point are due to contralateral knee pain which is scheduled for TKA 22.  We will D/C to [facility] at this time. PLAN  Continue with current plan of care and progress as appropriate towards functional goals.   []  Upgrade activities as tolerated     []  Continue plan of care  [x]  Discharge    [] Other:_       Peri Grissom, PTA  12/14/2022    10:09 AM

## 2022-12-21 ENCOUNTER — HOSPITAL ENCOUNTER (OUTPATIENT)
Dept: PREADMISSION TESTING | Age: 62
Discharge: HOME | End: 2022-12-21
Payer: MEDICARE

## 2022-12-21 VITALS
WEIGHT: 200 LBS | DIASTOLIC BLOOD PRESSURE: 91 MMHG | BODY MASS INDEX: 34.15 KG/M2 | SYSTOLIC BLOOD PRESSURE: 139 MMHG | HEIGHT: 64 IN | HEART RATE: 85 BPM | TEMPERATURE: 97.9 F

## 2022-12-21 LAB
ABO + RH BLD: NORMAL
ANION GAP SERPL CALC-SCNC: 7 MMOL/L (ref 5–15)
APPEARANCE UR: ABNORMAL
BACTERIA URNS QL MICRO: ABNORMAL /HPF
BILIRUB UR QL: NEGATIVE
BLOOD GROUP ANTIBODIES SERPL: NORMAL
BUN SERPL-MCNC: 17 MG/DL (ref 6–20)
BUN/CREAT SERPL: 22 (ref 12–20)
CALCIUM SERPL-MCNC: 9.7 MG/DL (ref 8.5–10.1)
CHLORIDE SERPL-SCNC: 106 MMOL/L (ref 97–108)
CO2 SERPL-SCNC: 29 MMOL/L (ref 21–32)
COLOR UR: ABNORMAL
CREAT SERPL-MCNC: 0.79 MG/DL (ref 0.55–1.02)
EPITH CASTS URNS QL MICRO: ABNORMAL /LPF
ERYTHROCYTE [DISTWIDTH] IN BLOOD BY AUTOMATED COUNT: 14.2 % (ref 11.5–14.5)
EST. AVERAGE GLUCOSE BLD GHB EST-MCNC: 108 MG/DL
GLUCOSE SERPL-MCNC: 110 MG/DL (ref 65–100)
GLUCOSE UR STRIP.AUTO-MCNC: NEGATIVE MG/DL
HBA1C MFR BLD: 5.4 % (ref 4–5.6)
HCT VFR BLD AUTO: 49.3 % (ref 35–47)
HGB BLD-MCNC: 15.5 G/DL (ref 11.5–16)
HGB UR QL STRIP: NEGATIVE
INR PPP: 1.1 (ref 0.9–1.1)
KETONES UR QL STRIP.AUTO: NEGATIVE MG/DL
LEUKOCYTE ESTERASE UR QL STRIP.AUTO: NEGATIVE
MCH RBC QN AUTO: 31.1 PG (ref 26–34)
MCHC RBC AUTO-ENTMCNC: 31.4 G/DL (ref 30–36.5)
MCV RBC AUTO: 99 FL (ref 80–99)
MUCOUS THREADS URNS QL MICRO: ABNORMAL /LPF
NITRITE UR QL STRIP.AUTO: NEGATIVE
NRBC # BLD: 0 K/UL (ref 0–0.01)
NRBC BLD-RTO: 0 PER 100 WBC
PH UR STRIP: 5 [PH] (ref 5–8)
PLATELET # BLD AUTO: 195 K/UL (ref 150–400)
PMV BLD AUTO: 12 FL (ref 8.9–12.9)
POTASSIUM SERPL-SCNC: 4.1 MMOL/L (ref 3.5–5.1)
PROT UR STRIP-MCNC: ABNORMAL MG/DL
PROTHROMBIN TIME: 11.2 SEC (ref 9–11.1)
RBC # BLD AUTO: 4.98 M/UL (ref 3.8–5.2)
RBC #/AREA URNS HPF: ABNORMAL /HPF (ref 0–5)
SODIUM SERPL-SCNC: 142 MMOL/L (ref 136–145)
SP GR UR REFRACTOMETRY: 1.02 (ref 1–1.03)
SPECIMEN EXP DATE BLD: NORMAL
UA: UC IF INDICATED,UAUC: ABNORMAL
UROBILINOGEN UR QL STRIP.AUTO: 0.2 EU/DL (ref 0.2–1)
WBC # BLD AUTO: 5 K/UL (ref 3.6–11)
WBC URNS QL MICRO: ABNORMAL /HPF (ref 0–4)

## 2022-12-21 PROCEDURE — 36415 COLL VENOUS BLD VENIPUNCTURE: CPT

## 2022-12-21 PROCEDURE — 85027 COMPLETE CBC AUTOMATED: CPT

## 2022-12-21 PROCEDURE — 81001 URINALYSIS AUTO W/SCOPE: CPT

## 2022-12-21 PROCEDURE — 83036 HEMOGLOBIN GLYCOSYLATED A1C: CPT

## 2022-12-21 PROCEDURE — 86900 BLOOD TYPING SEROLOGIC ABO: CPT

## 2022-12-21 PROCEDURE — 80048 BASIC METABOLIC PNL TOTAL CA: CPT

## 2022-12-21 PROCEDURE — 85610 PROTHROMBIN TIME: CPT

## 2022-12-21 RX ORDER — GABAPENTIN 600 MG/1
600 TABLET ORAL
COMMUNITY

## 2022-12-21 RX ORDER — GABAPENTIN 300 MG/1
300 CAPSULE ORAL DAILY
COMMUNITY

## 2022-12-21 RX ORDER — IBUPROFEN 200 MG
400 TABLET ORAL
COMMUNITY

## 2022-12-21 RX ORDER — ONDANSETRON 4 MG/1
4 TABLET, ORALLY DISINTEGRATING ORAL
COMMUNITY

## 2022-12-21 RX ORDER — CETIRIZINE HYDROCHLORIDE 10 MG/1
10 TABLET ORAL
COMMUNITY

## 2022-12-21 RX ORDER — MECLIZINE HYDROCHLORIDE 25 MG/1
25 TABLET ORAL
COMMUNITY

## 2022-12-21 NOTE — PERIOP NOTES
PATIENT GIVEN SURGICAL SITE INFECTION FAQ HANDOUT AND HAND WASHING TIP SHEET. PREOP INSTRUCTIONS REVIEWED AND PATIENT VERBALIZES UNDERSTANDING OF INSTRUCTIONS. PATIENT HAS BEEN GIVEN THE OPPORTUNITY TO ASK ADDITIONAL QUESTIONS. PATIENT HAD PREVIOUS KNEE REPLACEMENT 8/2022, SO DIDN'T HAVE TO ATTEND CLASS THIS TIME, HAS A WALKER TO FIT AT HOME.

## 2022-12-21 NOTE — PERIOP NOTES
[address]  ORTHOPAEDIC    Surgery Date:   12/30/22    Your surgeon's office or [facility] staff will call you between 4 PM- 8 PM the day before surgery with your arrival time. If your surgery is on a Monday, you will receive a call the preceding Friday. Please report to [facility] Patient Access/Admitting on the 1st floor. Bring your insurance card, photo identification, and any copayment (if applicable). If you are going home the same day of your surgery, you must have a responsible adult to drive you home. You need to have a responsible adult to stay with you the first 24 hours after surgery and you should not drive a car for 24 hours following your surgery. Do NOT eat any solid foods after midnight the night before surgery including candy, mints or gum. You may drink clear liquids from midnight until 1 hour prior to arrival time. You may drink up to 12 ounces at one time every 4 hours. Do NOT drink alcohol or smoke 24 hours before surgery. STOP smoking for 14 days prior as it helps with breathing and healing after surgery. If your arrival time is 3pm or later, you may eat a light breakfast before 8am (toast, bagel-no butter, black coffee, plain tea, fruit juice-no pulp) Please note special instructions, if applicable, below for medications. If you are being admitted to the hospital,please leave personal belongings/luggage in your car until you have an assigned hospital room number. Please wear comfortable clothes. Wear your glasses instead of contacts. We ask that all money, jewelry and valuables be left at home. Wear no make up, particularly mascara, the day of surgery. All body piercings, rings, and jewelry need to be removed and left at home. Please remove any nail polish or artificial nails from your fingernails. Please wear your hair loose or down. Please no pony-tails, buns, or any metal hair accessories.  If you shower the morning of surgery, please do not apply any lotions or powders afterwards. You may wear deodorant. Do not shave any body area within 24 hours of your surgery. Please follow all instructions to avoid any potential surgical cancellation. Should your physical condition change, (i.e. fever, cold, flu, etc.) please notify your surgeon as soon as possible. It is important to be on time. If a situation occurs where you may be delayed, please call:  [phone] / [phone] on the day of surgery. The Preadmission Testing staff can be reached at [phone]. Special instructions: ANY INSTRUCTIONS FROM DR. KABA OFFICE    Current Outpatient Medications   Medication Sig    gabapentin (NEURONTIN) 300 mg capsule Take 300 mg by mouth daily. gabapentin (NEURONTIN) 600 mg tablet Take 600 mg by mouth daily (after dinner). cetirizine (ZYRTEC) 10 mg tablet Take 10 mg by mouth daily as needed for Allergies. ibuprofen (MOTRIN) 200 mg tablet Take 400 mg by mouth every six (6) hours as needed for Pain. meclizine (ANTIVERT) 25 mg tablet Take 25 mg by mouth three (3) times daily as needed for Dizziness. ondansetron (ZOFRAN ODT) 4 mg disintegrating tablet Take 4 mg by mouth every eight (8) hours as needed for Nausea or Vomiting. acetaminophen (TYLENOL) 325 mg tablet Take 2 Tablets by mouth every six (6) hours. atorvastatin (LIPITOR) 10 mg tablet TAKE 1 TABLET BY MOUTH NIGHTLY FOR CHOLESTEROL    ergocalciferol (ERGOCALCIFEROL) 1,250 mcg (50,000 unit) capsule Take 1 Capsule by mouth every seven (7) days. (Patient taking differently: Take 50,000 Units by mouth every seven (7) days. TAKES ON Friday)    folic acid (FOLVITE) 1 mg tablet Take 1 Tablet by mouth in the morning. tiZANidine (ZANAFLEX) 4 mg tablet Take 1 Tablet by mouth nightly. metoprolol tartrate (LOPRESSOR) 25 mg tablet Take 1/2 (one-half) tablet by mouth twice daily    clopidogreL (PLAVIX) 75 mg tab Take 1 Tablet by mouth daily.     naloxone (Narcan) 4 mg/actuation nasal spray Use 1 spray intranasally, then discard. Repeat with new spray every 2 min as needed for opioid overdose symptoms, alternating nostrils. rimegepant (NURTEC) 75 mg disintegrating tablet Take 1 tablet every other day, then 1 tablet p.o. as needed for severe headache, not to DC 1 dose in 24 hours (Patient not taking: Reported on 12/21/2022)     No current facility-administered medications for this encounter. YOU MUST ONLY TAKE THESE MEDICATIONS THE MORNING OF SURGERY WITH A SIP OF WATER: GABAPENTIN, METOPROLOL  MEDICATIONS TO TAKE THE MORNING OF SURGERY ONLY IF NEEDED: TYLENOL  HOLD these prescription medications BEFORE Surgery: SKIP TIZANIDINE AND VITAMIN D STARTING TODAY, STOP IBUPROPHEN 3 DAYS PRIOR TO SURGERY. Ask your surgeon/prescribing physician about when/if to STOP taking these medications: CLOPIDIGREL (PLAVIX)   Stop any non-steroidal anti-inflammatory drugs (i.e. Ibuprofen, Naproxen, Advil, Aleve) 3 days before surgery. You may take Tylenol. STOP all vitamins and herbal supplements 1 week prior to  surgery. If you are currently taking Plavix, Coumadin, or any other blood-thinning/anticoagulant medication contact your prescribing physician for instructions. Preventing Infections Before and After - Your Surgery    IMPORTANT INSTRUCTIONS    You play an important role in your health and preparation for surgery. To reduce the germs on your skin you will need to shower with CHG soap (Chorhexidine gluconate 4%) two times before surgery. CHG soap (Hibiclens, Hex-A-Clens or store brand)  CHG soap will be provided at your Preadmission Testing (PAT) appointment. If you do not have a PAT appointment before surgery, you may arrange to  CHG soap from our office or purchase CHG soap at a pharmacy, grocery or department store. You need to purchase TWO 4 ounce bottles to use for your 2 showers.     Steps to follow:  Wash your hair with your normal shampoo and your body with regular soap and rinse well to remove shampoo and soap from your skin. Wet a clean washcloth and turn off the shower. Put CHG soap on washcloth and apply to your entire body from the neck down. Do not use on your head, face or private parts(genitals). Do not use CHG soap on open sores, wounds or areas of skin irritation. Wash you body gently for 5 minutes. Do not wash your skin too hard. This soap does not create lather. Pay special attention to your underarms and from your belly button to your feet. Turn the shower back on and rinse well to get CHG soap off your body. Pat your skin dry with a clean, dry towel. Do not apply lotions or moisturizer. Put on clean clothes and sleep on fresh bed sheets and do not allow pets to sleep with you. Shower with CHG soap 2 times before your surgery  The evening before your surgery  The morning of your surgery      Tips to help prevent infections after your surgery:  Protect your surgical wound from germs:  Hand washing is the most important thing you and your caregivers can do to prevent infections. Keep your bandage clean and dry! Do not touch your surgical wound. Use clean, freshly washed towels and washcloths every time you shower; do not share bath linens with others. Until your surgical wound is healed, wear clothing and sleep on bed linens each day that are clean and freshly washed. Do not allow pets to sleep in your bed with you or touch your surgical wound. Do not smoke - smoking delays wound healing. This may be a good time to stop smoking. If you have diabetes, it is important for you to manage your blood sugar levels properly before your surgery as well as after your surgery. Poorly managed blood sugar levels slow down wound healing and prevent you from healing completely. Prevention of Infection  Testing for Staphylococcus aureus on your skin before surgery    Staphylococcus aureus (staph) is a common bacteria that is found on the body.  It normally does not cause infection on healthy skin. Before surgery, you will be tested to see if you have staph by swabbing the inside of your nose. When you have an incision with surgery, the goal is to protect that incision from infection. Removal of the staph bacteria before surgery can decrease the risk of a surgical site infection. If your nose swab is positive for staph you will be called. Your treatment will include 2 steps:  Prescription for Mupirocin ointment to be used in each nostril twice a day for 5 days. Showering with Chlorhexidine (CHG) liquid soap for 5 days prior to surgery. How to use Mupirocin ointment in your nose   the prescription from your pharmacy. You will receive a large tube of ointment which will be big enough for all of your treatments. You will apply this ointment to each nostril 2 times a day for 5 days. Wash your hands with  gel or soap and water for 20 seconds before using ointment. Place a pea-sized amount of ointment on a cotton Q-tip. Apply ointment just inside of each nostril with the Q-tip. Do not push Q-tip or ointment deep inside you nose. Press your nostrils together and massage for a few seconds. Wash your hands with  gel or soap and water after you are finished. Do not get ointment near your eyes. If it gets into your eyes, rinse them with cool water. If you need to use nasal spray, clean the tip of the bottle with alcohol before use and do not use both at the same time. If you are scheduled for COVID testing during the 5 days, do NOT apply morning dose until after the COVID test has been performed. How to use Chlorhexidine (CHG) 4% liquid soap  Purchase an 8 ounce bottle of CHG liquid soap (Chlorhexidine 4%, Hibiclens, Tabitha-A-Clens or store brand) at a pharmacy or grocery store. Wash your hair with your normal shampoo and your body with regular soap and rinse well to remove shampoo and soap from your skin.   Wet a clean washcloth and turn off the shower. Put CHG soap on washcloth and apply to your entire body from the neck down. Do not use on your head, face or private parts(genitals). Do not use CHG soap on open sores, wounds or areas of skin irritation. Wash your body gently for 5 minutes. Do not wash your skin too hard. This soap does not create lather. Pay special attention to your underarms and from your belly button to your feet. Turn the shower back on and rinse well to get CHG soap off your body. Pat your skin dry with a clean, dry towel. Do not apply lotions or moisturizer. Put on clean clothes and sleep on fresh bed sheets the night before surgery. Do not allow pets to sleep with you. Eating and Drinking Before Surgery    You may eat a regular dinner at the usual time on the day before your surgery. Do NOT eat any solid foods after midnight unless your arrival time at the hospital is 3pm or later. You may drink clear liquids only from 12 midnight until 1 hours prior to your arrival time at the hospital on the day of your surgery. Do NOT drink alcohol. Clear liquids include:  Water  Fruit juices without pulp( i.e. apple juice)  Carbonated beverages  Black coffee (no cream/milk)  Tea (no cream/milk)  Gatorade  You may drink up to 12-16 ounces at one time every 4 hours between the hours of midnight and 1 hour before your arrival time at the hospital. Example- if your arrival time at the hospital is 6am, you may drink 12-16 ounces of clear liquids no later than 5am.  If your arrival time at the hospital is 3pm or later, you may eat a light breakfast before 8am.  A light breakfast includes: Toast or bagel (no butter)  Black coffee (no cream/milk)  Tea (no cream/milk)  Fruit juices without pulp ( i.e. apple juice)  Do NOT eat meat, eggs, vegetables or fruit  If you have any questions, please contact your surgeon's office.           Patient Information Regarding COVID Restrictions    Day of Procedure    Please park in the parking deck or any designated visitor parking lot. Enter the facility through the Main Entrance of the hospital.  On the day of surgery, please provide the cell phone number of the person who will be waiting for you to the Patient Access representative at the time of registration. Masks are highly recommended in the hospital, but not required. Once your procedure and the immediate recovery period is completed, a nurse in the recovery area will contact your designated visitor to inform them of your room number or to otherwise review other pertinent information regarding your care. Social distancing practices are strongly encouraged in waiting areas and the cafeteria. The patient was contacted in person. She verbalized understanding of all instructions does not  need reinforcement.

## 2022-12-22 LAB
BACTERIA SPEC CULT: NORMAL
BACTERIA SPEC CULT: NORMAL
SERVICE CMNT-IMP: NORMAL

## 2022-12-22 NOTE — PERIOP NOTES
PAT Nurse Practitioner   Pre-Operative Chart Review/Assessment:-ORTHOPEDIC                Patient Name:  Brandy Rivera                                                           Age:   57 y.o.    :  1960     Today's Date:  2022     Date of PAT:   22      Date of Surgery:    22      Procedure(s):  Right Total Knee Arthroplasty     Surgeon:   Dr. Sheridan Blood                       PLAN:      1)  Medical Clearance:  Lance Whiting NP       2)  Cardiac Clearance:  Pt seen by Dr. Vahe Weston on 22. Clearance obtained at that time. Pt w/o significant cardiac hx or any clinical changes since. OV note in CC. EKG showed SR w/ LAE. 3)  Diabetic Treatment Consult:  Not indicated. A1c-5.4       4)  Sleep Apnea evaluation:   Pt had previous SS that was neg.         5) Treatment for MRSA/Staph Aureus:  Neg       6) Additional Concerns:  Former smoker, THC use, HTN, GERD, CVA(memory loss), dep/anx, bipolar 1              Vital Signs:         Vitals:    22 0954   BP: (!) 139/91   Pulse: 85   Temp: 97.9 °F (36.6 °C)   Weight: 90.7 kg (200 lb)   Height: 5' 4\" (1.626 m)          Body mass index is 34.33 kg/m².         ____________________________________________  PAST MEDICAL HISTORY  Past Medical History:   Diagnosis Date    Anxiety     Bipolar 1 disorder (HCC)     Depression     Fatigue     Frequent headaches     GERD (gastroesophageal reflux disease)     Hearing loss     PT DENIES ANY HEARING    Hypertension     Joint pain     Memory disorder     LAPSE OF MEMORY DUE TO STROKES    Muscle pain     Nerve damage     ALL OVER    Snoring     Stomach pain     Stroke ([address]     X2 OVER 10 YRS AGO UNABLE TO REMEMBER DATE      ____________________________________________  PAST SURGICAL HISTORY  Past Surgical History:   Procedure Laterality Date    HX ADENOIDECTOMY      sweat glands    HX GYN      tubal ligation    HX ORTHOPAEDIC  2022    LEFT TOTAL KNEE REPLACEMENT    HX TONSILLECTOMY ____________________________________________  HOME MEDICATIONS  Current Outpatient Medications   Medication Sig    gabapentin (NEURONTIN) 300 mg capsule Take 300 mg by mouth daily. gabapentin (NEURONTIN) 600 mg tablet Take 600 mg by mouth daily (after dinner). cetirizine (ZYRTEC) 10 mg tablet Take 10 mg by mouth daily as needed for Allergies. ibuprofen (MOTRIN) 200 mg tablet Take 400 mg by mouth every six (6) hours as needed for Pain. meclizine (ANTIVERT) 25 mg tablet Take 25 mg by mouth three (3) times daily as needed for Dizziness. ondansetron (ZOFRAN ODT) 4 mg disintegrating tablet Take 4 mg by mouth every eight (8) hours as needed for Nausea or Vomiting. acetaminophen (TYLENOL) 325 mg tablet Take 2 Tablets by mouth every six (6) hours. atorvastatin (LIPITOR) 10 mg tablet TAKE 1 TABLET BY MOUTH NIGHTLY FOR CHOLESTEROL    ergocalciferol (ERGOCALCIFEROL) 1,250 mcg (50,000 unit) capsule Take 1 Capsule by mouth every seven (7) days. (Patient taking differently: Take 50,000 Units by mouth every seven (7) days. TAKES ON Friday)    folic acid (FOLVITE) 1 mg tablet Take 1 Tablet by mouth in the morning. tiZANidine (ZANAFLEX) 4 mg tablet Take 1 Tablet by mouth nightly. metoprolol tartrate (LOPRESSOR) 25 mg tablet Take 1/2 (one-half) tablet by mouth twice daily    clopidogreL (PLAVIX) 75 mg tab Take 1 Tablet by mouth daily. naloxone (Narcan) 4 mg/actuation nasal spray Use 1 spray intranasally, then discard. Repeat with new spray every 2 min as needed for opioid overdose symptoms, alternating nostrils.     rimegepant (NURTEC) 75 mg disintegrating tablet Take 1 tablet every other day, then 1 tablet p.o. as needed for severe headache, not to DC 1 dose in 24 hours (Patient not taking: Reported on 12/21/2022)     No current facility-administered medications for this encounter.      ____________________________________________  ALLERGIES  Allergies   Allergen Reactions    Ace Inhibitors Cough Contrast Dye [Iodine] Hives      ____________________________________________  SOCIAL HISTORY  Social History     Tobacco Use    Smoking status: Some Days     Types: Cigars     Passive exposure: Current    Smokeless tobacco: Never   Substance Use Topics    Alcohol use: Yes     Comment: \"once a month\"      ____________________________________________   Internal Administration   First Dose COVID-19, PFIZER PURPLE top, DILUTE for use, (age 15 y+), IM, 30mcg/0.3mL  04/07/2021   Second Dose COVID-19, PFIZER PURPLE top, DILUTE for use, (age 15 y+), IM, 30mcg/0.3mL  04/28/2021      Last COVID Lab SARS-CoV-2 ( )   Date Value   01/28/2021 NOT DETECTED     SARS-CoV-2 by PCR ( )   Date Value   01/28/2021 Please find results under separate order                    Labs:     Hospital Outpatient Visit on 12/21/2022   Component Date Value Ref Range Status    Sodium 12/21/2022 142  136 - 145 mmol/L Final    Potassium 12/21/2022 4.1  3.5 - 5.1 mmol/L Final    Chloride 12/21/2022 106  97 - 108 mmol/L Final    CO2 12/21/2022 29  21 - 32 mmol/L Final    Anion gap 12/21/2022 7  5 - 15 mmol/L Final    Glucose 12/21/2022 110 (A)  65 - 100 mg/dL Final    BUN 12/21/2022 17  6 - 20 MG/DL Final    Creatinine 12/21/2022 0.79  0.55 - 1.02 MG/DL Final    BUN/Creatinine ratio 12/21/2022 22 (A)  12 - 20   Final    eGFR 12/21/2022 >60  >60 ml/min/1.73m2 Final    Comment:      Pediatric calculator link: Lin.at. org/professionals/kdoqi/gfr_calculatorped       These results are not intended for use in patients <25years of age. eGFR results are calculated without a race factor using  the 2021 CKD-EPI equation. Careful clinical correlation is recommended, particularly when comparing to results calculated using previous equations. The CKD-EPI equation is less accurate in patients with extremes of muscle mass, extra-renal metabolism of creatinine, excessive creatine ingestion, or following therapy that affects renal tubular secretion. Calcium 12/21/2022 9.7  8.5 - 10.1 MG/DL Final    WBC 12/21/2022 5.0  3.6 - 11.0 K/uL Final    RBC 12/21/2022 4.98  3.80 - 5.20 M/uL Final    HGB 12/21/2022 15.5  11.5 - 16.0 g/dL Final    HCT 12/21/2022 49.3 (A)  35.0 - 47.0 % Final    MCV 12/21/2022 99.0  80.0 - 99.0 FL Final    MCH 12/21/2022 31.1  26.0 - 34.0 PG Final    MCHC 12/21/2022 31.4  30.0 - 36.5 g/dL Final    RDW 12/21/2022 14.2  11.5 - 14.5 % Final    PLATELET 37/37/3478 835  150 - 400 K/uL Final    MPV 12/21/2022 12.0  8.9 - 12.9 FL Final    NRBC 12/21/2022 0.0  0  WBC Final    ABSOLUTE NRBC 12/21/2022 0.00  0.00 - 0.01 K/uL Final    Crossmatch Expiration 12/21/2022 01/02/2023,2359   Final    ABO/Rh(D) 12/21/2022 A POSITIVE   Final    Antibody screen 12/21/2022 NEG   Final    INR 12/21/2022 1.1  0.9 - 1.1   Final    A single therapeutic range for Vit K antagonists may not be optimal for all indications - see June, 2008 issue of Chest, American College of Chest Physicians Evidence-Based Clinical Practice Guidelines, 8th Edition. Prothrombin time 12/21/2022 11.2 (A)  9.0 - 11.1 sec Final    Color 12/21/2022 YELLOW/STRAW    Final    Color Reference Range: Straw, Yellow or Dark Yellow    Appearance 12/21/2022 TURBID (A)  CLEAR   Final    Specific gravity 12/21/2022 1.025  1.003 - 1.030   Final    pH (UA) 12/21/2022 5.0  5.0 - 8.0   Final    Protein 12/21/2022 TRACE (A)  NEG mg/dL Final    Glucose 12/21/2022 Negative  NEG mg/dL Final    Ketone 12/21/2022 Negative  NEG mg/dL Final    Bilirubin 12/21/2022 Negative  NEG   Final    Blood 12/21/2022 Negative  NEG   Final    Urobilinogen 12/21/2022 0.2  0.2 - 1.0 EU/dL Final    Nitrites 12/21/2022 Negative  NEG   Final    Leukocyte Esterase 12/21/2022 Negative  NEG   Final    WBC 12/21/2022 0-4  0 - 4 /hpf Final    RBC 12/21/2022 0-5  0 - 5 /hpf Final    Epithelial cells 12/21/2022 MANY (A)  FEW /lpf Final    Epithelial cell category consists of squamous cells and /or transitional urothelial cells. Renal tubular cells, if present, are separately identified as such. Bacteria 12/21/2022 1+ (A)  NEG /hpf Final    UA:UC IF INDICATED 12/21/2022 CULTURE NOT INDICATED BY UA RESULT  CNI   Final    Mucus 12/21/2022 1+ (A)  NEG /lpf Final    Hemoglobin A1c 12/21/2022 5.4  4.0 - 5.6 % Final    Comment: NEW METHOD  PLEASE NOTE NEW REFERENCE RANGE  (NOTE)  HbA1C Interpretive Ranges  <5.7              Normal  5.7 - 6.4         Consider Prediabetes  >6.5              Consider Diabetes      Est. average glucose 12/21/2022 108  mg/dL Final    Special Requests: 12/21/2022 NO SPECIAL REQUESTS    Final    Culture result: 12/21/2022 MRSA NOT PRESENT    Final    Culture result: 12/21/2022     Final                    Value:Screening of patient nares for MRSA is for surveillance purposes and, if positive, to facilitate isolation considerations in high risk settings. It is not intended for automatic decolonization interventions per se as regimens are not sufficiently effective to warrant routine use. Skin:     Denies open wounds, cuts, sores, rashes or other areas of concern in PAT assessment.           Luly Mullins NP  Available via [facility]

## 2022-12-29 NOTE — DISCHARGE INSTRUCTIONS
Discharge Instructions Knee Replacement  Dr. Brandee Gutierrez      Patient Name  Sachi Aguilar  Date of procedure  12/30/2022    Procedure  Procedure(s):  RIGHT TOTAL KNEE ARTHROPLASTY  Surgeon  Surgeon(s) and Role:     * Minnie Martinez MD - Primary  Date of discharge: No discharge date for patient encounter. PCP: José Miguel Chaudhary NP    Follow up care  Follow up visit with Dr. Brandee Gutierrez in 3 weeks. Call [phone]  to make an appointment  You do staples in your knee incision. They will be taken out in 10-14 days by [facility] staff. Activity at home  Take a short walk every hour; except at night when sleeping  Do your Home Exercise Program 3 times every day   After exercising lie down and elevate your leg on pillows for 15-30 minutes to decrease swelling  Refer to your patient notebook for more information    Bathing and caring for your incision  You may take a shower with your waterproof dressing on your knee. The waterproof dressing is to stay on your knee for 7 days. On the 7th day have someone gently peel the dressing off by lifting the edge and stretching it to break the seal.  You may then leave your incision open to air unless you see drainage from your knee. Preventing blood clots  Take Aspirin every day as prescribed. Call Dr. Brandee Gutierrez if you have side effects of blood thinning medication: bleeding, bruising, upset stomach, or diarrhea. Call Dr. Brandee Gutierrez for signs of a blood clot in your leg: calf pain, tenderness, redness, swelling of lower leg    Preventing lung congestion  Use your incentive spirometer 4 times a day; do 10 repetitions each time  Remember to keep the small blue ball between the two arrows when taking a slow, deep breath     Pain Management  Get up and walk a short distance to relieve pain and stiffness. Place ice wrap on your knee except when you are walking.  The gel ice packs should be changed about every 4 hours  Elevate your leg on pillows for 15-30 minutes   Take Tylenol 650mg (take two 325mg tablets) every 6 hours for pain. If needed, take a narcotic pain pill every 4-6 hours as prescribed. Take all medications with a small amount of food. As your pain decreases, take the narcotics less often or take ½ of a pill  Call Dr. Roman Woodruff if you have side effects from your narcotic pain medication: itching, drowsiness, dizziness, upset stomach, dry mouth, constipation or if you medication is not relieving your pain. Diet after surgery  You may resume your normal diet. Include vegetables, fruit, whole grains, lean meats, and low-fat dairy products. Eat food high in fiber   Drink plenty of fluids, including 8 cups of water daily  Take Senokot-s twice a day to prevent constipation    Avoid after surgery  Do not take any over-the-counter medication for pain except Tylenol  Do not take more than 3000mg (3 grams) of Tylenol in 24 hours. Do not drink alcoholic beverages  Do not smoke  Do not drive until seen for follow up appointment  Do not place frozen gel pack directly on your skin. It can cause frostbite.    Do not take a tub bath, swim or get in a hot tub for 6 weeks  Prevention of falls and safety at home  Set up an area where you can rest comfortably leaving space around furniture to allow you to walk with your walker  Keep stairs, hallways and bathrooms well lit; especially at night  Arrange for care for your pets  Keep your home free of clutter      Call Dr. Roman Woodruff at [phone] for:  Pain that is not relieved by pain medication, ice and activity  Side effects of medications  Increased/spread of bruising  Warning signs of infection:  persistent fever greater than 100 degrees  shaking or chills  increased redness, tenderness, swelling or drainage from incision  increased pain during activity or rest  Warning signs of a blood clot in your leg:  increased pain in your calf  tenderness or redness  increased swelling or knee, calf, ankle or foot    Call [phone] after 5pm or on a weekend.  The on call physician will return your phone call   Call your Primary Care Doctor for:   Concerns about your medical conditions such as diabetes, high blood pressure, asthma, congestive heart failure  Blood sugars greater than 180  Persistent headache or dizziness  Coughing or congestion  Constipation or diarrhea  Burning when you go to the bathroom  Abnormal heart rate (fast or slow)     Call 911 and go to the nearest hospital for:   Sudden increased shortness of breath  Sudden onset of chest pain  Difficulty breathing  Localized chest pain with coughing or taking a deep breath

## 2022-12-30 ENCOUNTER — HOSPITAL ENCOUNTER (OUTPATIENT)
Age: 62
Discharge: HOME | End: 2023-01-02
Attending: ORTHOPAEDIC SURGERY | Admitting: ORTHOPAEDIC SURGERY
Payer: MEDICARE

## 2022-12-30 ENCOUNTER — ANESTHESIA EVENT (OUTPATIENT)
Dept: SURGERY | Age: 62
End: 2022-12-30
Payer: MEDICARE

## 2022-12-30 ENCOUNTER — ANESTHESIA (OUTPATIENT)
Dept: SURGERY | Age: 62
End: 2022-12-30
Payer: MEDICARE

## 2022-12-30 DIAGNOSIS — M17.11 PRIMARY OSTEOARTHRITIS OF RIGHT KNEE: Primary | ICD-10-CM

## 2022-12-30 LAB
GLUCOSE BLD STRIP.AUTO-MCNC: 81 MG/DL (ref 65–117)
SERVICE CMNT-IMP: NORMAL

## 2022-12-30 PROCEDURE — 74011250636 HC RX REV CODE- 250/636: Performed by: ORTHOPAEDIC SURGERY

## 2022-12-30 PROCEDURE — C1776 JOINT DEVICE (IMPLANTABLE): HCPCS | Performed by: ORTHOPAEDIC SURGERY

## 2022-12-30 PROCEDURE — 74011250637 HC RX REV CODE- 250/637: Performed by: PHYSICIAN ASSISTANT

## 2022-12-30 PROCEDURE — 77030006835 HC BLD SAW SAG STRY -B: Performed by: ORTHOPAEDIC SURGERY

## 2022-12-30 PROCEDURE — 74011000250 HC RX REV CODE- 250: Performed by: PHYSICIAN ASSISTANT

## 2022-12-30 PROCEDURE — 76010000171 HC OR TIME 2 TO 2.5 HR INTENSV-TIER 1: Performed by: ORTHOPAEDIC SURGERY

## 2022-12-30 PROCEDURE — 77030020268 HC MISC GENERAL SUPPLY: Performed by: ORTHOPAEDIC SURGERY

## 2022-12-30 PROCEDURE — 74011250636 HC RX REV CODE- 250/636: Performed by: NURSE ANESTHETIST, CERTIFIED REGISTERED

## 2022-12-30 PROCEDURE — 82962 GLUCOSE BLOOD TEST: CPT

## 2022-12-30 PROCEDURE — 74011250636 HC RX REV CODE- 250/636: Performed by: PHYSICIAN ASSISTANT

## 2022-12-30 PROCEDURE — 77030007866 HC KT SPN ANES BBMI -B: Performed by: NURSE ANESTHETIST, CERTIFIED REGISTERED

## 2022-12-30 PROCEDURE — C1713 ANCHOR/SCREW BN/BN,TIS/BN: HCPCS | Performed by: ORTHOPAEDIC SURGERY

## 2022-12-30 PROCEDURE — 76210000006 HC OR PH I REC 0.5 TO 1 HR: Performed by: ORTHOPAEDIC SURGERY

## 2022-12-30 PROCEDURE — 27447 TOTAL KNEE ARTHROPLASTY: CPT | Performed by: ORTHOPAEDIC SURGERY

## 2022-12-30 PROCEDURE — 76060000035 HC ANESTHESIA 2 TO 2.5 HR: Performed by: ORTHOPAEDIC SURGERY

## 2022-12-30 PROCEDURE — 2709999900 HC NON-CHARGEABLE SUPPLY: Performed by: ORTHOPAEDIC SURGERY

## 2022-12-30 PROCEDURE — 74011250636 HC RX REV CODE- 250/636: Performed by: ANESTHESIOLOGY

## 2022-12-30 PROCEDURE — C9290 INJ, BUPIVACAINE LIPOSOME: HCPCS | Performed by: ORTHOPAEDIC SURGERY

## 2022-12-30 PROCEDURE — 77030008462 HC STPLR SKN PROX J&J -A: Performed by: ORTHOPAEDIC SURGERY

## 2022-12-30 PROCEDURE — 77030010783 HC BOWL MX BN CEM J&J -B: Performed by: ORTHOPAEDIC SURGERY

## 2022-12-30 PROCEDURE — 77030035236 HC SUT PDS STRATFX BARB J&J -B: Performed by: ORTHOPAEDIC SURGERY

## 2022-12-30 PROCEDURE — 77030038269 HC DRN EXT URIN PURWCK BARD -A

## 2022-12-30 PROCEDURE — 74011000250 HC RX REV CODE- 250: Performed by: ANESTHESIOLOGY

## 2022-12-30 PROCEDURE — 74011000258 HC RX REV CODE- 258: Performed by: ORTHOPAEDIC SURGERY

## 2022-12-30 PROCEDURE — 74011000250 HC RX REV CODE- 250: Performed by: NURSE ANESTHETIST, CERTIFIED REGISTERED

## 2022-12-30 PROCEDURE — 77030005513 HC CATH URETH FOL11 MDII -B: Performed by: ORTHOPAEDIC SURGERY

## 2022-12-30 PROCEDURE — 77030031139 HC SUT VCRL2 J&J -A: Performed by: ORTHOPAEDIC SURGERY

## 2022-12-30 PROCEDURE — 74011000250 HC RX REV CODE- 250: Performed by: ORTHOPAEDIC SURGERY

## 2022-12-30 PROCEDURE — 77030000032 HC CUF TRNQT ZIMM -B: Performed by: ORTHOPAEDIC SURGERY

## 2022-12-30 PROCEDURE — 77030041279 HC DRSG PRMSL AG MDII -B: Performed by: ORTHOPAEDIC SURGERY

## 2022-12-30 PROCEDURE — 77030006822 HC BLD SAW SAG BRSM -B: Performed by: ORTHOPAEDIC SURGERY

## 2022-12-30 PROCEDURE — 77030038692 HC WND DEB SYS IRMX -B: Performed by: ORTHOPAEDIC SURGERY

## 2022-12-30 DEVICE — IMPLANTABLE DEVICE
Type: IMPLANTABLE DEVICE | Site: KNEE | Status: FUNCTIONAL
Brand: PERSONA® VIVACIT-E®

## 2022-12-30 DEVICE — IMPLANTABLE DEVICE
Type: IMPLANTABLE DEVICE | Site: KNEE | Status: FUNCTIONAL
Brand: PERSONA® NATURAL TIBIA®

## 2022-12-30 DEVICE — KNEE K1 TOT HEMI STD CEM IMPL CAPPED K1 ZIM: Type: IMPLANTABLE DEVICE | Status: FUNCTIONAL

## 2022-12-30 DEVICE — IMPLANTABLE DEVICE
Type: IMPLANTABLE DEVICE | Site: KNEE | Status: FUNCTIONAL
Brand: PERSONA®

## 2022-12-30 DEVICE — SMARTSET GHV GENTAMICIN HIGH VISCOSITY BONE CEMENT 40G
Type: IMPLANTABLE DEVICE | Site: KNEE | Status: FUNCTIONAL
Brand: SMARTSET

## 2022-12-30 RX ORDER — GABAPENTIN 600 MG/1
600 TABLET ORAL
Status: DISCONTINUED | OUTPATIENT
Start: 2022-12-30 | End: 2023-01-02 | Stop reason: HOSPADM

## 2022-12-30 RX ORDER — MIDAZOLAM HYDROCHLORIDE 1 MG/ML
1 INJECTION, SOLUTION INTRAMUSCULAR; INTRAVENOUS AS NEEDED
Status: DISCONTINUED | OUTPATIENT
Start: 2022-12-30 | End: 2022-12-30 | Stop reason: HOSPADM

## 2022-12-30 RX ORDER — PROPOFOL 10 MG/ML
INJECTION, EMULSION INTRAVENOUS
Status: DISCONTINUED | OUTPATIENT
Start: 2022-12-30 | End: 2022-12-30 | Stop reason: HOSPADM

## 2022-12-30 RX ORDER — TIZANIDINE 4 MG/1
4 TABLET ORAL
Status: DISCONTINUED | OUTPATIENT
Start: 2022-12-30 | End: 2023-01-02 | Stop reason: HOSPADM

## 2022-12-30 RX ORDER — OXYCODONE HYDROCHLORIDE 5 MG/1
5-10 TABLET ORAL
Qty: 60 TABLET | Refills: 0 | Status: SHIPPED | OUTPATIENT
Start: 2022-12-30 | End: 2023-01-06

## 2022-12-30 RX ORDER — AMOXICILLIN 250 MG
1 CAPSULE ORAL 2 TIMES DAILY
Status: DISCONTINUED | OUTPATIENT
Start: 2022-12-30 | End: 2023-01-02 | Stop reason: HOSPADM

## 2022-12-30 RX ORDER — PHENYLEPHRINE HCL IN 0.9% NACL 0.4MG/10ML
SYRINGE (ML) INTRAVENOUS AS NEEDED
Status: DISCONTINUED | OUTPATIENT
Start: 2022-12-30 | End: 2022-12-30 | Stop reason: HOSPADM

## 2022-12-30 RX ORDER — CLOPIDOGREL BISULFATE 75 MG/1
75 TABLET ORAL DAILY
Status: DISCONTINUED | OUTPATIENT
Start: 2022-12-31 | End: 2023-01-02 | Stop reason: HOSPADM

## 2022-12-30 RX ORDER — DEXAMETHASONE SODIUM PHOSPHATE 4 MG/ML
INJECTION, SOLUTION INTRA-ARTICULAR; INTRALESIONAL; INTRAMUSCULAR; INTRAVENOUS; SOFT TISSUE AS NEEDED
Status: DISCONTINUED | OUTPATIENT
Start: 2022-12-30 | End: 2022-12-30 | Stop reason: HOSPADM

## 2022-12-30 RX ORDER — GLYCOPYRROLATE 0.2 MG/ML
INJECTION INTRAMUSCULAR; INTRAVENOUS AS NEEDED
Status: DISCONTINUED | OUTPATIENT
Start: 2022-12-30 | End: 2022-12-30 | Stop reason: HOSPADM

## 2022-12-30 RX ORDER — SODIUM CHLORIDE 9 MG/ML
125 INJECTION, SOLUTION INTRAVENOUS CONTINUOUS
Status: DISCONTINUED | OUTPATIENT
Start: 2022-12-30 | End: 2022-12-31

## 2022-12-30 RX ORDER — FOLIC ACID 1 MG/1
1 TABLET ORAL DAILY
Status: DISCONTINUED | OUTPATIENT
Start: 2022-12-31 | End: 2023-01-02 | Stop reason: HOSPADM

## 2022-12-30 RX ORDER — GUAIFENESIN 100 MG/5ML
81 LIQUID (ML) ORAL DAILY
Qty: 30 TABLET | Refills: 0 | Status: SHIPPED | OUTPATIENT
Start: 2022-12-30

## 2022-12-30 RX ORDER — FENTANYL CITRATE 50 UG/ML
50 INJECTION, SOLUTION INTRAMUSCULAR; INTRAVENOUS AS NEEDED
Status: DISCONTINUED | OUTPATIENT
Start: 2022-12-30 | End: 2022-12-30 | Stop reason: HOSPADM

## 2022-12-30 RX ORDER — POLYETHYLENE GLYCOL 3350 17 G/17G
17 POWDER, FOR SOLUTION ORAL DAILY
Status: DISCONTINUED | OUTPATIENT
Start: 2022-12-31 | End: 2023-01-02 | Stop reason: HOSPADM

## 2022-12-30 RX ORDER — FENTANYL CITRATE 50 UG/ML
25 INJECTION, SOLUTION INTRAMUSCULAR; INTRAVENOUS
Status: DISCONTINUED | OUTPATIENT
Start: 2022-12-30 | End: 2022-12-30 | Stop reason: HOSPADM

## 2022-12-30 RX ORDER — SODIUM CHLORIDE 9 MG/ML
50 INJECTION, SOLUTION INTRAVENOUS CONTINUOUS
Status: DISCONTINUED | OUTPATIENT
Start: 2022-12-30 | End: 2022-12-30 | Stop reason: HOSPADM

## 2022-12-30 RX ORDER — PROCHLORPERAZINE EDISYLATE 5 MG/ML
5 INJECTION INTRAMUSCULAR; INTRAVENOUS
Status: DISPENSED | OUTPATIENT
Start: 2022-12-30 | End: 2023-01-01

## 2022-12-30 RX ORDER — FACIAL-BODY WIPES
10 EACH TOPICAL DAILY PRN
Status: DISCONTINUED | OUTPATIENT
Start: 2022-12-30 | End: 2023-01-02 | Stop reason: HOSPADM

## 2022-12-30 RX ORDER — OXYCODONE AND ACETAMINOPHEN 5; 325 MG/1; MG/1
1 TABLET ORAL AS NEEDED
Status: DISCONTINUED | OUTPATIENT
Start: 2022-12-30 | End: 2022-12-30 | Stop reason: HOSPADM

## 2022-12-30 RX ORDER — OXYCODONE HYDROCHLORIDE 5 MG/1
10 TABLET ORAL
Status: DISCONTINUED | OUTPATIENT
Start: 2022-12-30 | End: 2023-01-02 | Stop reason: HOSPADM

## 2022-12-30 RX ORDER — ERGOCALCIFEROL 1.25 MG/1
50000 CAPSULE ORAL
Status: DISCONTINUED | OUTPATIENT
Start: 2022-12-30 | End: 2023-01-02 | Stop reason: HOSPADM

## 2022-12-30 RX ORDER — ONDANSETRON 2 MG/ML
4 INJECTION INTRAMUSCULAR; INTRAVENOUS
Status: ACTIVE | OUTPATIENT
Start: 2022-12-30 | End: 2023-01-01

## 2022-12-30 RX ORDER — DIPHENHYDRAMINE HYDROCHLORIDE 50 MG/ML
12.5 INJECTION, SOLUTION INTRAMUSCULAR; INTRAVENOUS AS NEEDED
Status: DISCONTINUED | OUTPATIENT
Start: 2022-12-30 | End: 2022-12-30 | Stop reason: HOSPADM

## 2022-12-30 RX ORDER — LIDOCAINE HYDROCHLORIDE 20 MG/ML
INJECTION, SOLUTION EPIDURAL; INFILTRATION; INTRACAUDAL; PERINEURAL AS NEEDED
Status: DISCONTINUED | OUTPATIENT
Start: 2022-12-30 | End: 2022-12-30 | Stop reason: HOSPADM

## 2022-12-30 RX ORDER — MORPHINE SULFATE 2 MG/ML
2 INJECTION, SOLUTION INTRAMUSCULAR; INTRAVENOUS
Status: DISCONTINUED | OUTPATIENT
Start: 2022-12-30 | End: 2022-12-30 | Stop reason: HOSPADM

## 2022-12-30 RX ORDER — SODIUM CHLORIDE 0.9 % (FLUSH) 0.9 %
5-40 SYRINGE (ML) INJECTION AS NEEDED
Status: DISCONTINUED | OUTPATIENT
Start: 2022-12-30 | End: 2023-01-02 | Stop reason: HOSPADM

## 2022-12-30 RX ORDER — METOPROLOL TARTRATE 25 MG/1
12.5 TABLET, FILM COATED ORAL 2 TIMES DAILY
Status: DISCONTINUED | OUTPATIENT
Start: 2022-12-30 | End: 2023-01-02 | Stop reason: HOSPADM

## 2022-12-30 RX ORDER — MECLIZINE HCL 12.5 MG 12.5 MG/1
25 TABLET ORAL
Status: DISCONTINUED | OUTPATIENT
Start: 2022-12-30 | End: 2023-01-02 | Stop reason: HOSPADM

## 2022-12-30 RX ORDER — LIDOCAINE HYDROCHLORIDE 10 MG/ML
0.1 INJECTION, SOLUTION EPIDURAL; INFILTRATION; INTRACAUDAL; PERINEURAL AS NEEDED
Status: DISCONTINUED | OUTPATIENT
Start: 2022-12-30 | End: 2022-12-30 | Stop reason: HOSPADM

## 2022-12-30 RX ORDER — HYDROMORPHONE HYDROCHLORIDE 1 MG/ML
0.2 INJECTION, SOLUTION INTRAMUSCULAR; INTRAVENOUS; SUBCUTANEOUS
Status: DISCONTINUED | OUTPATIENT
Start: 2022-12-30 | End: 2022-12-30 | Stop reason: HOSPADM

## 2022-12-30 RX ORDER — MIDAZOLAM HYDROCHLORIDE 1 MG/ML
0.5 INJECTION, SOLUTION INTRAMUSCULAR; INTRAVENOUS
Status: DISCONTINUED | OUTPATIENT
Start: 2022-12-30 | End: 2022-12-30 | Stop reason: HOSPADM

## 2022-12-30 RX ORDER — HYDROMORPHONE HYDROCHLORIDE 1 MG/ML
1 INJECTION, SOLUTION INTRAMUSCULAR; INTRAVENOUS; SUBCUTANEOUS
Status: DISPENSED | OUTPATIENT
Start: 2022-12-30 | End: 2022-12-31

## 2022-12-30 RX ORDER — SODIUM CHLORIDE 0.9 % (FLUSH) 0.9 %
5-40 SYRINGE (ML) INJECTION EVERY 8 HOURS
Status: DISCONTINUED | OUTPATIENT
Start: 2022-12-30 | End: 2022-12-30 | Stop reason: HOSPADM

## 2022-12-30 RX ORDER — SODIUM CHLORIDE 0.9 % (FLUSH) 0.9 %
5-40 SYRINGE (ML) INJECTION EVERY 8 HOURS
Status: DISCONTINUED | OUTPATIENT
Start: 2022-12-30 | End: 2023-01-02 | Stop reason: HOSPADM

## 2022-12-30 RX ORDER — ONDANSETRON 2 MG/ML
INJECTION INTRAMUSCULAR; INTRAVENOUS AS NEEDED
Status: DISCONTINUED | OUTPATIENT
Start: 2022-12-30 | End: 2022-12-30 | Stop reason: HOSPADM

## 2022-12-30 RX ORDER — OXYCODONE HYDROCHLORIDE 5 MG/1
5 TABLET ORAL
Status: DISCONTINUED | OUTPATIENT
Start: 2022-12-30 | End: 2023-01-02 | Stop reason: HOSPADM

## 2022-12-30 RX ORDER — SODIUM CHLORIDE, SODIUM LACTATE, POTASSIUM CHLORIDE, CALCIUM CHLORIDE 600; 310; 30; 20 MG/100ML; MG/100ML; MG/100ML; MG/100ML
75 INJECTION, SOLUTION INTRAVENOUS CONTINUOUS
Status: DISCONTINUED | OUTPATIENT
Start: 2022-12-30 | End: 2022-12-30 | Stop reason: HOSPADM

## 2022-12-30 RX ORDER — SODIUM CHLORIDE 0.9 % (FLUSH) 0.9 %
5-40 SYRINGE (ML) INJECTION AS NEEDED
Status: DISCONTINUED | OUTPATIENT
Start: 2022-12-30 | End: 2022-12-30 | Stop reason: HOSPADM

## 2022-12-30 RX ORDER — ONDANSETRON 2 MG/ML
4 INJECTION INTRAMUSCULAR; INTRAVENOUS AS NEEDED
Status: DISCONTINUED | OUTPATIENT
Start: 2022-12-30 | End: 2022-12-30 | Stop reason: HOSPADM

## 2022-12-30 RX ORDER — MIDAZOLAM HYDROCHLORIDE 1 MG/ML
INJECTION, SOLUTION INTRAMUSCULAR; INTRAVENOUS AS NEEDED
Status: DISCONTINUED | OUTPATIENT
Start: 2022-12-30 | End: 2022-12-30 | Stop reason: HOSPADM

## 2022-12-30 RX ORDER — ATORVASTATIN CALCIUM 10 MG/1
10 TABLET, FILM COATED ORAL DAILY
Status: DISCONTINUED | OUTPATIENT
Start: 2022-12-31 | End: 2023-01-02 | Stop reason: HOSPADM

## 2022-12-30 RX ORDER — GABAPENTIN 300 MG/1
300 CAPSULE ORAL DAILY
Status: DISCONTINUED | OUTPATIENT
Start: 2022-12-31 | End: 2023-01-02 | Stop reason: HOSPADM

## 2022-12-30 RX ORDER — CETIRIZINE HYDROCHLORIDE 10 MG/1
10 TABLET ORAL
Status: DISCONTINUED | OUTPATIENT
Start: 2022-12-30 | End: 2023-01-02 | Stop reason: HOSPADM

## 2022-12-30 RX ORDER — ACETAMINOPHEN 500 MG
1000 TABLET ORAL EVERY 6 HOURS
Status: DISCONTINUED | OUTPATIENT
Start: 2022-12-30 | End: 2023-01-02 | Stop reason: HOSPADM

## 2022-12-30 RX ORDER — NALOXONE HYDROCHLORIDE 0.4 MG/ML
0.4 INJECTION, SOLUTION INTRAMUSCULAR; INTRAVENOUS; SUBCUTANEOUS AS NEEDED
Status: DISCONTINUED | OUTPATIENT
Start: 2022-12-30 | End: 2023-01-02 | Stop reason: HOSPADM

## 2022-12-30 RX ORDER — HYDROXYZINE HYDROCHLORIDE 10 MG/1
10 TABLET, FILM COATED ORAL
Status: DISCONTINUED | OUTPATIENT
Start: 2022-12-30 | End: 2023-01-02 | Stop reason: HOSPADM

## 2022-12-30 RX ORDER — BUPIVACAINE HYDROCHLORIDE 5 MG/ML
INJECTION, SOLUTION EPIDURAL; INTRACAUDAL
Status: COMPLETED | OUTPATIENT
Start: 2022-12-30 | End: 2022-12-30

## 2022-12-30 RX ADMIN — FENTANYL CITRATE 100 MCG: 50 INJECTION, SOLUTION INTRAMUSCULAR; INTRAVENOUS at 12:20

## 2022-12-30 RX ADMIN — METOPROLOL TARTRATE 12.5 MG: 25 TABLET, FILM COATED ORAL at 17:34

## 2022-12-30 RX ADMIN — PROPOFOL 25 MCG/KG/MIN: 10 INJECTION, EMULSION INTRAVENOUS at 13:27

## 2022-12-30 RX ADMIN — CEFAZOLIN 2 G: 1 INJECTION, POWDER, FOR SOLUTION INTRAMUSCULAR; INTRAVENOUS at 21:07

## 2022-12-30 RX ADMIN — PHENYLEPHRINE HYDROCHLORIDE 50 MCG/MIN: 10 INJECTION INTRAVENOUS at 13:41

## 2022-12-30 RX ADMIN — SODIUM CHLORIDE, POTASSIUM CHLORIDE, SODIUM LACTATE AND CALCIUM CHLORIDE 75 ML/HR: 600; 310; 30; 20 INJECTION, SOLUTION INTRAVENOUS at 12:10

## 2022-12-30 RX ADMIN — GLYCOPYRROLATE 0.2 MG: 0.2 INJECTION INTRAMUSCULAR; INTRAVENOUS at 13:51

## 2022-12-30 RX ADMIN — WATER 2 G: 1 INJECTION INTRAMUSCULAR; INTRAVENOUS; SUBCUTANEOUS at 14:00

## 2022-12-30 RX ADMIN — BUPIVACAINE HYDROCHLORIDE 10 MG: 5 INJECTION, SOLUTION EPIDURAL; INTRACAUDAL; PERINEURAL at 13:40

## 2022-12-30 RX ADMIN — MIDAZOLAM 2 MG: 1 INJECTION INTRAMUSCULAR; INTRAVENOUS at 12:20

## 2022-12-30 RX ADMIN — SODIUM CHLORIDE 125 ML/HR: 9 INJECTION, SOLUTION INTRAVENOUS at 15:53

## 2022-12-30 RX ADMIN — OXYCODONE 10 MG: 5 TABLET ORAL at 22:13

## 2022-12-30 RX ADMIN — ERGOCALCIFEROL 50000 UNITS: 1.25 CAPSULE ORAL at 17:34

## 2022-12-30 RX ADMIN — Medication 120 MCG: at 15:00

## 2022-12-30 RX ADMIN — GLYCOPYRROLATE 0.1 MG: 0.2 INJECTION INTRAMUSCULAR; INTRAVENOUS at 14:10

## 2022-12-30 RX ADMIN — SENNOSIDES AND DOCUSATE SODIUM 1 TABLET: 50; 8.6 TABLET ORAL at 17:34

## 2022-12-30 RX ADMIN — HYDROMORPHONE HYDROCHLORIDE 1 MG: 1 INJECTION, SOLUTION INTRAMUSCULAR; INTRAVENOUS; SUBCUTANEOUS at 20:46

## 2022-12-30 RX ADMIN — ONDANSETRON HYDROCHLORIDE 4 MG: 2 INJECTION, SOLUTION INTRAMUSCULAR; INTRAVENOUS at 13:51

## 2022-12-30 RX ADMIN — ACETAMINOPHEN 1000 MG: 500 TABLET, FILM COATED ORAL at 17:34

## 2022-12-30 RX ADMIN — MIDAZOLAM 2 MG: 1 INJECTION INTRAMUSCULAR; INTRAVENOUS at 13:20

## 2022-12-30 RX ADMIN — GLYCOPYRROLATE 0.1 MG: 0.2 INJECTION INTRAMUSCULAR; INTRAVENOUS at 14:16

## 2022-12-30 RX ADMIN — GABAPENTIN 600 MG: 600 TABLET, FILM COATED ORAL at 17:34

## 2022-12-30 RX ADMIN — DEXAMETHASONE SODIUM PHOSPHATE 4 MG: 4 INJECTION, SOLUTION INTRAMUSCULAR; INTRAVENOUS at 13:51

## 2022-12-30 RX ADMIN — PROPOFOL 60 MCG/KG/MIN: 10 INJECTION, EMULSION INTRAVENOUS at 13:26

## 2022-12-30 RX ADMIN — SODIUM CHLORIDE, PRESERVATIVE FREE 10 ML: 5 INJECTION INTRAVENOUS at 22:13

## 2022-12-30 RX ADMIN — LIDOCAINE HYDROCHLORIDE 40 MG: 20 INJECTION, SOLUTION EPIDURAL; INFILTRATION; INTRACAUDAL; PERINEURAL at 13:26

## 2022-12-30 RX ADMIN — FENTANYL CITRATE 25 MCG: 0.05 INJECTION, SOLUTION INTRAMUSCULAR; INTRAVENOUS at 16:33

## 2022-12-30 NOTE — H&P
Date of Surgery Update:  Palmira Ford was seen and examined. History and physical has been reviewed. The patient has been examined.  There have been no significant clinical changes since the completion of the originally dated History and Physical.    Signed By: Brian Alvarado MD     December 30, 2022 7:34 AM

## 2022-12-30 NOTE — OP NOTES
Name: Breana Seth  MRN:  [id number]  : 1960  Age:  57 y.o. Surgery Date: 2022      OPERATIVE REPORT - RIGHT TOTAL KNEE REPLACEMENT    PREOPERATIVE DIAGNOSIS: Osteoarthritis, right knee. Left Knee stiffness. POSTOPERATIVE DIAGNOSIS: Osteoarthritis, right knee. Left Knee stiffness. PROCEDURE PERFORMED: Right total knee arthroplasty. ANJEL of left knee. SURGEON: Sara Cloud MD    FIRST ASSISTANT:   August Bound    ANESTHESIA: Spinal    PRE-OP ANTIBIOTIC: Ancef 2g    COMPLICATIONS: None. ESTIMATED BLOOD LOSS: 50 mL. SPECIMENS REMOVED: None. COMPONENTS IMPLANTED:   Implant Name Type Inv. Item Serial No.  Lot No. LRB No. Used Action   CEMENT BNE 40GM FULL DOSE PMMA W/ GENT HI VISC RADPQ LNG - SN/A  CEMENT BNE 40GM FULL DOSE PMMA W/ GENT HI VISC RADPQ LNG N/A [facility]OPEDICS_ [id number] Right 2 Implanted   PAT PSN VE POLY 32MM -- PERSONA - SN/A  PAT PSN VE POLY 32MM -- PERSONA N/A [employer] [id number] Right 1 Implanted   FEM CR MARVIN CCR STD SZ 6 RT -- PERSONA - SN/A  FEM CR MARVIN CCR STD SZ 6 RT -- PERSONA N/A [employer] [id number] Right 1 Implanted   TIB PRN NP STM 5 DEG SZ ER -- PERSONA - SN/A  TIB PRN NP STM 5 DEG SZ ER -- PERSONA N/A [employer] [id number] Right 1 Implanted   PSN MC VE ASF R 11MM 6-7/EF - SN/A  PSN MC VE ASF R 11MM 6-7/EF N/A [facility] [id number] Right 1 Implanted       INDICATIONS: The patient is an 57 yrs female with progressive debilitating right knee pain due to severe osteoarthritis. Symptoms have progressed despite comprehensive conservative treatment and the patient presents for right total knee replacement. Risks, benefits, alternatives of the procedure were reviewed in detail and the patient elects to proceed. The patient understands the risk for perioperative medical complications. DESCRIPTION OF PROCEDURE: Spinal anesthesia was initiated. Preoperative dose of antibiotic was given.  Valdivia catheter was not placed. The right lower extremity was prepped and draped in the usual sterile fashion. The skin was covered with Ioban occlusive dressing. The right lower extremity was flexed. A medial parapatellar incision was made to the right knee. The right knee was exposed and the distal femur was cut at -1 degrees distal femoral valgus. Femur was sized for a size 6. An AP cutting block was placed, rotated based on bony landmarks. Femoral cuts were completed for a size 6. The tibia was subluxed and a neutral varus/valgus proximal tibial cut was made matching the patient's slope. The meniscal remnants were removed. The posterior osteophytes were removed from the femur. Gaps were examined. The flexion and extension gaps were 11 mm. The femur was finished for a 6, tibia for a E. Trials were placed. Patella was cut and a 32 mm trial was fitted . The knee tracked well with all trial implants. The trials were then removed. Bony surfaces were drilled, lavaged, and dried. Tourniquet was inflated. All components were cemented. Excess cement was removed. A 11 polyethylene component was placed. After the cement had fully cured, the knee was ranged and  irrigated copiously with pulsatile lavage. All surrounding soft tissues were infiltrated with local anesthetic. The arthrotomy was closed with #2 Vicryl sutures and 0 Vicryl sutures. Skin and subcutaneous tissues were irrigated and closed in standard fashion. Sterile dressing was applied. There were no complications. The procedure was a RIGHT TOTAL KNEE REPLACEMENT. A Dolores total knee construct was utilized. No specimens were obtained/sent. The patient was transferred to the recovery room in stable condition. Falguni Parent  assistants to assist during this procedure.       Shira Avalos MD

## 2022-12-30 NOTE — PROGRESS NOTES
TRANSFER - OUT REPORT:    Verbal report given to Ken Coyle RN(name) on Evgeny García  being transferred to (unit) for routine post - op       Report consisted of patients Situation, Background, Assessment and   Recommendations(SBAR). Time Pre op antibiotic given:1400  Anesthesia Stop time: 8191    Information from the following report(s) SBAR, Kardex, Procedure Summary, Intake/Output, MAR, and Recent Results was reviewed with the receiving nurse. Opportunity for questions and clarification was provided. Is the patient on 02? NO       L/Min        Other     Is the patient on a monitor? NO    Is the nurse transporting with the patient? NO    Surgical Waiting Area notified of patient's transfer from PACU? YES      The following personal items collected during your admission accompanied patient upon transfer:   Dental Appliance:    Vision:    Hearing Aid:    Jewelry:    Clothing: Clothing: Other (comment) (clothing returned in [employer])  Other Valuables: Other Valuables:  Other (comment) (tote bag returned in pacu)  Valuables sent to safe: Personal Items Sent to Safe: valuable to security, receipts in chart

## 2022-12-30 NOTE — ANESTHESIA POSTPROCEDURE EVALUATION
Procedure(s):  RIGHT TOTAL KNEE ARTHROPLASTY; LEFT KNEE MANIPULATION. spinal    Anesthesia Post Evaluation        Patient location during evaluation: PACU  Note status: Adequate. Level of consciousness: responsive to verbal stimuli and sleepy but conscious  Pain management: satisfactory to patient  Airway patency: patent  Anesthetic complications: no  Cardiovascular status: acceptable  Respiratory status: acceptable  Hydration status: acceptable  Comments: +Post-Anesthesia Evaluation and Assessment    Patient: Loraine Kim MRN: [id number]  SSN: [id number]   YOB: 1960  Age: 57 y.o. Sex: female          Cardiovascular Function/Vital Signs    BP (!) 177/105   Pulse 61   Temp 36.2 °C (97.2 °F)   Resp 20   Ht 5' 4\" (1.626 m)   Wt 90.7 kg (199 lb 15.3 oz)   SpO2 100%   BMI 34.32 kg/m²     Patient is status post Procedure(s):  RIGHT TOTAL KNEE ARTHROPLASTY; LEFT KNEE MANIPULATION. Nausea/Vomiting: Controlled. Postoperative hydration reviewed and adequate. Pain:  Pain Scale 1: Numeric (0 - 10) (12/30/22 1632)  Pain Intensity 1: 5 (12/30/22 1632)   Managed. Neurological Status:   Neuro (WDL): Exceptions to WDL (s/p spinal) (12/30/22 1632)   At baseline. Mental Status and Level of Consciousness: Arousable. Pulmonary Status:   O2 Device: None (Room air) (12/30/22 1632)   Adequate oxygenation and airway patent. Complications related to anesthesia: None    Post-anesthesia assessment completed. No concerns. I have evaluated the patient and the patient is stable and ready to be discharged from PACU . Signed By: Alexsandra Pope MD    12/30/2022        INITIAL Post-op Vital signs:   Vitals Value Taken Time   /99 12/30/22 1651   Temp 36.3 °C (97.4 °F) 12/30/22 1632   Pulse 57 12/30/22 1652   Resp 15 12/30/22 1652   SpO2 96 % 12/30/22 1652   Vitals shown include unvalidated device data.

## 2022-12-30 NOTE — ANESTHESIA PROCEDURE NOTES
Spinal Block    Start time: 12/30/2022 1:28 PM  End time: 12/30/2022 1:40 PM  Performed by: Angelika Ford CRNA  Authorized by: Katy Landa MD     Pre-procedure:   Indications: at surgeon's request, post-op pain management, procedure for pain and primary anesthetic  Preanesthetic Checklist: patient identified, risks and benefits discussed, anesthesia consent, site marked, patient being monitored, timeout performed and fire risk safety assessment completed and verbalized      Spinal Block:   Patient Position:  Seated  Prep Region:  Lumbar  Prep: Betadine      Location:  L3-4  Technique:  Single shot  Local: bupivacaine (PF) (MARCAINE) 0.5 % (5 mg/mL) intrathecal - Intrathecal   10 mg - 12/30/2022 1:40:00 PM    Med Admin Time: 12/30/2022 1:40 PM    Needle:   Needle Type:  Pencan  Needle Gauge:  25 G  Attempts:  2      Events: CSF confirmed, no blood with aspiration and no paresthesia        Assessment:  Insertion:  Uncomplicated  Patient tolerance:  Patient tolerated the procedure well with no immediate complications

## 2022-12-30 NOTE — ANESTHESIA PREPROCEDURE EVALUATION
Relevant Problems   No relevant active problems       Anesthetic History   No history of anesthetic complications            Review of Systems / Medical History  Patient summary reviewed, nursing notes reviewed and pertinent labs reviewed    Pulmonary                   Neuro/Psych       CVA  Headaches and psychiatric history    Comments: Bipolar 1 disorder (HCC) (F31.9)  Memory disorder (R41.3)  Drug use: Marijuana Cardiovascular    Hypertension              Exercise tolerance: >4 METS     GI/Hepatic/Renal     GERD  Hepatitis: type C         Endo/Other        Obesity and arthritis     Other Findings              Physical Exam    Airway  Mallampati: II  TM Distance: 4 - 6 cm  Neck ROM: normal range of motion   Mouth opening: Normal     Cardiovascular  Regular rate and rhythm,  S1 and S2 normal,  no murmur, click, rub, or gallop  Rhythm: regular  Rate: normal         Dental    Dentition: Caps/crowns     Pulmonary  Breath sounds clear to auscultation               Abdominal  GI exam deferred       Other Findings            Anesthetic Plan    ASA: 3  Anesthesia type: spinal      Post-op pain plan if not by surgeon: peripheral nerve block single    Induction: Intravenous  Anesthetic plan and risks discussed with: Patient

## 2022-12-30 NOTE — PERIOP NOTES
Called patient's , Dayanna Hawk, to update of surgical progress. No answer to phone call. Unable to leave message.

## 2022-12-31 LAB
ANION GAP SERPL CALC-SCNC: 5 MMOL/L (ref 5–15)
BUN SERPL-MCNC: 12 MG/DL (ref 6–20)
BUN/CREAT SERPL: 16 (ref 12–20)
CALCIUM SERPL-MCNC: 9.2 MG/DL (ref 8.5–10.1)
CHLORIDE SERPL-SCNC: 109 MMOL/L (ref 97–108)
CO2 SERPL-SCNC: 27 MMOL/L (ref 21–32)
CREAT SERPL-MCNC: 0.75 MG/DL (ref 0.55–1.02)
GLUCOSE SERPL-MCNC: 118 MG/DL (ref 65–100)
HGB BLD-MCNC: 12.9 G/DL (ref 11.5–16)
POTASSIUM SERPL-SCNC: 4.3 MMOL/L (ref 3.5–5.1)
SODIUM SERPL-SCNC: 141 MMOL/L (ref 136–145)

## 2022-12-31 PROCEDURE — 97116 GAIT TRAINING THERAPY: CPT

## 2022-12-31 PROCEDURE — 97110 THERAPEUTIC EXERCISES: CPT

## 2022-12-31 PROCEDURE — 36415 COLL VENOUS BLD VENIPUNCTURE: CPT

## 2022-12-31 PROCEDURE — 97165 OT EVAL LOW COMPLEX 30 MIN: CPT

## 2022-12-31 PROCEDURE — 85018 HEMOGLOBIN: CPT

## 2022-12-31 PROCEDURE — 74011250636 HC RX REV CODE- 250/636: Performed by: PHYSICIAN ASSISTANT

## 2022-12-31 PROCEDURE — 74011000250 HC RX REV CODE- 250: Performed by: PHYSICIAN ASSISTANT

## 2022-12-31 PROCEDURE — 97161 PT EVAL LOW COMPLEX 20 MIN: CPT

## 2022-12-31 PROCEDURE — 74011250637 HC RX REV CODE- 250/637: Performed by: PHYSICIAN ASSISTANT

## 2022-12-31 PROCEDURE — 80048 BASIC METABOLIC PNL TOTAL CA: CPT

## 2022-12-31 PROCEDURE — 97535 SELF CARE MNGMENT TRAINING: CPT

## 2022-12-31 RX ADMIN — SENNOSIDES AND DOCUSATE SODIUM 1 TABLET: 50; 8.6 TABLET ORAL at 17:31

## 2022-12-31 RX ADMIN — HYDROMORPHONE HYDROCHLORIDE 1 MG: 1 INJECTION, SOLUTION INTRAMUSCULAR; INTRAVENOUS; SUBCUTANEOUS at 09:53

## 2022-12-31 RX ADMIN — CEFAZOLIN 2 G: 1 INJECTION, POWDER, FOR SOLUTION INTRAMUSCULAR; INTRAVENOUS at 05:23

## 2022-12-31 RX ADMIN — SODIUM CHLORIDE, PRESERVATIVE FREE 10 ML: 5 INJECTION INTRAVENOUS at 05:23

## 2022-12-31 RX ADMIN — HYDROMORPHONE HYDROCHLORIDE 1 MG: 1 INJECTION, SOLUTION INTRAMUSCULAR; INTRAVENOUS; SUBCUTANEOUS at 00:22

## 2022-12-31 RX ADMIN — HYDROMORPHONE HYDROCHLORIDE 1 MG: 1 INJECTION, SOLUTION INTRAMUSCULAR; INTRAVENOUS; SUBCUTANEOUS at 16:27

## 2022-12-31 RX ADMIN — OXYCODONE 10 MG: 5 TABLET ORAL at 14:19

## 2022-12-31 RX ADMIN — ATORVASTATIN CALCIUM 10 MG: 10 TABLET, FILM COATED ORAL at 09:53

## 2022-12-31 RX ADMIN — PROCHLORPERAZINE EDISYLATE 5 MG: 5 INJECTION INTRAMUSCULAR; INTRAVENOUS at 00:24

## 2022-12-31 RX ADMIN — OXYCODONE 10 MG: 5 TABLET ORAL at 20:09

## 2022-12-31 RX ADMIN — GABAPENTIN 300 MG: 300 CAPSULE ORAL at 09:53

## 2022-12-31 RX ADMIN — OXYCODONE 10 MG: 5 TABLET ORAL at 01:37

## 2022-12-31 RX ADMIN — METOPROLOL TARTRATE 12.5 MG: 25 TABLET, FILM COATED ORAL at 17:30

## 2022-12-31 RX ADMIN — POLYETHYLENE GLYCOL 3350 17 G: 17 POWDER, FOR SOLUTION ORAL at 09:53

## 2022-12-31 RX ADMIN — ACETAMINOPHEN 1000 MG: 500 TABLET, FILM COATED ORAL at 17:30

## 2022-12-31 RX ADMIN — OXYCODONE 10 MG: 5 TABLET ORAL at 04:38

## 2022-12-31 RX ADMIN — CLOPIDOGREL BISULFATE 75 MG: 75 TABLET ORAL at 09:53

## 2022-12-31 RX ADMIN — HYDROMORPHONE HYDROCHLORIDE 1 MG: 1 INJECTION, SOLUTION INTRAMUSCULAR; INTRAVENOUS; SUBCUTANEOUS at 03:07

## 2022-12-31 RX ADMIN — ACETAMINOPHEN 1000 MG: 500 TABLET, FILM COATED ORAL at 11:13

## 2022-12-31 RX ADMIN — SODIUM CHLORIDE 125 ML/HR: 9 INJECTION, SOLUTION INTRAVENOUS at 03:09

## 2022-12-31 RX ADMIN — ACETAMINOPHEN 1000 MG: 500 TABLET, FILM COATED ORAL at 00:22

## 2022-12-31 RX ADMIN — METOPROLOL TARTRATE 12.5 MG: 25 TABLET, FILM COATED ORAL at 09:54

## 2022-12-31 RX ADMIN — SODIUM CHLORIDE, PRESERVATIVE FREE 10 ML: 5 INJECTION INTRAVENOUS at 16:27

## 2022-12-31 RX ADMIN — SODIUM CHLORIDE, PRESERVATIVE FREE 10 ML: 5 INJECTION INTRAVENOUS at 21:00

## 2022-12-31 RX ADMIN — ACETAMINOPHEN 1000 MG: 500 TABLET, FILM COATED ORAL at 06:23

## 2022-12-31 RX ADMIN — SENNOSIDES AND DOCUSATE SODIUM 1 TABLET: 50; 8.6 TABLET ORAL at 09:53

## 2022-12-31 RX ADMIN — OXYCODONE 10 MG: 5 TABLET ORAL at 07:24

## 2022-12-31 RX ADMIN — GABAPENTIN 600 MG: 600 TABLET, FILM COATED ORAL at 17:30

## 2022-12-31 RX ADMIN — FOLIC ACID 1 MG: 1 TABLET ORAL at 09:53

## 2022-12-31 NOTE — PROGRESS NOTES
Problem: Mobility Impaired (Adult and Pediatric)  Goal: *Acute Goals and Plan of Care (Insert Text)  Description: FUNCTIONAL STATUS PRIOR TO ADMISSION: Patient was independent and active without use of DME. Patient had L TKR done in 9/2022 and used [facility] following, but has not been using AD immediately prior to this new R TKR. Patient reports not falls at home. HOME SUPPORT PRIOR TO ADMISSION: The patient lived alone with fiance to provide assistance. Physical Therapy Goals  Initiated 12/31/2022    1. Patient will move from supine to sit and sit to supine  in bed with supervision/set-up within 4 days. 2. Patient will perform sit to stand with supervision/set-up within 4 days. 3. Patient will ambulate with supervision/set-up for 50 feet with the least restrictive device within 4 days. 4. Patient will ascend/descend 1 stairs with no handrail(s) with supervision/set-up within 4 days. 5. Patient will perform home exercise program per protocol with independence within 4 days. 6. Patient will demonstrate AROM 0-90 degrees in operative joint within 4 days. Outcome: Progressing Towards Goal   PHYSICAL THERAPY EVALUATION  Patient: Ellie Downs (43 y.o. female)  Date: 12/31/2022  Primary Diagnosis: Arthritis of right knee [M17.11]  Procedure(s) (LRB):  RIGHT TOTAL KNEE ARTHROPLASTY; LEFT KNEE MANIPULATION (Right) 1 Day Post-Op   Precautions:   Fall, WBAT      ASSESSMENT  Based on the objective data described below, the patient presents with decreased activity tolerance, high level of post-op pain limiting mobility, increased need for physical assistance, unsteady gait, impaired balance, and high risk for falls now POD #1 s/p R TKR. Patient found supine in bed and agreeable to PT, all VSS. Patient required up to min A x 1 for supine > sit, and endorsed significant 8/10 R knee pain in sitting. Patient provided with cues for positioning for pain relief.  Patient initially completed sit <> stand from EOB with max A x 2, later completing sit <> stands with CGA and increased time. Patient ambulated 15 feet x 2 with seated rest between bouts with up to min A x 1 and RW. Patient cued for step-to gait pattern for pain reduction and for appropriate sequencing for safety. Patient did demonstrate occasional R knee partial buckling 2/2 to pain, though no full LOB noted. Patient left seated in chair with LEs elevated and ice applied to R knee. Anticipate patient to continue to progress and be able to return home with [facility] PT pending continued progress. Current Level of Function Impacting Discharge (mobility/balance): ambulates 15 feet x 2 with RW and min A x 1     Functional Outcome Measure: The patient scored Total Score: 7/28 on the Tinetti outcome measure which is indicative of high fall risk. Other factors to consider for discharge: min A x 1, lives alone      Patient will benefit from skilled therapy intervention to address the above noted impairments. PLAN :  Recommendations and Planned Interventions: bed mobility training, transfer training, gait training, therapeutic exercises, neuromuscular re-education, edema management/control, patient and family training/education, and therapeutic activities      Frequency/Duration: Patient will be followed by physical therapy:  twice daily to address goals. Recommendation for discharge: (in order for the patient to meet his/her long term goals)  Physical therapy at least 2 days/week in the home     This discharge recommendation:  A follow-up discussion with the attending provider and/or case management is planned    IF patient discharges home will need the following DME: rolling walker         SUBJECTIVE:   Patient stated This feels better than my last knee.     OBJECTIVE DATA SUMMARY:   HISTORY:    Past Medical History:   Diagnosis Date    Anxiety     Bipolar 1 disorder ([facility] [address]     Depression     Fatigue     Frequent headaches     GERD (gastroesophageal reflux disease)     Hearing loss     PT DENIES ANY HEARING    Hypertension     Joint pain     Memory disorder     LAPSE OF MEMORY DUE TO STROKES    Muscle pain     Nerve damage     ALL OVER    Snoring     Stomach pain     Stroke ([address]     X2 OVER 10 YRS AGO UNABLE TO REMEMBER DATE     Past Surgical History:   Procedure Laterality Date    HX ADENOIDECTOMY      sweat glands    HX GYN      tubal ligation    HX ORTHOPAEDIC  09/2022    LEFT TOTAL KNEE REPLACEMENT    HX TONSILLECTOMY         Personal factors and/or comorbidities impacting plan of care: hx L TKR earlier this year     Home Situation  Home Environment: Apartment  # Steps to Enter: 1  Rails to Enter: Yes  Hand Rails : Bilateral  One/Two Story Residence: One story  Living Alone: Yes  Support Systems: Friend/Neighbor  Current DME Used/Available at Home: Walker, rolling, Cane, straight  Tub or Shower Type: Shower    EXAMINATION/PRESENTATION/DECISION MAKING:   Critical Behavior:  Neurologic State: Alert, Drowsy  Orientation Level: Oriented X4  Cognition: Follows commands  Safety/Judgement: Fall prevention  Hearing:   Auditory  Auditory Impairment: None  Skin:  intact  Edema: generalized edema R knee   Range Of Motion:  AROM: Generally decreased, functional (R LE 2/2 pain post-op)           PROM: Generally decreased, functional (R LE 2/2 pain post-op)           Strength:    Strength: Generally decreased, functional                    Tone & Sensation:   Tone: Normal              Sensation: Intact               Coordination:  Coordination: Within functional limits  Vision:   Acuity: Within Defined Limits;Able to read clock/calendar on wall without difficulty  Functional Mobility:  Bed Mobility:  Rolling: Contact guard assistance  Supine to Sit: Minimum assistance;Assist x1     Scooting: Contact guard assistance  Transfers:  Sit to Stand: Maximum assistance;Assist x2 (initially max x 2, progressed to CGA)  Stand to Sit: Minimum assistance;Contact guard assistance;Assist x1        Bed to Chair: Minimum assistance;Assist x1              Balance:   Sitting: Intact; Without support  Standing: Impaired; With support  Standing - Static: Good  Standing - Dynamic : Fair;Constant support  Ambulation/Gait Training:  Distance (ft): 30 Feet (ft) (15 feet x 2, seated rest between)  Assistive Device: Walker, rolling;Gait belt  Ambulation - Level of Assistance: Minimal assistance;Contact guard assistance;Assist x1        Gait Abnormalities: Decreased step clearance;Shuffling gait; Step to gait;Trunk sway increased  Right Side Weight Bearing: As tolerated     Base of Support: Widened;Center of gravity altered;Shift to left  Stance: Right decreased; Left increased  Speed/Valeria: Slow;Shuffled  Step Length: Right shortened;Left shortened  Swing Pattern: Right asymmetrical           Functional Measure:  Tinetti test:    Sitting Balance: 1  Arises: 0  Attempts to Rise: 0  Immediate Standing Balance: 0  Standing Balance: 1  Nudged: 1  Eyes Closed: 0  Turn 360 Degrees - Continuous/Discontinuous: 0  Turn 360 Degrees - Steady/Unsteady: 0  Sitting Down: 1  Balance Score: 4 Balance total score  Indication of Gait: 0  R Step Length/Height: 0  L Step Length/Height: 0  R Foot Clearance: 1  L Foot Clearance: 1  Step Symmetry: 0  Step Continuity: 0  Path: 1  Trunk: 0  Walking Time: 0  Gait Score: 3 Gait total score  Total Score: 7/28 Overall total score         Tinetti Tool Score Risk of Falls  <19 = High Fall Risk  19-24 = Moderate Fall Risk  25-28 = Low Fall Risk  Tinetti ME. Performance-Oriented Assessment of Mobility Problems in Elderly Patients. [address]; [id number].  (Scoring Description: PT Bulletin Feb. 10, 1993)    Older adults: Diego Miller et al, 2009; n = 1000 [facility] elderly evaluated with ABC, RUPERTO, ADL, and IADL)  · Mean RUPERTO score for males aged 69-68 years = 26.21(3.40)  · Mean RUPERTO score for females age 69-68 years = 25.16(4.30)  · Mean RUPERTO score for males over 80 years = 23.29(6.02)  · Mean RUPERTO score for females over [de-identified] years = 17.20(8.32)        Physical Therapy Evaluation Charge Determination   History Examination Presentation Decision-Making   MEDIUM  Complexity : 1-2 comorbidities / personal factors will impact the outcome/ POC  LOW Complexity : 1-2 Standardized tests and measures addressing body structure, function, activity limitation and / or participation in recreation  LOW Complexity : Stable, uncomplicated  Other outcome measures tinetti  HIGH       Based on the above components, the patient evaluation is determined to be of the following complexity level: LOW     Pain Ratin/10 R knee pain - RN aware    Activity Tolerance:   Good, SpO2 stable on RA, and requires rest breaks      After treatment patient left in no apparent distress:   Sitting in chair, Heels elevated for pressure relief, and Call bell within reach    COMMUNICATION/EDUCATION:   The patients plan of care was discussed with: Occupational therapist and Registered nurse. Fall prevention education was provided and the patient/caregiver indicated understanding.     Thank you for this referral.  Jennifer Frias, PT   Time Calculation: 26 mins

## 2022-12-31 NOTE — PROGRESS NOTES
Resting comfortably. Pain controlled. No complaints. GEN:  NAD.  AOx3   ABD:  S/NT/ND   RLE:  Dressing scant bloody drainage, 5/5 motor, Calf nttp (Bilat), Sensation rossly intact to light touch throughout, 1+ dp/pt pulses, foot perfused    Patient Vitals for the past 24 hrs:   Temp Pulse Resp BP SpO2   12/31/22 0139 97.8 °F (36.6 °C) 67 16 (!) 158/87 97 %   12/30/22 2145 97.4 °F (36.3 °C) 77 16 (!) 154/88 98 %   12/30/22 1741 97.2 °F (36.2 °C) 61 20 (!) 177/105 100 %   12/30/22 1651 -- 66 28 (!) 153/99 100 %   12/30/22 1632 97.4 °F (36.3 °C) (!) 57 13 (!) 142/97 98 %   12/30/22 1613 -- 65 13 131/88 100 %   12/30/22 1607 -- 64 14 127/86 97 %   12/30/22 1602 -- 61 17 (!) 116/90 100 %   12/30/22 1557 -- 67 19 111/76 100 %   12/30/22 1552 -- 66 18 105/67 100 %   12/30/22 1548 97.4 °F (36.3 °C) 63 14 101/70 100 %   12/30/22 1241 -- 61 16 (!) 160/98 100 %   12/30/22 1237 -- 65 17 (!) 156/104 100 %   12/30/22 1231 -- 64 16 (!) 144/102 100 %   12/30/22 1141 98.5 °F (36.9 °C) 64 16 (!) 182/99 98 %       Current Facility-Administered Medications   Medication Dose Route Frequency    atorvastatin (LIPITOR) tablet 10 mg  10 mg Oral DAILY    cetirizine (ZYRTEC) tablet 10 mg  10 mg Oral DAILY PRN    clopidogreL (PLAVIX) tablet 75 mg  75 mg Oral DAILY    ergocalciferol capsule 50,000 Units  50,000 Units Oral L0R    folic acid (FOLVITE) tablet 1 mg  1 mg Oral DAILY    gabapentin (NEURONTIN) capsule 300 mg  300 mg Oral DAILY    gabapentin (NEURONTIN) tablet 600 mg  600 mg Oral PCD    meclizine (ANTIVERT) tablet 25 mg  25 mg Oral TID PRN    metoprolol tartrate (LOPRESSOR) tablet 12.5 mg  12.5 mg Oral BID    tiZANidine (ZANAFLEX) tablet 4 mg  4 mg Oral QHS PRN    0.9% sodium chloride infusion  125 mL/hr IntraVENous CONTINUOUS    sodium chloride 0.9 % bolus infusion 500 mL  500 mL IntraVENous ONCE PRN    sodium chloride (NS) flush 5-40 mL  5-40 mL IntraVENous Q8H    sodium chloride (NS) flush 5-40 mL  5-40 mL IntraVENous PRN acetaminophen (TYLENOL) tablet 1,000 mg  1,000 mg Oral Q6H    oxyCODONE IR (ROXICODONE) tablet 5 mg  5 mg Oral Q3H PRN    oxyCODONE IR (ROXICODONE) tablet 10 mg  10 mg Oral Q3H PRN    HYDROmorphone (DILAUDID) injection 1 mg  1 mg IntraVENous Q2H PRN    naloxone (NARCAN) injection 0.4 mg  0.4 mg IntraVENous PRN    ondansetron (ZOFRAN) injection 4 mg  4 mg IntraVENous Q4H PRN    prochlorperazine (COMPAZINE) injection 5 mg  5 mg IntraVENous Q6H PRN    hydrOXYzine HCL (ATARAX) tablet 10 mg  10 mg Oral Q8H PRN    senna-docusate (PERICOLACE) 8.6-50 mg per tablet 1 Tablet  1 Tablet Oral BID    polyethylene glycol (MIRALAX) packet 17 g  17 g Oral DAILY    bisacodyL (DULCOLAX) suppository 10 mg  10 mg Rectal DAILY PRN       Lab Results   Component Value Date/Time    HGB 12.9 12/31/2022 12:28 AM    INR 1.1 12/21/2022 10:23 AM       Lab Results   Component Value Date/Time    Sodium 141 12/31/2022 12:28 AM    Potassium 4.3 12/31/2022 12:28 AM    Chloride 109 (H) 12/31/2022 12:28 AM    CO2 27 12/31/2022 12:28 AM    BUN 12 12/31/2022 12:28 AM    Creatinine 0.75 12/31/2022 12:28 AM    Calcium 9.2 12/31/2022 12:28 AM    Magnesium 2.1 05/09/2014 07:15 PM    Phosphorus 3.3 05/09/2014 07:15 PM            57 y.o. female s/p right total knee arthroplasty and ANJEL of left knee on 12/30/2022  . Doing well. IVF d/bisi, patient drinking well.        ABX: Complete 24 hours perioperative abx  PATHWAY: Straight cath per protocol if needed  DVT Prophylaxis: Aspirin 81 mg enteric coated BID  Weight Bearing: WBAT RLE   Pain Control: Tylenol, oxy 5-10 mg for breakthrough pain  Disposition: Home once cleared by PT and remains medically stable, HHPT

## 2022-12-31 NOTE — PROGRESS NOTES
Transition of Care Plan  RUR N/A    Medicare Outpatient Observation Notice (MOON)/ [place] Outpatient Observation Notice ([place]) provided to patient/representative with verbal explanation of the notice. Time allotted for questions regarding the notice. Patient /representative provided a completed copy of the MOON/VOON notice. Copy placed on bedside chart. Disposition   [address] [phone]    Medical follow up  PCP and specialist    Contact  Father  Jovana Garcia [phone]    Cm met with patient in her room to introduce self and explain role. Patient confirmed right knee replacement yesterday. Confirmed demographics, PCP and [place]      Patient in agreement with [facility]    The Plan for Transition of Care is related to the following treatment goals: [facility]    The Patient was provided with a choice of provider and agrees   with the discharge plan. [x] Yes [] No    Freedom of choice list was provided with basic dialogue that supports the patient's individualized plan of care/goals, treatment preferences and shares the quality data associated with the providers. [x] Yes [] No     Referral sent to [facility]  [phone] as she has used the agency in the past but agency can not service until 1/5/23. Referral sent to [facility] and accepted. Patient in agreement    Patient lives in a 2nd floor apartment with an elevator alone. She was self care and independent prior to admission. Driving to appointment and daily actvitties. Had left knee replacement 3 month ago and now right knee . She has good family support-- 5 adult sons who live in the area, fiance, and other family members. Patient own RW. Patient plans to return home when discharged. Family will transport     Care Management Interventions  PCP Verified by CM: Yes  Mode of Transport at Discharge: Other (see comment) (car)  Transition of Care Consult (CM Consult):  Other  Discharge Durable Medical Equipment: No  Physical Therapy Consult: Yes  Occupational Therapy Consult: Yes  Support Systems: Spouse/Significant Other, Child(phong), Other Family Member(s)  The Plan for Transition of Care is Related to the Following Treatment Goals : HH  The Patient and/or Patient Representative was Provided with a Choice of Provider and Agrees with the Discharge Plan?: Yes  Freedom of Choice List was Provided with Basic Dialogue that Supports the Patient's Individualized Plan of Care/Goals, Treatment Preferences and Shares the Quality Data Associated with the Providers?: Yes  Discharge Location  Patient Expects to be Discharged to[de-identified] Home with home health

## 2022-12-31 NOTE — PROGRESS NOTES
Problem: Self Care Deficits Care Plan (Adult)  Goal: *Acute Goals and Plan of Care (Insert Text)  Description: FUNCTIONAL STATUS PRIOR TO ADMISSION: Patient was independent and active without use of DME.    HOME SUPPORT: The patient lived alone with friends/family available to provide assistance after d/c. Pt reports using dressing/bathing AE following LTKA in 09/2022. Occupational Therapy Goals  Initiated 12/31/2022  1. Patient will perform lower body ADLs with supervision/set-up within 4 day(s). 2.  Patient will perform upper body ADLs standing 5 mins without fatigue or LOB with supervision/set-up within 4 day(s). 3.  Patient will perform all aspects of toileting with supervision/set-up within 4 day(s). 4.  Patient will participate in upper extremity therapeutic exercise/activities with supervision/set-up for 10 minutes within 4 day(s). 5.  Patient will utilize energy conservation techniques during functional activities without cues within 4 day(s). Outcome: Not Met   OCCUPATIONAL THERAPY EVALUATION  Patient: Trini Preston (55 y.o. female)  Date: 12/31/2022  Primary Diagnosis: Arthritis of right knee [M17.11]  Procedure(s) (LRB):  RIGHT TOTAL KNEE ARTHROPLASTY; LEFT KNEE MANIPULATION (Right) 1 Day Post-Op   Precautions:  Fall, WBAT    ASSESSMENT  Based on the objective data described below, the patient presents with impaired balance, activity tolerance, and expected post-operative pain s/p admission for R TKA POD1. Pt received supine, drowsy but alerting to voice, and agreeable to participation in therapy session. Initial stand from EOB required max A x2 d/t significant pain, however with time OOB and mobility pt progressed to CGA-min A with use of RW. As pt recently had Amee Fuentes (09/2022) she has good knowledge of post-operative plan/expectations/modifications to ADL tasks and routines. She reports using AE for distal LB dressing and bathing at home.  Would benefit from trial of AE in future sessions to ensure success at d/c. Anticipate good progress with acute OT and as pain better controlled. Recommend d/c home with no skilled follow up OT needs indicated at this time. Current Level of Function Impacting Discharge (ADLs/self-care): max A x2 progressing to CGA/min A functional transfers in prep for ADL tasks (d/t pain), distal LB dressing max A, UB dressing set-up, toileting routine/kimberly bathing min A     Functional Outcome Measure: The patient scored 50/100 on the Barthel Index outcome measure which is indicative of partial dependence in basic self care. Other factors to consider for discharge: PLOF, L TKA 09/2022     Patient will benefit from skilled therapy intervention to address the above noted impairments. PLAN :  Recommendations and Planned Interventions: self care training, functional mobility training, therapeutic exercise, balance training, therapeutic activities, endurance activities, patient education, and home safety training    Frequency/Duration: Patient will be followed by occupational therapy 5 times a week to address goals. Recommendation for discharge: (in order for the patient to meet his/her long term goals)  No skilled occupational therapy/ follow up rehabilitation needs identified at this time. This discharge recommendation:  Has been made in collaboration with the attending provider and/or case management    IF patient discharges home will need the following DME: patient owns DME required for discharge       SUBJECTIVE:   Patient stated I use some of those at home normally.  (regarding dressing/bathing AE)    OBJECTIVE DATA SUMMARY:   HISTORY:   Past Medical History:   Diagnosis Date    Anxiety     Bipolar 1 disorder ([facility] [address]     Depression     Fatigue     Frequent headaches     GERD (gastroesophageal reflux disease)     Hearing loss     PT DENIES ANY HEARING    Hypertension     Joint pain     Memory disorder     LAPSE OF MEMORY DUE TO STROKES    Muscle pain Nerve damage     ALL OVER    Snoring     Stomach pain     Stroke ([facility] [address]     X2 OVER 10 YRS AGO UNABLE TO REMEMBER DATE     Past Surgical History:   Procedure Laterality Date    HX ADENOIDECTOMY      sweat glands    HX GYN      tubal ligation    HX ORTHOPAEDIC  09/2022    LEFT TOTAL KNEE REPLACEMENT    HX TONSILLECTOMY         Expanded or extensive additional review of patient history:     Home Situation  Home Environment: Apartment  # Steps to Enter: 1  Rails to Enter: Yes  Hand Rails : Bilateral  One/Two Story Residence: One story  Living Alone: Yes  Support Systems: Friend/Neighbor  Current DME Used/Available at Home: Walker, rolling, Cane, straight  Tub or Shower Type: Shower    Hand dominance: Right    EXAMINATION OF PERFORMANCE DEFICITS:  Cognitive/Behavioral Status:  Neurologic State: Alert;Drowsy  Orientation Level: Oriented X4  Cognition: Follows commands  Perception: Appears intact  Perseveration: No perseveration noted  Safety/Judgement: Fall prevention    Skin: visually intact     Edema: none noted     Hearing: Auditory  Auditory Impairment: None    Vision/Perceptual:                           Acuity: Within Defined Limits;Able to read clock/calendar on wall without difficulty         Range of Motion:    AROM: Generally decreased, functional (R LE 2/2 pain post-op)  PROM: Generally decreased, functional (R LE 2/2 pain post-op)                      Strength:    Strength: Generally decreased, functional                Coordination:  Coordination: Within functional limits            Tone & Sensation:    Tone: Normal  Sensation: Intact                      Balance:  Sitting: Intact; Without support  Standing: Impaired; With support  Standing - Static: Good  Standing - Dynamic : Fair;Constant support    Functional Mobility and Transfers for ADLs:  Bed Mobility:  Rolling: Contact guard assistance  Supine to Sit: Minimum assistance;Assist x1  Scooting: Contact guard assistance    Transfers:  Sit to Stand: Maximum assistance;Assist x2 (initially max x 2, progressed to CGA)  Stand to Sit: Minimum assistance;Contact guard assistance;Assist x1  Bed to Chair: Minimum assistance;Assist x1  Bathroom Mobility: Minimum assistance  Toilet Transfer : Minimum assistance    ADL Assessment:  Feeding: Independent    Oral Facial Hygiene/Grooming: Contact guard assistance (inferred, standing)    Bathing: Minimum assistance (for balance in standing)         Upper Body Dressing: Setup (seated)    Lower Body Dressing: Maximum assistance (pt uses AE at baseline)    Toileting: Minimum assistance (for transfer and balance with hygiene in standing)                ADL Intervention and task modifications:  Feeding  Feeding Assistance: Independent  Drink to Mouth: Independent         Upper Body Bathing  Bathing Assistance: Set-up  Position Performed:  (seated on toilet)  Cues: Verbal cues provided         Lower Body Bathing  Bathing Assistance: Minimum assistance  Perineal  : Minimum assistance  Position Performed: Standing  Adaptive Equipment: Walker;Grab bar  Lower Body : Minimum assistance  Position Performed: Standing  Cues: Verbal cues provided  Adaptive Equipment: Walker;Grab bar    Upper [address]: Set-up    Lower Body Dressing Assistance  Socks: Maximum assistance  Leg Crossed Method Used: No  Position Performed: Seated edge of bed  Cues: Verbal cues provided;Physical assistance    Toileting  Toileting Assistance: Minimum assistance (for balance with completion of hygiene in standing)  Bladder Hygiene: Minimum assistance  Bowel Hygiene: Minimum assistance  Clothing Management: Contact guard assistance  Cues: Verbal cues provided  Adaptive Equipment: Grab bars; Walker    Cognitive Retraining  Safety/Judgement: Fall prevention    Bathing: Patient instructed when bathing to not submerge wound in water, stand to shower or sponge bathe, cover wound with plastic and tape to ensure no water reaches bandage/wound without cues. Patient indicated understanding. Dressing joint: Patient instructed and demonstrated to don/doff Right LE first/last minimal cues. Patient instructed and demonstrated to don all clothing while sitting prior to standing, doff all clothing to knees while standing, then sit to doff clothing off from knees to feet to facilitate fall prevention, pain management, and energy conservation with Maximum assistance. Home safety: Patient instructed on home modifications and safety (raise height of ADL objects, appropriate height of chair surfaces, recliner safety, change of floor surfaces, clear pathways) to increase independence and fall prevention. Patient indicated understanding. Standing: Patient instructed and demonstrated during ADLs to walk up to sink/countertop/surfaces, step into walker to increase safety of joint and fall prevention with Minimum assistance. Patient educated about knee anatomy verbally and with pictures and educated to avoid rotation of Right LE. Instructed to apply concept to ADLs within the home (no twisting of knee during reaching across body, square off while using objects, slide objects along surfaces). Patient instructed to increase amount of time standing, observe standing position during ADLs to increase even weight bearing through bilateral LEs to increase independence with ADLs. Goal to be reached 30 days post - op, per orthopedic surgeon or per PT. Patient indicated understanding. Functional Measure:    Barthel Index:  Bathin  Bladder: 10  Bowels: 10  Groomin  Dressin  Feeding: 10  Mobility: 0  Stairs: 0  Toilet Use: 5  Transfer (Bed to Chair and Back): 10  Total: 50/100      The Barthel ADL Index: Guidelines  1. The index should be used as a record of what a patient does, not as a record of what a patient could do.   2. The main aim is to establish degree of independence from any help, physical or verbal, however minor and for whatever reason. 3. The need for supervision renders the patient not independent. 4. A patient's performance should be established using the best available evidence. Asking the patient, friends/relatives and nurses are the usual sources, but direct observation and common sense are also important. However direct testing is not needed. 5. Usually the patient's performance over the preceding 24-48 hours is important, but occasionally longer periods will be relevant. 6. Middle categories imply that the patient supplies over 50 per cent of the effort. 7. Use of aids to be independent is allowed. Score Interpretation (from [address] [facility] [address])    Independent   60-79 Minimally independent   40-59 Partially dependent   20-39 Very dependent   <20 Totally dependent     -Doretha Ortiz., Barthel, DMANJEET. (1965). Functional evaluation: the Barthel Index. [address] [facility] ([address] (1997). The Barthel activities of daily living index: self-reporting versus actual performance in the old (> or = 75 years). Journal of [address] [place] [address] 45(7), [facility], J.J.ARIANA.F, Doug Velazquez., Karla Benson. (1999). Measuring the change in disability after inpatient rehabilitation; comparison of the responsiveness of the Barthel Index and Functional [place]ette Measure. Journal of Neurology, Neurosurgery, and Psychiatry, 66(4), 985-033. Evan Guillaume, NAVEL.A, LONI Kramer, & Kathrine Chao, MNitaA. (2004) Assessment of post-stroke quality of life in cost-effectiveness studies: The usefulness of the Barthel Index and the EuroQoL-5D.  Quality of Life Research, 15, 971-10       Occupational Therapy Evaluation Charge Determination   History Examination Decision-Making   LOW Complexity : Brief history review  LOW Complexity : 1-3 performance deficits relating to physical, cognitive , or psychosocial skils that result in activity limitations and / or participation restrictions  MEDIUM Complexity : Patient may present with comorbidities that affect occupational performnce. Miniml to moderate modification of tasks or assistance (eg, physical or verbal ) with assesment(s) is necessary to enable patient to complete evaluation       Based on the above components, the patient evaluation is determined to be of the following complexity level: LOW   Pain Ratin-10/10    Activity Tolerance:   Good and Fair, limited by pain     After treatment patient left in no apparent distress:    Sitting in chair, Call bell within reach, and Bed / chair alarm activated    COMMUNICATION/EDUCATION:   The patients plan of care was discussed with: Physical therapist and Registered nurse. Patient/family have participated as able in goal setting and plan of care. and Patient/family agree to work toward stated goals and plan of care. This patients plan of care is appropriate for delegation to [facility].     Thank you for this referral.  ALYCIA Abid, OTR/L  Time Calculation: 27 mins

## 2022-12-31 NOTE — PROGRESS NOTES
Bedside and Verbal shift change report given to Sridevi Ortiz RN (oncoming nurse) by Regina Martin RN (offgoing nurse). Report included the following information SBAR, Kardex, and MAR.

## 2022-12-31 NOTE — PROGRESS NOTES
Nursing Mobility Assessment    Nicol's Egress Test:    Activity:  up of bed with one person assist  Weight Bearing Status:as toleraed    Level of Assistance:    Ambulation:  couple of steps  Distance Ambulated:    Assistive Device:  walker  Activity Response:  well    Comments:      PT Recommendations:

## 2022-12-31 NOTE — PROGRESS NOTES
Problem: Mobility Impaired (Adult and Pediatric)  Goal: *Acute Goals and Plan of Care (Insert Text)  Description: FUNCTIONAL STATUS PRIOR TO ADMISSION: Patient was independent and active without use of DME. Patient had L TKR done in 9/2022 and used [facility] following, but has not been using AD immediately prior to this new R TKR. Patient reports not falls at home. HOME SUPPORT PRIOR TO ADMISSION: The patient lived alone with fiance to provide assistance. Physical Therapy Goals  Initiated 12/31/2022    1. Patient will move from supine to sit and sit to supine  in bed with supervision/set-up within 4 days. 2. Patient will perform sit to stand with supervision/set-up within 4 days. 3. Patient will ambulate with supervision/set-up for 50 feet with the least restrictive device within 4 days. 4. Patient will ascend/descend 1 stairs with no handrail(s) with supervision/set-up within 4 days. 5. Patient will perform home exercise program per protocol with independence within 4 days. 6. Patient will demonstrate AROM 0-90 degrees in operative joint within 4 days. 12/31/2022 1322 by Ismael Taylor PT  Outcome: Progressing Towards Goal   PHYSICAL THERAPY TREATMENT  Patient: Beverley Castro (15 y.o. female)  Date: 12/31/2022  Diagnosis: Arthritis of right knee [M17.11] <principal problem not specified>  Procedure(s) (LRB):  RIGHT TOTAL KNEE ARTHROPLASTY; LEFT KNEE MANIPULATION (Right) 1 Day Post-Op  Precautions: Fall, WBAT  Chart, physical therapy assessment, plan of care and goals were reviewed. ASSESSMENT  Patient continues with skilled PT services and is progressing towards goals. Patient found seated in chair and agreeable to PT. Patient continues to be limited in progression of ambulation distance and standing activity 2/2 R knee pain up to 9/10, but did demonstrate improved gait mechanics and stability at this PM treatment session compared with AM evaluation this morning.  Patient completed sit > stand with min A x 1 and ambulated 15 feet x 2 with CGA and RW, continuing to demonstrate step-to gait pattern and R knee instability, but no further buckling. Patient returned to supine in bed and completed supine ankle pumps, quad sets, glute sets, and heel slides with cues for continued breathing/ to not hold breath. Patient left supine in bed with all needs in reach. Current Level of Function Impacting Discharge (mobility/balance): ambulates 15 feet x 2 with RW and CGA     Other factors to consider for discharge: limited by pain, min A x 1 to stand          PLAN :  Patient continues to benefit from skilled intervention to address the above impairments. Continue treatment per established plan of care. to address goals. Recommendation for discharge: (in order for the patient to meet his/her long term goals)  Physical therapy at least 2 days/week in the home     This discharge recommendation:  A follow-up discussion with the attending provider and/or case management is planned    IF patient discharges home will need the following DME: patient owns DME required for discharge       SUBJECTIVE:   Patient stated I know I have to do this but it hurts.     OBJECTIVE DATA SUMMARY:   Critical Behavior:  Neurologic State: Alert, Drowsy  Orientation Level: Oriented X4  Cognition: Follows commands  Safety/Judgement: Fall prevention    Range of Motion:  AROM: Generally decreased, functional (R LE 2/2 pain post-op)  PROM: Generally decreased, functional (R LE 2/2 pain post-op)                      Functional Mobility Training:  Bed Mobility:  Rolling: Contact guard assistance  Supine to Sit: Minimum assistance;Assist x1  Sit to Supine: Contact guard assistance  Scooting: Contact guard assistance        Transfers:  Sit to Stand: Minimum assistance;Contact guard assistance;Assist x1  Stand to Sit: Contact guard assistance        Bed to Chair: Contact guard assistance                    Balance:  Sitting: Intact; Without support  Standing: Impaired; With support  Standing - Static: Good;Constant support  Standing - Dynamic : Good;Fair;Constant support  Ambulation/Gait Training:  Distance (ft): 30 Feet (ft) (15 feet x 2, seated rest on toilet between bouts)  Assistive Device: Walker, rolling;Gait belt  Ambulation - Level of Assistance: Contact guard assistance;Assist x1        Gait Abnormalities: Antalgic;Decreased step clearance; Step to gait  Right Side Weight Bearing: As tolerated     Base of Support: Widened;Center of gravity altered;Shift to left  Stance: Right decreased; Left increased  Speed/Valeria: Slow;Shuffled  Step Length: Right shortened;Left shortened  Swing Pattern: Right asymmetrical                Therapeutic Exercises:     EXERCISE   Sets   Reps   Active Active Assist   Passive Self ROM   Comments   Ankle Pumps 1 10 [x]                                        []                                        []                                        []                                           Quad Sets 1 5 [x]                                        []                                        []                                        []                                           Heel Slides 1 5 [x]                                        []                                        []                                        []                                           Glute Sets 1 5 [x]                                        []                                        []                                        []                                               Pain Ratin-9/10 R knee pain - RN aware     Activity Tolerance:   Good, SpO2 stable on RA, and limited by pain    After treatment patient left in no apparent distress:   Supine in bed, Call bell within reach, and Side rails x 3    COMMUNICATION/COLLABORATION:   The patients plan of care was discussed with: Registered nurse.      Brandon Simpson, PT   Time Calculation: 25 mins

## 2023-01-01 PROCEDURE — 74011250637 HC RX REV CODE- 250/637: Performed by: PHYSICIAN ASSISTANT

## 2023-01-01 PROCEDURE — 77030038269 HC DRN EXT URIN PURWCK BARD -A

## 2023-01-01 PROCEDURE — 74011000250 HC RX REV CODE- 250: Performed by: PHYSICIAN ASSISTANT

## 2023-01-01 PROCEDURE — 97530 THERAPEUTIC ACTIVITIES: CPT

## 2023-01-01 PROCEDURE — 97116 GAIT TRAINING THERAPY: CPT

## 2023-01-01 RX ORDER — HYDROMORPHONE HYDROCHLORIDE 2 MG/1
2 TABLET ORAL ONCE
Status: COMPLETED | OUTPATIENT
Start: 2023-01-01 | End: 2023-01-01

## 2023-01-01 RX ADMIN — SENNOSIDES AND DOCUSATE SODIUM 1 TABLET: 50; 8.6 TABLET ORAL at 18:23

## 2023-01-01 RX ADMIN — ACETAMINOPHEN 1000 MG: 500 TABLET, FILM COATED ORAL at 00:58

## 2023-01-01 RX ADMIN — CLOPIDOGREL BISULFATE 75 MG: 75 TABLET ORAL at 09:34

## 2023-01-01 RX ADMIN — OXYCODONE 10 MG: 5 TABLET ORAL at 05:57

## 2023-01-01 RX ADMIN — GABAPENTIN 300 MG: 300 CAPSULE ORAL at 09:34

## 2023-01-01 RX ADMIN — FOLIC ACID 1 MG: 1 TABLET ORAL at 09:34

## 2023-01-01 RX ADMIN — ACETAMINOPHEN 1000 MG: 500 TABLET, FILM COATED ORAL at 18:23

## 2023-01-01 RX ADMIN — METOPROLOL TARTRATE 12.5 MG: 25 TABLET, FILM COATED ORAL at 18:23

## 2023-01-01 RX ADMIN — POLYETHYLENE GLYCOL 3350 17 G: 17 POWDER, FOR SOLUTION ORAL at 09:34

## 2023-01-01 RX ADMIN — OXYCODONE 10 MG: 5 TABLET ORAL at 18:23

## 2023-01-01 RX ADMIN — OXYCODONE 10 MG: 5 TABLET ORAL at 15:21

## 2023-01-01 RX ADMIN — OXYCODONE 10 MG: 5 TABLET ORAL at 21:27

## 2023-01-01 RX ADMIN — ACETAMINOPHEN 1000 MG: 500 TABLET, FILM COATED ORAL at 11:57

## 2023-01-01 RX ADMIN — SENNOSIDES AND DOCUSATE SODIUM 1 TABLET: 50; 8.6 TABLET ORAL at 09:34

## 2023-01-01 RX ADMIN — ATORVASTATIN CALCIUM 10 MG: 10 TABLET, FILM COATED ORAL at 09:35

## 2023-01-01 RX ADMIN — ACETAMINOPHEN 1000 MG: 500 TABLET, FILM COATED ORAL at 06:40

## 2023-01-01 RX ADMIN — GABAPENTIN 600 MG: 600 TABLET, FILM COATED ORAL at 18:23

## 2023-01-01 RX ADMIN — SODIUM CHLORIDE, PRESERVATIVE FREE 10 ML: 5 INJECTION INTRAVENOUS at 05:57

## 2023-01-01 RX ADMIN — METOPROLOL TARTRATE 12.5 MG: 25 TABLET, FILM COATED ORAL at 09:34

## 2023-01-01 RX ADMIN — HYDROMORPHONE HYDROCHLORIDE 2 MG: 2 TABLET ORAL at 19:52

## 2023-01-01 RX ADMIN — SODIUM CHLORIDE, PRESERVATIVE FREE 10 ML: 5 INJECTION INTRAVENOUS at 21:29

## 2023-01-01 RX ADMIN — OXYCODONE 10 MG: 5 TABLET ORAL at 00:58

## 2023-01-01 RX ADMIN — OXYCODONE 10 MG: 5 TABLET ORAL at 11:57

## 2023-01-01 NOTE — PROGRESS NOTES
Problem: Mobility Impaired (Adult and Pediatric)  Goal: *Acute Goals and Plan of Care (Insert Text)  Description: FUNCTIONAL STATUS PRIOR TO ADMISSION: Patient was independent and active without use of DME. Patient had L TKR done in 9/2022 and used / [facility] following, but has not been using AD immediately prior to this new R TKR. Patient reports not falls at home. HOME SUPPORT PRIOR TO ADMISSION: The patient lived alone with fiance to provide assistance. Physical Therapy Goals  Initiated 12/31/2022    1. Patient will move from supine to sit and sit to supine  in bed with supervision/set-up within 4 days. 2. Patient will perform sit to stand with supervision/set-up within 4 days. 3. Patient will ambulate with supervision/set-up for 50 feet with the least restrictive device within 4 days. 4. Patient will ascend/descend 1 stairs with no handrail(s) with supervision/set-up within 4 days. 5. Patient will perform home exercise program per protocol with independence within 4 days. 6. Patient will demonstrate AROM 0-90 degrees in operative joint within 4 days. Outcome: Progressing Towards Goal   PHYSICAL THERAPY TREATMENT  Patient: Erin Mendenhall (88 y.o. female)  Date: 1/1/2023  Diagnosis: Arthritis of right knee [M17.11] <principal problem not specified>  Procedure(s) (LRB):  RIGHT TOTAL KNEE ARTHROPLASTY; LEFT KNEE MANIPULATION (Right) 2 Days Post-Op  Precautions: Fall, WBAT  Chart, physical therapy assessment, plan of care and goals were reviewed. ASSESSMENT  Patient continues with skilled PT services and is progressing towards goals. Patient presents up in chair. States that she is having a lot of pain and that left knee also feels weak and like it might buckle. Performed transfers, gait with RW and gait belt, 50 ft with several stops (standing rest breaks). PAIN is main barrier to mobility at this time.     Current Level of Function Impacting Discharge (mobility/balance): Contact guard assistance for transfers and gait. Walked 50 ft with antalgic, step-to gait, decreased foot clearance. Other factors to consider for discharge: Motivated/A & O x 4/Supportive Friend/Independent PLOF          PLAN :  Patient continues to benefit from skilled intervention to address the above impairments. Continue treatment per established plan of care. to address goals. Recommendation for discharge: (in order for the patient to meet his/her long term goals)  Physical therapy at least 2 days/week in the home     This discharge recommendation:  Has been made in collaboration with the attending provider and/or case management    IF patient discharges home will need the following DME: patient owns DME required for discharge       SUBJECTIVE:   Patient stated This leg is really hurting.     OBJECTIVE DATA SUMMARY:   Critical Behavior:  Neurologic State: Alert  Orientation Level: Appropriate for age  Cognition: Appropriate decision making  Safety/Judgement: Fall prevention  Functional Mobility Training:  Bed Mobility:   Presents up in chair and returns to chair                 Transfers:  Sit to Stand: Contact guard assistance  Stand to Sit: Stand-by assistance                             Balance:  Sitting: Intact  Standing: Impaired; With support  Standing - Static: Good;Constant support  Standing - Dynamic : Good;Fair;Constant support  Ambulation/Gait Training:  Distance (ft): 50 Feet (ft)  Assistive Device: Walker, rolling;Gait belt  Ambulation - Level of Assistance: Contact guard assistance        Gait Abnormalities: Antalgic;Decreased step clearance; Step to gait  Right Side Weight Bearing: As tolerated     Base of Support: Widened;Center of gravity altered  Stance: Right decreased (left knee is still not strong and it feels like it will buckle)  Speed/Valeria: Slow;Shuffled  Step Length: Right shortened;Left shortened  Swing Pattern: Right asymmetrical                  Therapeutic Exercises:   Performing post-TKA exercises    Pain Ratin-7/10    Activity Tolerance:   Fair--limited by pain and weakness in left knee    After treatment patient left in no apparent distress:   Sitting in chair, Call bell within reach, and nurse notified. COMMUNICATION/COLLABORATION:   The patients plan of care was discussed with: Registered nurse and Case management.      Alanna Buckley   Time Calculation: 25 mins

## 2023-01-01 NOTE — PROGRESS NOTES
Transition of Care: Plan for discharge home with family and HH. [facility] accepted patient. Patient lives in a 2nd floor apartment with an elevator. Patient owns rolling walker. Family will transport patient home.  notified [facility] of discharge today.     GAIL Brewer/CRM

## 2023-01-01 NOTE — PROGRESS NOTES
Resting comfortably. Pain controlled. No complaints. GEN:  NAD.  AOx3   ABD:  S/NT/ND   RLE:  Dressing scant bloody drainage, 5/5 motor, Calf nttp (Bilat), Sensation rossly intact to light touch throughout, 1+ dp/pt pulses, foot perfused    Patient Vitals for the past 24 hrs:   Temp Pulse Resp BP SpO2   01/01/23 0928 98.5 °F (36.9 °C) 68 18 (!) 169/93 97 %   01/01/23 0101 97.4 °F (36.3 °C) 75 16 (!) 145/98 98 %   12/31/22 2017 97.7 °F (36.5 °C) 69 16 (!) 162/87 96 %   12/31/22 1604 97.8 °F (36.6 °C) (!) 57 18 -- 99 %   12/31/22 0949 97.8 °F (36.6 °C) 61 18 (!) 168/94 100 %         Current Facility-Administered Medications   Medication Dose Route Frequency    atorvastatin (LIPITOR) tablet 10 mg  10 mg Oral DAILY    cetirizine (ZYRTEC) tablet 10 mg  10 mg Oral DAILY PRN    clopidogreL (PLAVIX) tablet 75 mg  75 mg Oral DAILY    ergocalciferol capsule 50,000 Units  50,000 Units Oral T8V    folic acid (FOLVITE) tablet 1 mg  1 mg Oral DAILY    gabapentin (NEURONTIN) capsule 300 mg  300 mg Oral DAILY    gabapentin (NEURONTIN) tablet 600 mg  600 mg Oral PCD    meclizine (ANTIVERT) tablet 25 mg  25 mg Oral TID PRN    metoprolol tartrate (LOPRESSOR) tablet 12.5 mg  12.5 mg Oral BID    tiZANidine (ZANAFLEX) tablet 4 mg  4 mg Oral QHS PRN    sodium chloride (NS) flush 5-40 mL  5-40 mL IntraVENous Q8H    sodium chloride (NS) flush 5-40 mL  5-40 mL IntraVENous PRN    acetaminophen (TYLENOL) tablet 1,000 mg  1,000 mg Oral Q6H    oxyCODONE IR (ROXICODONE) tablet 5 mg  5 mg Oral Q3H PRN    oxyCODONE IR (ROXICODONE) tablet 10 mg  10 mg Oral Q3H PRN    naloxone (NARCAN) injection 0.4 mg  0.4 mg IntraVENous PRN    ondansetron (ZOFRAN) injection 4 mg  4 mg IntraVENous Q4H PRN    prochlorperazine (COMPAZINE) injection 5 mg  5 mg IntraVENous Q6H PRN    hydrOXYzine HCL (ATARAX) tablet 10 mg  10 mg Oral Q8H PRN    senna-docusate (PERICOLACE) 8.6-50 mg per tablet 1 Tablet  1 Tablet Oral BID    polyethylene glycol (MIRALAX) packet 17 g 17 g Oral DAILY    bisacodyL (DULCOLAX) suppository 10 mg  10 mg Rectal DAILY PRN       Lab Results   Component Value Date/Time    HGB 12.9 12/31/2022 12:28 AM    INR 1.1 12/21/2022 10:23 AM       Lab Results   Component Value Date/Time    Sodium 141 12/31/2022 12:28 AM    Potassium 4.3 12/31/2022 12:28 AM    Chloride 109 (H) 12/31/2022 12:28 AM    CO2 27 12/31/2022 12:28 AM    BUN 12 12/31/2022 12:28 AM    Creatinine 0.75 12/31/2022 12:28 AM    Calcium 9.2 12/31/2022 12:28 AM    Magnesium 2.1 05/09/2014 07:15 PM    Phosphorus 3.3 05/09/2014 07:15 PM            57 y.o. female s/p right total knee arthroplasty and ANJEL of left knee on 12/30/2022  . Doing well. No acute events overnight. ABX: Complete 24 hours perioperative abx  PATHWAY: Straight cath per protocol if needed  DVT Prophylaxis: Aspirin 81 mg enteric coated BID  Weight Bearing: WBAT RLE   Pain Control: Tylenol, oxy 5-10 mg for breakthrough pain  Disposition: Home once cleared by PT and remains medically stable, HHPT  - goal for later today if able.

## 2023-01-02 VITALS
TEMPERATURE: 98.2 F | BODY MASS INDEX: 34.14 KG/M2 | HEART RATE: 83 BPM | SYSTOLIC BLOOD PRESSURE: 156 MMHG | RESPIRATION RATE: 16 BRPM | DIASTOLIC BLOOD PRESSURE: 88 MMHG | OXYGEN SATURATION: 99 % | WEIGHT: 199.96 LBS | HEIGHT: 64 IN

## 2023-01-02 PROCEDURE — 97116 GAIT TRAINING THERAPY: CPT

## 2023-01-02 PROCEDURE — 97110 THERAPEUTIC EXERCISES: CPT

## 2023-01-02 PROCEDURE — 74011250637 HC RX REV CODE- 250/637: Performed by: PHYSICIAN ASSISTANT

## 2023-01-02 PROCEDURE — 74011000250 HC RX REV CODE- 250: Performed by: PHYSICIAN ASSISTANT

## 2023-01-02 RX ADMIN — OXYCODONE 10 MG: 5 TABLET ORAL at 00:32

## 2023-01-02 RX ADMIN — OXYCODONE 10 MG: 5 TABLET ORAL at 03:28

## 2023-01-02 RX ADMIN — POLYETHYLENE GLYCOL 3350 17 G: 17 POWDER, FOR SOLUTION ORAL at 10:11

## 2023-01-02 RX ADMIN — ACETAMINOPHEN 1000 MG: 500 TABLET, FILM COATED ORAL at 03:28

## 2023-01-02 RX ADMIN — SENNOSIDES AND DOCUSATE SODIUM 1 TABLET: 50; 8.6 TABLET ORAL at 09:57

## 2023-01-02 RX ADMIN — SODIUM CHLORIDE, PRESERVATIVE FREE 10 ML: 5 INJECTION INTRAVENOUS at 06:31

## 2023-01-02 RX ADMIN — CLOPIDOGREL BISULFATE 75 MG: 75 TABLET ORAL at 10:02

## 2023-01-02 RX ADMIN — ATORVASTATIN CALCIUM 10 MG: 10 TABLET, FILM COATED ORAL at 10:01

## 2023-01-02 RX ADMIN — OXYCODONE 10 MG: 5 TABLET ORAL at 09:58

## 2023-01-02 RX ADMIN — OXYCODONE 10 MG: 5 TABLET ORAL at 06:28

## 2023-01-02 RX ADMIN — METOPROLOL TARTRATE 12.5 MG: 25 TABLET, FILM COATED ORAL at 09:57

## 2023-01-02 RX ADMIN — GABAPENTIN 300 MG: 300 CAPSULE ORAL at 09:57

## 2023-01-02 RX ADMIN — FOLIC ACID 1 MG: 1 TABLET ORAL at 10:01

## 2023-01-02 NOTE — PROGRESS NOTES
Medicare Outpatient Observation Notice (MOON)/ provided to patient/representative with verbal explanation of the notice. Time allotted for questions regarding the notice. Patient /representative provided a completed copy of the VOON notice. Copy placed on bedside chart.

## 2023-01-02 NOTE — PROGRESS NOTES
PHYSICAL THERAPY TREATMENT  Patient: Tracy Gan (61 y.o. female)  Date: 1/2/2023  Diagnosis: Arthritis of right knee [M17.11] <principal problem not specified>  Procedure(s) (LRB):  RIGHT TOTAL KNEE ARTHROPLASTY; LEFT KNEE MANIPULATION (Right) 3 Days Post-Op  Precautions: Fall, WBAT  Chart, physical therapy assessment, plan of care and goals were reviewed. ASSESSMENT  Patient continues with skilled PT services and is progressing towards goals. She is still struggling with pain and her ROM is quite limited, but she is mobilizing considerably better. She was instructed in use of strap to assist RLE and was much more comfortable using it for bed mobility. Anxiety and incisional pain are primary limiting factors, but her standing, transitions and ambulation are stable with the RW. Reviewed safety considerations for home and importance of exercises, positioning and use of ice for pain relief in addition to pain medication. She is eager to go home and has supportive family at home. From a mobility standpoint, she is ambulating with good overall balance and safety awareness and is clear for D/C home from a PT standpoint once medically ready. She reports that her fiance will be able to assist her at home. Current Level of Function Impacting Discharge (mobility/balance): supervision for short distance ambulation with RW    Other factors to consider for discharge: none         PLAN :  Patient continues to benefit from skilled intervention to address the above impairments. Continue treatment per established plan of care. to address goals.     Recommendation for discharge: (in order for the patient to meet his/her long term goals)  Physical therapy at least 2 days/week in the home AND ensure assist and/or supervision for safety with mobility for the first several days, she reports fiance who helped her after previous TKA several months ago can provide support    This discharge recommendation:  Has been made in collaboration with the attending provider and/or case management    IF patient discharges home will need the following DME: patient owns DME required for discharge       SUBJECTIVE:   Patient stated I want to go home.     OBJECTIVE DATA SUMMARY:   Critical Behavior:  Neurologic State: Alert  Orientation Level: Appropriate for age  Cognition: Appropriate decision making  Safety/Judgement: Fall prevention  Functional Mobility Training:  Bed Mobility:     Supine to Sit: Supervision; Adaptive equipment; Additional time (using strap to assist RLE)  Sit to Supine:  (up in chair after)           Transfers:  Sit to Stand: Supervision; Adaptive equipment; Additional time  Stand to Sit: Supervision; Adaptive equipment; Additional time        Bed to Chair: Supervision; Adaptive equipment; Additional time                    Balance:  Sitting: Intact; Without support  Standing: Impaired; With support (hands on RW)  Standing - Static: Good  Standing - Dynamic : Good;Occasional  Ambulation/Gait Training:  Distance (ft): 70 Feet (ft)  Assistive Device: Gait belt;Walker, rolling  Ambulation - Level of Assistance: Supervision; Adaptive equipment; Additional time        Gait Abnormalities: Antalgic;Decreased step clearance; Step to gait  Right Side Weight Bearing: As tolerated  Left Side Weight Bearing: Full  Base of Support: Widened;Center of gravity altered (flexed at hips)  Stance: Right decreased  Speed/Valeria: Slow  Step Length: Right shortened;Left shortened  Swing Pattern: Right asymmetrical                 Stairs:               Therapeutic Exercises:   Knee flexion stretching in sitting as much as she can tolerate, with limited flexion noted in the L and very limited in the R  Pain Rating:  Primarily incisional pain with flexion, encouraged use of ice    Activity Tolerance:   Fair and requires rest breaks    After treatment patient left in no apparent distress:   Sitting in chair, Heels elevated for pressure relief, and Call bell within reach    COMMUNICATION/COLLABORATION:   The patients plan of care was discussed with: Registered nurse.      Jen Todd, PT   Time Calculation: 46 mins

## 2023-01-03 NOTE — PROGRESS NOTES
[facility]  [facility]opaedic Pathway Handoff     FROM:                                TO: Pop at [facility]                                                      ([facility] or Facility name)  [place] [address]  Dept: [address]: [phone]                                      Room#:  552/01                                                       Nurse Navigator: Luis Mcgee RN         SITUATION      ASAScore: ASA 3 - Patient with moderate systemic disease with functional limitations    Admitted:  12/30/2022  Hospital Day: 4      Attending Provider:  No att. providers found     Consultations:  None    PCP:  Mis Bird., NP   [phone]     Admitting Dx: Arthritis of right knee [M17.11]       Active Problems:    Arthritis of right knee (12/30/2022)      4 Days Post-Op of   Procedure(s):  RIGHT TOTAL KNEE ARTHROPLASTY; LEFT KNEE MANIPULATION   BY: Isabel Elaine MD             ON: 12/30/2022                  Code Status: Prior             Advance Directive? No Doesnt Have (Send w/patient)     Isolation:  There are currently no Active Isolations       MDRO: No current active infections    BACKGROUND     Allergies:   Allergies   Allergen Reactions    Ace Inhibitors Cough    Contrast Dye [Iodine] Hives       Past Medical History:   Diagnosis Date    Anxiety     Bipolar 1 disorder (HCC)     Depression     Fatigue     Frequent headaches     GERD (gastroesophageal reflux disease)     Hearing loss     PT DENIES ANY HEARING    Hypertension     Joint pain     Memory disorder     LAPSE OF MEMORY DUE TO STROKES    Muscle pain     Nerve damage     ALL OVER    Snoring     Stomach pain     Stroke ([address]     X2 OVER 10 YRS AGO UNABLE TO REMEMBER DATE       Past Surgical History:   Procedure Laterality Date    HX ADENOIDECTOMY      sweat glands    HX GYN      tubal ligation    HX ORTHOPAEDIC  09/2022    LEFT TOTAL KNEE REPLACEMENT    HX TONSILLECTOMY         Prior to Admission Medications   Prescriptions Last Dose Informant Patient Reported? Taking?   acetaminophen (TYLENOL) 325 mg tablet 12/29/2022  No Yes   Sig: Take 2 Tablets by mouth every six (6) hours. atorvastatin (LIPITOR) 10 mg tablet 12/29/2022  No Yes   Sig: TAKE 1 TABLET BY MOUTH NIGHTLY FOR CHOLESTEROL   cetirizine (ZYRTEC) 10 mg tablet Not Taking  Yes No   Sig: Take 10 mg by mouth daily as needed for Allergies. clopidogreL (PLAVIX) 75 mg tab 12/23/2022  No Yes   Sig: Take 1 Tablet by mouth daily. ergocalciferol (ERGOCALCIFEROL) 1,250 mcg (50,000 unit) capsule 12/30/2022  No Yes   Sig: Take 1 Capsule by mouth every seven (7) days. Patient taking differently: Take 50,000 Units by mouth every seven (7) days. TAKES ON Friday   folic acid (FOLVITE) 1 mg tablet 12/23/2022  No Yes   Sig: Take 1 Tablet by mouth in the morning.   gabapentin (NEURONTIN) 300 mg capsule 12/30/2022  Yes Yes   Sig: Take 300 mg by mouth daily. gabapentin (NEURONTIN) 600 mg tablet 12/29/2022  Yes Yes   Sig: Take 600 mg by mouth daily (after dinner). ibuprofen (MOTRIN) 200 mg tablet   Yes No   Sig: Take 400 mg by mouth every six (6) hours as needed for Pain. meclizine (ANTIVERT) 25 mg tablet Unknown  Yes No   Sig: Take 25 mg by mouth three (3) times daily as needed for Dizziness. metoprolol tartrate (LOPRESSOR) 25 mg tablet 12/30/2022 at 0700  No Yes   Sig: Take 1/2 (one-half) tablet by mouth twice daily   naloxone (Narcan) 4 mg/actuation nasal spray   No No   Sig: Use 1 spray intranasally, then discard. Repeat with new spray every 2 min as needed for opioid overdose symptoms, alternating nostrils.    ondansetron (ZOFRAN ODT) 4 mg disintegrating tablet Unknown  Yes No   Sig: Take 4 mg by mouth every eight (8) hours as needed for Nausea or Vomiting.   rimegepant (NURTEC) 75 mg disintegrating tablet Unknown  No No   Sig: Take 1 tablet every other day, then 1 tablet p.o. as needed for severe headache, not to DC 1 dose in 24 hours   tiZANidine (ZANAFLEX) 4 mg tablet Unknown  No No   Sig: Take 1 Tablet by mouth nightly. Facility-Administered Medications: None       Vaccinations:    Immunization History   Administered Date(s) Administered    (RETIRED) Pneumococcal Vaccine (Unspecified Type) 10/27/2012    COVID-19, PFIZER PURPLE top, DILUTE for use, (age 15 y+), IM, 30mcg/0.3mL 04/07/2021, 04/28/2021, 11/08/2021    Influenza Vaccine 09/23/2019    Influenza Vaccine Intradermal PF 10/28/2014    Influenza Vaccine Split 10/27/2012    Influenza, FLUARIX, FLULAVAL, FLUZONE (age 10 mo+) AND AFLURIA, (age 1 y+), PF, 0.5mL 09/23/2019, 12/03/2020, 12/09/2021    TB Skin Test (PPD) Intradermal 10/28/2019    Tdap 07/20/2020    Zoster Recombinant 09/23/2019         ASSESSMENT   Age: 57 y.o. Gender: female        Height: Height: 5' 4\" (162.6 cm)                    Weight:Weight: 90.7 kg (199 lb 15.3 oz)     No data found. Active Orders   There are no active orders of the following types: Diet. Orientation: Orientation Level: Appropriate for age    Active Lines/Drains:  (Peg Tube / [place] / CL or S/L?):no    Urinary Status: Voiding      Last BM:       Skin Integrity: Incision (comment) (rtt knee surgery)             Mobility: Slightly limited   Weight Bearing Status: WBAT (Weight Bearing as Tolerated)      Gait Training  Assistive Device: Gait belt, Walker, rolling  Ambulation - Level of Assistance: Supervision, Adaptive equipment, Additional time  Distance (ft): 70 Feet (ft)     On Anticoagulation?  YES  Aspirin                                         Pain Medications given:  Oxycodone                                   Lab Results   Component Value Date/Time    Glucose 118 (H) 12/31/2022 12:28 AM    Hemoglobin A1c 5.4 12/21/2022 10:23 AM    INR 1.1 12/21/2022 10:23 AM    INR 1.1 08/29/2022 08:53 AM    HGB 12.9 12/31/2022 12:28 AM    HGB 15.5 12/21/2022 10:23 AM    HGB 12.4 09/13/2022 03:59 AM    HGB 14.7 08/29/2022 08:53 AM       Readmission Risks:  Score:         RECOMMENDATION     See After Visit Summary (AVS) for:  Discharge instructions  After [address]    Medication Reconciliation          [facility] Orthopaedic Nurse Navigator  BUTCH Cosme, RN      Office  [phone]  Fax      [phone]  [email or web address]             . Phy

## 2023-01-09 DIAGNOSIS — Z96.651 STATUS POST RIGHT KNEE REPLACEMENT: Primary | ICD-10-CM

## 2023-01-09 RX ORDER — OXYCODONE HYDROCHLORIDE 5 MG/1
5 TABLET ORAL
Qty: 42 TABLET | Refills: 0 | Status: SHIPPED | OUTPATIENT
Start: 2023-01-09 | End: 2023-01-16

## 2023-01-09 NOTE — TELEPHONE ENCOUNTER
Patient called to request refill on pain medication. Patient reported she is currently taking 3 tablets of Oxycodone 5mg each time she is taking due to pain.            RTKA & Left knee manipulation 12/30/22

## 2023-01-31 ENCOUNTER — OFFICE VISIT (OUTPATIENT)
Dept: ORTHOPEDIC SURGERY | Age: 63
End: 2023-01-31
Payer: MEDICARE

## 2023-01-31 DIAGNOSIS — Z96.651 STATUS POST RIGHT KNEE REPLACEMENT: Primary | ICD-10-CM

## 2023-01-31 PROCEDURE — 99024 POSTOP FOLLOW-UP VISIT: CPT | Performed by: PHYSICIAN ASSISTANT

## 2023-01-31 RX ORDER — OXYCODONE HYDROCHLORIDE 5 MG/1
5-10 TABLET ORAL
Qty: 30 TABLET | Refills: 0 | Status: SHIPPED | OUTPATIENT
Start: 2023-01-31 | End: 2023-02-07

## 2023-02-01 NOTE — PROGRESS NOTES
Marianna Michael (: 1960) is a 57 y.o. female, here for evaluation of the following chief complaint(s):  Surgical Follow-up (Post op follow up [address] )         SUBJECTIVE/OBJECTIVE:    Marianna Michael (: 1960) is a 57 y.o. female. Right Total Knee Arthroplasty; Left Knee Manipulation - Right 2022     The patient states they are overall happy with how they have progressed so far following their procedure. They have been working diligently in physical therapy. Their pain has been tolerable. They are currently taking oxycodone for their pain. Allergies   Allergen Reactions    Ace Inhibitors Cough    Contrast Dye [Iodine] Hives       Current Outpatient Medications   Medication Sig    oxyCODONE IR (ROXICODONE) 5 mg immediate release tablet Take 1-2 Tablets by mouth every six (6) hours as needed for Pain for up to 7 days. Max Daily Amount: 40 mg.    aspirin 81 mg chewable tablet Take 1 Tablet by mouth daily. Indications: treatment to prevent peripheral artery thromboembolism    gabapentin (NEURONTIN) 300 mg capsule Take 300 mg by mouth daily. gabapentin (NEURONTIN) 600 mg tablet Take 600 mg by mouth daily (after dinner). cetirizine (ZYRTEC) 10 mg tablet Take 10 mg by mouth daily as needed for Allergies. meclizine (ANTIVERT) 25 mg tablet Take 25 mg by mouth three (3) times daily as needed for Dizziness. ondansetron (ZOFRAN ODT) 4 mg disintegrating tablet Take 4 mg by mouth every eight (8) hours as needed for Nausea or Vomiting. acetaminophen (TYLENOL) 325 mg tablet Take 2 Tablets by mouth every six (6) hours. rimegepant (NURTEC) 75 mg disintegrating tablet Take 1 tablet every other day, then 1 tablet p.o. as needed for severe headache, not to DC 1 dose in 24 hours    atorvastatin (LIPITOR) 10 mg tablet TAKE 1 TABLET BY MOUTH NIGHTLY FOR CHOLESTEROL    ergocalciferol (ERGOCALCIFEROL) 1,250 mcg (50,000 unit) capsule Take 1 Capsule by mouth every seven (7) days.  (Patient taking differently: Take 50,000 Units by mouth every seven (7) days. TAKES ON Friday)    folic acid (FOLVITE) 1 mg tablet Take 1 Tablet by mouth in the morning. tiZANidine (ZANAFLEX) 4 mg tablet Take 1 Tablet by mouth nightly. metoprolol tartrate (LOPRESSOR) 25 mg tablet Take 1/2 (one-half) tablet by mouth twice daily    clopidogreL (PLAVIX) 75 mg tab Take 1 Tablet by mouth daily. naloxone (Narcan) 4 mg/actuation nasal spray Use 1 spray intranasally, then discard. Repeat with new spray every 2 min as needed for opioid overdose symptoms, alternating nostrils. (Patient not taking: Reported on 1/31/2023)     No current facility-administered medications for this visit.        Social History     Socioeconomic History    Marital status:      Spouse name: Not on file    Number of children: Not on file    Years of education: Not on file    Highest education level: Not on file   Occupational History    Not on file   Tobacco Use    Smoking status: Some Days     Types: Cigars     Passive exposure: Current    Smokeless tobacco: Never   Vaping Use    Vaping Use: Never used   Substance and Sexual Activity    Alcohol use: Yes     Comment: \"once a month\"    Drug use: Yes     Types: Marijuana    Sexual activity: Not Currently   Other Topics Concern    Not on file   Social History Narrative    ** Merged History Encounter **          Social Determinants of Health     Financial Resource Strain: Not on file   Food Insecurity: Not on file   Transportation Needs: Not on file   Physical Activity: Not on file   Stress: Not on file   Social Connections: Not on file   Intimate Partner Violence: Not on file   Housing Stability: Not on file       Past Surgical History:   Procedure Laterality Date    HX ADENOIDECTOMY      sweat glands    HX GYN      tubal ligation    HX ORTHOPAEDIC  09/2022    LEFT TOTAL KNEE REPLACEMENT    HX TONSILLECTOMY         Family History   Problem Relation Age of Onset    Cancer Mother     Heart Disease Father         stents late 66's    Pacemaker Father     Other Sister         MURDERED SHOT    Anesth Problems Neg Hx         OB History          5    Para   5    Term   5       0    AB   0    Living             SAB   0    IAB   0    Ectopic   0    Molar        Multiple        Live Births   5                   REVIEW OF SYSTEMS:    Patient denies any recent fever, chills, nausea, vomiting, chest pain, abdominal pain, blurred vision, dizziness or shortness of breath. Vitals: There were no vitals taken for this visit. There is no height or weight on file to calculate BMI. PHYSICAL EXAM:    The patient is alert and oriented x 3 and in no acute distress. The postoperative wound is healing well without erythema or drainage. Range of motion of the operative knee demonstrates full extension with flexion to 90 degrees. Mild swelling of the operative knee is noted. There is no calf tenderness to palpation. Distal motor and sensation is intact. IMAGING:    Results from Appointment encounter on 23    XR KNEE RT 3 V    Narrative  AP, lateral and sunrise view digital radiographs of the right knee obtained in the office today were reviewed and demonstrate anatomic alignment of components with no evidence of hardware loosening or subsidence. Orders Placed This Encounter    XR KNEE RT 3 V     Has replacement     Standing Status:   Future     Number of Occurrences:   1     Standing Expiration Date:   2024    REFERRAL TO PHYSICAL THERAPY     Referral Priority:   Routine     Referral Type:   PT/OT/ST     Referral Reason:   Specialty Services Required     Requested Specialty:   Physical Therapy     Number of Visits Requested:   18    oxyCODONE IR (ROXICODONE) 5 mg immediate release tablet     Sig: Take 1-2 Tablets by mouth every six (6) hours as needed for Pain for up to 7 days. Max Daily Amount: 40 mg.      Dispense:  30 Tablet     Refill:  0     Supervising Physician: Dr. Jacklyn Dejesus Kiana Phipps: [id number]          ASSESSMENT/PLAN:      1. Status post right knee replacement  -     XR KNEE RT 3 V; Future  -     REFERRAL TO PHYSICAL THERAPY  -     oxyCODONE IR (ROXICODONE) 5 mg immediate release tablet; Take 1-2 Tablets by mouth every six (6) hours as needed for Pain for up to 7 days. Max Daily Amount: 40 mg., Normal, Disp-30 Tablet, R-0Supervising Physician: Dr. Jeannine Collazo, MINA: [id number]        Below is the assessment and plan developed based on review of pertinent history, physical exam, labs, studies, and medications. I have discussed radiographic findings and have answered all patient questions to her satisfaction. The patient is to continue DVT prophylaxis for 1 more week, then is to discontinue. I have provided the patient with a prescription for outpatient PT for ROM, strengthening and gait training. Have asked the patient to follow up in 8 weeks time for reevaluation with Dr Cathi Martinez, sooner if she should develop any surgery related complications. The patient should remain out of work until follow-up appointment. The patient was asked to contact the office with any questions or concerns. The patient understands and agrees to the treatment plan as outlined above. No follow-ups on file. Dr. Cathi Martinez was available for immediate consultation as needed. An electronic signature was used to authenticate this note.   -- Benedicto Strauss PA-C

## 2023-02-14 ENCOUNTER — OFFICE VISIT (OUTPATIENT)
Dept: ORTHOPEDIC SURGERY | Age: 63
End: 2023-02-14
Payer: MEDICARE

## 2023-02-14 DIAGNOSIS — R26.81 UNSTEADINESS ON FEET: ICD-10-CM

## 2023-02-14 DIAGNOSIS — M62.81 QUADRICEPS WEAKNESS: Primary | ICD-10-CM

## 2023-02-14 DIAGNOSIS — Z96.651 STATUS POST RIGHT KNEE REPLACEMENT: ICD-10-CM

## 2023-02-14 DIAGNOSIS — M25.661 STIFFNESS OF RIGHT KNEE: ICD-10-CM

## 2023-02-14 NOTE — PROGRESS NOTES
Patient Name: Nica Montano  Date:2023  : 1960  [x]  Patient  Verified  Payor: Kaylah Gutierrez / Plan: [place] MED[place] PART [place] & B / Product Type: Medicare /    Total Treatment Time (min): 40 min  1:1 Treatment Time ( [address] only): 40 min   Mercedez Manzo*  1. Quadriceps weakness  2. Unsteadiness on feet  3. Status post right knee replacement  4. Stiffness of right knee      Subjective:    Patient is a 57 y.o. female referred to physical therapy by Monica Lynch status post right total knee arthroplasty and left knee manipulation on 2022. Patient arrives to clinic ambulating with straight cane with antalgic gait. Patient states she had home health physical therapy for several weeks. Patient states her knee continues to be stiff and sore. She rates her pain a 5 out of 10 at least and 9 out of 10 at worst.  Patient states she has had difficulty bending her knee and is concerned about the color of her incision. Patient is anxious to get the surgery behind her. Objective: Incisions: Healing  Gait: Ambulates with decreased knee flexion with straight cane  Strength: MMT: Quads/hamstrings: 3/5  Left knee ROM: WFL  Right knee ROM: -5 degrees extension and 84 degrees knee flexion  Circumfererence: increased by approximately +1cm as compared to contralateral side  LEFS: 34/80      Ex: 20 min  Treatment today to include instruction in a home exercise program as well as providing patient with written and visual handouts. Man:  min    NMR:  min    All questions were addressed. Assessment:    Patient presents with increased pain and decreased ROM, strength, and mobility consistent with status post right knee total arthroplasty and left knee manipulation on 2022    Long Term Goals. 8 weeks  1. Patient will demonstrate the ability to ambulate on level surfaces, uneven surfaces, stairs with a smooth and nonantalgic gait pattern.   2.  Patient will demonstrate improved AROM of the knee to within 95% or greater as compared to the contralateral side to assist with home/work/community/recreational ADL activity. 3.  Patient will report pain to be consistently less than or equal to 1/10 with all home/work/community/recreational ADL activity. Short Term Goals. 2 visits. Patient will demonstrate independence with HEP. Plan:  Plan of care: Physical therapy consisted of frequency of 2/week for the next 8 weeks. Physical therapy will consist of therapeutic exercise, modalities, patient education, neuromuscular reeducation, manual therapy, therapeutic activity, dry needling, and instruction in home exercise program as appropriate. Eval  Ex: 20 min  Man:   NMR:     The referring physician has reviewed and approved this evaluation and plan of care as noted by the electronic signature attached to note.     Julio Beaulieu, MSPT, DPT

## 2023-02-23 ENCOUNTER — TELEPHONE (OUTPATIENT)
Dept: ORTHOPEDIC SURGERY | Age: 63
End: 2023-02-23

## 2023-02-23 NOTE — TELEPHONE ENCOUNTER
Patient called for pain medication refill       [facility]  Called patient and notified this medication may not be filled being she is two months post op, message forward to Samuel Akins, [address]

## 2023-02-24 DIAGNOSIS — Z96.651 STATUS POST RIGHT KNEE REPLACEMENT: Primary | ICD-10-CM

## 2023-02-24 RX ORDER — OXYCODONE HYDROCHLORIDE 5 MG/1
5 TABLET ORAL
Qty: 28 TABLET | Refills: 0 | Status: SHIPPED | OUTPATIENT
Start: 2023-02-24 | End: 2023-03-03

## 2023-03-01 ENCOUNTER — OFFICE VISIT (OUTPATIENT)
Dept: ORTHOPEDIC SURGERY | Age: 63
End: 2023-03-01

## 2023-03-01 DIAGNOSIS — M25.661 STIFFNESS OF RIGHT KNEE: ICD-10-CM

## 2023-03-01 DIAGNOSIS — M62.81 QUADRICEPS WEAKNESS: Primary | ICD-10-CM

## 2023-03-01 DIAGNOSIS — R26.81 UNSTEADINESS ON FEET: ICD-10-CM

## 2023-03-01 DIAGNOSIS — Z96.651 STATUS POST RIGHT KNEE REPLACEMENT: ICD-10-CM

## 2023-03-01 NOTE — PROGRESS NOTES
PT DAILY TREATMENT NOTE    Patient Name: Evelio Borges  Date:3/1/2023  : 1960  [x]  Patient  Verified  Payor: Joel Cazares / Plan: [place] MEDICARE PART A & B / Product Type: Medicare /    Total Treatment Time (min): 50 min  1:1 Treatment Time ( [address] only): 30 min   Referring Provider: TAMI De Leon*    1. Quadriceps weakness  2. Unsteadiness on feet  3. Stiffness of right knee  4. Status post right knee replacement      SUBJECTIVE  Subjective functional status/changes:   [] No changes reported    Patient reports feeling ok, but that she  is having difficulty getting a ride to PT.    OBJECTIVE/TREATMENT    Manual Therapy x 15  mins: PF/TF mobilization in multiple angles of flexion/extension to improve ROM. PROM in supine for hamstrings and knee flexion. Neuromuscular Re-education (NMR) x  minutes:  []  Kinesiotaping for   []  Neuromuscular reeducation to the VMO with use of [place] electrical stimulation in conjunction with active contraction and exercises. []  balance/proprioceptive exercises and activities in clinic as listed below as NMR. Therapeutic Exercise x 35  mins, 15 min 1:1 w/PT:   Strengthening/Endurance/ADL function/Neuromuscular reeducation activities/exercises supervised and completed as listed below.       EXERCISE X= completed on  3/1/2023   Nu step 5'   Heel slides A, AA, A x   LAQ x   Quad sets x   squats x   sidestepping x   Hamstring stretch x   Step stretch x   SLB x   Gastroc stretch x                                           Added/Changed Exercises:  [x]  Advanced to address: [x] functional strength/ROM deficits [x] balance/proprioceptive tasks  []  Modified: [] per subjective reports [] for patient time constraints [] for clinic time constraints    Modality:  []  E-Stim: type _ x _ min     []att   []unatt   []w/ice   []w/heat  []  Ultrasound: []cont   []pulse    _ W/cm2 x _  min   []1MHz   []3MHz  []  Ice pack: post      []  Hot pack: pre    []  Other:     Patient Education: [] Review HEP    [] Progressed/Changed HEP to include:  [] positioning   [] body mechanics   [] transfers   [] heat/ice application      ASSESSMENT  [x]See Plan of Care  [x]Patient will continue to benefit from skilled therapy to address remaining functional deficits:  Patient has increased knee extension. Ambulating w/more normal gait. Tolerating exercise. Has continued pain w/knee flexion. Progressing per POC. Progress towards goals / Updated goals:    PLAN  [x]  Upgrade activities as tolerated      [x]  Continue plan of care  []  Discharge due to:  []  Other: The referring physician has reviewed and approved this evaluation and plan of care as noted by the electronic signature attached to note.     Charmayne Guadalajara, MSPT, DPT

## 2023-03-03 ENCOUNTER — OFFICE VISIT (OUTPATIENT)
Dept: ORTHOPEDIC SURGERY | Age: 63
End: 2023-03-03

## 2023-03-03 DIAGNOSIS — M62.81 QUADRICEPS WEAKNESS: Primary | ICD-10-CM

## 2023-03-03 DIAGNOSIS — Z96.651 STATUS POST RIGHT KNEE REPLACEMENT: ICD-10-CM

## 2023-03-03 DIAGNOSIS — R26.81 UNSTEADINESS ON FEET: ICD-10-CM

## 2023-03-03 DIAGNOSIS — M25.661 STIFFNESS OF RIGHT KNEE: ICD-10-CM

## 2023-03-03 NOTE — PROGRESS NOTES
PT DAILY TREATMENT NOTE    Patient Name: Gale Prabhakar  Date:3/3/2023  : 1960  [x]  Patient  Verified  Payor: Theodora Pagan / Plan: [place] MEDICARE PART A & B / Product Type: Medicare /    Total Treatment Time (min): 30 min  1:1 Treatment Time ( [address] only): 30 min   Referring Provider: TAMI Santiago*    1. Quadriceps weakness  2. Unsteadiness on feet  3. Stiffness of right knee  4. Status post right knee replacement      SUBJECTIVE  Subjective functional status/changes:   [] No changes reported    Patient reports B knee pain/stiffness. States she has to leave by 11:30 today. OBJECTIVE/TREATMENT    AROM 0-90    Manual Therapy x  5  mins:   PF/TF mobilization in multiple angles of flexion/extension to improve ROM. PROM in supine for hamstrings and knee flexion. Neuromuscular Re-education (NMR) x  minutes:  []  Kinesiotaping for   []  Neuromuscular reeducation to the VMO with use of [place] electrical stimulation in conjunction with active contraction and exercises. []  balance/proprioceptive exercises and activities in clinic as listed below as NMR. Therapeutic Exercise x 25  mins   Strengthening/Endurance/ADL function/Neuromuscular reeducation activities/exercises supervised and completed as listed below. Manually assisted passive knee flex/ext.        EXERCISE X= completed on  3/3/2023   Nu step 10'   Heel slides A, AA, A     LAQ     Quad sets x   squats     sidestepping     Hamstring stretch      Step stretch     SLB     Gastroc stretch x                                           Added/Changed Exercises:  []  Advanced to address: [] functional strength/ROM deficits [] balance/proprioceptive tasks  [x]  Modified: [] per subjective reports [x] for patient time constraints [] for clinic time constraints    Modality:  []  E-Stim: type _ x _ min     []att   []unatt   []w/ice   []w/heat  []  Ultrasound: []cont   []pulse    _ W/cm2 x _  min   []1MHz   []3MHz  []  Ice pack: post      []  Hot pack: pre    []  Other:     Patient Education: [] Review HEP    [] Progressed/Changed HEP to include:  [] positioning   [] body mechanics   [] transfers   [] heat/ice application      ASSESSMENT  [x]See Plan of Care  [x]Patient will continue to benefit from skilled therapy to address remaining functional deficits:    ROM improving. Limited time today secondary to patient having to leave early. Good quad engagement with isometric contraction. Incision well healed with some keloidal areas.      Progress towards goals / Updated goals:    PLAN  [x]  Upgrade activities as tolerated      [x]  Continue plan of care  []  Discharge due to:  []  Other:      Cotreatment by Jhon GUERRERO, CMT

## 2023-03-03 NOTE — PROGRESS NOTES
I have reviewed the notes, assessments, and/or procedures performed by Daniel Johnson PTA, I concur with her/his documentation of Asya Power.

## 2023-03-10 ENCOUNTER — OFFICE VISIT (OUTPATIENT)
Dept: ORTHOPEDIC SURGERY | Age: 63
End: 2023-03-10

## 2023-03-10 DIAGNOSIS — M25.661 STIFFNESS OF RIGHT KNEE: ICD-10-CM

## 2023-03-10 DIAGNOSIS — M62.81 QUADRICEPS WEAKNESS: Primary | ICD-10-CM

## 2023-03-10 DIAGNOSIS — R26.81 UNSTEADINESS ON FEET: ICD-10-CM

## 2023-03-10 NOTE — PROGRESS NOTES
PT DAILY TREATMENT NOTE    Patient Name: Silviano Rose  Date:3/10/2023  : 1960  [x]  Patient  Verified  Payor: Katarina Lloyd / Plan: [place] MED[place] / Product Type: Medicare /    Total Treatment Time (min): 55 min  1:1 Treatment Time ( [address] only): 55 min   Referring Provider: TAMI Phillips*    1. Quadriceps weakness  2. Unsteadiness on feet  3. Stiffness of right knee      SUBJECTIVE  Subjective functional status/changes:   [] No changes reported    Patient reports still having pain and stiffness. OBJECTIVE/TREATMENT    AROM 0-90    Manual Therapy x  15  mins:   PF/TF mobilization in multiple angles of flexion/extension to improve ROM. PROM in supine for hamstrings and knee flexion. Neuromuscular Re-education (NMR) x  minutes:  []  Kinesiotaping for   []  Neuromuscular reeducation to the VMO with use of [place] electrical stimulation in conjunction with active contraction and exercises. []  balance/proprioceptive exercises and activities in clinic as listed below as NMR. Therapeutic Exercise x 40  mins   Strengthening/Endurance/ADL function/Neuromuscular reeducation activities/exercises supervised and completed as listed below. Manually assisted passive knee flex/ext.        EXERCISE X= completed on  3/10/2023   Nu step 10'   Heel slides A, AA, A  x   LAQ  x   Quad sets x   squats  x   sidestepping  x   Hamstring stretch      Step stretch  x   SLB  x   Gastroc stretch    cups x                                       Added/Changed Exercises:  [x]  Advanced to address: [x] functional strength/ROM deficits [] balance/proprioceptive tasks  []  Modified: [] per subjective reports [] for patient time constraints [] for clinic time constraints    Modality:  []  E-Stim: type _ x _ min     []att   []unatt   []w/ice   []w/heat  []  Ultrasound: []cont   []pulse    _ W/cm2 x _  min   []1MHz   []3MHz  []  Ice pack: post      []  Hot pack: pre    []  Other:     Patient Education: [] Review HEP [] Progressed/Changed HEP to include:  [] positioning   [] body mechanics   [] transfers   [] heat/ice application      ASSESSMENT  [x]See Plan of Care  [x]Patient will continue to benefit from skilled therapy to address remaining functional deficits:    Ambulating w/more normal gait. Has continued stiffness at endrange flexion. Tolerating exercise. C/o continued LLE pain. Slowly progressing per POC.      Progress towards goals / Updated goals:    PLAN  [x]  Upgrade activities as tolerated      [x]  Continue plan of care  []  Discharge due to:  []  Other:      Cotreatment by Alexis GUERRERO, CMT

## 2023-03-15 ENCOUNTER — OFFICE VISIT (OUTPATIENT)
Dept: ORTHOPEDIC SURGERY | Age: 63
End: 2023-03-15

## 2023-03-15 DIAGNOSIS — Z96.651 STATUS POST RIGHT KNEE REPLACEMENT: ICD-10-CM

## 2023-03-15 DIAGNOSIS — R26.81 UNSTEADINESS ON FEET: ICD-10-CM

## 2023-03-15 DIAGNOSIS — M25.661 STIFFNESS OF RIGHT KNEE: ICD-10-CM

## 2023-03-15 DIAGNOSIS — M62.81 QUADRICEPS WEAKNESS: Primary | ICD-10-CM

## 2023-03-15 NOTE — PROGRESS NOTES
I have reviewed the notes, assessments, and/or procedures performed by Javy Aviles PTA, I concur with her/his documentation of Raymundo Babinski.

## 2023-03-15 NOTE — PROGRESS NOTES
PT DAILY TREATMENT NOTE    Patient Name: Luca Osman  Date:3/15/2023  : 1960  [x]  Patient  Verified  Payor: Gus Peres / Plan: [place] MEDICARE PART A & B / Product Type: Medicare /    Total Treatment Time (min): 55 min  1:1 Treatment Time ( [address] only): 55 min   Referring Provider: TAMI Modi*    1. Quadriceps weakness  2. Unsteadiness on feet  3. Stiffness of right knee  4. Status post right knee replacement    SUBJECTIVE  Subjective functional status/changes:   [] No changes reported    Patient reports knee feels \"tight\". States she is able to go up steps easily but still has difficulty going down steps. OBJECTIVE/TREATMENT    AROM 0-90    Manual Therapy x  15  mins:   PF/TF mobilization in multiple angles of flexion/extension to improve ROM. PROM in supine for hamstrings and knee flexion. Neuromuscular Re-education (NMR) x  minutes:  []  Kinesiotaping for   []  Neuromuscular reeducation to the VMO with use of [place] electrical stimulation in conjunction with active contraction and exercises. []  balance/proprioceptive exercises and activities in clinic as listed below as NMR. Therapeutic Exercise x 40  mins   Strengthening/Endurance/ADL function/Neuromuscular reeducation activities/exercises supervised and completed as listed below. Manually assisted passive knee flex/ext.        EXERCISE X= completed on  3/15/2023   Nu step 10'   bridges grn   LAQ 3#   SAQ 2#   squats x   sidestepping x   SLS *NMR x   Step stretch x   cups x   slantboard x                                           Added/Changed Exercises:  [x]  Advanced to address: [x] functional strength/ROM deficits [] balance/proprioceptive tasks  []  Modified: [] per subjective reports [] for patient time constraints [] for clinic time constraints    Modality:  []  E-Stim: type _ x _ min     []att   []unatt   []w/ice   []w/heat  []  Ultrasound: []cont   []pulse    _ W/cm2 x _  min   []1MHz   []3MHz  []  Ice pack: post []  Hot pack: pre    []  Other:     Patient Education: [] Review HEP    [] Progressed/Changed HEP to include:  [] positioning   [] body mechanics   [] transfers   [] heat/ice application      ASSESSMENT  [x]See Plan of Care  [x]Patient will continue to benefit from skilled therapy to address remaining functional deficits:    Still with appropriate eccentric quad weakness for functional step down tasks as well as knee flexion stiffness. Does need to push ROM more at home for maximal gains.      Progress towards goals / Updated goals:    PLAN  [x]  Upgrade activities as tolerated      [x]  Continue plan of care  []  Discharge due to:  []  Other:      Cotreatment by Jaquan GUERRERO, CMT

## 2023-03-20 ENCOUNTER — OFFICE VISIT (OUTPATIENT)
Dept: ORTHOPEDIC SURGERY | Age: 63
End: 2023-03-20
Payer: MEDICARE

## 2023-03-20 DIAGNOSIS — M25.661 STIFFNESS OF RIGHT KNEE: ICD-10-CM

## 2023-03-20 DIAGNOSIS — M62.81 QUADRICEPS WEAKNESS: Primary | ICD-10-CM

## 2023-03-20 DIAGNOSIS — R26.81 UNSTEADINESS ON FEET: ICD-10-CM

## 2023-03-20 PROCEDURE — 97110 THERAPEUTIC EXERCISES: CPT | Performed by: PHYSICAL THERAPIST

## 2023-03-20 PROCEDURE — 97140 MANUAL THERAPY 1/> REGIONS: CPT | Performed by: PHYSICAL THERAPIST

## 2023-03-20 NOTE — PROGRESS NOTES
PT DAILY TREATMENT NOTE    Patient Name: Shirley Trotter  Date:3/20/2023  : 1960  [x]  Patient  Verified  Payor: Vasile Bravo / Plan: [place] MEDICARE PART A & B / Product Type: Medicare /    Total Treatment Time (min): 55 min  1:1 Treatment Time ( [address] only): 55 min   Referring Provider: TAMI Burgos*    1. Quadriceps weakness  2. Unsteadiness on feet  3. Stiffness of right knee    SUBJECTIVE  Subjective functional status/changes:   [] No changes reported    Patient states that her knee feels tight. She got on the floor and was unable to get up without difficulty. OBJECTIVE/TREATMENT    AROM 0-80    Manual Therapy x  15  mins:   PF/TF mobilization in multiple angles of flexion/extension to improve ROM. PROM in supine for hamstrings and knee flexion. Neuromuscular Re-education (NMR) x  minutes:  []  Kinesiotaping for   []  Neuromuscular reeducation to the VMO with use of [place] electrical stimulation in conjunction with active contraction and exercises. []  balance/proprioceptive exercises and activities in clinic as listed below as NMR. Therapeutic Exercise x 40  mins   Strengthening/Endurance/ADL function/Neuromuscular reeducation activities/exercises supervised and completed as listed below. Manually assisted passive knee flex/ext.        EXERCISE X= completed on  3/20/2023   Nu step 10'   bridges grn   LAQ 3#   SAQ 2#   squats x   sidestepping    SLS *NMR x   Step stretch x   cups x   slantboard x   Heel slides x                                       Added/Changed Exercises:  [x]  Advanced to address: [x] functional strength/ROM deficits [] balance/proprioceptive tasks  []  Modified: [] per subjective reports [] for patient time constraints [] for clinic time constraints    Modality:  []  E-Stim: type _ x _ min     []att   []unatt   []w/ice   []w/heat  []  Ultrasound: []cont   []pulse    _ W/cm2 x _  min   []1MHz   []3MHz  []  Ice pack: post      []  Hot pack: pre    []  Other: Patient Education: [] Review HEP    [] Progressed/Changed HEP to include:  [] positioning   [] body mechanics   [] transfers   [] heat/ice application      ASSESSMENT  [x]See Plan of Care  [x]Patient will continue to benefit from skilled therapy to address remaining functional deficits:    Patient ambulating w/lack of knee flexion but improved. Reviewed HEP for ROM. Patient slowly improving per POC. Continues to be stiff in knee flexion.      Progress towards goals / Updated goals:    PLAN  [x]  Upgrade activities as tolerated      [x]  Continue plan of care  []  Discharge due to:  []  Other:      Cotreatment by Anna GUERRERO, CMT

## 2023-03-22 ENCOUNTER — OFFICE VISIT (OUTPATIENT)
Dept: ORTHOPEDIC SURGERY | Age: 63
End: 2023-03-22

## 2023-03-22 DIAGNOSIS — Z96.651 STATUS POST RIGHT KNEE REPLACEMENT: ICD-10-CM

## 2023-03-22 DIAGNOSIS — M25.661 STIFFNESS OF RIGHT KNEE: ICD-10-CM

## 2023-03-22 DIAGNOSIS — R26.81 UNSTEADINESS ON FEET: ICD-10-CM

## 2023-03-22 DIAGNOSIS — M62.81 QUADRICEPS WEAKNESS: Primary | ICD-10-CM

## 2023-03-22 NOTE — PROGRESS NOTES
I have reviewed the notes, assessments, and/or procedures performed by Jaden Rosas PTA, I concur with her/his documentation of Sherren Brunner.

## 2023-03-28 ENCOUNTER — OFFICE VISIT (OUTPATIENT)
Dept: ORTHOPEDIC SURGERY | Age: 63
End: 2023-03-28

## 2023-03-28 DIAGNOSIS — Z96.651 STATUS POST RIGHT KNEE REPLACEMENT: Primary | ICD-10-CM

## 2023-03-28 NOTE — PROGRESS NOTES
Silviano Rose (: 1960) is a 57 y.o. female, patient, here for evaluation of the following chief complaint(s):  Surgical Follow-up (Post op eval - RTKA 23)       HPI:    Patient actually did pretty well after her right total knee but she would like to get more motion and is requesting a manipulation today. Allergies   Allergen Reactions    Ace Inhibitors Cough    Contrast Dye [Iodine] Hives       Current Outpatient Medications   Medication Sig    metoprolol tartrate (LOPRESSOR) 25 mg tablet Take 1/2 (one-half) tablet by mouth twice daily    tiZANidine (ZANAFLEX) 4 mg tablet Take 1 Tablet by mouth nightly. aspirin 81 mg chewable tablet Take 1 Tablet by mouth daily. Indications: treatment to prevent peripheral artery thromboembolism    gabapentin (NEURONTIN) 300 mg capsule Take 300 mg by mouth daily. gabapentin (NEURONTIN) 600 mg tablet Take 600 mg by mouth daily (after dinner). cetirizine (ZYRTEC) 10 mg tablet Take 10 mg by mouth daily as needed for Allergies. meclizine (ANTIVERT) 25 mg tablet Take 25 mg by mouth three (3) times daily as needed for Dizziness. ondansetron (ZOFRAN ODT) 4 mg disintegrating tablet Take 4 mg by mouth every eight (8) hours as needed for Nausea or Vomiting. acetaminophen (TYLENOL) 325 mg tablet Take 2 Tablets by mouth every six (6) hours. naloxone (Narcan) 4 mg/actuation nasal spray Use 1 spray intranasally, then discard. Repeat with new spray every 2 min as needed for opioid overdose symptoms, alternating nostrils. (Patient not taking: Reported on 2023)    rimegepant (NURTEC) 75 mg disintegrating tablet Take 1 tablet every other day, then 1 tablet p.o. as needed for severe headache, not to DC 1 dose in 24 hours    atorvastatin (LIPITOR) 10 mg tablet TAKE 1 TABLET BY MOUTH NIGHTLY FOR CHOLESTEROL    ergocalciferol (ERGOCALCIFEROL) 1,250 mcg (50,000 unit) capsule Take 1 Capsule by mouth every seven (7) days.  (Patient taking differently: Take 50,000 Units by mouth every seven (7) days. TAKES ON Friday)    folic acid (FOLVITE) 1 mg tablet Take 1 Tablet by mouth in the morning. clopidogreL (PLAVIX) 75 mg tab Take 1 Tablet by mouth daily. No current facility-administered medications for this visit.        Past Medical History:   Diagnosis Date    Anxiety     Bipolar 1 disorder (HCC)     Depression     Fatigue     Frequent headaches     GERD (gastroesophageal reflux disease)     Hearing loss     PT DENIES ANY HEARING    Hypertension     Joint pain     Memory disorder     LAPSE OF MEMORY DUE TO STROKES    Muscle pain     Nerve damage     ALL OVER    Snoring     Stomach pain     Stroke ([address]     X2 OVER 10 YRS AGO UNABLE TO REMEMBER DATE        Past Surgical History:   Procedure Laterality Date    HX ADENOIDECTOMY      sweat glands    HX GYN      tubal ligation    HX ORTHOPAEDIC  09/2022    LEFT TOTAL KNEE REPLACEMENT    HX TONSILLECTOMY         Family History   Problem Relation Age of Onset    Cancer Mother     Heart Disease Father         stents late 66's    Pacemaker Father     Other Sister         MURDERED SHOT    Anesth Problems Neg Hx         Social History     Socioeconomic History    Marital status:      Spouse name: Not on file    Number of children: Not on file    Years of education: Not on file    Highest education level: Not on file   Occupational History    Not on file   Tobacco Use    Smoking status: Some Days     Types: Cigars     Passive exposure: Current    Smokeless tobacco: Never   Vaping Use    Vaping Use: Never used   Substance and Sexual Activity    Alcohol use: Yes     Comment: \"once a month\"    Drug use: Yes     Types: Marijuana    Sexual activity: Not Currently   Other Topics Concern    Not on file   Social History Narrative    ** Merged History Encounter **          Social Determinants of Health     Financial Resource Strain: Not on file   Food Insecurity: Not on file   Transportation Needs: Not on file   Physical Activity: Not on file   Stress: Not on file   Social Connections: Not on file   Intimate Partner Violence: Not on file   Housing Stability: Not on file       ROS    Positive for: Musculoskeletal  Last edited by Tammy Gunderson on 3/28/2023  4:10 PM.            Vitals: There were no vitals taken for this visit. There is no height or weight on file to calculate BMI. PHYSICAL EXAM:  On exam she is about 95 degree arc of motion. Left knee is 120. ASSESSMENT/PLAN:  1. Status post right knee replacement  Probably can get her more motion. We will schedule her for manipulation under anesthesia. Risk benefits discussed I will see her back on the day of surgery. An electronic signature was used to authenticate this note.   --Telly Holt MD

## 2023-03-28 NOTE — LETTER
3/28/2023    Patient: Harry Montoya   YOB: 1960   Date of Visit: 3/28/2023     Levi Zamorano. Sade Barnett NP  [address] [facility] [place]    Dear Levi Zamorano. Sade Barnett NP,      Thank you for referring Ms. Kathleen Church to [facility] for evaluation. My notes for this consultation are attached. If you have questions, please do not hesitate to call me. I look forward to following your patient along with you.       Sincerely,    Nabeel Murillo MD

## 2023-03-29 DIAGNOSIS — Z96.651 STATUS POST RIGHT KNEE REPLACEMENT: Primary | ICD-10-CM

## 2023-04-16 RX ORDER — POLYETHYLENE GLYCOL 3350 17 G/17G
17 POWDER, FOR SOLUTION ORAL DAILY
Status: CANCELLED | OUTPATIENT
Start: 2023-04-17

## 2023-04-16 RX ORDER — HYDROXYZINE HYDROCHLORIDE 10 MG/1
10 TABLET, FILM COATED ORAL
Status: CANCELLED | OUTPATIENT
Start: 2023-04-16

## 2023-04-16 RX ORDER — ONDANSETRON 2 MG/ML
4 INJECTION INTRAMUSCULAR; INTRAVENOUS
Status: CANCELLED | OUTPATIENT
Start: 2023-04-16 | End: 2023-04-18

## 2023-04-16 RX ORDER — PROCHLORPERAZINE EDISYLATE 5 MG/ML
5 INJECTION INTRAMUSCULAR; INTRAVENOUS
Status: CANCELLED | OUTPATIENT
Start: 2023-04-16 | End: 2023-04-18

## 2023-04-16 RX ORDER — OXYCODONE HYDROCHLORIDE 5 MG/1
5 TABLET ORAL
Status: CANCELLED | OUTPATIENT
Start: 2023-04-16

## 2023-04-16 RX ORDER — ACETAMINOPHEN 500 MG
1000 TABLET ORAL EVERY 6 HOURS
Status: CANCELLED | OUTPATIENT
Start: 2023-04-16

## 2023-04-16 RX ORDER — SODIUM CHLORIDE 0.9 % (FLUSH) 0.9 %
5-40 SYRINGE (ML) INJECTION AS NEEDED
Status: CANCELLED | OUTPATIENT
Start: 2023-04-16

## 2023-04-16 RX ORDER — ATORVASTATIN CALCIUM 10 MG/1
10 TABLET, FILM COATED ORAL DAILY
Status: CANCELLED | OUTPATIENT
Start: 2023-04-17

## 2023-04-16 RX ORDER — AMOXICILLIN 250 MG
1 CAPSULE ORAL 2 TIMES DAILY
Status: CANCELLED | OUTPATIENT
Start: 2023-04-16

## 2023-04-16 RX ORDER — CLOPIDOGREL BISULFATE 75 MG/1
75 TABLET ORAL DAILY
Status: CANCELLED | OUTPATIENT
Start: 2023-04-17

## 2023-04-16 RX ORDER — FACIAL-BODY WIPES
10 EACH TOPICAL DAILY PRN
Status: CANCELLED | OUTPATIENT
Start: 2023-04-18

## 2023-04-16 RX ORDER — OXYCODONE HYDROCHLORIDE 5 MG/1
10 TABLET ORAL
Status: CANCELLED | OUTPATIENT
Start: 2023-04-16

## 2023-04-16 RX ORDER — SODIUM CHLORIDE 0.9 % (FLUSH) 0.9 %
5-40 SYRINGE (ML) INJECTION EVERY 8 HOURS
Status: CANCELLED | OUTPATIENT
Start: 2023-04-16

## 2023-04-16 RX ORDER — METOPROLOL TARTRATE 25 MG/1
25 TABLET, FILM COATED ORAL DAILY
Status: CANCELLED | OUTPATIENT
Start: 2023-04-17

## 2023-04-16 RX ORDER — GABAPENTIN 300 MG/1
300 CAPSULE ORAL DAILY
Status: CANCELLED | OUTPATIENT
Start: 2023-04-17

## 2023-04-16 RX ORDER — HYDROMORPHONE HYDROCHLORIDE 1 MG/ML
1 INJECTION, SOLUTION INTRAMUSCULAR; INTRAVENOUS; SUBCUTANEOUS
Status: CANCELLED | OUTPATIENT
Start: 2023-04-16 | End: 2023-04-17

## 2023-04-16 RX ORDER — NALOXONE HYDROCHLORIDE 0.4 MG/ML
0.4 INJECTION, SOLUTION INTRAMUSCULAR; INTRAVENOUS; SUBCUTANEOUS AS NEEDED
Status: CANCELLED | OUTPATIENT
Start: 2023-04-16

## 2023-04-17 ENCOUNTER — ANESTHESIA EVENT (OUTPATIENT)
Dept: SURGERY | Age: 63
End: 2023-04-17
Payer: MEDICARE

## 2023-04-17 NOTE — PERIOP NOTES
PAT PHONE INTERVIEW COMPLETED WITH PT AND INSTRUCTED AS BELOW. ALL QUESTION ANSWERED. MY CHART MESSAGE CONTAINING PT INSTRUCTIONS SENT TO PT PER PT REQUEST.   [facility]  PREOPERATIVE INSTRUCTIONS    Surgery Date:   4/18/23    Your surgeon's office or [facility] staff will call you between 4 PM- 8 PM the day before surgery with your arrival time. If your surgery is on a Monday, you will receive a call the preceding Friday. Please report to [facility] Patient Access/Admitting on the 1st floor. Bring your insurance card, photo identification, and any copayment ( if applicable). If you are going home the same day of your surgery, you must have a responsible adult to drive you home. You need to have a responsible adult to stay with you the first 24 hours after surgery and you should not drive a car for 24 hours following your surgery. Nothing to eat or drink after midnight the night before surgery. This includes no water, gum, mints, coffee, juice, etc.  Please note special instructions, if applicable, below for medications. Do NOT drink alcohol or smoke 24 hours before surgery. STOP smoking for 14 days prior as it helps with breathing and healing after surgery. If you are being admitted to the hospital, please leave personal belongings/luggage in your car until you have an assigned hospital room number. Please wear comfortable clothes. Wear your glasses instead of contacts. We ask that all money, jewelry and valuables be left at home. Wear no make up, particularly mascara, the day of surgery. All body piercings, rings, and jewelry need to be removed and left at home. Please remove any nail polish or artificial nails from your fingernails. Please wear your hair loose or down. Please no pony-tails, buns, or any metal hair accessories. When you shower the morning of surgery, please do not apply any lotions or powders afterwards. You may wear deodorant, unless having breast surgery.  Do not shave any body area within 24 hours of your surgery. Please follow all instructions to avoid any potential surgical cancellation. Should your physical condition change, (i.e. fever, cold, flu, etc.) please notify your surgeon as soon as possible. It is important to be on time. If a situation occurs where you may be delayed, please call:  [phone] / [phone] on the day of surgery. The Preadmission Testing staff can be reached at [phone]. Special instructions: NONE      No current facility-administered medications for this encounter. Current Outpatient Medications   Medication Sig    atorvastatin (LIPITOR) 10 mg tablet TAKE 1 TABLET BY MOUTH NIGHTLY FOR CHOLESTEROL    metoprolol tartrate (LOPRESSOR) 25 mg tablet Take 1/2 (one-half) tablet by mouth twice daily    clopidogreL (PLAVIX) 75 mg tab Take 1 Tablet by mouth daily. gabapentin (NEURONTIN) 300 mg capsule Take 1 Capsule by mouth daily. YOU MUST ONLY TAKE THESE MEDICATIONS THE MORNING OF SURGERY WITH A SIP OF WATER: METOPROLOL, GABAPENTIN  MEDICATIONS TO TAKE THE MORNING OF SURGERY ONLY IF NEEDED: NONE  HOLD these prescription medications BEFORE Surgery: NONE  Ask your surgeon/prescribing physician about when/if to STOP taking these medications: PLAVIX  Stop all vitamins, herbal medicines and Aspirin containing products 7 days prior to surgery. Stop any non-steroidal anti-inflammatory drugs (i.e. Ibuprofen, Naproxen, Advil, Aleve) 3 days before surgery. You may take Tylenol. If you are currently taking Plavix, Coumadin, or any other blood-thinning/anticoagulant medication contact your prescribing physician for instructions. Preventing Infections Before and After - Your Surgery    IMPORTANT INSTRUCTIONS      You play an important role in your health and preparation for surgery. To reduce the germs on your skin you will need to shower with CHG soap (Chorhexidine gluconate 4%) two times before surgery.     CHG soap (Hibiclens, Hex-A-Clens or store brand)  CHG soap will be provided at your Preadmission Testing (PAT) appointment. If you do not have a PAT appointment before surgery, you may arrange to  CHG soap from our office or purchase CHG soap at a pharmacy, grocery or department store. You need to purchase TWO 4 ounce bottles to use for your 2 showers. Steps to follow:  Corina Wirtz your hair with your normal shampoo and your body with regular soap and rinse well to remove shampoo and soap from your skin. Wet a clean washcloth and turn off the shower. Put CHG soap on washcloth and apply to your entire body from the neck down. Do not use on your head, face or private parts(genitals). Do not use CHG soap on open sores, wounds or areas of skin irritation. Wash you body gently for 5 minutes. Do not wash your skin too hard. This soap does not create lather. Pay special attention to your underarms and from your belly button to your feet. Turn the shower back on and rinse well to get CHG soap off your body. Pat your skin dry with a clean, dry towel. Do not apply lotions or moisturizer. Put on clean clothes and sleep on fresh bed sheets and do not allow pets to sleep with you. Shower with CHG soap 2 times before your surgery  The evening before your surgery  The morning of your surgery      Tips to help prevent infections after your surgery:  Protect your surgical wound from germs:  Hand washing is the most important thing you and your caregivers can do to prevent infections. Keep your bandage clean and dry! Do not touch your surgical wound. Use clean, freshly washed towels and washcloths every time you shower; do not share bath linens with others. Until your surgical wound is healed, wear clothing and sleep on bed linens each day that are clean. Do not allow pets to sleep in your bed with you or touch your surgical wound. Do not smoke - smoking delays wound healing. This may be a good time to stop smoking.   If you have diabetes, it is important for you to manage your blood sugar levels properly before your surgery as well as after your surgery. Poorly managed blood sugar levels slow down wound healing and prevent you from healing completely. Patient Information Regarding COVID Restrictions    Day of Procedure    Please park in the parking deck or any designated visitor parking lot. Enter the facility through the Main Entrance of the hospital.  On the day of surgery, please provide the cell phone number of the person who will be waiting for you to the Patient Access representative at the time of registration. Masks are highly recommended in the hospital, but not required. Once your procedure and the immediate recovery period is completed, a nurse in the recovery area will contact your designated visitor to inform them of your room number or to otherwise review other pertinent information regarding your care. Social distancing practices are strongly encouraged in waiting areas and the cafeteria. The patient was contacted  via phone. She verbalized understanding of all instructions does not  need reinforcement.

## 2023-04-18 ENCOUNTER — ANESTHESIA (OUTPATIENT)
Dept: SURGERY | Age: 63
End: 2023-04-18
Payer: MEDICARE

## 2023-04-18 ENCOUNTER — HOSPITAL ENCOUNTER (OUTPATIENT)
Age: 63
Discharge: HOME | End: 2023-04-18
Attending: ORTHOPAEDIC SURGERY | Admitting: ORTHOPAEDIC SURGERY
Payer: MEDICARE

## 2023-04-18 VITALS
RESPIRATION RATE: 17 BRPM | WEIGHT: 198 LBS | OXYGEN SATURATION: 96 % | DIASTOLIC BLOOD PRESSURE: 94 MMHG | BODY MASS INDEX: 33.8 KG/M2 | HEIGHT: 64 IN | TEMPERATURE: 98.3 F | HEART RATE: 81 BPM | SYSTOLIC BLOOD PRESSURE: 156 MMHG

## 2023-04-18 DIAGNOSIS — M24.661 FIBROSIS OF RIGHT KNEE JOINT: Primary | ICD-10-CM

## 2023-04-18 PROCEDURE — 27570 FIXATION OF KNEE JOINT: CPT | Performed by: ORTHOPAEDIC SURGERY

## 2023-04-18 PROCEDURE — 77030040922 HC BLNKT HYPOTHRM STRY -A

## 2023-04-18 PROCEDURE — 74011000250 HC RX REV CODE- 250: Performed by: NURSE ANESTHETIST, CERTIFIED REGISTERED

## 2023-04-18 PROCEDURE — 76060000031 HC ANESTHESIA FIRST 0.5 HR: Performed by: ORTHOPAEDIC SURGERY

## 2023-04-18 PROCEDURE — 74011250636 HC RX REV CODE- 250/636: Performed by: ANESTHESIOLOGY

## 2023-04-18 PROCEDURE — 76210000016 HC OR PH I REC 1 TO 1.5 HR: Performed by: ORTHOPAEDIC SURGERY

## 2023-04-18 PROCEDURE — 76210000020 HC REC RM PH II FIRST 0.5 HR: Performed by: ORTHOPAEDIC SURGERY

## 2023-04-18 PROCEDURE — 76010000154 HC OR TIME FIRST 0.5 HR: Performed by: ORTHOPAEDIC SURGERY

## 2023-04-18 PROCEDURE — 74011250637 HC RX REV CODE- 250/637: Performed by: ANESTHESIOLOGY

## 2023-04-18 PROCEDURE — 74011250636 HC RX REV CODE- 250/636: Performed by: NURSE ANESTHETIST, CERTIFIED REGISTERED

## 2023-04-18 RX ORDER — LIDOCAINE HYDROCHLORIDE 10 MG/ML
0.1 INJECTION, SOLUTION EPIDURAL; INFILTRATION; INTRACAUDAL; PERINEURAL AS NEEDED
Status: DISCONTINUED | OUTPATIENT
Start: 2023-04-18 | End: 2023-04-18 | Stop reason: HOSPADM

## 2023-04-18 RX ORDER — KETOROLAC TROMETHAMINE 30 MG/ML
INJECTION, SOLUTION INTRAMUSCULAR; INTRAVENOUS AS NEEDED
Status: DISCONTINUED | OUTPATIENT
Start: 2023-04-18 | End: 2023-04-18 | Stop reason: HOSPADM

## 2023-04-18 RX ORDER — MIDAZOLAM HYDROCHLORIDE 1 MG/ML
INJECTION, SOLUTION INTRAMUSCULAR; INTRAVENOUS AS NEEDED
Status: DISCONTINUED | OUTPATIENT
Start: 2023-04-18 | End: 2023-04-18 | Stop reason: HOSPADM

## 2023-04-18 RX ORDER — MIDAZOLAM HYDROCHLORIDE 1 MG/ML
1 INJECTION, SOLUTION INTRAMUSCULAR; INTRAVENOUS AS NEEDED
Status: DISCONTINUED | OUTPATIENT
Start: 2023-04-18 | End: 2023-04-18 | Stop reason: HOSPADM

## 2023-04-18 RX ORDER — SODIUM CHLORIDE 9 MG/ML
50 INJECTION, SOLUTION INTRAVENOUS CONTINUOUS
Status: DISCONTINUED | OUTPATIENT
Start: 2023-04-18 | End: 2023-04-18 | Stop reason: HOSPADM

## 2023-04-18 RX ORDER — DIPHENHYDRAMINE HYDROCHLORIDE 50 MG/ML
12.5 INJECTION, SOLUTION INTRAMUSCULAR; INTRAVENOUS AS NEEDED
Status: DISCONTINUED | OUTPATIENT
Start: 2023-04-18 | End: 2023-04-18 | Stop reason: HOSPADM

## 2023-04-18 RX ORDER — PROPOFOL 10 MG/ML
INJECTION, EMULSION INTRAVENOUS AS NEEDED
Status: DISCONTINUED | OUTPATIENT
Start: 2023-04-18 | End: 2023-04-18 | Stop reason: HOSPADM

## 2023-04-18 RX ORDER — ONDANSETRON 2 MG/ML
4 INJECTION INTRAMUSCULAR; INTRAVENOUS AS NEEDED
Status: DISCONTINUED | OUTPATIENT
Start: 2023-04-18 | End: 2023-04-18 | Stop reason: HOSPADM

## 2023-04-18 RX ORDER — HYDROMORPHONE HYDROCHLORIDE 1 MG/ML
0.2 INJECTION, SOLUTION INTRAMUSCULAR; INTRAVENOUS; SUBCUTANEOUS
Status: DISCONTINUED | OUTPATIENT
Start: 2023-04-18 | End: 2023-04-18 | Stop reason: HOSPADM

## 2023-04-18 RX ORDER — MORPHINE SULFATE 2 MG/ML
2 INJECTION, SOLUTION INTRAMUSCULAR; INTRAVENOUS
Status: DISCONTINUED | OUTPATIENT
Start: 2023-04-18 | End: 2023-04-18 | Stop reason: HOSPADM

## 2023-04-18 RX ORDER — SODIUM CHLORIDE 0.9 % (FLUSH) 0.9 %
5-40 SYRINGE (ML) INJECTION AS NEEDED
Status: DISCONTINUED | OUTPATIENT
Start: 2023-04-18 | End: 2023-04-18 | Stop reason: HOSPADM

## 2023-04-18 RX ORDER — FENTANYL CITRATE 50 UG/ML
INJECTION, SOLUTION INTRAMUSCULAR; INTRAVENOUS AS NEEDED
Status: DISCONTINUED | OUTPATIENT
Start: 2023-04-18 | End: 2023-04-18 | Stop reason: HOSPADM

## 2023-04-18 RX ORDER — FENTANYL CITRATE 50 UG/ML
25 INJECTION, SOLUTION INTRAMUSCULAR; INTRAVENOUS
Status: DISCONTINUED | OUTPATIENT
Start: 2023-04-18 | End: 2023-04-18 | Stop reason: HOSPADM

## 2023-04-18 RX ORDER — SODIUM CHLORIDE, SODIUM LACTATE, POTASSIUM CHLORIDE, CALCIUM CHLORIDE 600; 310; 30; 20 MG/100ML; MG/100ML; MG/100ML; MG/100ML
125 INJECTION, SOLUTION INTRAVENOUS CONTINUOUS
Status: DISCONTINUED | OUTPATIENT
Start: 2023-04-18 | End: 2023-04-18 | Stop reason: HOSPADM

## 2023-04-18 RX ORDER — SODIUM CHLORIDE 9 MG/ML
125 INJECTION, SOLUTION INTRAVENOUS CONTINUOUS
Status: DISCONTINUED | OUTPATIENT
Start: 2023-04-18 | End: 2023-04-18 | Stop reason: HOSPADM

## 2023-04-18 RX ORDER — LIDOCAINE HYDROCHLORIDE 20 MG/ML
INJECTION, SOLUTION EPIDURAL; INFILTRATION; INTRACAUDAL; PERINEURAL AS NEEDED
Status: DISCONTINUED | OUTPATIENT
Start: 2023-04-18 | End: 2023-04-18 | Stop reason: HOSPADM

## 2023-04-18 RX ORDER — MIDAZOLAM HYDROCHLORIDE 1 MG/ML
0.5 INJECTION, SOLUTION INTRAMUSCULAR; INTRAVENOUS
Status: DISCONTINUED | OUTPATIENT
Start: 2023-04-18 | End: 2023-04-18 | Stop reason: HOSPADM

## 2023-04-18 RX ORDER — SODIUM CHLORIDE 0.9 % (FLUSH) 0.9 %
5-40 SYRINGE (ML) INJECTION EVERY 8 HOURS
Status: DISCONTINUED | OUTPATIENT
Start: 2023-04-18 | End: 2023-04-18 | Stop reason: HOSPADM

## 2023-04-18 RX ORDER — SODIUM CHLORIDE 9 MG/ML
1000 INJECTION, SOLUTION INTRAVENOUS CONTINUOUS
Status: DISCONTINUED | OUTPATIENT
Start: 2023-04-18 | End: 2023-04-18 | Stop reason: HOSPADM

## 2023-04-18 RX ORDER — OXYCODONE AND ACETAMINOPHEN 5; 325 MG/1; MG/1
1 TABLET ORAL AS NEEDED
Status: DISCONTINUED | OUTPATIENT
Start: 2023-04-18 | End: 2023-04-18 | Stop reason: HOSPADM

## 2023-04-18 RX ORDER — OXYCODONE HYDROCHLORIDE 5 MG/1
5-10 TABLET ORAL
Qty: 42 TABLET | Refills: 0 | Status: SHIPPED | OUTPATIENT
Start: 2023-04-18 | End: 2023-04-25

## 2023-04-18 RX ORDER — FENTANYL CITRATE 50 UG/ML
50 INJECTION, SOLUTION INTRAMUSCULAR; INTRAVENOUS AS NEEDED
Status: DISCONTINUED | OUTPATIENT
Start: 2023-04-18 | End: 2023-04-18 | Stop reason: HOSPADM

## 2023-04-18 RX ORDER — ACETAMINOPHEN 325 MG/1
650 TABLET ORAL ONCE
Status: COMPLETED | OUTPATIENT
Start: 2023-04-18 | End: 2023-04-18

## 2023-04-18 RX ADMIN — FENTANYL CITRATE 50 MCG: 50 INJECTION, SOLUTION INTRAMUSCULAR; INTRAVENOUS at 08:09

## 2023-04-18 RX ADMIN — PROPOFOL 20 MG: 10 INJECTION, EMULSION INTRAVENOUS at 08:01

## 2023-04-18 RX ADMIN — OXYCODONE HYDROCHLORIDE AND ACETAMINOPHEN 1 TABLET: 5; 325 TABLET ORAL at 08:37

## 2023-04-18 RX ADMIN — FENTANYL CITRATE 25 MCG: 50 INJECTION, SOLUTION INTRAMUSCULAR; INTRAVENOUS at 08:20

## 2023-04-18 RX ADMIN — SODIUM CHLORIDE, POTASSIUM CHLORIDE, SODIUM LACTATE AND CALCIUM CHLORIDE 125 ML/HR: 600; 310; 30; 20 INJECTION, SOLUTION INTRAVENOUS at 07:06

## 2023-04-18 RX ADMIN — HYDROMORPHONE HYDROCHLORIDE 0.5 MG: 1 INJECTION, SOLUTION INTRAMUSCULAR; INTRAVENOUS; SUBCUTANEOUS at 08:35

## 2023-04-18 RX ADMIN — PROPOFOL 150 MG: 10 INJECTION, EMULSION INTRAVENOUS at 07:54

## 2023-04-18 RX ADMIN — MIDAZOLAM 2 MG: 1 INJECTION INTRAMUSCULAR; INTRAVENOUS at 07:43

## 2023-04-18 RX ADMIN — HYDROMORPHONE HYDROCHLORIDE 0.5 MG: 1 INJECTION, SOLUTION INTRAMUSCULAR; INTRAVENOUS; SUBCUTANEOUS at 08:20

## 2023-04-18 RX ADMIN — PROPOFOL 20 MG: 10 INJECTION, EMULSION INTRAVENOUS at 08:04

## 2023-04-18 RX ADMIN — FENTANYL CITRATE 50 MCG: 50 INJECTION, SOLUTION INTRAMUSCULAR; INTRAVENOUS at 08:06

## 2023-04-18 RX ADMIN — SODIUM CHLORIDE, POTASSIUM CHLORIDE, SODIUM LACTATE AND CALCIUM CHLORIDE: 600; 310; 30; 20 INJECTION, SOLUTION INTRAVENOUS at 06:42

## 2023-04-18 RX ADMIN — ACETAMINOPHEN 650 MG: 325 TABLET, FILM COATED ORAL at 06:57

## 2023-04-18 RX ADMIN — FENTANYL CITRATE 25 MCG: 50 INJECTION, SOLUTION INTRAMUSCULAR; INTRAVENOUS at 08:25

## 2023-04-18 RX ADMIN — LIDOCAINE HYDROCHLORIDE 80 MG: 20 INJECTION, SOLUTION EPIDURAL; INFILTRATION; INTRACAUDAL; PERINEURAL at 07:54

## 2023-04-18 RX ADMIN — KETOROLAC TROMETHAMINE 30 MG: 30 INJECTION, SOLUTION INTRAMUSCULAR; INTRAVENOUS at 08:01

## 2023-04-18 NOTE — ANESTHESIA POSTPROCEDURE EVALUATION
Procedure(s):  RIGHT KNEE MANIPULATION. general    Anesthesia Post Evaluation      Multimodal analgesia: multimodal analgesia used between 6 hours prior to anesthesia start to PACU discharge  Patient location during evaluation: PACU  Patient participation: complete - patient participated  Level of consciousness: awake  Pain score: 2  Pain management: adequate  Airway patency: patent  Anesthetic complications: no  Cardiovascular status: acceptable  Respiratory status: acceptable  Hydration status: acceptable  Comments: I have evaluated the patient and meets criteria for discharge from PACU. Allen Harding MD  Post anesthesia nausea and vomiting:  controlled  Final Post Anesthesia Temperature Assessment:  Normothermia (36.0-37.5 degrees C)      INITIAL Post-op Vital signs:   Vitals Value Taken Time   /95 04/18/23 0820   Temp 36.8 °C (98.3 °F) 04/18/23 0810   Pulse 73 04/18/23 0823   Resp 24 04/18/23 0823   SpO2 100 % 04/18/23 0823   Vitals shown include unvalidated device data.

## 2023-04-18 NOTE — ADDENDUM NOTE
Addendum  created 04/18/23 0830 by Bhaskar Balderrama CRNA    Intraprocedure Meds edited, Orders acknowledged in Narrator

## 2023-04-18 NOTE — PERIOP NOTES
Discharge instructions reviewed with patient.  Verbalized understanding   Printed instructions provided to patient at discharge

## 2023-04-18 NOTE — PERIOP NOTES
/100,Curly CORADO (CRNA) into evaluate and medicate with versed. Patient placed on monitor P 78 O 2 saturation 97 R 20.

## 2023-04-18 NOTE — H&P
Date of Surgery Update:  Thuan Zimmer was seen and examined. History and physical has been reviewed. The patient has been examined.  There have been no significant clinical changes since the completion of the originally dated History and Physical.    Signed By: Andrews De La Torre MD     April 18, 2023 6:55 AM

## 2023-04-18 NOTE — ANESTHESIA PREPROCEDURE EVALUATION
Relevant Problems   No relevant active problems       Anesthetic History   No history of anesthetic complications            Review of Systems / Medical History  Patient summary reviewed, nursing notes reviewed and pertinent labs reviewed    Pulmonary  Within defined limits                 Neuro/Psych   Within defined limits      TIA     Cardiovascular  Within defined limits  Hypertension                   GI/Hepatic/Renal  Within defined limits   GERD           Endo/Other  Within defined limits      Obesity     Other Findings              Physical Exam    Airway  Mallampati: II  TM Distance: > 6 cm  Neck ROM: normal range of motion   Mouth opening: Normal     Cardiovascular  Regular rate and rhythm,  S1 and S2 normal,  no murmur, click, rub, or gallop             Dental  No notable dental hx       Pulmonary  Breath sounds clear to auscultation               Abdominal  GI exam deferred       Other Findings            Anesthetic Plan    ASA: 2  Anesthesia type: general          Induction: Intravenous  Anesthetic plan and risks discussed with: Patient

## 2023-04-18 NOTE — DISCHARGE INSTRUCTIONS
Knee Discharge Instructions  Dr. Marco Antonio Daly    Activity:  You should be as active as you can tolerate within the guidelines you have been given. 2.   Ambulate as tolerated. Diet:  You may resume your regular home diet. If you are on Coumadin, please be consistent eating green leafy vegetables day to day (Green leafy vegetables contain Vitamin K, which does the opposite affect of Coumadin). You CAN eat them, but again, be consistent day to day. One serving per day is recommended. Pain:    A prescription for pain control has been called to your pharmacy. Take this medication as directed. Do not take more than prescribed. If your pain is not controlled by the medication you were given, please call your surgeons office. Anticoagulation:     As directed by physician. Stool Softeners:    If it important to have regular bowel movements. Pain medications can cause constipation. Stool softeners, warm prune juice, increasing your water intake, and increasing fiber can help in preventing constipation. Do not take laxatives if at all possible except in severe situations. It can result in a vicious cycle between constipation and diarrhea. Ice Pack:  Application of ice will prevent and treat inflammation. You should use this three times a day for 20- 30 minutes as needed. Follow up Care:    When you arrive home, please call your surgeon to schedule your follow-up appointment if not already set up for you. Reasons to call your surgeon:    Fever above 101 for more than 24 hours. Persistent pain in the calf or either leg. Pain uncontrolled by your pain medication. Nausea or vomiting  Redness, swelling or drainage from your incision. Numbness, tingling, or change in your affected extremity.                   ______________________________________________________________________    Anesthesia Discharge Instructions    After general anesthesia or intervenous sedation, for 24 hours or while taking prescription Narcotics:  Limit your activities  Do not drive or operate hazardous machinery  If you have not urinated within 8 hours after discharge, please contact your surgeon on call. Do not make important personal or business decisions  Do not drink alcoholic beverages    Report the following to your surgeon:  Excessive pain, swelling, redness or odor of or around the surgical area  Temperature over 100.5 degrees  Nausea and vomiting lasting longer than 4 hours or if unable to take medication  Any signs of decreased circulation or nerve impairment to extremity:  Change in color, persistent numbness, tingling, coldness or increased pain.   Any questions

## 2023-04-26 NOTE — OP NOTES
[address]   OPERATIVE REPORT    Name:  Lewis Collazo  MR#:  [id number]  :  1960  ACCOUNT #:  [de-identified]  DATE OF SERVICE:  2023    PREOPERATIVE DIAGNOSIS:  Status post right total knee. POSTOPERATIVE DIAGNOSIS:  Status post right total knee    PROCEDURE PERFORMED:  Manipulation under anesthesia. SURGEON:  Max Brenner MD    ASSISTANT:  None. ANESTHESIA:  General.    COMPLICATIONS:  None. SPECIMENS REMOVED:  None. IMPLANTS:  None. ESTIMATED BLOOD LOSS:  None. INDICATIONS:  This is a 55-year-old woman status post total knee arthroplasty right, who presents today for manipulation due to slow progress in PT. PROCEDURE:  Anesthetic by mask was initiated. The right side was confirmed as the operative side. I gently manipulated her right knee to 125 degrees of flexion with good pickup of scar tissue in the suprapatellar pouch. The patient tolerated the procedure well, was awakened from anesthetic and taken to the recovery room stable.       Jatin Guerrero MD [id number]  D:  2023 17:41  T:  2023 2:03  JOB #:  [id number]

## 2023-07-05 ENCOUNTER — NURSE TRIAGE (OUTPATIENT)
Dept: OTHER | Facility: CLINIC | Age: 63
End: 2023-07-05

## 2023-07-05 ENCOUNTER — TELEPHONE (OUTPATIENT)
Facility: CLINIC | Age: 63
End: 2023-07-05

## 2023-07-05 NOTE — TELEPHONE ENCOUNTER
Location of patient: [address] [place] [address] [place] call from [address] with [place]. Subjective: Caller states \"Shortness of breath   \"     Current Symptoms: Shortness of breath on and off for at least 2 weeks. Not currently short of breath. Shortness of breath especially when exerting or walking but sometimes when sitting still as well. Starting to have shortness of breath everyday. Onset: 2 weeks ago; intermittent, unchanged    Associated Symptoms: NA    Pain Severity: Denies    Temperature: Denies    What has been tried: NA    LMP: NA Pregnant: NA    Recommended disposition: Go to Office Now, Caller states unable to go to office today, Reiterated my disposition to the patient and expressed concern for the patient's well-being. Care advice provided, patient verbalizes understanding; denies any other questions or concerns; instructed to call back for any new or worsening symptoms. Writer provided warm transfer to [facility] at [address] [place] for Refusal of urgent disposition      Attention Provider: Thank you for allowing me to participate in the care of your patient. The patient was connected to triage in response to information provided to the ECC/PSC. Please do not respond through this encounter as the response is not directed to a shared pool.       Reason for Disposition   MILD difficulty breathing (e.g., minimal/no SOB at rest, SOB with walking, pulse < 100) of new-onset or worse than normal    Protocols used: Breathing Difficulty-ADULT-[place]

## 2023-07-09 ENCOUNTER — APPOINTMENT (OUTPATIENT)
Facility: HOSPITAL | Age: 63
DRG: 175 | End: 2023-07-09
Payer: MEDICARE

## 2023-07-09 ENCOUNTER — HOSPITAL ENCOUNTER (INPATIENT)
Facility: HOSPITAL | Age: 63
LOS: 4 days | Discharge: HOME HEALTH | DRG: 175 | End: 2023-07-13
Attending: EMERGENCY MEDICINE | Admitting: HOSPITALIST
Payer: MEDICARE

## 2023-07-09 DIAGNOSIS — R77.8 ELEVATED TROPONIN: ICD-10-CM

## 2023-07-09 DIAGNOSIS — R79.89 ELEVATED BRAIN NATRIURETIC PEPTIDE (BNP) LEVEL: ICD-10-CM

## 2023-07-09 DIAGNOSIS — I50.9 ACUTE CONGESTIVE HEART FAILURE, UNSPECIFIED HEART FAILURE TYPE (HCC): ICD-10-CM

## 2023-07-09 DIAGNOSIS — R09.02 HYPOXIA: Primary | ICD-10-CM

## 2023-07-09 DIAGNOSIS — I26.99 ACUTE PULMONARY EMBOLISM, UNSPECIFIED PULMONARY EMBOLISM TYPE, UNSPECIFIED WHETHER ACUTE COR PULMONALE PRESENT (HCC): ICD-10-CM

## 2023-07-09 LAB
ALBUMIN SERPL-MCNC: 4.2 G/DL (ref 3.5–5)
ALBUMIN/GLOB SERPL: 1.2 (ref 1.1–2.2)
ALP SERPL-CCNC: 145 U/L (ref 45–117)
ALT SERPL-CCNC: 32 U/L (ref 12–78)
ANION GAP SERPL CALC-SCNC: 6 MMOL/L (ref 5–15)
APTT PPP: 21.8 SEC (ref 22.1–31)
AST SERPL-CCNC: 21 U/L (ref 15–37)
BASOPHILS # BLD: 0 K/UL (ref 0–0.1)
BASOPHILS NFR BLD: 0 % (ref 0–1)
BILIRUB SERPL-MCNC: 0.6 MG/DL (ref 0.2–1)
BUN SERPL-MCNC: 15 MG/DL (ref 6–20)
BUN/CREAT SERPL: 17 (ref 12–20)
CALCIUM SERPL-MCNC: 9.5 MG/DL (ref 8.5–10.1)
CHLORIDE SERPL-SCNC: 113 MMOL/L (ref 97–108)
CO2 SERPL-SCNC: 24 MMOL/L (ref 21–32)
COMMENT:: NORMAL
CREAT SERPL-MCNC: 0.87 MG/DL (ref 0.55–1.02)
D DIMER PPP FEU-MCNC: 5.44 MG/L FEU (ref 0–0.65)
DIFFERENTIAL METHOD BLD: NORMAL
EOSINOPHIL # BLD: 0 K/UL (ref 0–0.4)
EOSINOPHIL NFR BLD: 0 % (ref 0–7)
ERYTHROCYTE [DISTWIDTH] IN BLOOD BY AUTOMATED COUNT: 13.8 % (ref 11.5–14.5)
GLOBULIN SER CALC-MCNC: 3.5 G/DL (ref 2–4)
GLUCOSE SERPL-MCNC: 150 MG/DL (ref 65–100)
HCG UR QL: NEGATIVE
HCT VFR BLD AUTO: 46.1 % (ref 35–47)
HGB BLD-MCNC: 15.1 G/DL (ref 11.5–16)
IMM GRANULOCYTES # BLD AUTO: 0 K/UL (ref 0–0.04)
IMM GRANULOCYTES NFR BLD AUTO: 0 % (ref 0–0.5)
INR PPP: 1.4 (ref 0.9–1.1)
LYMPHOCYTES # BLD: 1.3 K/UL (ref 0.8–3.5)
LYMPHOCYTES NFR BLD: 25 % (ref 12–49)
MCH RBC QN AUTO: 31.1 PG (ref 26–34)
MCHC RBC AUTO-ENTMCNC: 32.8 G/DL (ref 30–36.5)
MCV RBC AUTO: 95.1 FL (ref 80–99)
MONOCYTES # BLD: 0.4 K/UL (ref 0–1)
MONOCYTES NFR BLD: 7 % (ref 5–13)
NEUTS SEG # BLD: 3.5 K/UL (ref 1.8–8)
NEUTS SEG NFR BLD: 68 % (ref 32–75)
NRBC # BLD: 0 K/UL (ref 0–0.01)
NRBC BLD-RTO: 0 PER 100 WBC
NT PRO BNP: 2203 PG/ML
PLATELET # BLD AUTO: 193 K/UL (ref 150–400)
PMV BLD AUTO: 11.1 FL (ref 8.9–12.9)
POTASSIUM SERPL-SCNC: 4 MMOL/L (ref 3.5–5.1)
PROT SERPL-MCNC: 7.7 G/DL (ref 6.4–8.2)
PROTHROMBIN TIME: 14.6 SEC (ref 9–11.1)
RBC # BLD AUTO: 4.85 M/UL (ref 3.8–5.2)
SODIUM SERPL-SCNC: 143 MMOL/L (ref 136–145)
SPECIMEN HOLD: NORMAL
THERAPEUTIC RANGE: ABNORMAL SECS (ref 58–77)
TROPONIN I SERPL HS-MCNC: 282 NG/L (ref 0–37)
TROPONIN I SERPL HS-MCNC: 673 NG/L (ref 0–37)
UFH PPP CHRO-ACNC: 0.68 IU/ML
UFH PPP CHRO-ACNC: <0.1 IU/ML
WBC # BLD AUTO: 5.3 K/UL (ref 3.6–11)

## 2023-07-09 PROCEDURE — 85520 HEPARIN ASSAY: CPT

## 2023-07-09 PROCEDURE — 6370000000 HC RX 637 (ALT 250 FOR IP): Performed by: EMERGENCY MEDICINE

## 2023-07-09 PROCEDURE — 6360000002 HC RX W HCPCS: Performed by: HOSPITALIST

## 2023-07-09 PROCEDURE — 96374 THER/PROPH/DIAG INJ IV PUSH: CPT

## 2023-07-09 PROCEDURE — 94640 AIRWAY INHALATION TREATMENT: CPT

## 2023-07-09 PROCEDURE — 71046 X-RAY EXAM CHEST 2 VIEWS: CPT

## 2023-07-09 PROCEDURE — 96375 TX/PRO/DX INJ NEW DRUG ADDON: CPT

## 2023-07-09 PROCEDURE — 83880 ASSAY OF NATRIURETIC PEPTIDE: CPT

## 2023-07-09 PROCEDURE — 81025 URINE PREGNANCY TEST: CPT

## 2023-07-09 PROCEDURE — 36415 COLL VENOUS BLD VENIPUNCTURE: CPT

## 2023-07-09 PROCEDURE — 85025 COMPLETE CBC W/AUTO DIFF WBC: CPT

## 2023-07-09 PROCEDURE — 6360000002 HC RX W HCPCS: Performed by: EMERGENCY MEDICINE

## 2023-07-09 PROCEDURE — 80053 COMPREHEN METABOLIC PANEL: CPT

## 2023-07-09 PROCEDURE — 6370000000 HC RX 637 (ALT 250 FOR IP): Performed by: HOSPITALIST

## 2023-07-09 PROCEDURE — 2580000003 HC RX 258: Performed by: HOSPITALIST

## 2023-07-09 PROCEDURE — 2060000000 HC ICU INTERMEDIATE R&B

## 2023-07-09 PROCEDURE — 85379 FIBRIN DEGRADATION QUANT: CPT

## 2023-07-09 PROCEDURE — 99285 EMERGENCY DEPT VISIT HI MDM: CPT

## 2023-07-09 PROCEDURE — 84484 ASSAY OF TROPONIN QUANT: CPT

## 2023-07-09 PROCEDURE — 85610 PROTHROMBIN TIME: CPT

## 2023-07-09 PROCEDURE — 85730 THROMBOPLASTIN TIME PARTIAL: CPT

## 2023-07-09 RX ORDER — HEPARIN SODIUM 1000 [USP'U]/ML
4000 INJECTION, SOLUTION INTRAVENOUS; SUBCUTANEOUS PRN
Status: DISCONTINUED | OUTPATIENT
Start: 2023-07-09 | End: 2023-07-10

## 2023-07-09 RX ORDER — LOSARTAN POTASSIUM 50 MG/1
100 TABLET ORAL DAILY
Status: DISCONTINUED | OUTPATIENT
Start: 2023-07-09 | End: 2023-07-13 | Stop reason: HOSPADM

## 2023-07-09 RX ORDER — HEPARIN SODIUM 1000 [USP'U]/ML
4000 INJECTION, SOLUTION INTRAVENOUS; SUBCUTANEOUS ONCE
Status: COMPLETED | OUTPATIENT
Start: 2023-07-09 | End: 2023-07-09

## 2023-07-09 RX ORDER — CLOPIDOGREL BISULFATE 75 MG/1
75 TABLET ORAL DAILY
Status: DISCONTINUED | OUTPATIENT
Start: 2023-07-10 | End: 2023-07-13 | Stop reason: HOSPADM

## 2023-07-09 RX ORDER — ASPIRIN 325 MG
325 TABLET ORAL
Status: COMPLETED | OUTPATIENT
Start: 2023-07-09 | End: 2023-07-09

## 2023-07-09 RX ORDER — HEPARIN SODIUM 1000 [USP'U]/ML
2000 INJECTION, SOLUTION INTRAVENOUS; SUBCUTANEOUS PRN
Status: DISCONTINUED | OUTPATIENT
Start: 2023-07-09 | End: 2023-07-10

## 2023-07-09 RX ORDER — DIPHENHYDRAMINE HYDROCHLORIDE 50 MG/ML
50 INJECTION INTRAMUSCULAR; INTRAVENOUS ONCE
Status: COMPLETED | OUTPATIENT
Start: 2023-07-10 | End: 2023-07-10

## 2023-07-09 RX ORDER — HYDRALAZINE HYDROCHLORIDE 20 MG/ML
10 INJECTION INTRAMUSCULAR; INTRAVENOUS EVERY 6 HOURS PRN
Status: DISCONTINUED | OUTPATIENT
Start: 2023-07-09 | End: 2023-07-13 | Stop reason: HOSPADM

## 2023-07-09 RX ORDER — SODIUM CHLORIDE 0.9 % (FLUSH) 0.9 %
5-40 SYRINGE (ML) INJECTION PRN
Status: DISCONTINUED | OUTPATIENT
Start: 2023-07-09 | End: 2023-07-13 | Stop reason: HOSPADM

## 2023-07-09 RX ORDER — DIPHENHYDRAMINE HYDROCHLORIDE 50 MG/ML
25 INJECTION INTRAMUSCULAR; INTRAVENOUS
Status: COMPLETED | OUTPATIENT
Start: 2023-07-09 | End: 2023-07-09

## 2023-07-09 RX ORDER — ATORVASTATIN CALCIUM 10 MG/1
10 TABLET, FILM COATED ORAL DAILY
Status: DISCONTINUED | OUTPATIENT
Start: 2023-07-10 | End: 2023-07-13 | Stop reason: HOSPADM

## 2023-07-09 RX ORDER — ACETAMINOPHEN 325 MG/1
650 TABLET ORAL EVERY 6 HOURS PRN
Status: DISCONTINUED | OUTPATIENT
Start: 2023-07-09 | End: 2023-07-13 | Stop reason: HOSPADM

## 2023-07-09 RX ORDER — ONDANSETRON 4 MG/1
4 TABLET, ORALLY DISINTEGRATING ORAL EVERY 8 HOURS PRN
Status: DISCONTINUED | OUTPATIENT
Start: 2023-07-09 | End: 2023-07-13 | Stop reason: HOSPADM

## 2023-07-09 RX ORDER — METOPROLOL TARTRATE 50 MG/1
50 TABLET, FILM COATED ORAL 2 TIMES DAILY
Status: DISCONTINUED | OUTPATIENT
Start: 2023-07-09 | End: 2023-07-13 | Stop reason: HOSPADM

## 2023-07-09 RX ORDER — ASPIRIN 81 MG/1
81 TABLET, CHEWABLE ORAL DAILY
Status: DISCONTINUED | OUTPATIENT
Start: 2023-07-09 | End: 2023-07-10

## 2023-07-09 RX ORDER — ACETAMINOPHEN 650 MG/1
650 SUPPOSITORY RECTAL EVERY 6 HOURS PRN
Status: DISCONTINUED | OUTPATIENT
Start: 2023-07-09 | End: 2023-07-13 | Stop reason: HOSPADM

## 2023-07-09 RX ORDER — ONDANSETRON 2 MG/ML
4 INJECTION INTRAMUSCULAR; INTRAVENOUS EVERY 6 HOURS PRN
Status: DISCONTINUED | OUTPATIENT
Start: 2023-07-09 | End: 2023-07-13 | Stop reason: HOSPADM

## 2023-07-09 RX ORDER — TIZANIDINE 4 MG/1
4 TABLET ORAL NIGHTLY
Status: DISCONTINUED | OUTPATIENT
Start: 2023-07-09 | End: 2023-07-13 | Stop reason: HOSPADM

## 2023-07-09 RX ORDER — IPRATROPIUM BROMIDE AND ALBUTEROL SULFATE 2.5; .5 MG/3ML; MG/3ML
1 SOLUTION RESPIRATORY (INHALATION) EVERY 4 HOURS PRN
Status: DISCONTINUED | OUTPATIENT
Start: 2023-07-09 | End: 2023-07-13 | Stop reason: HOSPADM

## 2023-07-09 RX ORDER — SODIUM CHLORIDE 9 MG/ML
INJECTION, SOLUTION INTRAVENOUS PRN
Status: DISCONTINUED | OUTPATIENT
Start: 2023-07-09 | End: 2023-07-13 | Stop reason: HOSPADM

## 2023-07-09 RX ORDER — GABAPENTIN 300 MG/1
300 CAPSULE ORAL DAILY
Status: DISCONTINUED | OUTPATIENT
Start: 2023-07-09 | End: 2023-07-09

## 2023-07-09 RX ORDER — SODIUM CHLORIDE 0.9 % (FLUSH) 0.9 %
5-40 SYRINGE (ML) INJECTION EVERY 12 HOURS SCHEDULED
Status: DISCONTINUED | OUTPATIENT
Start: 2023-07-09 | End: 2023-07-13 | Stop reason: HOSPADM

## 2023-07-09 RX ORDER — POLYETHYLENE GLYCOL 3350 17 G/17G
17 POWDER, FOR SOLUTION ORAL DAILY PRN
Status: DISCONTINUED | OUTPATIENT
Start: 2023-07-09 | End: 2023-07-13 | Stop reason: HOSPADM

## 2023-07-09 RX ORDER — HEPARIN SODIUM 10000 [USP'U]/100ML
11-30 INJECTION, SOLUTION INTRAVENOUS CONTINUOUS
Status: DISCONTINUED | OUTPATIENT
Start: 2023-07-09 | End: 2023-07-10

## 2023-07-09 RX ADMIN — LOSARTAN POTASSIUM 100 MG: 50 TABLET, FILM COATED ORAL at 20:55

## 2023-07-09 RX ADMIN — ASPIRIN 325 MG: 325 TABLET ORAL at 16:54

## 2023-07-09 RX ADMIN — ARFORMOTEROL TARTRATE: 15 SOLUTION RESPIRATORY (INHALATION) at 22:03

## 2023-07-09 RX ADMIN — HEPARIN SODIUM 11 UNITS/KG/HR: 10000 INJECTION, SOLUTION INTRAVENOUS at 16:54

## 2023-07-09 RX ADMIN — HYDRALAZINE HYDROCHLORIDE 10 MG: 20 INJECTION INTRAMUSCULAR; INTRAVENOUS at 22:51

## 2023-07-09 RX ADMIN — HEPARIN SODIUM 4000 UNITS: 1000 INJECTION INTRAVENOUS; SUBCUTANEOUS at 16:53

## 2023-07-09 RX ADMIN — METHYLPREDNISOLONE SODIUM SUCCINATE 125 MG: 125 INJECTION, POWDER, FOR SOLUTION INTRAMUSCULAR; INTRAVENOUS at 15:52

## 2023-07-09 RX ADMIN — DIPHENHYDRAMINE HYDROCHLORIDE 25 MG: 50 INJECTION INTRAMUSCULAR; INTRAVENOUS at 15:52

## 2023-07-09 RX ADMIN — METOPROLOL TARTRATE 50 MG: 50 TABLET, FILM COATED ORAL at 20:54

## 2023-07-09 RX ADMIN — Medication 10 ML: at 23:25

## 2023-07-09 ASSESSMENT — PAIN - FUNCTIONAL ASSESSMENT: PAIN_FUNCTIONAL_ASSESSMENT: NONE - DENIES PAIN

## 2023-07-10 ENCOUNTER — APPOINTMENT (OUTPATIENT)
Facility: HOSPITAL | Age: 63
DRG: 175 | End: 2023-07-10
Attending: HOSPITALIST
Payer: MEDICARE

## 2023-07-10 ENCOUNTER — APPOINTMENT (OUTPATIENT)
Facility: HOSPITAL | Age: 63
DRG: 175 | End: 2023-07-10
Payer: MEDICARE

## 2023-07-10 LAB
ECHO AO ASC DIAM: 3.7 CM
ECHO AO ASCENDING AORTA INDEX: 1.92 CM/M2
ECHO AV AREA PEAK VELOCITY: 2.2 CM2
ECHO AV AREA VTI: 2.1 CM2
ECHO AV AREA/BSA PEAK VELOCITY: 1.1 CM2/M2
ECHO AV AREA/BSA VTI: 1.1 CM2/M2
ECHO AV MEAN GRADIENT: 5 MMHG
ECHO AV MEAN VELOCITY: 1 M/S
ECHO AV PEAK GRADIENT: 10 MMHG
ECHO AV PEAK VELOCITY: 1.5 M/S
ECHO AV VELOCITY RATIO: 0.73
ECHO AV VTI: 28 CM
ECHO BSA: 2 M2
ECHO EST RA PRESSURE: 3 MMHG
ECHO LA DIAMETER INDEX: 1.66 CM/M2
ECHO LA DIAMETER: 3.2 CM
ECHO LA VOL 2C: 41 ML (ref 22–52)
ECHO LA VOL 2C: 43 ML (ref 22–52)
ECHO LA VOL 4C: 43 ML (ref 22–52)
ECHO LA VOL 4C: 47 ML (ref 22–52)
ECHO LA VOLUME AREA LENGTH: 49 ML
ECHO LA VOLUME INDEX AREA LENGTH: 25 ML/M2 (ref 16–34)
ECHO LV EDV A2C: 57 ML
ECHO LV EDV A4C: 57 ML
ECHO LV EDV BP: 60 ML (ref 56–104)
ECHO LV EDV INDEX A4C: 30 ML/M2
ECHO LV EDV INDEX BP: 31 ML/M2
ECHO LV EDV NDEX A2C: 30 ML/M2
ECHO LV EJECTION FRACTION A2C: 64 %
ECHO LV EJECTION FRACTION A4C: 53 %
ECHO LV EJECTION FRACTION BIPLANE: 61 % (ref 55–100)
ECHO LV ESV A2C: 20 ML
ECHO LV ESV A4C: 27 ML
ECHO LV ESV BP: 23 ML (ref 19–49)
ECHO LV ESV INDEX A2C: 10 ML/M2
ECHO LV ESV INDEX A4C: 14 ML/M2
ECHO LV ESV INDEX BP: 12 ML/M2
ECHO LV FRACTIONAL SHORTENING: 31 % (ref 28–44)
ECHO LV INTERNAL DIMENSION DIASTOLE INDEX: 2.02 CM/M2
ECHO LV INTERNAL DIMENSION DIASTOLIC: 3.9 CM (ref 3.9–5.3)
ECHO LV INTERNAL DIMENSION SYSTOLIC INDEX: 1.4 CM/M2
ECHO LV INTERNAL DIMENSION SYSTOLIC: 2.7 CM
ECHO LV IVSD: 1.3 CM (ref 0.6–0.9)
ECHO LV MASS 2D: 179.7 G (ref 67–162)
ECHO LV MASS INDEX 2D: 93.1 G/M2 (ref 43–95)
ECHO LV POSTERIOR WALL DIASTOLIC: 1.3 CM (ref 0.6–0.9)
ECHO LV RELATIVE WALL THICKNESS RATIO: 0.67
ECHO LVOT AREA: 3.1 CM2
ECHO LVOT AV VTI INDEX: 0.67
ECHO LVOT DIAM: 2 CM
ECHO LVOT MEAN GRADIENT: 2 MMHG
ECHO LVOT PEAK GRADIENT: 5 MMHG
ECHO LVOT PEAK VELOCITY: 1.1 M/S
ECHO LVOT STROKE VOLUME INDEX: 30.6 ML/M2
ECHO LVOT SV: 59 ML
ECHO LVOT VTI: 18.8 CM
ECHO MV A VELOCITY: 0.6 M/S
ECHO MV AREA PHT: 3.9 CM2
ECHO MV E DECELERATION TIME (DT): 194.9 MS
ECHO MV E VELOCITY: 0.37 M/S
ECHO MV E/A RATIO: 0.62
ECHO MV PRESSURE HALF TIME (PHT): 56.5 MS
ECHO PV MAX VELOCITY: 0.6 M/S
ECHO PV PEAK GRADIENT: 2 MMHG
ECHO PVEIN A DURATION: 151.3 MS
ECHO PVEIN A VELOCITY: 0.3 M/S
ECHO PVEIN PEAK D VELOCITY: 0.2 M/S
ECHO PVEIN PEAK S VELOCITY: 0.4 M/S
ECHO PVEIN S/D RATIO: 2
ECHO RIGHT VENTRICULAR SYSTOLIC PRESSURE (RVSP): 44 MMHG
ECHO RV TAPSE: 1.6 CM (ref 1.7–?)
ECHO TV REGURGITANT MAX VELOCITY: 3.21 M/S
ECHO TV REGURGITANT PEAK GRADIENT: 41 MMHG
TROPONIN I SERPL HS-MCNC: 244 NG/L (ref 0–37)
UFH PPP CHRO-ACNC: 0.68 IU/ML

## 2023-07-10 PROCEDURE — 99222 1ST HOSP IP/OBS MODERATE 55: CPT | Performed by: SPECIALIST

## 2023-07-10 PROCEDURE — 71275 CT ANGIOGRAPHY CHEST: CPT

## 2023-07-10 PROCEDURE — 84484 ASSAY OF TROPONIN QUANT: CPT

## 2023-07-10 PROCEDURE — 2580000003 HC RX 258: Performed by: HOSPITALIST

## 2023-07-10 PROCEDURE — 6360000002 HC RX W HCPCS: Performed by: HOSPITALIST

## 2023-07-10 PROCEDURE — 94640 AIRWAY INHALATION TREATMENT: CPT

## 2023-07-10 PROCEDURE — 6360000004 HC RX CONTRAST MEDICATION: Performed by: RADIOLOGY

## 2023-07-10 PROCEDURE — 93306 TTE W/DOPPLER COMPLETE: CPT

## 2023-07-10 PROCEDURE — 2060000000 HC ICU INTERMEDIATE R&B

## 2023-07-10 PROCEDURE — 36415 COLL VENOUS BLD VENIPUNCTURE: CPT

## 2023-07-10 PROCEDURE — 6370000000 HC RX 637 (ALT 250 FOR IP): Performed by: HOSPITALIST

## 2023-07-10 PROCEDURE — 97535 SELF CARE MNGMENT TRAINING: CPT

## 2023-07-10 PROCEDURE — 97165 OT EVAL LOW COMPLEX 30 MIN: CPT

## 2023-07-10 PROCEDURE — 93970 EXTREMITY STUDY: CPT

## 2023-07-10 PROCEDURE — 85520 HEPARIN ASSAY: CPT

## 2023-07-10 PROCEDURE — 97116 GAIT TRAINING THERAPY: CPT

## 2023-07-10 PROCEDURE — 97161 PT EVAL LOW COMPLEX 20 MIN: CPT

## 2023-07-10 RX ORDER — HYDRALAZINE HYDROCHLORIDE 50 MG/1
50 TABLET, FILM COATED ORAL EVERY 8 HOURS SCHEDULED
Status: DISCONTINUED | OUTPATIENT
Start: 2023-07-10 | End: 2023-07-10

## 2023-07-10 RX ORDER — AMLODIPINE BESYLATE 5 MG/1
10 TABLET ORAL DAILY
Status: DISCONTINUED | OUTPATIENT
Start: 2023-07-10 | End: 2023-07-12

## 2023-07-10 RX ORDER — OXYCODONE HYDROCHLORIDE 5 MG/1
5 TABLET ORAL EVERY 4 HOURS PRN
Status: DISCONTINUED | OUTPATIENT
Start: 2023-07-10 | End: 2023-07-13 | Stop reason: HOSPADM

## 2023-07-10 RX ADMIN — APIXABAN 10 MG: 5 TABLET, FILM COATED ORAL at 20:58

## 2023-07-10 RX ADMIN — AMLODIPINE BESYLATE 10 MG: 5 TABLET ORAL at 15:42

## 2023-07-10 RX ADMIN — HYDRALAZINE HYDROCHLORIDE 10 MG: 20 INJECTION INTRAMUSCULAR; INTRAVENOUS at 12:47

## 2023-07-10 RX ADMIN — IOPAMIDOL 80 ML: 755 INJECTION, SOLUTION INTRAVENOUS at 06:04

## 2023-07-10 RX ADMIN — LOSARTAN POTASSIUM 100 MG: 50 TABLET, FILM COATED ORAL at 08:43

## 2023-07-10 RX ADMIN — TIZANIDINE 4 MG: 4 TABLET ORAL at 20:58

## 2023-07-10 RX ADMIN — Medication 10 ML: at 21:06

## 2023-07-10 RX ADMIN — Medication 10 ML: at 09:44

## 2023-07-10 RX ADMIN — ARFORMOTEROL TARTRATE: 15 SOLUTION RESPIRATORY (INHALATION) at 20:13

## 2023-07-10 RX ADMIN — ACETAMINOPHEN 650 MG: 325 TABLET ORAL at 12:05

## 2023-07-10 RX ADMIN — METOPROLOL TARTRATE 50 MG: 50 TABLET, FILM COATED ORAL at 20:58

## 2023-07-10 RX ADMIN — WATER 40 MG: 1 INJECTION INTRAMUSCULAR; INTRAVENOUS; SUBCUTANEOUS at 04:52

## 2023-07-10 RX ADMIN — WATER 40 MG: 1 INJECTION INTRAMUSCULAR; INTRAVENOUS; SUBCUTANEOUS at 00:17

## 2023-07-10 RX ADMIN — ARFORMOTEROL TARTRATE: 15 SOLUTION RESPIRATORY (INHALATION) at 09:44

## 2023-07-10 RX ADMIN — CLOPIDOGREL BISULFATE 75 MG: 75 TABLET ORAL at 08:43

## 2023-07-10 RX ADMIN — DIPHENHYDRAMINE HYDROCHLORIDE 50 MG: 50 INJECTION INTRAMUSCULAR; INTRAVENOUS at 04:52

## 2023-07-10 RX ADMIN — METOPROLOL TARTRATE 50 MG: 50 TABLET, FILM COATED ORAL at 08:43

## 2023-07-10 RX ADMIN — ACETAMINOPHEN 650 MG: 325 TABLET ORAL at 18:13

## 2023-07-10 RX ADMIN — APIXABAN 10 MG: 5 TABLET, FILM COATED ORAL at 09:14

## 2023-07-10 RX ADMIN — TIZANIDINE 4 MG: 4 TABLET ORAL at 00:17

## 2023-07-10 RX ADMIN — ATORVASTATIN CALCIUM 10 MG: 10 TABLET, FILM COATED ORAL at 08:43

## 2023-07-10 ASSESSMENT — PAIN SCALES - GENERAL
PAINLEVEL_OUTOF10: 6
PAINLEVEL_OUTOF10: 2
PAINLEVEL_OUTOF10: 5
PAINLEVEL_OUTOF10: 4
PAINLEVEL_OUTOF10: 6

## 2023-07-10 ASSESSMENT — PAIN DESCRIPTION - ORIENTATION
ORIENTATION: MID
ORIENTATION: MID

## 2023-07-10 ASSESSMENT — PAIN DESCRIPTION - DESCRIPTORS
DESCRIPTORS: ACHING;SHARP
DESCRIPTORS: ACHING
DESCRIPTORS: ACHING

## 2023-07-10 ASSESSMENT — PAIN DESCRIPTION - LOCATION
LOCATION: HEAD

## 2023-07-10 NOTE — ED NOTES
TRANSFER - OUT REPORT:    Verbal report given to Kaylene Slaughter RN on Philip Cadet  being transferred to  for routine progression of patient care       Report consisted of patient's Situation, Background, Assessment and   Recommendations(SBAR). Information from the following report(s) ED Encounter Summary, ED SBAR, [facility], and Recent Results was reviewed with the receiving nurse. Kinder Fall Assessment:                           Lines:   Peripheral IV 07/09/23 Right Antecubital (Active)   Site Assessment Clean, dry & intact 07/09/23 1757   Line Status Blood return noted 07/09/23 1757   Line Care Cap changed 07/09/23 1757   Phlebitis Assessment No symptoms 07/09/23 1757   Infiltration Assessment 0 07/09/23 1757   Dressing Status Clean, dry & intact; New dressing applied 07/09/23 1757   Dressing Type Transparent 07/09/23 1757        Opportunity for questions and clarification was provided.       Patient transported with:  Monitor, O2 @ 5lpm, and Tech          Lennie Fatima RN  07/10/23 8654

## 2023-07-10 NOTE — ED NOTES
Bedside and Verbal shift change report given to [address] [place] [address] (oncoming nurse) by Ishmael Harris (offgoing nurse). Report included the following information ED Encounter Summary, ED SBAR, MAR, and Recent Results.        Meg Moses RN  07/10/23 3271

## 2023-07-10 NOTE — PROGRESS NOTES
Hospitalist Progress Note  Joaquin Hedrick MD  Answering service: [phone]        Date of Service:  7/10/2023  NAME:  Sanjuana Odonnell  :  1960  MRN:  [id number]      Admission Summary:   26-year-old female H/O htn, cva on asa and plavix for stroke per pt, ex smoker. who comes in for shortness of breath. Patient states that she has been short of breath for several weeks but over the last 3 days is gotten acutely worse. She describes worsening dyspnea with any type of exertion with some chest pressure. No swelling in her extremities. Family history of cardiac disease but patient denies any  previous cardiac history. She just quitting her smoking weeks ago. Otherwise she denies other acute complaints. On arrival, patient hypoxic in the 80s requiring supplemental oxygen. .           Interval history / Subjective:   No chest pain  No sob at rest, but dyspnea after mild exertion  Had CTA this am after 12 hours premedication prep, no hives after contrast     XR CHEST (2 VW)    Result Date: 2023  Clear lungs. CTA CHEST W WO CONTRAST    Result Date: 7/10/2023  Multiple bilateral acute pulmonary emboli as detailed above. The findings were called to ED nurse Krish Serrano on 7/10/2023 at 7:04 AM by Dr. Ester Obrien.  [phone]        Assessment & Plan:      Acute hypoxia resp failure  Due to bilateral acute pulmometry emboli : multiple   On heparin drip overnight: transition to po eliquis   -echo pending  -bilat leg venous   -per pt she had her bilat knee replaced end of last year, and since then her mobility was decreased a lot, no recent long trip or procedure   -would rec at least 6 months [address], but should have further outpatient hypercoagulation work up vs cancer screen per her PCP   O2 and will reassess, may need home O2        + TROPONIN  R/o ACS  Likely due to [facility]   Cardiologist consulted: echo pending, will need

## 2023-07-10 NOTE — ED NOTES
Patient resting in bed comfortably with no complaints at this time, call bell within reach.  Will continue to monitor      Janet Benavides RN  07/10/23 0136

## 2023-07-10 NOTE — ED NOTES
Bedside and Verbal shift change report given to Deisi Archuleta RN (oncoming nurse) by Severo Watson RN (offgoing nurse). Report included the following information Nurse Handoff Report, Index, ED Encounter Summary, ED SBAR, Adult Overview, Surgery Report, Intake/Output, MAR, Recent Results, Med Rec Status, Alarm Parameters, Pre Procedure Checklist, Procedure Verification, Quality Measures, Neuro Assessment, and Event Log.        Tim Rea RN  07/09/23 1592

## 2023-07-10 NOTE — ED NOTES
Radiology contacted nursing staff to inform + PE on CTA. Charge nurse contacted nursing supervisor nursing supervisor attempting to reach  admitting physician to relay results.       Izzy Graham RN  07/10/23 1577

## 2023-07-10 NOTE — DISCHARGE INSTRUCTIONS
Download the Heart Failure Dawson Adilson: Search in your [employer] (Android) or [employer] Adilson Store ([employer]): Search for- HF Dawson Adilson.    [employer]    HF Dawson is a brand-new phone adilson that helps you track daily symptoms, vitals, mood, energy level and more. You can even add your heart failure care team members to view your data and monitor your condition at home. HF Dawson lets you:  Track symptoms, medications and more  Share health information with your health care team  Connect with others living with heart failure  Smoking Cessation Program: This is a free, phone/web based, smoking cessation program. The program is individualized to meet each patient's needs. To enroll use the link - www.quitnow[place]. org or call 4-367-HQEKBMX (1.971.514.2080) .

## 2023-07-11 ENCOUNTER — APPOINTMENT (OUTPATIENT)
Facility: HOSPITAL | Age: 63
DRG: 175 | End: 2023-07-11
Payer: MEDICARE

## 2023-07-11 LAB
ALBUMIN SERPL-MCNC: 4.1 G/DL (ref 3.5–5)
ALBUMIN/GLOB SERPL: 1.2 (ref 1.1–2.2)
ALP SERPL-CCNC: 120 U/L (ref 45–117)
ALT SERPL-CCNC: 48 U/L (ref 12–78)
ANION GAP SERPL CALC-SCNC: 7 MMOL/L (ref 5–15)
AST SERPL-CCNC: 27 U/L (ref 15–37)
BILIRUB SERPL-MCNC: 0.4 MG/DL (ref 0.2–1)
BUN SERPL-MCNC: 21 MG/DL (ref 6–20)
BUN/CREAT SERPL: 28 (ref 12–20)
CALCIUM SERPL-MCNC: 9.3 MG/DL (ref 8.5–10.1)
CHLORIDE SERPL-SCNC: 107 MMOL/L (ref 97–108)
CO2 SERPL-SCNC: 23 MMOL/L (ref 21–32)
CREAT SERPL-MCNC: 0.76 MG/DL (ref 0.55–1.02)
ERYTHROCYTE [DISTWIDTH] IN BLOOD BY AUTOMATED COUNT: 14.3 % (ref 11.5–14.5)
GLOBULIN SER CALC-MCNC: 3.3 G/DL (ref 2–4)
GLUCOSE SERPL-MCNC: 122 MG/DL (ref 65–100)
HCT VFR BLD AUTO: 47.2 % (ref 35–47)
HGB BLD-MCNC: 14.6 G/DL (ref 11.5–16)
MCH RBC QN AUTO: 30.9 PG (ref 26–34)
MCHC RBC AUTO-ENTMCNC: 30.9 G/DL (ref 30–36.5)
MCV RBC AUTO: 100 FL (ref 80–99)
NRBC # BLD: 0 K/UL (ref 0–0.01)
NRBC BLD-RTO: 0 PER 100 WBC
PLATELET # BLD AUTO: 205 K/UL (ref 150–400)
PMV BLD AUTO: 11.4 FL (ref 8.9–12.9)
POTASSIUM SERPL-SCNC: 3.6 MMOL/L (ref 3.5–5.1)
PROT SERPL-MCNC: 7.4 G/DL (ref 6.4–8.2)
RBC # BLD AUTO: 4.72 M/UL (ref 3.8–5.2)
SODIUM SERPL-SCNC: 137 MMOL/L (ref 136–145)
WBC # BLD AUTO: 16 K/UL (ref 3.6–11)

## 2023-07-11 PROCEDURE — 2580000003 HC RX 258: Performed by: HOSPITALIST

## 2023-07-11 PROCEDURE — 74176 CT ABD & PELVIS W/O CONTRAST: CPT

## 2023-07-11 PROCEDURE — 94640 AIRWAY INHALATION TREATMENT: CPT

## 2023-07-11 PROCEDURE — 2060000000 HC ICU INTERMEDIATE R&B

## 2023-07-11 PROCEDURE — 80053 COMPREHEN METABOLIC PANEL: CPT

## 2023-07-11 PROCEDURE — 85027 COMPLETE CBC AUTOMATED: CPT

## 2023-07-11 PROCEDURE — 36415 COLL VENOUS BLD VENIPUNCTURE: CPT

## 2023-07-11 PROCEDURE — 6370000000 HC RX 637 (ALT 250 FOR IP): Performed by: HOSPITALIST

## 2023-07-11 PROCEDURE — 99232 SBSQ HOSP IP/OBS MODERATE 35: CPT | Performed by: SPECIALIST

## 2023-07-11 PROCEDURE — 6360000002 HC RX W HCPCS: Performed by: HOSPITALIST

## 2023-07-11 RX ADMIN — APIXABAN 10 MG: 5 TABLET, FILM COATED ORAL at 08:38

## 2023-07-11 RX ADMIN — Medication 10 ML: at 08:39

## 2023-07-11 RX ADMIN — TIZANIDINE 4 MG: 4 TABLET ORAL at 21:41

## 2023-07-11 RX ADMIN — Medication 10 ML: at 21:42

## 2023-07-11 RX ADMIN — ARFORMOTEROL TARTRATE: 15 SOLUTION RESPIRATORY (INHALATION) at 09:19

## 2023-07-11 RX ADMIN — APIXABAN 10 MG: 5 TABLET, FILM COATED ORAL at 21:41

## 2023-07-11 RX ADMIN — ATORVASTATIN CALCIUM 10 MG: 10 TABLET, FILM COATED ORAL at 08:38

## 2023-07-11 RX ADMIN — METOPROLOL TARTRATE 50 MG: 50 TABLET, FILM COATED ORAL at 21:41

## 2023-07-11 RX ADMIN — LOSARTAN POTASSIUM 100 MG: 50 TABLET, FILM COATED ORAL at 08:38

## 2023-07-11 RX ADMIN — CLOPIDOGREL BISULFATE 75 MG: 75 TABLET ORAL at 08:37

## 2023-07-11 RX ADMIN — METOPROLOL TARTRATE 50 MG: 50 TABLET, FILM COATED ORAL at 08:38

## 2023-07-11 RX ADMIN — ARFORMOTEROL TARTRATE: 15 SOLUTION RESPIRATORY (INHALATION) at 19:33

## 2023-07-11 RX ADMIN — AMLODIPINE BESYLATE 10 MG: 5 TABLET ORAL at 08:38

## 2023-07-11 ASSESSMENT — PAIN SCALES - GENERAL
PAINLEVEL_OUTOF10: 0
PAINLEVEL_OUTOF10: 3

## 2023-07-11 ASSESSMENT — PAIN DESCRIPTION - ORIENTATION: ORIENTATION: LEFT;RIGHT

## 2023-07-11 ASSESSMENT — PAIN DESCRIPTION - LOCATION: LOCATION: KNEE

## 2023-07-11 ASSESSMENT — PAIN DESCRIPTION - DESCRIPTORS: DESCRIPTORS: ACHING

## 2023-07-11 NOTE — PROGRESS NOTES
Bedside shift change report given to [address] (oncoming nurse) by Jayda Colorado RN  (offgoing nurse). Report included the following information Nurse Handoff Report, Intake/Output, MAR, and Recent Results.

## 2023-07-11 NOTE — PROGRESS NOTES
[address] [facility]  Cardiology Care Note                  [x]Initial visit     []Established visit     Patient Name: Jeannette Land - LIL:[birth year] - ERL:[id number]  Primary Cardiologist: Jose Lamar MD  Consulting Cardiologist: Marixa Chilel MD     Reason for initial visit: SOB, elevated troponin    HPI:   Patient is a 57year old female with a medical hx significant for HTN, GERD, CVA, Bipolar disorder, and obesity. She presented to ED with c/o worsening sob. She was hypoxic in the 80s and hypertensive on presentation. Labs showed , K 4.0, Creatinine 0.87, ProBNP 2203, HS troponin 204,492,229. CTA chest showing multiple steve PE.       SUBJECTIVE:  Denies CP, SOB, palpitations, lightheadedness or edema. Assessment and Plan   Patient seen on the day of progress note and examined  and agree with Advance Practice Provider (ELHAM, NP,PA)  assessment and plans. Echo noted with preserved ejection fraction no gross regional wall motion abnormalities. Suspect elevated troponin related to pulmonary embolism. Eliquis has been started. This will be deferred to primary team.    For now no additional cardiac interventions in the patient she will be proceeding with outpatient stress test once settled from the pulmonary embolism standpoint. Unclear etiology of pulmonary embolism hypercoagulable work-up potential as per primary team.    I will sign off for now remain available as needed. Elevated troponin: denies CP or SOB today. EKG SR with nonspecific T wave changes. CTA chest shows multiple steve PE. Echo showed preserved EF, no valvular disease of significance. No ischemic work up while inpt. Can follow up in office to discuss stress testing. Will arrange for office appt. Multiple PE: pt with PE on CTA. Vascular duplex of legs neg for DVT on preliminary report. Started on Eliquis. ASA stopped, Plavix cont.  Management

## 2023-07-11 NOTE — PROGRESS NOTES
Hospitalist Progress Note  Bartolo Wyatt MD  Answering service: [phone]        Date of Service:  2023  NAME:  Juancarlos Hays  :  1960  MRN:  [id number]      Admission Summary:   78-year-old female H/O htn, cva on asa and plavix for stroke per pt, ex smoker. who comes in for shortness of breath. Patient states that she has been short of breath for several weeks but over the last 3 days is gotten acutely worse. She describes worsening dyspnea with any type of exertion with some chest pressure. No swelling in her extremities. Family history of cardiac disease but patient denies any  previous cardiac history. She just quitting her smoking weeks ago. Otherwise she denies other acute complaints. On arrival, patient hypoxic in the 80s requiring supplemental oxygen. .           Interval history / Subjective:   No chest pain  No sob at rest, but dyspnea after mild exertion   Ambulating 4l O2 sao2 91%. Reported mild nose bleeding this morning, also had smear of blood around vaginal area while wipe today       Assessment & Plan:      Acute hypoxia resp failure  Due to bilateral acute pulmometry emboli : multiple   On heparin drip overnight: transition to po eliquis   -echo normal EF, normal RV.   -bilat leg venous pending.   -per pt she had her bilat knee replaced end of last year, and since then her mobility was decreased a lot, no recent long trip or procedure   -would rec at least 6 months [address], but should have further outpatient hypercoagulation work up vs cancer screen per her PCP   May have undiagnosed copd which worse her hypoxia   O2 and will reassess, likely  need home O2   Nebs     Nose bleeding: stopped now, changed to humidify O2   ?  Very minimal Vaginal bleeding vs skin irritation/bleeding   -hold plavix now  Continue eliquis  Monitor  cbc  Would consider abd/pelvic CT to eval possible gyn malignancy

## 2023-07-11 NOTE — CONSULTS
[address] [facility]  Cardiology Care Note                  [x]Initial visit     []Established visit     Patient Name: Janeth Villalobos [facility]:[birth year] - EOQ:[id number]  Primary Cardiologist: Clair Green MD  Consulting Cardiologist: Diane Clay MD     Reason for initial visit: SOB, elevated troponin    HPI:   Patient is a 57year old female with a medical hx significant for HTN, GERD, CVA, Bipolar disorder, and obesity. She presented to ED with c/o worsening sob. She was hypoxic in the 80s and hypertensive on presentation. Labs showed , K 4.0, Creatinine 0.87, ProBNP 2203, HS troponin 616,563,228. CTA chest showing multiple steve PE.       SUBJECTIVE:  Patient reports she was becoming more sob and decided to come to ED. She had some pressure in her chest with taking a deep breath. Denies palpitations, orthopnea, PND or lightheadedness. Assessment and Plan   Patient seen on the day of progress note and examined  and agree with Advance Practice Provider (ELHAM, NP,PA)  assessment and plans. Mostly patient complained of shortness of breath no chest pain reported. EKG noted with normal sinus rhythm T wave abnormalities in inferior and anterior leads. Troponin also noted with a peak of 673 then \"flattening\" approximate 244. Suspect elevation in troponin related to right ventricular strain for pulmonary embolism. Proceed with echocardiogram.    If no particular issues from the echo she will need a stress test as an outpatient after pulm embolism is settled down given also her risk factors and particularly long history of tobacco abuse. Elevated troponin: pt presented to ED with c/o worsening SOB/BRENNAN with chest pressure. She was hypoxic and hypertensive on presentation. EKG SR with nonspecific T wave changes. CTA chest shows multiple steve PE. Echo has been ordered and is pending completion.  No ischemic work up at this
normal chest rise b/l    HEART:  RRR, no murmurs/rubs/gallops    Lungs:  CTA b/l, no rhonchi/crackles/wheeze, diminished BS at bases    ABD: Soft/NT, non rigid mildly distended    EXT: No cyanosis/clubbing/edema, normal peripheral pulses    Skin: No rashes or ulcers, no mottling    Neuro: A/O x 3        Medications:  Current Facility-Administered Medications   Medication Dose Route Frequency    apixaban (ELIQUIS) tablet 10 mg  10 mg Oral BID    Followed by    Abe Mejia ON 7/17/2023] apixaban (ELIQUIS) tablet 5 mg  5 mg Oral BID    oxyCODONE (ROXICODONE) immediate release tablet 5 mg  5 mg Oral Q4H PRN    amLODIPine (NORVASC) tablet 10 mg  10 mg Oral Daily    sodium chloride flush 0.9 % injection 5-40 mL  5-40 mL IntraVENous 2 times per day    sodium chloride flush 0.9 % injection 5-40 mL  5-40 mL IntraVENous PRN    0.9 % sodium chloride infusion   IntraVENous PRN    ondansetron (ZOFRAN-ODT) disintegrating tablet 4 mg  4 mg Oral Q8H PRN    Or    ondansetron (ZOFRAN) injection 4 mg  4 mg IntraVENous Q6H PRN    polyethylene glycol (GLYCOLAX) packet 17 g  17 g Oral Daily PRN    acetaminophen (TYLENOL) tablet 650 mg  650 mg Oral Q6H PRN    Or    acetaminophen (TYLENOL) suppository 650 mg  650 mg Rectal Q6H PRN    ipratropium 0.5 mg-albuterol 2.5 mg (DUONEB) nebulizer solution 1 Dose  1 Dose Inhalation Q4H PRN    arformoterol 15 mcg-budesonide 0.5 mg neb solution   Nebulization BID    metoprolol tartrate (LOPRESSOR) tablet 50 mg  50 mg Oral BID    losartan (COZAAR) tablet 100 mg  100 mg Oral Daily    hydrALAZINE (APRESOLINE) injection 10 mg  10 mg IntraVENous Q6H PRN    atorvastatin (LIPITOR) tablet 10 mg  10 mg Oral Daily    [Held by provider] clopidogrel (PLAVIX) tablet 75 mg  75 mg Oral Daily    tiZANidine (ZANAFLEX) tablet 4 mg  4 mg Oral Nightly       Labs:  ABG Invalid input(s): PHI, PCO2I, PO2I, HCO3I, SO2I, FIO2I     CBC Recent Labs     07/09/23  1255 07/11/23  0517   WBC 5.3 16.0*   HGB 15.1 14.6   HCT 46.1 47.2*

## 2023-07-11 NOTE — CARE COORDINATION
Care Management Initial Assessment       RUR: 7% Low  Readmission? No  1st IM letter given? 7/11/23  1st  letter given: NA    Patient presented to the ED on 7/9/23 with shortness of breath. Patient has history of hypertension and stroke. Patient lives alone and is independent at baseline. She is currently on 3L oxygen-baseline room air. Patient has no home health or rehab history. Anticipate DC 48 hours. Patient in agreement for home health PT/OT. CM to send referrals and follow for acceptance. CM to monitor. 07/11/23 7929   Service Assessment   Patient Orientation Alert and Oriented;Person;Place;Situation;Self   Cognition Alert   History Provided By Patient   Primary Caregiver Self   PCP Verified by CM Yes  (Dr. Randal Guillen)   Last Visit to PCP Within last 3 months   Prior Functional Level Independent in ADLs/IADLs   Current Functional Level Independent in ADLs/IADLs   Can patient return to prior living arrangement Yes   Ability to make needs known: Good   Financial Resources Medicare  (BCBS)   Social/Functional History   Lives With Alone   Type of [address]   Ambulation Assistance Independent   Transfer Assistance Independent   Discharge Planning   Type of [address] Alone   Current Services Prior To Admission None   [address]   DME Ordered? No   Type of Home Care Services OT;PT   Patient expects to be discharged to: [address] [facility] Discharge   Transition of Care Consult (CM Consult) [facility] Discharge Home Health   Confirm Follow Up Transport Self   Condition of Participation: Discharge Planning   The Plan for Transition of Care is related to the following treatment goals: Home with Jozef Blakely   The Patient and/or Patient Representative was provided with a Choice of Provider?  Patient   The Patient and/Or Patient

## 2023-07-12 LAB
ERYTHROCYTE [DISTWIDTH] IN BLOOD BY AUTOMATED COUNT: 14.1 % (ref 11.5–14.5)
HCT VFR BLD AUTO: 42.6 % (ref 35–47)
HGB BLD-MCNC: 13.5 G/DL (ref 11.5–16)
MCH RBC QN AUTO: 31 PG (ref 26–34)
MCHC RBC AUTO-ENTMCNC: 31.7 G/DL (ref 30–36.5)
MCV RBC AUTO: 97.9 FL (ref 80–99)
NRBC # BLD: 0 K/UL (ref 0–0.01)
NRBC BLD-RTO: 0 PER 100 WBC
PLATELET # BLD AUTO: 202 K/UL (ref 150–400)
PMV BLD AUTO: 11.3 FL (ref 8.9–12.9)
RBC # BLD AUTO: 4.35 M/UL (ref 3.8–5.2)
WBC # BLD AUTO: 8.2 K/UL (ref 3.6–11)

## 2023-07-12 PROCEDURE — 94640 AIRWAY INHALATION TREATMENT: CPT

## 2023-07-12 PROCEDURE — 2580000003 HC RX 258: Performed by: HOSPITALIST

## 2023-07-12 PROCEDURE — 2060000000 HC ICU INTERMEDIATE R&B

## 2023-07-12 PROCEDURE — 6370000000 HC RX 637 (ALT 250 FOR IP): Performed by: HOSPITALIST

## 2023-07-12 PROCEDURE — 94760 N-INVAS EAR/PLS OXIMETRY 1: CPT

## 2023-07-12 PROCEDURE — 97530 THERAPEUTIC ACTIVITIES: CPT

## 2023-07-12 PROCEDURE — 36415 COLL VENOUS BLD VENIPUNCTURE: CPT

## 2023-07-12 PROCEDURE — 6360000002 HC RX W HCPCS: Performed by: HOSPITALIST

## 2023-07-12 PROCEDURE — 2700000000 HC OXYGEN THERAPY PER DAY

## 2023-07-12 PROCEDURE — 85027 COMPLETE CBC AUTOMATED: CPT

## 2023-07-12 RX ORDER — GUAIFENESIN 600 MG/1
600 TABLET, EXTENDED RELEASE ORAL 2 TIMES DAILY
Status: DISCONTINUED | OUTPATIENT
Start: 2023-07-12 | End: 2023-07-13 | Stop reason: HOSPADM

## 2023-07-12 RX ORDER — FLUTICASONE PROPIONATE 50 MCG
1 SPRAY, SUSPENSION (ML) NASAL DAILY
Status: DISCONTINUED | OUTPATIENT
Start: 2023-07-12 | End: 2023-07-13 | Stop reason: HOSPADM

## 2023-07-12 RX ORDER — CALCIUM CARBONATE 500 MG/1
500 TABLET, CHEWABLE ORAL 3 TIMES DAILY PRN
Status: DISCONTINUED | OUTPATIENT
Start: 2023-07-12 | End: 2023-07-13 | Stop reason: HOSPADM

## 2023-07-12 RX ORDER — AZITHROMYCIN 250 MG/1
500 TABLET, FILM COATED ORAL DAILY
Status: DISCONTINUED | OUTPATIENT
Start: 2023-07-12 | End: 2023-07-13 | Stop reason: HOSPADM

## 2023-07-12 RX ORDER — ARFORMOTEROL TARTRATE 15 UG/2ML
15 SOLUTION RESPIRATORY (INHALATION) 2 TIMES DAILY
Status: DISCONTINUED | OUTPATIENT
Start: 2023-07-12 | End: 2023-07-13 | Stop reason: HOSPADM

## 2023-07-12 RX ORDER — BUDESONIDE 0.5 MG/2ML
0.5 INHALANT ORAL 2 TIMES DAILY
Status: DISCONTINUED | OUTPATIENT
Start: 2023-07-12 | End: 2023-07-13 | Stop reason: HOSPADM

## 2023-07-12 RX ADMIN — METOPROLOL TARTRATE 50 MG: 50 TABLET, FILM COATED ORAL at 09:41

## 2023-07-12 RX ADMIN — ATORVASTATIN CALCIUM 10 MG: 10 TABLET, FILM COATED ORAL at 09:41

## 2023-07-12 RX ADMIN — APIXABAN 10 MG: 5 TABLET, FILM COATED ORAL at 09:41

## 2023-07-12 RX ADMIN — GUAIFENESIN 600 MG: 600 TABLET, EXTENDED RELEASE ORAL at 12:12

## 2023-07-12 RX ADMIN — CALCIUM CARBONATE (ANTACID) CHEW TAB 500 MG 500 MG: 500 CHEW TAB at 15:19

## 2023-07-12 RX ADMIN — METOPROLOL TARTRATE 50 MG: 50 TABLET, FILM COATED ORAL at 21:57

## 2023-07-12 RX ADMIN — ARFORMOTEROL TARTRATE: 15 SOLUTION RESPIRATORY (INHALATION) at 08:24

## 2023-07-12 RX ADMIN — TIZANIDINE 4 MG: 4 TABLET ORAL at 21:57

## 2023-07-12 RX ADMIN — APIXABAN 10 MG: 5 TABLET, FILM COATED ORAL at 21:57

## 2023-07-12 RX ADMIN — AZITHROMYCIN MONOHYDRATE 500 MG: 250 TABLET ORAL at 12:12

## 2023-07-12 RX ADMIN — BUDESONIDE 500 MCG: 0.5 INHALANT RESPIRATORY (INHALATION) at 22:56

## 2023-07-12 RX ADMIN — GUAIFENESIN 600 MG: 600 TABLET, EXTENDED RELEASE ORAL at 21:57

## 2023-07-12 RX ADMIN — Medication 10 ML: at 09:43

## 2023-07-12 RX ADMIN — ARFORMOTEROL TARTRATE 15 MCG: 15 SOLUTION RESPIRATORY (INHALATION) at 22:56

## 2023-07-12 RX ADMIN — FLUTICASONE PROPIONATE 1 SPRAY: 50 SPRAY, METERED NASAL at 12:12

## 2023-07-12 RX ADMIN — LOSARTAN POTASSIUM 100 MG: 50 TABLET, FILM COATED ORAL at 09:40

## 2023-07-12 RX ADMIN — Medication 10 ML: at 22:03

## 2023-07-12 NOTE — PROGRESS NOTES
Hospitalist Progress Note  Carvin Schirmer, MD  Answering service: [phone]        Date of Service:  2023  NAME:  Stephen Marquez  :  1960  MRN:  [id number]      Admission Summary:   38-year-old female H/O htn, cva on asa and plavix for stroke per pt, ex smoker. who comes in for shortness of breath. Patient states that she has been short of breath for several weeks but over the last 3 days is gotten acutely worse. She describes worsening dyspnea with any type of exertion with some chest pressure. No swelling in her extremities. Family history of cardiac disease but patient denies any  previous cardiac history. She just quitting her smoking weeks ago. Otherwise she denies other acute complaints. On arrival, patient hypoxic in the 80s requiring supplemental oxygen. .           Interval history / Subjective:   No chest pain  No sob at rest, but dyspnea after mild exertion   Ambulating 4l O2 sao2 91%. No more vaginal bleeding. Felt nose congested, mild nose bleeding yesterday        Assessment & Plan:      Acute hypoxia resp failure  Due to bilateral acute pulmometry emboli : multiple   On heparin drip overnight: transition to po eliquis   -echo normal EF, normal RV.   -bilat leg venous pending.   -per pt she had her bilat knee replaced end of last year, and since then her mobility was decreased a lot, no recent long trip or procedure   -would rec at least 6 months [facility], but should have further outpatient hypercoagulation work up vs cancer screen per her PCP   May have undiagnosed copd which worse her hypoxia   O2 wean to off goal of [place]> 95%   Nebs     Nose bleeding: stopped now, changed to humidify O2   Possible sinusitis   -hold plavix now  Continue eliquis  Monitor  cbc  Start Flonase, mucinex and 5 days of oral azithromycin     ?  Very minimal Vaginal bleeding vs skin irritation/bleeding   Once only

## 2023-07-13 VITALS
SYSTOLIC BLOOD PRESSURE: 107 MMHG | OXYGEN SATURATION: 94 % | DIASTOLIC BLOOD PRESSURE: 69 MMHG | RESPIRATION RATE: 16 BRPM | TEMPERATURE: 98.2 F | HEART RATE: 70 BPM | HEIGHT: 64 IN | BODY MASS INDEX: 33.16 KG/M2 | WEIGHT: 194.22 LBS

## 2023-07-13 LAB
ERYTHROCYTE [DISTWIDTH] IN BLOOD BY AUTOMATED COUNT: 14.1 % (ref 11.5–14.5)
HCT VFR BLD AUTO: 43.8 % (ref 35–47)
HGB BLD-MCNC: 14.1 G/DL (ref 11.5–16)
MCH RBC QN AUTO: 31 PG (ref 26–34)
MCHC RBC AUTO-ENTMCNC: 32.2 G/DL (ref 30–36.5)
MCV RBC AUTO: 96.3 FL (ref 80–99)
NRBC # BLD: 0 K/UL (ref 0–0.01)
NRBC BLD-RTO: 0 PER 100 WBC
PLATELET # BLD AUTO: 214 K/UL (ref 150–400)
PMV BLD AUTO: 11.2 FL (ref 8.9–12.9)
RBC # BLD AUTO: 4.55 M/UL (ref 3.8–5.2)
WBC # BLD AUTO: 6.7 K/UL (ref 3.6–11)

## 2023-07-13 PROCEDURE — 6370000000 HC RX 637 (ALT 250 FOR IP): Performed by: HOSPITALIST

## 2023-07-13 PROCEDURE — 94640 AIRWAY INHALATION TREATMENT: CPT

## 2023-07-13 PROCEDURE — 2580000003 HC RX 258: Performed by: HOSPITALIST

## 2023-07-13 PROCEDURE — 85027 COMPLETE CBC AUTOMATED: CPT

## 2023-07-13 PROCEDURE — 6360000002 HC RX W HCPCS: Performed by: HOSPITALIST

## 2023-07-13 PROCEDURE — 94760 N-INVAS EAR/PLS OXIMETRY 1: CPT

## 2023-07-13 PROCEDURE — 36415 COLL VENOUS BLD VENIPUNCTURE: CPT

## 2023-07-13 RX ORDER — LOSARTAN POTASSIUM 100 MG/1
100 TABLET ORAL DAILY
Qty: 30 TABLET | Refills: 0 | Status: SHIPPED | OUTPATIENT
Start: 2023-07-13 | End: 2023-08-12

## 2023-07-13 RX ORDER — GUAIFENESIN 600 MG/1
600 TABLET, EXTENDED RELEASE ORAL 2 TIMES DAILY
Qty: 14 TABLET | Refills: 0 | Status: SHIPPED | OUTPATIENT
Start: 2023-07-13 | End: 2023-07-20

## 2023-07-13 RX ORDER — FLUTICASONE PROPIONATE 50 MCG
1 SPRAY, SUSPENSION (ML) NASAL DAILY
Qty: 1 EACH | Refills: 0 | Status: SHIPPED | OUTPATIENT
Start: 2023-07-13 | End: 2023-08-12

## 2023-07-13 RX ORDER — METOPROLOL TARTRATE 50 MG/1
50 TABLET, FILM COATED ORAL 2 TIMES DAILY
Qty: 60 TABLET | Refills: 0 | Status: SHIPPED | OUTPATIENT
Start: 2023-07-13 | End: 2023-08-12

## 2023-07-13 RX ORDER — AZITHROMYCIN 500 MG/1
500 TABLET, FILM COATED ORAL DAILY
Qty: 4 TABLET | Refills: 0 | Status: SHIPPED | OUTPATIENT
Start: 2023-07-13 | End: 2023-07-17

## 2023-07-13 RX ADMIN — FLUTICASONE PROPIONATE 1 SPRAY: 50 SPRAY, METERED NASAL at 08:46

## 2023-07-13 RX ADMIN — ARFORMOTEROL TARTRATE 15 MCG: 15 SOLUTION RESPIRATORY (INHALATION) at 08:17

## 2023-07-13 RX ADMIN — Medication 10 ML: at 08:45

## 2023-07-13 RX ADMIN — BUDESONIDE 500 MCG: 0.5 INHALANT RESPIRATORY (INHALATION) at 08:17

## 2023-07-13 RX ADMIN — METOPROLOL TARTRATE 50 MG: 50 TABLET, FILM COATED ORAL at 08:45

## 2023-07-13 RX ADMIN — ACETAMINOPHEN 650 MG: 325 TABLET ORAL at 11:14

## 2023-07-13 RX ADMIN — ATORVASTATIN CALCIUM 10 MG: 10 TABLET, FILM COATED ORAL at 08:45

## 2023-07-13 RX ADMIN — LOSARTAN POTASSIUM 100 MG: 50 TABLET, FILM COATED ORAL at 08:45

## 2023-07-13 RX ADMIN — APIXABAN 10 MG: 5 TABLET, FILM COATED ORAL at 08:44

## 2023-07-13 RX ADMIN — AZITHROMYCIN MONOHYDRATE 500 MG: 250 TABLET ORAL at 08:45

## 2023-07-13 RX ADMIN — GUAIFENESIN 600 MG: 600 TABLET, EXTENDED RELEASE ORAL at 08:44

## 2023-07-13 NOTE — PROGRESS NOTES
PCCM:    VSS, on room air     WBC6.7    Plan:  PE   Eliquis as directed   O2 titration above 90%    Follow up in 1-2 wks post discharge    Going home soon   Mayte Conte PA-C

## 2023-07-13 NOTE — PROGRESS NOTES
Physician Progress Note      PATIENT:               Heri Francisco  CSN #:                  [id number]  :                       1960  ADMIT DATE:       2023 1:37 PM  [facility] DATE:        2023 2:00 PM  RESPONDING  PROVIDER #:        Jayna Hitchcock MD          QUERY TEXT:    Pt admitted with PE and has CHF documented. If possible, please document in   progress notes and discharge summary further specificity regarding the type   and acuity of CHF:    The medical record reflects the following:  Risk Factors: 61yo with HTN    Clinical Indicators:  pro-BNP: 2203    7/10 Echo  -  Left? Ventricle: Hyperdynamic left ventricular systolic function with a   visually estimated EF of 65 - 70%. Left ventricle size is normal. Moderately   increased wall thickness. Normal wall motion. Diastolic dysfunction present   with normal LV EF.  -  Aortic? Valve: Trileaflet valve. H&P and DCS  Principal Problem:  - Acute congestive heart failure, unspecified heart failure type ([address])    Treatment: Echo, pro-BNP lab, losartan, Lopressor    Thank you,  Andrew Yu  [phone]  I am also available via [employer]. Options provided:  -- Acute on Chronic Diastolic CHF/HFpEF  -- Chronic Diastolic CHF/HFpEF  -- Other - I will add my own diagnosis  -- Disagree - Not applicable / Not valid  -- Disagree - Clinically unable to determine / Unknown  -- Refer to Clinical Documentation Reviewer    PROVIDER RESPONSE TEXT:    Provider disagreed with this query. Query created by: Andrew Yu on 2023 2:52 PM      QUERY TEXT:    Patient admitted with PE. Noted documentation of acute respiratory failure in   multiple notes since admission. Noted signs of hypoxia but not respiratory   distress. In order to support the diagnosis of acute respiratory failure,   please include additional clinical indicators of respiratory distress in your   documentation.   Or please document if the diagnosis of acute respiratory   failure

## 2023-07-13 NOTE — PROGRESS NOTES
Discharge instructions reviewed with patient. Patient verbalized understanding. The opportunity was given to ask questions. IV removed and telemetry discontinued.

## 2023-07-13 NOTE — PROGRESS NOTES
Attempted to schedule hospital follow up PCP appointment. ? Office /Nurse will contact the patient with appointment information. Pending patient discharge.  Monette Castleman, RN- Care Management Specialist

## 2023-07-13 NOTE — PROGRESS NOTES
Spiritual Care Partner Volunteer visited patient at [facility] in [facility] [address] [place] [address] [address] on 7/13/2023   Documented by:  Gris Wren MDIV, [facility]

## 2023-07-13 NOTE — DISCHARGE SUMMARY
Discharge Summary       PATIENT ID: Janeth King  MRN: [id number]   YOB: 1960    DATE OF ADMISSION: 7/9/2023  1:37 PM    DATE OF DISCHARGE: 07/13/23    PRIMARY CARE PROVIDER: YOLI Cox - MADALYN     ATTENDING PHYSICIAN: Jonna Ku MD   DISCHARGING PROVIDER: Jonna Ku MD    To contact this individual call [phone] and ask the  to page. If unavailable ask to be transferred the Adult Hospitalist Department. CONSULTATIONS: IP CONSULT TO CARDIOLOGY  IP CONSULT TO HOSPITALIST  IP CONSULT TO PULMONOLOGY    PROCEDURES/SURGERIES: * No surgery found *     ADMITTING DIAGNOSES & HOSPITAL COURSE:   HPI: \"57year-old female H/O htn, cva on asa and plavix for stroke per pt, ex smoker. who comes in for shortness of breath. Patient states that she has been short of breath for several weeks but over the last 3 days is gotten acutely worse. She describes worsening dyspnea with any type of exertion with some chest pressure. No swelling in her extremities. Family history of cardiac disease but patient denies any  previous cardiac history. She just quitting her smoking weeks ago. Otherwise she denies other acute complaints. On arrival, patient hypoxic in the 80s requiring supplemental oxygen. Susan Bennett  \"        DISCHARGE DIAGNOSES / PLAN:      Acute hypoxic resp failure  Due to bilateral acute pulmometry emboli : multiple   On heparin drip overnight: transition to po eliquis   -echo normal EF, normal RV.   -bilat leg venous neg  -per pt she had her bilat knee replaced end of last year, and since then her mobility was decreased a lot, no recent long trip or procedure   -would rec at least 6 months [address], but should have further outpatient hypercoagulation work up vs cancer screen per her PCP   May have undiagnosed copd which worse her hypoxia   O2 wean to off goal of [place]> 95%   Nebs   -stable on ra prior to DC     Nose bleeding: stopped now, changed to humidify O2   Possible sinusitis

## 2023-07-26 ENCOUNTER — TELEPHONE (OUTPATIENT)
Age: 63
End: 2023-07-26

## 2023-07-26 ENCOUNTER — TELEMEDICINE (OUTPATIENT)
Facility: CLINIC | Age: 63
End: 2023-07-26

## 2023-07-26 DIAGNOSIS — I10 PRIMARY HYPERTENSION: ICD-10-CM

## 2023-07-26 DIAGNOSIS — I26.99 PULMONARY EMBOLISM WITHOUT ACUTE COR PULMONALE, UNSPECIFIED CHRONICITY, UNSPECIFIED PULMONARY EMBOLISM TYPE (HCC): ICD-10-CM

## 2023-07-26 DIAGNOSIS — Z12.11 COLON CANCER SCREENING: ICD-10-CM

## 2023-07-26 DIAGNOSIS — I26.99 ACUTE PULMONARY EMBOLISM WITHOUT ACUTE COR PULMONALE, UNSPECIFIED PULMONARY EMBOLISM TYPE (HCC): ICD-10-CM

## 2023-07-26 DIAGNOSIS — Z09 HOSPITAL DISCHARGE FOLLOW-UP: Primary | ICD-10-CM

## 2023-07-26 DIAGNOSIS — Z86.73 HISTORY OF CVA (CEREBROVASCULAR ACCIDENT): ICD-10-CM

## 2023-07-26 PROBLEM — I50.9 ACUTE CONGESTIVE HEART FAILURE, UNSPECIFIED HEART FAILURE TYPE (HCC): Status: RESOLVED | Noted: 2023-07-09 | Resolved: 2023-07-26

## 2023-07-26 PROBLEM — M17.11 ARTHRITIS OF RIGHT KNEE: Status: RESOLVED | Noted: 2022-12-30 | Resolved: 2023-07-26

## 2023-07-26 PROBLEM — M24.661 ARTHROFIBROSIS OF KNEE JOINT, RIGHT: Status: RESOLVED | Noted: 2023-04-18 | Resolved: 2023-07-26

## 2023-07-26 RX ORDER — BLOOD PRESSURE TEST KIT
1 KIT MISCELLANEOUS DAILY
Qty: 1 KIT | Refills: 0 | Status: SHIPPED | OUTPATIENT
Start: 2023-07-26

## 2023-07-26 NOTE — PROGRESS NOTES
1. Have you been to the ER, urgent care clinic since your last visit? Hospitalized since your last visit? YES    2. Have you seen or consulted any other health care providers outside of the [address] [place] [address] since your last visit? Include any pap smears or colon screening.  No     Chief Complaint   Patient presents with    Follow-Up from Hospital     Blood clots in lungs         No data recorded

## 2023-07-26 NOTE — PROGRESS NOTES
Ledon Riedel (:  1960) is a Established patient, presenting virtually for evaluation of the following:    Assessment & Plan   Below is the assessment and plan developed based on review of pertinent history, physical exam, labs, studies, and medications. Home BP  Fu pulm and gave pt # to hematology    1. Hospital discharge follow-up  2. Pulmonary embolism without acute cor pulmonale, unspecified chronicity, unspecified pulmonary embolism type (HCC)  -     Rosas Valdez MD, Hematology/Oncology, [place]  3. Primary hypertension  -     Blood Pressure KIT; DAILY Starting Wed 2023, Disp-1 kit, R-0, Normal  4. History of CVA (cerebrovascular accident)  5. Colon cancer screening  -     Cologuard (Fecal DNA Colorectal Cancer Screening)    No follow-ups on file. Subjective   HPI  Review of [address] follow up    Admitted from 23 to 23 for sob  Phmx of HTN, CVA    ARF due to bilateral pulmonary emboli: transitioned from hepatrin to eliquis which she is taking  ECHO normal   Bilat LE venous duplex negatrive  Hx of bilat knee replacement last year with decreased activity since but no other apparent risk factors  Per hosp notes recc 6 mo anti-coag atleast w hypercoag aguiar  ? COPD  Quit smoking a few weeks ago  Rec outpatient stress test due to +troponin but likely due to PE  She has cardio fu    HTN: metoprol increased  Losartan added  Has not checked home BP    Holding plavix since on eliquis now    She is discharged to home, she reports feeling \"great\"    She has cardiology appt on   Pulmonary appt; pt needs to call         Objective   Patient-Reported Vitals  No data recorded     Physical Exam  [INSTRUCTIONS:  \"[x]\" Indicates a positive item  \"[]\" Indicates a negative item  -- DELETE ALL ITEMS NOT EXAMINED]    Constitutional: [x] Appears well-developed and well-nourished [x] No apparent distress      [] Abnormal -     Mental status: [x] Alert and awake  [x] Oriented to

## 2023-07-27 PROBLEM — I26.99 ACUTE PULMONARY EMBOLISM WITHOUT ACUTE COR PULMONALE (HCC): Status: ACTIVE | Noted: 2023-07-27

## 2023-07-27 RX ORDER — TIZANIDINE 4 MG/1
TABLET ORAL
Qty: 30 TABLET | Refills: 0 | Status: SHIPPED | OUTPATIENT
Start: 2023-07-27

## 2023-08-09 ENCOUNTER — TELEPHONE (OUTPATIENT)
Facility: CLINIC | Age: 63
End: 2023-08-09

## 2023-08-09 LAB — NONINV COLON CA DNA+OCC BLD SCRN STL QL: NEGATIVE

## 2023-08-09 NOTE — TELEPHONE ENCOUNTER
Pt is calling for refill for  Eloquis that was given in the hospital. Should pt continue this medication? If so pt will need refill sent to [facility] on [facility] and [place].   Pt # [phone]

## 2023-08-10 ENCOUNTER — TELEPHONE (OUTPATIENT)
Facility: CLINIC | Age: 63
End: 2023-08-10

## 2023-08-10 RX ORDER — LOSARTAN POTASSIUM 100 MG/1
100 TABLET ORAL DAILY
Qty: 90 TABLET | Refills: 0 | Status: SHIPPED | OUTPATIENT
Start: 2023-08-10 | End: 2023-11-08

## 2023-08-10 RX ORDER — METOPROLOL TARTRATE 50 MG/1
TABLET, FILM COATED ORAL
Qty: 180 TABLET | Refills: 0 | Status: SHIPPED | OUTPATIENT
Start: 2023-08-10

## 2023-08-10 RX ORDER — TIZANIDINE 4 MG/1
4 TABLET ORAL NIGHTLY
Qty: 30 TABLET | Refills: 1 | Status: SHIPPED | OUTPATIENT
Start: 2023-08-10

## 2023-08-10 NOTE — TELEPHONE ENCOUNTER
Send to [facility]        tiZANidine (ZANAFLEX) 4 MG tablet    losartan (COZAAR) 100 MG tablet    metoprolol tartrate (LOPRESSOR) 50 MG tablet

## 2023-08-15 RX ORDER — LOSARTAN POTASSIUM 100 MG/1
100 TABLET ORAL DAILY
Qty: 90 TABLET | Refills: 0 | OUTPATIENT
Start: 2023-08-15 | End: 2023-11-13

## 2023-08-15 RX ORDER — METOPROLOL TARTRATE 50 MG/1
TABLET, FILM COATED ORAL
Qty: 180 TABLET | Refills: 0 | OUTPATIENT
Start: 2023-08-15

## 2023-10-05 RX ORDER — TIZANIDINE 4 MG/1
4 TABLET ORAL
Qty: 30 TABLET | Refills: 1 | Status: SHIPPED | OUTPATIENT
Start: 2023-10-05

## 2023-10-31 RX ORDER — APIXABAN 5 MG/1
5 TABLET, FILM COATED ORAL 2 TIMES DAILY
Qty: 180 TABLET | Refills: 1 | Status: SHIPPED | OUTPATIENT
Start: 2023-10-31

## 2023-11-09 RX ORDER — ATORVASTATIN CALCIUM 10 MG/1
TABLET, FILM COATED ORAL
Qty: 90 TABLET | Refills: 1 | Status: SHIPPED | OUTPATIENT
Start: 2023-11-09

## 2023-11-27 ENCOUNTER — TELEPHONE (OUTPATIENT)
Facility: CLINIC | Age: 63
End: 2023-11-27

## 2023-11-27 RX ORDER — LOSARTAN POTASSIUM 100 MG/1
100 TABLET ORAL DAILY
Qty: 90 TABLET | Refills: 0 | Status: SHIPPED | OUTPATIENT
Start: 2023-11-27 | End: 2024-02-25

## 2023-11-27 NOTE — TELEPHONE ENCOUNTER
Patient states that she needs a refell on Losartan 100mg. She states that she's been out for 2 days and that she can not come into the office, because she has a cold and may have been exposed to covid. Thanks.

## 2023-11-30 RX ORDER — CLOPIDOGREL BISULFATE 75 MG/1
75 TABLET ORAL DAILY
Qty: 90 TABLET | Refills: 0 | Status: SHIPPED | OUTPATIENT
Start: 2023-11-30

## 2023-12-04 RX ORDER — TIZANIDINE 4 MG/1
4 TABLET ORAL
Qty: 30 TABLET | Refills: 1 | Status: SHIPPED | OUTPATIENT
Start: 2023-12-04

## 2023-12-07 ENCOUNTER — TRANSCRIBE ORDERS (OUTPATIENT)
Facility: HOSPITAL | Age: 63
End: 2023-12-07

## 2023-12-07 DIAGNOSIS — Z12.31 VISIT FOR SCREENING MAMMOGRAM: Primary | ICD-10-CM

## 2023-12-14 ENCOUNTER — HOSPITAL ENCOUNTER (OUTPATIENT)
Facility: HOSPITAL | Age: 63
Discharge: HOME | End: 2023-12-14
Payer: MEDICARE

## 2023-12-14 VITALS — HEIGHT: 64 IN | BODY MASS INDEX: 35 KG/M2 | WEIGHT: 205 LBS

## 2023-12-14 DIAGNOSIS — Z12.31 VISIT FOR SCREENING MAMMOGRAM: ICD-10-CM

## 2023-12-14 PROCEDURE — 77063 BREAST TOMOSYNTHESIS BI: CPT

## 2024-02-21 NOTE — H&P
Need to check 2 readings per day atleast ( fasting, before dinner or bedtime)   Bring logs or meter on every visit        Suzy Perry (: 1960) is a 57 y.o. female, patient, here for evaluation of the following chief complaint(s):  Surgical Follow-up (Post op follow up - [facility] 2022)        HPI:     Patient is here today for follow-up of her left total knee. She is doing well with this. Right knee is very painful for her. He is having her right knee replaced at the end of December. Allergies   Allergen Reactions    Ace Inhibitors Cough    Contrast Dye [Iodine] Hives              Current Outpatient Medications   Medication Sig    acetaminophen (TYLENOL) 325 mg tablet Take 2 Tablets by mouth every six (6) hours. naloxone (Narcan) 4 mg/actuation nasal spray Use 1 spray intranasally, then discard. Repeat with new spray every 2 min as needed for opioid overdose symptoms, alternating nostrils. rimegepant (NURTEC) 75 mg disintegrating tablet Take 1 tablet every other day, then 1 tablet p.o. as needed for severe headache, not to DC 1 dose in 24 hours    atorvastatin (LIPITOR) 10 mg tablet TAKE 1 TABLET BY MOUTH NIGHTLY FOR CHOLESTEROL    ergocalciferol (ERGOCALCIFEROL) 1,250 mcg (50,000 unit) capsule Take 1 Capsule by mouth every seven (7) days. (Patient taking differently: Take 50,000 Units by mouth every seven (7) days. TAKES ON Friday)    folic acid (FOLVITE) 1 mg tablet Take 1 Tablet by mouth in the morning.    gabapentin (NEURONTIN) 300 mg capsule Take 1 cap in am, 2 caps in pm (Patient taking differently: Take 300 mg by mouth three (3) times daily. Take 1 cap in am, 2 caps in pm)    tiZANidine (ZANAFLEX) 4 mg tablet Take 1 Tablet by mouth nightly. metoprolol tartrate (LOPRESSOR) 25 mg tablet Take 1/2 (one-half) tablet by mouth twice daily    clopidogreL (PLAVIX) 75 mg tab Take 1 Tablet by mouth daily. No current facility-administered medications for this visit.               Past Medical History:   Diagnosis Date    Anxiety      Bipolar 1 disorder ([facility] [address]      Depression      Fatigue      Frequent headaches      GERD (gastroesophageal reflux disease)      Hearing loss       PT DENIES ANY HEARING    Hypertension      Joint pain      Memory disorder       LAPSE OF MEMORY DUE TO STROKES    Muscle pain      Nerve damage       ALL OVER    Snoring      Stomach pain      Stroke ([address]       X2 OVER 10 YRS AGO UNABLE TO REMEMBER DATE               Past Surgical History:   Procedure Laterality Date    HX ADENOIDECTOMY         sweat glands    HX GYN         tubal ligation    HX TONSILLECTOMY                   Family History   Problem Relation Age of Onset    Cancer Mother      Heart Disease Father           stents late 66's    Pacemaker Father      Other Sister           MURDERED SHOT    Anesth Problems Neg Hx           Social History            Socioeconomic History    Marital status:        Spouse name: Not on file    Number of children: Not on file    Years of education: Not on file    Highest education level: Not on file   Occupational History    Not on file   Tobacco Use    Smoking status: Some Days       Types: Cigars       Passive exposure: Current    Smokeless tobacco: Never   Vaping Use    Vaping Use: Never used   Substance and Sexual Activity    Alcohol use: Yes       Comment: \"once a month\"    Drug use: Yes       Types: Marijuana    Sexual activity: Not Currently   Other Topics Concern    Not on file   Social History Narrative     ** Merged History Encounter **            Social Determinants of Health      Financial Resource Strain: Not on file   Food Insecurity: Not on file   Transportation Needs: Not on file   Physical Activity: Not on file   Stress: Not on file   Social Connections: Not on file   Intimate Partner Violence: Not on file   Housing Stability: Not on file         ROS    Positive for: Musculoskeletal  Last edited by Julio Mcdonald on 11/17/2022  9:42 AM.                Kierra Rincon: There were no vitals taken for this visit. There is no height or weight on file to calculate BMI. PHYSICAL EXAM:  On physical examination the patient's right knee has full extension flexes only to about 110 degrees. Varus alignment pain along medial joint line. Patient's left knee has well-healed incision with range of motion 0 to 95 degrees. ASSESSMENT/PLAN:  1. Primary osteoarthritis of right knee  Patient is scheduled for right total knee. She is doing well with her left knee. All questions were answered. We discussed continued conservative treatment measures versus total joint replacement. Symptoms have progressed despite conservative treatment measures outlined above and they desire to proceed with right total knee. Patient has had symptoms for over 12 months. They have decline in their activities of daily living with inability to walk long distances. There has been progressive decline in function. Discussed risks, benefits, and alternatives in detail, as well as anticipated hospital stay and course of rehabilitation. They will see their primary care physician prior to surgery. All questions answered. An electronic signature was used to authenticate this note.   --Adrianna Thurman MD

## 2024-02-26 RX ORDER — LOSARTAN POTASSIUM 100 MG/1
100 TABLET ORAL DAILY
Qty: 90 TABLET | Refills: 0 | Status: SHIPPED | OUTPATIENT
Start: 2024-02-26 | End: 2024-05-26

## 2024-05-29 ENCOUNTER — OFFICE VISIT (OUTPATIENT)
Facility: CLINIC | Age: 64
End: 2024-05-29
Payer: MEDICARE

## 2024-05-29 VITALS
OXYGEN SATURATION: 98 % | BODY MASS INDEX: 34.79 KG/M2 | RESPIRATION RATE: 18 BRPM | DIASTOLIC BLOOD PRESSURE: 87 MMHG | WEIGHT: 203.8 LBS | HEART RATE: 80 BPM | HEIGHT: 64 IN | TEMPERATURE: 98.6 F | SYSTOLIC BLOOD PRESSURE: 118 MMHG

## 2024-05-29 DIAGNOSIS — I27.82 OTHER CHRONIC PULMONARY EMBOLISM WITHOUT ACUTE COR PULMONALE (HCC): ICD-10-CM

## 2024-05-29 DIAGNOSIS — I10 PRIMARY HYPERTENSION: ICD-10-CM

## 2024-05-29 DIAGNOSIS — R53.81 PHYSICAL DECONDITIONING: ICD-10-CM

## 2024-05-29 DIAGNOSIS — M24.60 ANKYLOSIS: ICD-10-CM

## 2024-05-29 DIAGNOSIS — Z00.00 MEDICARE ANNUAL WELLNESS VISIT, SUBSEQUENT: Primary | ICD-10-CM

## 2024-05-29 DIAGNOSIS — Z78.0 POST-MENOPAUSAL: ICD-10-CM

## 2024-05-29 DIAGNOSIS — Z86.73 HISTORY OF CVA (CEREBROVASCULAR ACCIDENT): ICD-10-CM

## 2024-05-29 DIAGNOSIS — I70.90 ASVD (ARTERIOSCLEROTIC VASCULAR DISEASE): ICD-10-CM

## 2024-05-29 DIAGNOSIS — Z13.820 OSTEOPOROSIS SCREENING: ICD-10-CM

## 2024-05-29 DIAGNOSIS — I25.10 CORONARY ARTERY DISEASE INVOLVING NATIVE CORONARY ARTERY OF NATIVE HEART WITHOUT ANGINA PECTORIS: ICD-10-CM

## 2024-05-29 LAB
ANION GAP SERPL CALC-SCNC: 3 MMOL/L (ref 5–15)
BUN SERPL-MCNC: 14 MG/DL (ref 6–20)
BUN/CREAT SERPL: 19 (ref 12–20)
CALCIUM SERPL-MCNC: 9.2 MG/DL (ref 8.5–10.1)
CHLORIDE SERPL-SCNC: 112 MMOL/L (ref 97–108)
CHOLEST SERPL-MCNC: 189 MG/DL
CO2 SERPL-SCNC: 26 MMOL/L (ref 21–32)
CREAT SERPL-MCNC: 0.72 MG/DL (ref 0.55–1.02)
GLUCOSE SERPL-MCNC: 99 MG/DL (ref 65–100)
HDLC SERPL-MCNC: 70 MG/DL
HDLC SERPL: 2.7 (ref 0–5)
LDLC SERPL CALC-MCNC: 88.2 MG/DL (ref 0–100)
POTASSIUM SERPL-SCNC: 4.3 MMOL/L (ref 3.5–5.1)
SODIUM SERPL-SCNC: 141 MMOL/L (ref 136–145)
TRIGL SERPL-MCNC: 154 MG/DL
VLDLC SERPL CALC-MCNC: 30.8 MG/DL

## 2024-05-29 PROCEDURE — 3074F SYST BP LT 130 MM HG: CPT | Performed by: NURSE PRACTITIONER

## 2024-05-29 PROCEDURE — 3017F COLORECTAL CA SCREEN DOC REV: CPT | Performed by: NURSE PRACTITIONER

## 2024-05-29 PROCEDURE — G8400 PT W/DXA NO RESULTS DOC: HCPCS | Performed by: NURSE PRACTITIONER

## 2024-05-29 PROCEDURE — 3079F DIAST BP 80-89 MM HG: CPT | Performed by: NURSE PRACTITIONER

## 2024-05-29 PROCEDURE — 1090F PRES/ABSN URINE INCON ASSESS: CPT | Performed by: NURSE PRACTITIONER

## 2024-05-29 PROCEDURE — 1123F ACP DISCUSS/DSCN MKR DOCD: CPT | Performed by: NURSE PRACTITIONER

## 2024-05-29 PROCEDURE — G8417 CALC BMI ABV UP PARAM F/U: HCPCS | Performed by: NURSE PRACTITIONER

## 2024-05-29 PROCEDURE — 1036F TOBACCO NON-USER: CPT | Performed by: NURSE PRACTITIONER

## 2024-05-29 PROCEDURE — 99214 OFFICE O/P EST MOD 30 MIN: CPT | Performed by: NURSE PRACTITIONER

## 2024-05-29 PROCEDURE — G8427 DOCREV CUR MEDS BY ELIG CLIN: HCPCS | Performed by: NURSE PRACTITIONER

## 2024-05-29 PROCEDURE — G0439 PPPS, SUBSEQ VISIT: HCPCS | Performed by: NURSE PRACTITIONER

## 2024-05-29 RX ORDER — CLOPIDOGREL BISULFATE 75 MG/1
75 TABLET ORAL DAILY
Qty: 90 TABLET | Refills: 0 | Status: SHIPPED | OUTPATIENT
Start: 2024-05-29

## 2024-05-29 RX ORDER — TIZANIDINE 4 MG/1
4 TABLET ORAL 2 TIMES DAILY PRN
Qty: 30 TABLET | Refills: 1 | Status: SHIPPED | OUTPATIENT
Start: 2024-05-29

## 2024-05-29 RX ORDER — ATORVASTATIN CALCIUM 10 MG/1
TABLET, FILM COATED ORAL
Qty: 90 TABLET | Refills: 1 | Status: SHIPPED | OUTPATIENT
Start: 2024-05-29

## 2024-05-29 RX ORDER — METOPROLOL TARTRATE 50 MG/1
50 TABLET, FILM COATED ORAL 2 TIMES DAILY
Qty: 180 TABLET | Refills: 0 | Status: SHIPPED | OUTPATIENT
Start: 2024-05-29

## 2024-05-29 SDOH — ECONOMIC STABILITY: FOOD INSECURITY: WITHIN THE PAST 12 MONTHS, THE FOOD YOU BOUGHT JUST DIDN'T LAST AND YOU DIDN'T HAVE MONEY TO GET MORE.: NEVER TRUE

## 2024-05-29 SDOH — ECONOMIC STABILITY: FOOD INSECURITY: WITHIN THE PAST 12 MONTHS, YOU WORRIED THAT YOUR FOOD WOULD RUN OUT BEFORE YOU GOT MONEY TO BUY MORE.: NEVER TRUE

## 2024-05-29 SDOH — ECONOMIC STABILITY: INCOME INSECURITY: HOW HARD IS IT FOR YOU TO PAY FOR THE VERY BASICS LIKE FOOD, HOUSING, MEDICAL CARE, AND HEATING?: NOT HARD AT ALL

## 2024-05-29 SDOH — ECONOMIC STABILITY: HOUSING INSECURITY
IN THE LAST 12 MONTHS, WAS THERE A TIME WHEN YOU DID NOT HAVE A STEADY PLACE TO SLEEP OR SLEPT IN A SHELTER (INCLUDING NOW)?: NO

## 2024-05-29 ASSESSMENT — PATIENT HEALTH QUESTIONNAIRE - PHQ9
SUM OF ALL RESPONSES TO PHQ QUESTIONS 1-9: 0
SUM OF ALL RESPONSES TO PHQ9 QUESTIONS 1 & 2: 0
2. FEELING DOWN, DEPRESSED OR HOPELESS: NOT AT ALL
1. LITTLE INTEREST OR PLEASURE IN DOING THINGS: NOT AT ALL

## 2024-05-29 NOTE — PROGRESS NOTES
\"Have you been to the ER, urgent care clinic since your last visit?  Hospitalized since your last visit?\"    NO    “Have you seen or consulted any other health care providers outside of [facility] since your last visit?”    NO  /87 (Site: Right Upper Arm, Position: Sitting, Cuff Size: Large Adult)   Pulse 80   Temp 98.6 °F (37 °C)   Resp 18   Ht 1.626 m (5' 4\")   Wt 92.4 kg (203 lb 12.8 oz)   SpO2 98%   BMI 34.98 kg/m²             Click Here for Release of Records Request

## 2024-05-29 NOTE — PROGRESS NOTES
.  Subjective: (As above and below)     Chief Complaint   Patient presents with    Medicare AWV     Alysa Vizcaino is a 63 y.o. year old female who presents for     Hypertension ROS:  taking medications as instructed, no medication side effects noted, no TIAs, no chest pain on exertion, no dyspnea on exertion, no swelling of ankles    Bilateral pulmonary emboli in 2023: w reccs for 6 mo anti-coag and hypercoag aguiar, she was rf'd to heme but has not scheduled yet    Hx of elevated troponin when dx w PE, was recc outpatient stress test, she missed cards appt  denies    BP Readings from Last 3 Encounters:   05/29/24 118/87   07/13/23 107/69   04/14/23 133/80       Wt Readings from Last 3 Encounters:   05/29/24 92.4 kg (203 lb 12.8 oz)   12/14/23 93 kg (205 lb)   07/13/23 88.1 kg (194 lb 3.6 oz)     Hx of CVA: on plavix    DJD: knees, she is very sedentary and feels weak and is concerned about a possible fall    Mammo utd  DEXA not done  Cologuard utd    Reviewed PmHx, RxHx, FmHx, SocHx, AllgHx and updated in chart.  Family History   Problem Relation Age of Onset    Other Sister         MURDERED SHOT    Anesth Problems Neg Hx     Cancer Mother     Heart Disease Father         stents late 70's    Pacemaker Father        Past Medical History:   Diagnosis Date    Anxiety     Arthritis of right knee 12/30/2022    Arthrofibrosis of knee joint, right 4/18/2023    Bipolar 1 disorder (HCC)     Depression     Fatigue     Frequent headaches     GERD (gastroesophageal reflux disease)     Hearing loss     PT DENIES ANY HEARING    Hypertension     Joint pain     Memory disorder     LAPSE OF MEMORY DUE TO STROKES    Muscle pain     Nerve damage     ALL OVER    Snoring     Stomach pain     Stroke (HCC)     X2 OVER 10 YRS AGO UNABLE TO REMEMBER DATE      Social History     Socioeconomic History    Marital status:      Spouse name: None    Number of children: None    Years of education: None    Highest education level: None

## 2024-06-03 ENCOUNTER — HOSPITAL ENCOUNTER (OUTPATIENT)
Facility: HOSPITAL | Age: 64
Discharge: HOME | End: 2024-06-06
Payer: MEDICARE

## 2024-06-03 DIAGNOSIS — Z13.820 OSTEOPOROSIS SCREENING: ICD-10-CM

## 2024-06-03 DIAGNOSIS — Z78.0 POST-MENOPAUSAL: ICD-10-CM

## 2024-06-03 PROCEDURE — 77080 DXA BONE DENSITY AXIAL: CPT

## 2024-06-05 RX ORDER — LOSARTAN POTASSIUM 100 MG/1
100 TABLET ORAL DAILY
Qty: 90 TABLET | Refills: 0 | Status: SHIPPED | OUTPATIENT
Start: 2024-06-05 | End: 2024-09-03

## 2024-06-07 ENCOUNTER — HOSPITAL ENCOUNTER (OUTPATIENT)
Facility: HOSPITAL | Age: 64
Discharge: HOME | End: 2024-06-07
Payer: MEDICARE

## 2024-06-07 ENCOUNTER — HOSPITAL ENCOUNTER (OUTPATIENT)
Facility: HOSPITAL | Age: 64
End: 2024-06-07
Payer: MEDICARE

## 2024-06-07 VITALS
SYSTOLIC BLOOD PRESSURE: 135 MMHG | HEIGHT: 64 IN | DIASTOLIC BLOOD PRESSURE: 82 MMHG | BODY MASS INDEX: 34.83 KG/M2 | HEART RATE: 58 BPM | WEIGHT: 204 LBS

## 2024-06-07 DIAGNOSIS — I25.10 CORONARY ARTERY DISEASE INVOLVING NATIVE CORONARY ARTERY OF NATIVE HEART WITHOUT ANGINA PECTORIS: ICD-10-CM

## 2024-06-07 LAB
ECHO BSA: 2.04 M2
EKG DIAGNOSIS: NORMAL
STRESS BASELINE DIAS BP: 82 MMHG
STRESS BASELINE HR: 60 BPM
STRESS BASELINE SYS BP: 135 MMHG
STRESS ESTIMATED WORKLOAD: 1 METS
STRESS PEAK DIAS BP: 82 MMHG
STRESS PEAK SYS BP: 135 MMHG
STRESS PERCENT HR ACHIEVED: 54 %
STRESS POST PEAK HR: 83 BPM
STRESS RATE PRESSURE PRODUCT: NORMAL BPM*MMHG
STRESS STAGE 1 DURATION: 1 MIN:SEC
STRESS STAGE 1 HR: 59 BPM
STRESS STAGE 2 BP: NORMAL MMHG
STRESS STAGE 2 DURATION: 1 MIN:SEC
STRESS STAGE 2 HR: 71 BPM
STRESS STAGE 3 DURATION: 1 MIN:SEC
STRESS STAGE 3 HR: 82 BPM
STRESS STAGE RECOVERY 1 BP: NORMAL MMHG
STRESS STAGE RECOVERY 1 DURATION: 1 MIN:SEC
STRESS STAGE RECOVERY 1 HR: 79 BPM
STRESS STAGE RECOVERY 2 DURATION: 1 MIN:SEC
STRESS STAGE RECOVERY 2 HR: 75 BPM
STRESS TARGET HR: 155 BPM

## 2024-06-07 PROCEDURE — 3430000000 HC RX DIAGNOSTIC RADIOPHARMACEUTICAL: Performed by: NURSE PRACTITIONER

## 2024-06-07 PROCEDURE — 93017 CV STRESS TEST TRACING ONLY: CPT

## 2024-06-07 PROCEDURE — 78452 HT MUSCLE IMAGE SPECT MULT: CPT

## 2024-06-07 PROCEDURE — 6360000002 HC RX W HCPCS: Performed by: INTERNAL MEDICINE

## 2024-06-07 PROCEDURE — A9500 TC99M SESTAMIBI: HCPCS | Performed by: NURSE PRACTITIONER

## 2024-06-07 RX ORDER — TETRAKIS(2-METHOXYISOBUTYLISOCYANIDE)COPPER(I) TETRAFLUOROBORATE 1 MG/ML
10.5 INJECTION, POWDER, LYOPHILIZED, FOR SOLUTION INTRAVENOUS
Status: COMPLETED | OUTPATIENT
Start: 2024-06-07 | End: 2024-06-07

## 2024-06-07 RX ORDER — REGADENOSON 0.08 MG/ML
0.4 INJECTION, SOLUTION INTRAVENOUS
Status: COMPLETED | OUTPATIENT
Start: 2024-06-07 | End: 2024-06-07

## 2024-06-07 RX ORDER — TETRAKIS(2-METHOXYISOBUTYLISOCYANIDE)COPPER(I) TETRAFLUOROBORATE 1 MG/ML
32.5 INJECTION, POWDER, LYOPHILIZED, FOR SOLUTION INTRAVENOUS
Status: COMPLETED | OUTPATIENT
Start: 2024-06-07 | End: 2024-06-07

## 2024-06-07 RX ADMIN — TETRAKIS(2-METHOXYISOBUTYLISOCYANIDE)COPPER(I) TETRAFLUOROBORATE 32.5 MILLICURIE: 1 INJECTION, POWDER, LYOPHILIZED, FOR SOLUTION INTRAVENOUS at 09:16

## 2024-06-07 RX ADMIN — TETRAKIS(2-METHOXYISOBUTYLISOCYANIDE)COPPER(I) TETRAFLUOROBORATE 10.5 MILLICURIE: 1 INJECTION, POWDER, LYOPHILIZED, FOR SOLUTION INTRAVENOUS at 07:55

## 2024-06-07 RX ADMIN — REGADENOSON 0.4 MG: 0.08 INJECTION, SOLUTION INTRAVENOUS at 09:02

## 2024-06-10 ENCOUNTER — HOSPITAL ENCOUNTER (OUTPATIENT)
Facility: HOSPITAL | Age: 64
Setting detail: RECURRING SERIES
Discharge: HOME | End: 2024-06-13
Payer: MEDICARE

## 2024-06-10 PROCEDURE — 97110 THERAPEUTIC EXERCISES: CPT

## 2024-06-10 PROCEDURE — 97161 PT EVAL LOW COMPLEX 20 MIN: CPT

## 2024-06-10 NOTE — THERAPY EVALUATION
reviewed with PTA      Certification Period: 06/10/24 - [id number]      Jamal Maloney, PT       6/10/2024       9:54 AM        ===================================================================  I certify that the above Therapy Services are being furnished while the patient is under my care. I agree with the treatment plan and certify that this therapy is necessary.    Physician's Signature:_________________________   DATE:_________   TIME:________                           Adriana Lopez A*    ** Signature, Date and Time must be completed for valid certification **  Please sign and fax to [phone].  Thank you

## 2024-06-14 ENCOUNTER — TELEPHONE (OUTPATIENT)
Facility: CLINIC | Age: 64
End: 2024-06-14

## 2024-06-14 DIAGNOSIS — I44.0 1ST DEGREE AV BLOCK: Primary | ICD-10-CM

## 2024-06-14 NOTE — TELEPHONE ENCOUNTER
She has heme appt [facility] July 9  Reviewed stress test  She does have BRENNAN but she is being treated for PE  Will refer to cards for 1st degree AV block and risk factors

## 2024-06-14 NOTE — TELEPHONE ENCOUNTER
[facility] requesting peer to peer for patient. Needs to be done by 06/18/24.     Requests call back as soon as possible:  [phone]  [id number]

## 2024-06-27 ENCOUNTER — OFFICE VISIT (OUTPATIENT)
Age: 64
End: 2024-06-27
Payer: MEDICARE

## 2024-06-27 VITALS
BODY MASS INDEX: 34.79 KG/M2 | WEIGHT: 203.8 LBS | OXYGEN SATURATION: 97 % | HEIGHT: 64 IN | DIASTOLIC BLOOD PRESSURE: 78 MMHG | HEART RATE: 60 BPM | SYSTOLIC BLOOD PRESSURE: 130 MMHG

## 2024-06-27 DIAGNOSIS — R07.89 OTHER CHEST PAIN: Primary | ICD-10-CM

## 2024-06-27 DIAGNOSIS — I10 ESSENTIAL (PRIMARY) HYPERTENSION: ICD-10-CM

## 2024-06-27 DIAGNOSIS — E78.2 MIXED HYPERLIPIDEMIA: ICD-10-CM

## 2024-06-27 PROCEDURE — 3017F COLORECTAL CA SCREEN DOC REV: CPT | Performed by: SPECIALIST

## 2024-06-27 PROCEDURE — 3078F DIAST BP <80 MM HG: CPT | Performed by: SPECIALIST

## 2024-06-27 PROCEDURE — 1090F PRES/ABSN URINE INCON ASSESS: CPT | Performed by: SPECIALIST

## 2024-06-27 PROCEDURE — G8427 DOCREV CUR MEDS BY ELIG CLIN: HCPCS | Performed by: SPECIALIST

## 2024-06-27 PROCEDURE — 99214 OFFICE O/P EST MOD 30 MIN: CPT | Performed by: SPECIALIST

## 2024-06-27 PROCEDURE — G8399 PT W/DXA RESULTS DOCUMENT: HCPCS | Performed by: SPECIALIST

## 2024-06-27 PROCEDURE — G8417 CALC BMI ABV UP PARAM F/U: HCPCS | Performed by: SPECIALIST

## 2024-06-27 PROCEDURE — 3075F SYST BP GE 130 - 139MM HG: CPT | Performed by: SPECIALIST

## 2024-06-27 PROCEDURE — 1036F TOBACCO NON-USER: CPT | Performed by: SPECIALIST

## 2024-06-27 PROCEDURE — 1123F ACP DISCUSS/DSCN MKR DOCD: CPT | Performed by: SPECIALIST

## 2024-06-27 ASSESSMENT — PATIENT HEALTH QUESTIONNAIRE - PHQ9
1. LITTLE INTEREST OR PLEASURE IN DOING THINGS: NOT AT ALL
SUM OF ALL RESPONSES TO PHQ QUESTIONS 1-9: 0
SUM OF ALL RESPONSES TO PHQ9 QUESTIONS 1 & 2: 0
SUM OF ALL RESPONSES TO PHQ QUESTIONS 1-9: 0
2. FEELING DOWN, DEPRESSED OR HOPELESS: NOT AT ALL
SUM OF ALL RESPONSES TO PHQ QUESTIONS 1-9: 0
SUM OF ALL RESPONSES TO PHQ QUESTIONS 1-9: 0

## 2024-06-27 NOTE — PROGRESS NOTES
HISTORY OF PRESENT ILLNESS  Alysa Vizcaino is a 63 y.o. female     SUMMARY:   Patient Active Problem List   Diagnosis    Morbid obesity (HCC)    Cannabis abuse    History of CVA (cerebrovascular accident)    Positive hepatitis C antibody test    Vitamin D deficiency    ASVD (arteriosclerotic vascular disease)    HTN (hypertension)    Esophageal reflux    Primary osteoarthritis of left knee    Acute pulmonary embolism without acute cor pulmonale (HCC)    1st degree AV block            CARDIOLOGY STUDIES TO DATE:  7/23 mod lvh otherwise normal echo  6/24 negative lexiscan    Chief Complaint   Patient presents with    New Patient     Referred by Adriana VALENCIA   Denies chest pain,palpitations,swelling or wanting to harm self. Occas SOBOE    Hypertension       HPI :  She is referred back from her primary care for cardiac evaluation.  I met her first in August 2022 when she came in for preop prior to bilateral knee replacements which she underwent successfully.  Last summer she had a pulmonary embolus and has been on Eliquis ever since.  She was seen by cardiology during that admission for a slightly elevated troponin and a stress test was recommended and that was finally completed earlier this month and I have reviewed and summarized that above.  It was normal.  Other than some mild dyspnea on exertion probably from being overweight and lack of regular exercise, she has no cardiac type complaints.  Blood pressure is well-controlled and recent lipid profile looked great except for slightly elevated triglycerides.    CARDIAC ROS:   negative for chest pain, claudication, irregular heart beat, lower extremity edema, orthopnea, paroxysmal nocturnal dyspnea, and syncope    Family History   Problem Relation Age of Onset    Other Sister         MURDERED SHOT    Anesth Problems Neg Hx     Cancer Mother     Heart Disease Father         stents late 70's    Pacemaker Father        Past Medical History:   Diagnosis Date

## 2024-06-30 DIAGNOSIS — M24.60 ANKYLOSIS: ICD-10-CM

## 2024-07-01 RX ORDER — TIZANIDINE 4 MG/1
TABLET ORAL
Qty: 30 TABLET | Refills: 1 | Status: SHIPPED | OUTPATIENT
Start: 2024-07-01

## 2024-07-18 ENCOUNTER — TELEPHONE (OUTPATIENT)
Facility: CLINIC | Age: 64
End: 2024-07-18

## 2024-07-18 DIAGNOSIS — M24.60 ANKYLOSIS: ICD-10-CM

## 2024-07-18 RX ORDER — TIZANIDINE 4 MG/1
4 TABLET ORAL EVERY 8 HOURS PRN
Qty: 90 TABLET | Refills: 1 | Status: SHIPPED | OUTPATIENT
Start: 2024-07-18

## 2024-07-18 NOTE — TELEPHONE ENCOUNTER
Patient requesting refill of rx below to [facility] on [address]. Patient is wondering if quantity can be updated due to now going to PT 2x week, states legs are tight      tiZANidine (ZANAFLEX) 4 MG tablet

## 2024-08-08 ENCOUNTER — TELEPHONE (OUTPATIENT)
Facility: CLINIC | Age: 64
End: 2024-08-08

## 2024-08-08 DIAGNOSIS — R21 RASH AND NONSPECIFIC SKIN ERUPTION: Primary | ICD-10-CM

## 2024-08-26 DIAGNOSIS — I10 PRIMARY HYPERTENSION: ICD-10-CM

## 2024-08-27 RX ORDER — METOPROLOL TARTRATE 50 MG
50 TABLET ORAL 2 TIMES DAILY
Qty: 180 TABLET | Refills: 0 | Status: SHIPPED | OUTPATIENT
Start: 2024-08-27

## 2024-09-06 RX ORDER — LOSARTAN POTASSIUM 100 MG/1
100 TABLET ORAL DAILY
Qty: 90 TABLET | Refills: 0 | Status: SHIPPED | OUTPATIENT
Start: 2024-09-06 | End: 2024-12-05

## 2025-01-27 DIAGNOSIS — I10 PRIMARY HYPERTENSION: ICD-10-CM

## 2025-01-27 DIAGNOSIS — Z86.73 HISTORY OF CVA (CEREBROVASCULAR ACCIDENT): ICD-10-CM

## 2025-01-27 DIAGNOSIS — I27.82 OTHER CHRONIC PULMONARY EMBOLISM WITHOUT ACUTE COR PULMONALE (HCC): ICD-10-CM

## 2025-01-30 RX ORDER — APIXABAN 5 MG/1
5 TABLET, FILM COATED ORAL 2 TIMES DAILY
Qty: 180 TABLET | Refills: 1 | Status: SHIPPED | OUTPATIENT
Start: 2025-01-30

## 2025-01-30 RX ORDER — METOPROLOL TARTRATE 50 MG
50 TABLET ORAL 2 TIMES DAILY
Qty: 180 TABLET | Refills: 0 | Status: SHIPPED | OUTPATIENT
Start: 2025-01-30

## 2025-01-30 RX ORDER — ATORVASTATIN CALCIUM 10 MG/1
TABLET, FILM COATED ORAL
Qty: 90 TABLET | Refills: 1 | Status: SHIPPED | OUTPATIENT
Start: 2025-01-30

## 2025-05-02 DIAGNOSIS — I10 PRIMARY HYPERTENSION: ICD-10-CM

## 2025-05-05 RX ORDER — METOPROLOL TARTRATE 50 MG
50 TABLET ORAL 2 TIMES DAILY
Qty: 180 TABLET | Refills: 0 | Status: SHIPPED | OUTPATIENT
Start: 2025-05-05

## 2025-07-08 ENCOUNTER — APPOINTMENT (OUTPATIENT)
Age: 65
Setting detail: DERMATOLOGY
End: 2025-07-08

## 2025-07-08 DIAGNOSIS — L20.89 OTHER ATOPIC DERMATITIS: ICD-10-CM | Status: INADEQUATELY CONTROLLED

## 2025-07-08 PROCEDURE — ? COUNSELING

## 2025-07-08 PROCEDURE — ? TREATMENT REGIMEN

## 2025-07-08 PROCEDURE — ? PRESCRIPTION

## 2025-07-08 RX ORDER — TRIAMCINOLONE ACETONIDE 1 MG/G
OINTMENT TOPICAL
Qty: 80 | Refills: 3 | Status: ERX | COMMUNITY
Start: 2025-07-08

## 2025-07-08 RX ORDER — HYDROCORTISONE 25 MG/G
OINTMENT TOPICAL
Qty: 453.6 | Refills: 3 | Status: ERX | COMMUNITY
Start: 2025-07-08

## 2025-07-08 RX ADMIN — HYDROCORTISONE APPLY: 25 OINTMENT TOPICAL at 00:00

## 2025-07-08 RX ADMIN — TRIAMCINOLONE ACETONIDE APPLY: 1 OINTMENT TOPICAL at 00:00

## 2025-07-08 ASSESSMENT — LOCATION DETAILED DESCRIPTION DERM
LOCATION DETAILED: LEFT PROXIMAL POSTERIOR UPPER ARM
LOCATION DETAILED: PHILTRUM
LOCATION DETAILED: RIGHT ANTERIOR LATERAL DISTAL UPPER ARM
LOCATION DETAILED: RIGHT INFERIOR CENTRAL MALAR CHEEK
LOCATION DETAILED: LEFT ELBOW
LOCATION DETAILED: RIGHT VENTRAL LATERAL PROXIMAL FOREARM
LOCATION DETAILED: RIGHT LOWER CUTANEOUS LIP
LOCATION DETAILED: LEFT RADIAL DORSAL HAND
LOCATION DETAILED: LEFT LOWER CUTANEOUS LIP
LOCATION DETAILED: LEFT INFERIOR MEDIAL MALAR CHEEK

## 2025-07-08 ASSESSMENT — LOCATION SIMPLE DESCRIPTION DERM
LOCATION SIMPLE: LEFT CHEEK
LOCATION SIMPLE: UPPER LIP
LOCATION SIMPLE: RIGHT CHEEK
LOCATION SIMPLE: RIGHT FOREARM
LOCATION SIMPLE: LEFT ELBOW
LOCATION SIMPLE: RIGHT UPPER ARM
LOCATION SIMPLE: LEFT POSTERIOR UPPER ARM
LOCATION SIMPLE: LEFT HAND
LOCATION SIMPLE: RIGHT LIP
LOCATION SIMPLE: LEFT LIP

## 2025-07-08 ASSESSMENT — LOCATION ZONE DERM
LOCATION ZONE: FACE
LOCATION ZONE: HAND
LOCATION ZONE: ARM
LOCATION ZONE: LIP

## 2025-07-08 ASSESSMENT — BSA RASH: BSA RASH: 10

## 2025-07-08 ASSESSMENT — SEVERITY ASSESSMENT 2020: SEVERITY 2020: MODERATE

## 2025-07-08 ASSESSMENT — ITCH NUMERIC RATING SCALE: HOW SEVERE IS YOUR ITCHING?: 4

## 2025-07-08 NOTE — HPI: RASH
How Severe Is Your Rash?: moderate
Is This A New Presentation, Or A Follow-Up?: Rash
Additional History: Patient reports diagnosis of eczema in the past does not remember what she used to treat.

## 2025-07-08 NOTE — PROCEDURE: TREATMENT REGIMEN
Plan: lifelong h/o eczema, better as an adult, but will flare especially when warmer temperatures.  She is out of medicine at this time, needs new rx's.  Uses aquaphor for moisturizer.  Topicals work well for her in the past so will refill now.  No new topical products, no new detergents, no new clothing.
Detail Level: Zone

## 2025-07-22 ENCOUNTER — OFFICE VISIT (OUTPATIENT)
Facility: CLINIC | Age: 65
End: 2025-07-22
Payer: MEDICARE

## 2025-07-22 VITALS
RESPIRATION RATE: 18 BRPM | WEIGHT: 211.2 LBS | HEART RATE: 70 BPM | OXYGEN SATURATION: 100 % | HEIGHT: 64 IN | TEMPERATURE: 97.8 F | SYSTOLIC BLOOD PRESSURE: 125 MMHG | DIASTOLIC BLOOD PRESSURE: 84 MMHG | BODY MASS INDEX: 36.06 KG/M2

## 2025-07-22 DIAGNOSIS — I10 PRIMARY HYPERTENSION: ICD-10-CM

## 2025-07-22 DIAGNOSIS — Z11.3 SCREENING EXAMINATION FOR STD (SEXUALLY TRANSMITTED DISEASE): ICD-10-CM

## 2025-07-22 DIAGNOSIS — I26.99 ACUTE PULMONARY EMBOLISM WITHOUT ACUTE COR PULMONALE, UNSPECIFIED PULMONARY EMBOLISM TYPE (HCC): ICD-10-CM

## 2025-07-22 DIAGNOSIS — Z86.73 HISTORY OF CVA (CEREBROVASCULAR ACCIDENT): ICD-10-CM

## 2025-07-22 DIAGNOSIS — I70.90 ASVD (ARTERIOSCLEROTIC VASCULAR DISEASE): ICD-10-CM

## 2025-07-22 DIAGNOSIS — R76.8 POSITIVE HEPATITIS C ANTIBODY TEST: ICD-10-CM

## 2025-07-22 DIAGNOSIS — I10 PRIMARY HYPERTENSION: Primary | ICD-10-CM

## 2025-07-22 DIAGNOSIS — I44.0 1ST DEGREE AV BLOCK: ICD-10-CM

## 2025-07-22 PROCEDURE — 1159F MED LIST DOCD IN RCRD: CPT | Performed by: NURSE PRACTITIONER

## 2025-07-22 PROCEDURE — 1125F AMNT PAIN NOTED PAIN PRSNT: CPT | Performed by: NURSE PRACTITIONER

## 2025-07-22 PROCEDURE — 99214 OFFICE O/P EST MOD 30 MIN: CPT | Performed by: NURSE PRACTITIONER

## 2025-07-22 PROCEDURE — 3079F DIAST BP 80-89 MM HG: CPT | Performed by: NURSE PRACTITIONER

## 2025-07-22 PROCEDURE — 1123F ACP DISCUSS/DSCN MKR DOCD: CPT | Performed by: NURSE PRACTITIONER

## 2025-07-22 PROCEDURE — 3074F SYST BP LT 130 MM HG: CPT | Performed by: NURSE PRACTITIONER

## 2025-07-22 SDOH — ECONOMIC STABILITY: FOOD INSECURITY: WITHIN THE PAST 12 MONTHS, THE FOOD YOU BOUGHT JUST DIDN'T LAST AND YOU DIDN'T HAVE MONEY TO GET MORE.: NEVER TRUE

## 2025-07-22 SDOH — ECONOMIC STABILITY: FOOD INSECURITY: WITHIN THE PAST 12 MONTHS, YOU WORRIED THAT YOUR FOOD WOULD RUN OUT BEFORE YOU GOT MONEY TO BUY MORE.: NEVER TRUE

## 2025-07-22 ASSESSMENT — PATIENT HEALTH QUESTIONNAIRE - PHQ9
SUM OF ALL RESPONSES TO PHQ QUESTIONS 1-9: 0
2. FEELING DOWN, DEPRESSED OR HOPELESS: NOT AT ALL
1. LITTLE INTEREST OR PLEASURE IN DOING THINGS: NOT AT ALL

## 2025-07-22 NOTE — PROGRESS NOTES
Subjective: (As above and below)     Chief Complaint   Patient presents with    3 Month Follow-Up     Med Eval, blood wok     Alysa Vizcaino is a 64 y.o. year old female who presents for fu  Has not been here in a year    Hypertension ROS:  taking medications as instructed, no medication side effects noted, no TIAs, no chest pain on exertion, no dyspnea on exertion, no swelling of ankles    Hx of 1st degree AV block; has not seen cardiology   Denies shortness of breath    Hx of CVA tolerating statin  Hx of PE on Eliquis    Hx of hep C would like routine STI testing     BP Readings from Last 3 Encounters:   07/22/25 125/84   06/27/24 130/78   06/07/24 135/82       Wt Readings from Last 3 Encounters:   07/22/25 95.8 kg (211 lb 3.2 oz)   06/27/24 92.4 kg (203 lb 12.8 oz)   06/07/24 92.5 kg (204 lb)           Reviewed PmHx, RxHx, FmHx, SocHx, AllgHx and updated in chart.  Family History   Problem Relation Age of Onset    Other Sister         MURDERED SHOT    Anesth Problems Neg Hx     Cancer Mother     Heart Disease Father         stents late 70's    Pacemaker Father        Past Medical History:   Diagnosis Date    Anxiety     Arthritis of right knee 12/30/2022    Arthrofibrosis of knee joint, right 4/18/2023    Bipolar 1 disorder (HCC)     Depression     Fatigue     Frequent headaches     GERD (gastroesophageal reflux disease)     Hearing loss     PT DENIES ANY HEARING    Hypertension     Joint pain     Memory disorder     LAPSE OF MEMORY DUE TO STROKES    Muscle pain     Nerve damage     ALL OVER    Snoring     Stomach pain     Stroke (HCC)     X2 OVER 10 YRS AGO UNABLE TO REMEMBER DATE      Social History     Socioeconomic History    Marital status:      Spouse name: None    Number of children: None    Years of education: None    Highest education level: None   Tobacco Use    Smoking status: Former    Smokeless tobacco: Never   Substance and Sexual Activity    Alcohol use: Yes    Drug use: Yes     Types:

## 2025-07-22 NOTE — PROGRESS NOTES
Alysa Vizcaino is a 64 y.o. female  Have you been to the ER, urgent care clinic since your last visit?  Hospitalized since your last visit?   NO    Have you seen or consulted any other health care providers outside our system since your last visit?   NO           Chief Complaint   Patient presents with    3 Month Follow-Up     Med Eval, blood wok     /84 (BP Site: Left Upper Arm, Patient Position: Sitting, BP Cuff Size: Medium Adult)   Pulse 70   Temp 97.8 °F (36.6 °C) (Temporal)   Resp 18   Ht 1.626 m (5' 4\")   Wt 95.8 kg (211 lb 3.2 oz)   SpO2 100%   BMI 36.25 kg/m²

## 2025-07-24 LAB
ALBUMIN SERPL-MCNC: 4.3 G/DL (ref 3.9–4.9)
ALP SERPL-CCNC: 121 IU/L (ref 44–121)
ALT SERPL-CCNC: 12 IU/L (ref 0–32)
AST SERPL-CCNC: 16 IU/L (ref 0–40)
BILIRUB SERPL-MCNC: 0.6 MG/DL (ref 0–1.2)
BUN SERPL-MCNC: 11 MG/DL (ref 8–27)
BUN/CREAT SERPL: 16 (ref 12–28)
CALCIUM SERPL-MCNC: 9.8 MG/DL (ref 8.7–10.3)
CHLORIDE SERPL-SCNC: 102 MMOL/L (ref 96–106)
CHOLEST SERPL-MCNC: 221 MG/DL (ref 100–199)
CO2 SERPL-SCNC: 21 MMOL/L (ref 20–29)
CREAT SERPL-MCNC: 0.69 MG/DL (ref 0.57–1)
EGFRCR SERPLBLD CKD-EPI 2021: 96 ML/MIN/1.73
ERYTHROCYTE [DISTWIDTH] IN BLOOD BY AUTOMATED COUNT: 13.1 % (ref 11.7–15.4)
GLOBULIN SER CALC-MCNC: 2.5 G/DL (ref 1.5–4.5)
GLUCOSE SERPL-MCNC: 85 MG/DL (ref 70–99)
HCT VFR BLD AUTO: 47.5 % (ref 34–46.6)
HDLC SERPL-MCNC: 68 MG/DL
HGB BLD-MCNC: 14.9 G/DL (ref 11.1–15.9)
HIV 1+2 AB+HIV1 P24 AG SERPL QL IA: NON REACTIVE
LDLC SERPL CALC-MCNC: 129 MG/DL (ref 0–99)
MCH RBC QN AUTO: 31.3 PG (ref 26.6–33)
MCHC RBC AUTO-ENTMCNC: 31.4 G/DL (ref 31.5–35.7)
MCV RBC AUTO: 100 FL (ref 79–97)
PLATELET # BLD AUTO: 196 X10E3/UL (ref 150–450)
POTASSIUM SERPL-SCNC: 4.5 MMOL/L (ref 3.5–5.2)
PROT SERPL-MCNC: 6.8 G/DL (ref 6–8.5)
RBC # BLD AUTO: 4.76 X10E6/UL (ref 3.77–5.28)
SODIUM SERPL-SCNC: 138 MMOL/L (ref 134–144)
TRIGL SERPL-MCNC: 137 MG/DL (ref 0–149)
VLDLC SERPL CALC-MCNC: 24 MG/DL (ref 5–40)
WBC # BLD AUTO: 5.5 X10E3/UL (ref 3.4–10.8)

## 2025-07-25 LAB
C TRACH RRNA SPEC QL NAA+PROBE: NEGATIVE
DIAGNOSTIC IMP SPEC-IMP: NORMAL
HCV AB SERPL QL IA: REACTIVE
HCV RNA SERPL NAA+PROBE-ACNC: NORMAL IU/ML
IMP & REVIEW OF LAB RESULTS: NORMAL
N GONORRHOEA RRNA SPEC QL NAA+PROBE: NEGATIVE
REF LAB TEST REF RANGE: NORMAL
T VAGINALIS RRNA SPEC QL NAA+PROBE: NEGATIVE
TREPONEMA PALLIDUM IGG+IGM AB [PRESENCE] IN SERUM OR PLASMA BY IMMUNOASSAY: NON REACTIVE

## 2025-08-09 DIAGNOSIS — I27.82 OTHER CHRONIC PULMONARY EMBOLISM WITHOUT ACUTE COR PULMONALE (HCC): ICD-10-CM

## 2025-08-09 DIAGNOSIS — Z86.73 HISTORY OF CVA (CEREBROVASCULAR ACCIDENT): ICD-10-CM

## 2025-08-11 RX ORDER — APIXABAN 5 MG/1
5 TABLET, FILM COATED ORAL 2 TIMES DAILY
Qty: 180 TABLET | Refills: 1 | Status: SHIPPED | OUTPATIENT
Start: 2025-08-11

## 2025-08-11 RX ORDER — ATORVASTATIN CALCIUM 10 MG/1
TABLET, FILM COATED ORAL
Qty: 90 TABLET | Refills: 1 | Status: SHIPPED | OUTPATIENT
Start: 2025-08-11

## 2025-08-30 ENCOUNTER — HOSPITAL ENCOUNTER (EMERGENCY)
Facility: HOSPITAL | Age: 65
Discharge: HOME | End: 2025-08-30
Attending: STUDENT IN AN ORGANIZED HEALTH CARE EDUCATION/TRAINING PROGRAM
Payer: MEDICARE

## 2025-08-30 VITALS
HEART RATE: 65 BPM | BODY MASS INDEX: 34.83 KG/M2 | WEIGHT: 204 LBS | SYSTOLIC BLOOD PRESSURE: 117 MMHG | HEIGHT: 64 IN | OXYGEN SATURATION: 97 % | DIASTOLIC BLOOD PRESSURE: 79 MMHG | RESPIRATION RATE: 18 BRPM | TEMPERATURE: 97.5 F

## 2025-08-30 DIAGNOSIS — H61.23 BILATERAL IMPACTED CERUMEN: ICD-10-CM

## 2025-08-30 DIAGNOSIS — B96.89 ACUTE BACTERIAL RHINOSINUSITIS: Primary | ICD-10-CM

## 2025-08-30 DIAGNOSIS — J01.90 ACUTE BACTERIAL RHINOSINUSITIS: Primary | ICD-10-CM

## 2025-08-30 DIAGNOSIS — H69.93 ETD (EUSTACHIAN TUBE DYSFUNCTION), BILATERAL: ICD-10-CM

## 2025-08-30 PROCEDURE — 69209 REMOVE IMPACTED EAR WAX UNI: CPT

## 2025-08-30 PROCEDURE — 99285 EMERGENCY DEPT VISIT HI MDM: CPT

## 2025-08-30 RX ORDER — AZELASTINE 1 MG/ML
2 SPRAY, METERED NASAL 2 TIMES DAILY
Qty: 120 ML | Refills: 0 | Status: SHIPPED | OUTPATIENT
Start: 2025-08-30

## 2025-08-30 RX ORDER — ACETAMINOPHEN 500 MG
1000 TABLET ORAL EVERY 8 HOURS PRN
Qty: 60 TABLET | Refills: 0 | Status: SHIPPED | OUTPATIENT
Start: 2025-08-30

## 2025-08-30 RX ORDER — IBUPROFEN 600 MG/1
600 TABLET, FILM COATED ORAL 3 TIMES DAILY PRN
Qty: 30 TABLET | Refills: 0 | Status: SHIPPED | OUTPATIENT
Start: 2025-08-30

## 2025-08-30 RX ORDER — CETIRIZINE HYDROCHLORIDE 10 MG/1
10 TABLET ORAL DAILY
Qty: 30 TABLET | Refills: 0 | Status: SHIPPED | OUTPATIENT
Start: 2025-08-30

## 2025-08-30 ASSESSMENT — PAIN SCALES - GENERAL: PAINLEVEL_OUTOF10: 6

## 2025-08-30 ASSESSMENT — PAIN - FUNCTIONAL ASSESSMENT: PAIN_FUNCTIONAL_ASSESSMENT: 0-10

## 2025-08-30 ASSESSMENT — PAIN DESCRIPTION - DESCRIPTORS: DESCRIPTORS: ACHING

## 2025-08-30 ASSESSMENT — PAIN DESCRIPTION - LOCATION: LOCATION: EAR

## (undated) DEVICE — GLOVE SURG SZ 85 L12IN FNGR THK79MIL GRN LTX FREE

## (undated) DEVICE — T4 HOOD

## (undated) DEVICE — BLADE SAW W0.49XL3.15IN THK0.047IN CUT THK0.047IN REPL SAG

## (undated) DEVICE — SCRUB DRY SURG EZ SCRUB BRUSH PREOPERATIVE GRN

## (undated) DEVICE — SCR BN PERSONA ZIMM -D

## (undated) DEVICE — SUTURE VCRL SZ 0 L36IN ABSRB VLT L40MM CT 1/2 CIR J358H

## (undated) DEVICE — 450 ML BOTTLE OF 0.05% CHLORHEXIDINE GLUCONATE IN 99.95% STERILE WATER FOR IRRIGATION, USP AND APPLICATOR.: Brand: IRRISEPT ANTIMICROBIAL WOUND LAVAGE

## (undated) DEVICE — BANDAGE COMPR M W6INXL10YD WHT BGE VELC E MTRX HK AND LOOP

## (undated) DEVICE — HANDPIECE SET WITH BONE CLEANING TIP AND SUCTION TUBE: Brand: INTERPULSE

## (undated) DEVICE — SOLUTION IRRIG 3000ML 0.9% SOD CHL USP UROMATIC PLAS CONT

## (undated) DEVICE — GLOVE SURG SZ 65 L12IN FNGR THK94MIL STD WHT LTX FREE

## (undated) DEVICE — BLADE SAW W073XL276IN THK0031IN CUT THK0036IN REPL SAG

## (undated) DEVICE — GLOVE SURG SZ 65 L12IN FNGR THK79MIL GRN LTX FREE

## (undated) DEVICE — CONTAINER,SPECIMEN,3OZ,OR STRL: Brand: MEDLINE

## (undated) DEVICE — HEADLESS TROCHAR PIN 75MM: Brand: ZUK

## (undated) DEVICE — PADDING CAST SPEC 6INX4YD COT --

## (undated) DEVICE — TOTAL TRAY, 16FR 10ML SIL FOLEY, URN: Brand: MEDLINE

## (undated) DEVICE — SYRINGE 20ML LL S/C 50

## (undated) DEVICE — TAPE,CLOTH/SILK,CURAD,3"X10YD,LF,40/CS: Brand: CURAD

## (undated) DEVICE — PADDING CST 6IN STERILE --

## (undated) DEVICE — IASSIST PIN AND SCREW PACK

## (undated) DEVICE — TOTAL JOINT - SMH: Brand: MEDLINE INDUSTRIES, INC.

## (undated) DEVICE — ZIMMER® STERILE DISPOSABLE TOURNIQUET CUFF WITH PLC, DUAL PORT, SINGLE BLADDER, 34 IN. (86 CM)

## (undated) DEVICE — Device: Brand: JELCO

## (undated) DEVICE — SUTURE VCRL SZ 2-0 L36IN ABSRB UD L40MM CT 1/2 CIR J957H

## (undated) DEVICE — BOWL BNE CEM MIX SPAT CURET SMARTMIX CTS

## (undated) DEVICE — GLOVE SURG SZ 8 L12IN FNGR THK94MIL STD WHT LTX FREE

## (undated) DEVICE — DRAPE,EXTREMITY,89X128,STERILE: Brand: MEDLINE

## (undated) DEVICE — Device

## (undated) DEVICE — SOLUTION SURG PREP 26 CC PURPREP

## (undated) DEVICE — 4-PORT MANIFOLD: Brand: NEPTUNE 2

## (undated) DEVICE — SUTURE VCRL SZ 2 L54IN ABSRB UD L65MM TP-1 1/2 CIR J880T

## (undated) DEVICE — IASSIST KNEE SYSTEM V2 POD KIT

## (undated) DEVICE — STRYKER PERFORMANCE SERIES SAGITTAL BLADE: Brand: STRYKER PERFORMANCE SERIES

## (undated) DEVICE — DRSG POSTOP PRMSL AG 3.5X14IN

## (undated) DEVICE — BLADE SAW W083XL354IN THK0047IN CUT THK0047IN SAG FLR

## (undated) DEVICE — SYR 20ML LL STRL LF --

## (undated) DEVICE — GLOVE ORTHO 8   MSG9480

## (undated) DEVICE — PADDING CAST W6INXL4YD ST COT RAYON MICROPLEATED HIGHLY

## (undated) DEVICE — SUTURE STRATAFIX SYMMETRIC PDS + SZ 1 L18IN ABSRB VLT L48MM SXPP1A400

## (undated) DEVICE — GOWN,SIRUS,NONRNF,SETINSLV,2XL,18/CS: Brand: MEDLINE